# Patient Record
Sex: FEMALE | Race: WHITE | NOT HISPANIC OR LATINO | Employment: OTHER | URBAN - METROPOLITAN AREA
[De-identification: names, ages, dates, MRNs, and addresses within clinical notes are randomized per-mention and may not be internally consistent; named-entity substitution may affect disease eponyms.]

---

## 2021-01-01 ENCOUNTER — HOSPITAL ENCOUNTER (OUTPATIENT)
Dept: CARDIOLOGY | Facility: HOSPITAL | Age: 71
Discharge: HOME | End: 2021-10-26
Payer: COMMERCIAL

## 2021-01-01 ENCOUNTER — OFFICE VISIT (OUTPATIENT)
Dept: CARDIOLOGY | Facility: CLINIC | Age: 71
End: 2021-01-01
Payer: COMMERCIAL

## 2021-01-01 ENCOUNTER — LAB REQUISITION (OUTPATIENT)
Dept: LAB | Facility: CLINIC | Age: 71
End: 2021-01-01

## 2021-01-01 ENCOUNTER — HOSPITAL ENCOUNTER (OUTPATIENT)
Dept: CARDIAC REHAB | Facility: HOSPITAL | Age: 71
End: 2021-10-18
Attending: INTERNAL MEDICINE
Payer: COMMERCIAL

## 2021-01-01 ENCOUNTER — TELEPHONE (OUTPATIENT)
Dept: CARDIOLOGY | Facility: CLINIC | Age: 71
End: 2021-01-01
Payer: COMMERCIAL

## 2021-01-01 ENCOUNTER — HOSPITAL ENCOUNTER (OUTPATIENT)
Dept: RADIOLOGY | Facility: HOSPITAL | Age: 71
Discharge: HOME | End: 2021-12-03
Attending: INTERNAL MEDICINE | Admitting: INTERNAL MEDICINE
Payer: COMMERCIAL

## 2021-01-01 ENCOUNTER — LAB (OUTPATIENT)
Dept: CARDIOLOGY | Facility: CLINIC | Age: 71
End: 2021-01-01
Payer: COMMERCIAL

## 2021-01-01 ENCOUNTER — ALLIED HEALTH/NURSE VISIT (OUTPATIENT)
Dept: CARDIOLOGY | Facility: CLINIC | Age: 71
End: 2021-01-01

## 2021-01-01 ENCOUNTER — OFFICE VISIT (OUTPATIENT)
Dept: CARDIOLOGY | Facility: CLINIC | Age: 71
End: 2021-01-01

## 2021-01-01 VITALS
HEIGHT: 61 IN | WEIGHT: 166.6 LBS | RESPIRATION RATE: 20 BRPM | DIASTOLIC BLOOD PRESSURE: 82 MMHG | HEART RATE: 80 BPM | SYSTOLIC BLOOD PRESSURE: 152 MMHG | BODY MASS INDEX: 31.45 KG/M2

## 2021-01-01 VITALS
HEART RATE: 84 BPM | RESPIRATION RATE: 16 BRPM | SYSTOLIC BLOOD PRESSURE: 120 MMHG | BODY MASS INDEX: 30.02 KG/M2 | DIASTOLIC BLOOD PRESSURE: 70 MMHG | HEIGHT: 61 IN | WEIGHT: 159 LBS

## 2021-01-01 VITALS
RESPIRATION RATE: 16 BRPM | DIASTOLIC BLOOD PRESSURE: 84 MMHG | WEIGHT: 164 LBS | SYSTOLIC BLOOD PRESSURE: 126 MMHG | HEART RATE: 82 BPM | HEIGHT: 61 IN | BODY MASS INDEX: 30.96 KG/M2

## 2021-01-01 DIAGNOSIS — J90 PLEURAL EFFUSION ON RIGHT: ICD-10-CM

## 2021-01-01 DIAGNOSIS — Z95.2 S/P TAVR (TRANSCATHETER AORTIC VALVE REPLACEMENT): ICD-10-CM

## 2021-01-01 DIAGNOSIS — I50.32 CHRONIC HEART FAILURE WITH PRESERVED EJECTION FRACTION (H): Primary | ICD-10-CM

## 2021-01-01 DIAGNOSIS — I50.32 CHRONIC DIASTOLIC HEART FAILURE (H): Primary | ICD-10-CM

## 2021-01-01 DIAGNOSIS — R01.1 HEART MURMUR, SYSTOLIC: ICD-10-CM

## 2021-01-01 DIAGNOSIS — I35.0 SEVERE AORTIC STENOSIS: ICD-10-CM

## 2021-01-01 DIAGNOSIS — Z95.2 S/P TAVR (TRANSCATHETER AORTIC VALVE REPLACEMENT): Primary | ICD-10-CM

## 2021-01-01 DIAGNOSIS — Z09 ENCOUNTER FOR FOLLOW-UP EXAMINATION AFTER COMPLETED TREATMENT FOR CONDITIONS OTHER THAN MALIGNANT NEOPLASM: ICD-10-CM

## 2021-01-01 DIAGNOSIS — I10 HYPERTENSION, UNSPECIFIED TYPE: Primary | ICD-10-CM

## 2021-01-01 LAB
ANION GAP SERPL CALCULATED.3IONS-SCNC: 10 MMOL/L (ref 5–18)
ANION GAP SERPL CALCULATED.3IONS-SCNC: 10 MMOL/L (ref 5–18)
ANION GAP SERPL CALCULATED.3IONS-SCNC: 8 MMOL/L (ref 5–18)
ATRIAL RATE - MUSE: 82 BPM
BUN SERPL-MCNC: 12 MG/DL (ref 8–28)
BUN SERPL-MCNC: 12 MG/DL (ref 8–28)
BUN SERPL-MCNC: 9 MG/DL (ref 8–28)
CALCIUM SERPL-MCNC: 8.6 MG/DL (ref 8.5–10.5)
CALCIUM SERPL-MCNC: 8.7 MG/DL (ref 8.5–10.5)
CALCIUM SERPL-MCNC: 9.3 MG/DL (ref 8.5–10.5)
CHLORIDE BLD-SCNC: 102 MMOL/L (ref 98–107)
CHLORIDE BLD-SCNC: 103 MMOL/L (ref 98–107)
CHLORIDE BLD-SCNC: 104 MMOL/L (ref 98–107)
CO2 SERPL-SCNC: 28 MMOL/L (ref 22–31)
CO2 SERPL-SCNC: 29 MMOL/L (ref 22–31)
CO2 SERPL-SCNC: 30 MMOL/L (ref 22–31)
CREAT SERPL-MCNC: 1 MG/DL (ref 0.6–1.1)
CREAT SERPL-MCNC: 1.01 MG/DL (ref 0.6–1.1)
CREAT SERPL-MCNC: 1.1 MG/DL (ref 0.6–1.1)
DIASTOLIC BLOOD PRESSURE - MUSE: NORMAL MMHG
ERYTHROCYTE [DISTWIDTH] IN BLOOD BY AUTOMATED COUNT: 14.1 % (ref 10–15)
GFR SERPL CREATININE-BSD FRML MDRD: 50 ML/MIN/1.73M2
GFR SERPL CREATININE-BSD FRML MDRD: 55 ML/MIN/1.73M2
GFR SERPL CREATININE-BSD FRML MDRD: 56 ML/MIN/1.73M2
GLUCOSE BLD-MCNC: 101 MG/DL (ref 70–125)
GLUCOSE BLD-MCNC: 112 MG/DL (ref 70–125)
GLUCOSE BLD-MCNC: 97 MG/DL (ref 70–125)
HCT VFR BLD AUTO: 32.2 % (ref 35–47)
HGB BLD-MCNC: 10.5 G/DL (ref 11.7–15.7)
INTERPRETATION ECG - MUSE: NORMAL
LVEF ECHO: NORMAL
MCH RBC QN AUTO: 33.3 PG (ref 26.5–33)
MCHC RBC AUTO-ENTMCNC: 32.6 G/DL (ref 31.5–36.5)
MCV RBC AUTO: 102 FL (ref 78–100)
P AXIS - MUSE: 51 DEGREES
PLATELET # BLD AUTO: 103 10E3/UL (ref 150–450)
POTASSIUM BLD-SCNC: 3.6 MMOL/L (ref 3.5–5)
POTASSIUM BLD-SCNC: 4.1 MMOL/L (ref 3.5–5)
POTASSIUM BLD-SCNC: 4.5 MMOL/L (ref 3.5–5)
PR INTERVAL - MUSE: 152 MS
QRS DURATION - MUSE: 84 MS
QT - MUSE: 398 MS
QTC - MUSE: 464 MS
R AXIS - MUSE: 35 DEGREES
RBC # BLD AUTO: 3.15 10E6/UL (ref 3.8–5.2)
SODIUM SERPL-SCNC: 139 MMOL/L (ref 136–145)
SODIUM SERPL-SCNC: 141 MMOL/L (ref 136–145)
SODIUM SERPL-SCNC: 144 MMOL/L (ref 136–145)
SYSTOLIC BLOOD PRESSURE - MUSE: NORMAL MMHG
T AXIS - MUSE: -6 DEGREES
VENTRICULAR RATE- MUSE: 82 BPM
WBC # BLD AUTO: 4.3 10E3/UL (ref 4–11)

## 2021-01-01 PROCEDURE — 93306 TTE W/DOPPLER COMPLETE: CPT

## 2021-01-01 PROCEDURE — 93797 PHYS/QHP OP CAR RHAB WO ECG: CPT | Mod: XU

## 2021-01-01 PROCEDURE — 36415 COLL VENOUS BLD VENIPUNCTURE: CPT

## 2021-01-01 PROCEDURE — 71046 X-RAY EXAM CHEST 2 VIEWS: CPT

## 2021-01-01 PROCEDURE — 99214 OFFICE O/P EST MOD 30 MIN: CPT | Performed by: INTERNAL MEDICINE

## 2021-01-01 PROCEDURE — 93000 ELECTROCARDIOGRAM COMPLETE: CPT | Performed by: INTERNAL MEDICINE

## 2021-01-01 PROCEDURE — 99214 OFFICE O/P EST MOD 30 MIN: CPT | Performed by: NURSE PRACTITIONER

## 2021-01-01 PROCEDURE — 93306 TTE W/DOPPLER COMPLETE: CPT | Mod: 26 | Performed by: INTERNAL MEDICINE

## 2021-01-01 PROCEDURE — 80048 BASIC METABOLIC PNL TOTAL CA: CPT | Performed by: FAMILY MEDICINE

## 2021-01-01 PROCEDURE — 80048 BASIC METABOLIC PNL TOTAL CA: CPT | Performed by: NURSE PRACTITIONER

## 2021-01-01 PROCEDURE — 93798 PHYS/QHP OP CAR RHAB W/ECG: CPT

## 2021-01-01 PROCEDURE — 85027 COMPLETE CBC AUTOMATED: CPT

## 2021-01-01 PROCEDURE — 80048 BASIC METABOLIC PNL TOTAL CA: CPT

## 2021-01-01 PROCEDURE — 36415 COLL VENOUS BLD VENIPUNCTURE: CPT | Performed by: NURSE PRACTITIONER

## 2021-01-01 RX ORDER — HYDROXYZINE HYDROCHLORIDE 25 MG/1
25 TABLET, FILM COATED ORAL PRN
COMMUNITY
Start: 2021-01-01 | End: 2022-01-01

## 2021-01-01 RX ORDER — BUPROPION HYDROCHLORIDE 150 MG/1
1 TABLET ORAL EVERY MORNING
COMMUNITY
Start: 2021-09-13 | End: 2021-01-01

## 2021-01-01 RX ORDER — TORSEMIDE 20 MG/1
20 TABLET ORAL DAILY
Qty: 30 TABLET | Refills: 12 | Status: SHIPPED | OUTPATIENT
Start: 2021-01-01 | End: 2021-01-01

## 2021-01-01 RX ORDER — TORSEMIDE 20 MG/1
20 TABLET ORAL DAILY
Qty: 90 TABLET | Refills: 1 | Status: SHIPPED | OUTPATIENT
Start: 2021-01-01 | End: 2022-01-01

## 2021-01-01 RX ORDER — LOSARTAN POTASSIUM 25 MG/1
25 TABLET ORAL DAILY
Qty: 90 TABLET | Refills: 3 | Status: ON HOLD | OUTPATIENT
Start: 2021-01-01 | End: 2022-01-01

## 2021-01-01 ASSESSMENT — MIFFLIN-ST. JEOR
SCORE: 1198.07
SCORE: 1186.28
SCORE: 1163.6

## 2021-01-04 ENCOUNTER — HOME CARE/HOSPICE - HEALTHEAST (OUTPATIENT)
Dept: HOME HEALTH SERVICES | Facility: HOME HEALTH | Age: 71
End: 2021-01-04

## 2021-01-11 ENCOUNTER — HOME CARE/HOSPICE - HEALTHEAST (OUTPATIENT)
Dept: HOME HEALTH SERVICES | Facility: HOME HEALTH | Age: 71
End: 2021-01-11

## 2021-01-13 ENCOUNTER — HOME CARE/HOSPICE - HEALTHEAST (OUTPATIENT)
Dept: HOME HEALTH SERVICES | Facility: HOME HEALTH | Age: 71
End: 2021-01-13

## 2021-01-15 ENCOUNTER — HOME CARE/HOSPICE - HEALTHEAST (OUTPATIENT)
Dept: HOME HEALTH SERVICES | Facility: HOME HEALTH | Age: 71
End: 2021-01-15

## 2021-01-19 ENCOUNTER — HOME CARE/HOSPICE - HEALTHEAST (OUTPATIENT)
Dept: HOME HEALTH SERVICES | Facility: HOME HEALTH | Age: 71
End: 2021-01-19

## 2021-01-20 ENCOUNTER — HOME CARE/HOSPICE - HEALTHEAST (OUTPATIENT)
Dept: HOME HEALTH SERVICES | Facility: HOME HEALTH | Age: 71
End: 2021-01-20

## 2021-01-26 ENCOUNTER — RECORDS - HEALTHEAST (OUTPATIENT)
Dept: CARDIOLOGY | Facility: HOSPITAL | Age: 71
End: 2021-01-26

## 2021-01-26 ENCOUNTER — RECORDS - HEALTHEAST (OUTPATIENT)
Dept: ADMINISTRATIVE | Facility: OTHER | Age: 71
End: 2021-01-26

## 2021-01-26 DIAGNOSIS — R01.1 HEART MURMUR, SYSTOLIC: ICD-10-CM

## 2021-05-19 ENCOUNTER — RECORDS - HEALTHEAST (OUTPATIENT)
Dept: LAB | Facility: CLINIC | Age: 71
End: 2021-05-19

## 2021-05-19 LAB
CHOLEST SERPL-MCNC: 150 MG/DL
FASTING STATUS PATIENT QL REPORTED: NORMAL
HDLC SERPL-MCNC: 52 MG/DL
LDLC SERPL CALC-MCNC: 81 MG/DL
TRIGL SERPL-MCNC: 87 MG/DL
TSH SERPL DL<=0.005 MIU/L-ACNC: 1.68 UIU/ML (ref 0.3–5)

## 2021-05-24 ENCOUNTER — RECORDS - HEALTHEAST (OUTPATIENT)
Dept: ADMINISTRATIVE | Facility: CLINIC | Age: 71
End: 2021-05-24

## 2021-05-25 ENCOUNTER — RECORDS - HEALTHEAST (OUTPATIENT)
Dept: ADMINISTRATIVE | Facility: CLINIC | Age: 71
End: 2021-05-25

## 2021-05-26 ENCOUNTER — RECORDS - HEALTHEAST (OUTPATIENT)
Dept: ADMINISTRATIVE | Facility: CLINIC | Age: 71
End: 2021-05-26

## 2021-05-27 ENCOUNTER — RECORDS - HEALTHEAST (OUTPATIENT)
Dept: ADMINISTRATIVE | Facility: CLINIC | Age: 71
End: 2021-05-27

## 2021-05-27 VITALS
DIASTOLIC BLOOD PRESSURE: 70 MMHG | TEMPERATURE: 98.1 F | SYSTOLIC BLOOD PRESSURE: 122 MMHG | OXYGEN SATURATION: 94 % | HEART RATE: 81 BPM

## 2021-06-01 ENCOUNTER — RECORDS - HEALTHEAST (OUTPATIENT)
Dept: ADMINISTRATIVE | Facility: OTHER | Age: 71
End: 2021-06-01

## 2021-06-02 ENCOUNTER — AMBULATORY - HEALTHEAST (OUTPATIENT)
Dept: CARDIOLOGY | Facility: CLINIC | Age: 71
End: 2021-06-02

## 2021-06-16 PROBLEM — M25.469 KNEE EFFUSION: Status: ACTIVE | Noted: 2021-01-02

## 2021-06-25 NOTE — PROGRESS NOTES
"  ----- Message -----------------------------------------------------------------  From: Aaliyah Miranda  Sent: 6/1/2021   5:09 PM CDT  To: Garfield Leigh RN, [facility] [place]  Subject: RE: AS                                           Called and spoke to patient.  She is moving this month and would like to call me back after she gets settled.      ----- Message ------------------------------------------------------------------  From: Garfield Leigh RN  Sent: 6/1/2021   8:37 AM CDT  [facility] [place]  Subject: AS                                                 Schedule in  for AS. Echo report from [facility] will be scanned in media. (MG 46, KP 07.)    Thanks    ----- Message ------------------------------------------------------------------  From: Francine Travis  Sent: 5/28/2021  11:20 AM CDT  To: [facility] [place]  Subject: Valve consult                                    Hi Team,     We got a faxed referral from Dr. oNra Mccarthy with [facility]. States that \"the pt's echo shows normal LVEF but severe aortic stenosis. This may be contributing to Katherine's low mood and fatigue. Absolutely needs to see cardiology to discuss tx options\"    Should this pt establish with gen card or will she be a good fit for Valve Clinic?  "

## 2021-07-30 DIAGNOSIS — I35.0 AORTIC STENOSIS: Primary | ICD-10-CM

## 2021-08-02 ENCOUNTER — LAB (OUTPATIENT)
Dept: CARDIOLOGY | Facility: CLINIC | Age: 71
End: 2021-08-02
Payer: COMMERCIAL

## 2021-08-02 ENCOUNTER — ALLIED HEALTH/NURSE VISIT (OUTPATIENT)
Dept: CARDIOLOGY | Facility: CLINIC | Age: 71
End: 2021-08-02
Payer: COMMERCIAL

## 2021-08-02 ENCOUNTER — OFFICE VISIT (OUTPATIENT)
Dept: CARDIOLOGY | Facility: CLINIC | Age: 71
End: 2021-08-02
Payer: COMMERCIAL

## 2021-08-02 VITALS
RESPIRATION RATE: 16 BRPM | SYSTOLIC BLOOD PRESSURE: 124 MMHG | DIASTOLIC BLOOD PRESSURE: 82 MMHG | HEART RATE: 92 BPM | WEIGHT: 163.4 LBS | BODY MASS INDEX: 30.87 KG/M2

## 2021-08-02 DIAGNOSIS — I35.0 NONRHEUMATIC AORTIC VALVE STENOSIS: Primary | ICD-10-CM

## 2021-08-02 DIAGNOSIS — I35.0 SEVERE AORTIC STENOSIS: ICD-10-CM

## 2021-08-02 DIAGNOSIS — I35.0 AORTIC STENOSIS: Primary | ICD-10-CM

## 2021-08-02 DIAGNOSIS — I35.0 AORTIC STENOSIS: ICD-10-CM

## 2021-08-02 LAB
ALBUMIN SERPL-MCNC: 3.8 G/DL (ref 3.5–5)
ALP SERPL-CCNC: 99 U/L (ref 45–120)
ALT SERPL W P-5'-P-CCNC: 15 U/L (ref 0–45)
ANION GAP SERPL CALCULATED.3IONS-SCNC: 7 MMOL/L (ref 5–18)
AST SERPL W P-5'-P-CCNC: 34 U/L (ref 0–40)
ATRIAL RATE - MUSE: 83 BPM
BILIRUB SERPL-MCNC: 0.5 MG/DL (ref 0–1)
BUN SERPL-MCNC: 8 MG/DL (ref 8–28)
CALCIUM SERPL-MCNC: 9.2 MG/DL (ref 8.5–10.5)
CHLORIDE BLD-SCNC: 107 MMOL/L (ref 98–107)
CO2 SERPL-SCNC: 27 MMOL/L (ref 22–31)
CREAT SERPL-MCNC: 0.9 MG/DL (ref 0.6–1.1)
DIASTOLIC BLOOD PRESSURE - MUSE: NORMAL MMHG
ERYTHROCYTE [DISTWIDTH] IN BLOOD BY AUTOMATED COUNT: 14.9 % (ref 10–15)
GFR SERPL CREATININE-BSD FRML MDRD: 64 ML/MIN/1.73M2
GLUCOSE BLD-MCNC: 94 MG/DL (ref 70–125)
HCT VFR BLD AUTO: 34.6 % (ref 35–47)
HGB BLD-MCNC: 11.5 G/DL (ref 11.7–15.7)
INTERPRETATION ECG - MUSE: NORMAL
MCH RBC QN AUTO: 33.8 PG (ref 26.5–33)
MCHC RBC AUTO-ENTMCNC: 33.2 G/DL (ref 31.5–36.5)
MCV RBC AUTO: 102 FL (ref 78–100)
P AXIS - MUSE: 73 DEGREES
PLATELET # BLD AUTO: 149 10E3/UL (ref 150–450)
POTASSIUM BLD-SCNC: 3.8 MMOL/L (ref 3.5–5)
PR INTERVAL - MUSE: 142 MS
PROT SERPL-MCNC: 7.4 G/DL (ref 6–8)
QRS DURATION - MUSE: 86 MS
QT - MUSE: 370 MS
QTC - MUSE: 434 MS
R AXIS - MUSE: 69 DEGREES
RBC # BLD AUTO: 3.4 10E6/UL (ref 3.8–5.2)
SODIUM SERPL-SCNC: 141 MMOL/L (ref 136–145)
SYSTOLIC BLOOD PRESSURE - MUSE: NORMAL MMHG
T AXIS - MUSE: -84 DEGREES
VENTRICULAR RATE- MUSE: 83 BPM
WBC # BLD AUTO: 4.1 10E3/UL (ref 4–11)

## 2021-08-02 PROCEDURE — 99205 OFFICE O/P NEW HI 60 MIN: CPT | Performed by: INTERNAL MEDICINE

## 2021-08-02 PROCEDURE — 85027 COMPLETE CBC AUTOMATED: CPT

## 2021-08-02 PROCEDURE — 99207 PR NO CHARGE NURSE ONLY: CPT

## 2021-08-02 PROCEDURE — 93000 ELECTROCARDIOGRAM COMPLETE: CPT | Performed by: INTERNAL MEDICINE

## 2021-08-02 PROCEDURE — 36415 COLL VENOUS BLD VENIPUNCTURE: CPT

## 2021-08-02 PROCEDURE — 80053 COMPREHEN METABOLIC PANEL: CPT

## 2021-08-02 RX ORDER — FUROSEMIDE 20 MG
20 TABLET ORAL 2 TIMES DAILY PRN
Qty: 6 TABLET | Refills: 0 | Status: SHIPPED | OUTPATIENT
Start: 2021-08-02 | End: 2021-08-02

## 2021-08-02 RX ORDER — FUROSEMIDE 20 MG
20 TABLET ORAL DAILY
Qty: 90 TABLET | Refills: 3 | Status: SHIPPED | OUTPATIENT
Start: 2021-08-02 | End: 2021-08-02

## 2021-08-02 RX ORDER — FUROSEMIDE 20 MG
TABLET ORAL
Qty: 90 TABLET | Refills: 3 | Status: SHIPPED | OUTPATIENT
Start: 2021-08-02 | End: 2021-01-01

## 2021-08-02 NOTE — PROGRESS NOTES
"Patient in valve clinic today to discuss her severe aortic stenosis.     Valve Clinic Nursing Note: Aortic Stenosis    Referring provider: PCP    Patient history is significant for severe aortic stenosis, MAC, anxiety, h/o recent tobacco.     Symptoms include BLE swelling (patient stopped her lasix 2-3 weeks ago due to it being \"20 years old\"), fatigue, dizziness    Echo information: ()  EF: 65-70%  M P. Thomas: 4.2 KP: 0.7    CT scan needs to be done.     Tentative Plan: patient is going to try to get herself prepared for getting a procedure done- she would like to get her tests done at the end of August.   We will call her to get all of her tests scheduled.    TAVR Frailty nursing assessment:    Serum albumin (date completed 2021): 3.8  5 meter walk (date completed 2021): 9.50, 10.85, 10.08  De Leon index (date completed 2021): 6/6  Total frailty score: 0/3    KCCQ12 (date completed 2021)    Preliminary STS score: 2%      Alexa Vigil, RN, BSN  Valve Clinic Coordinator  [facility]  [phone]  21 3:12 PM    "

## 2021-08-02 NOTE — H&P (VIEW-ONLY)
HEART CARE ENCOUNTER CONSULTATON NOTE      [facility]  [phone]      Assessment/Recommendations   Assessment/Plan:    Severe symptomatic aortic valve stenosis: Given the findings of her echocardiogram as well as her declining functional capacity,Ms Fofana will benefit from aortic valve replacement.  The options of surgical as well as transcatheter aortic valve replacement were reviewed, given her age and comorbidities, she would be a good candidate for TAVR.    She will be scheduled for a coronary angiogram and a CTA to help plan the procedure.  Of note given her prior AAA repair, it is likely that she will need alternative access for valve delivery.    In the interim, she has been asked to avoid any exertion, and knows to call if there is a change in condition or worsening symptoms.    HFpEF: Ms Fofana describes increasing lower extremity edema, state that she ran out of her Lasix a few days ago.  She was provided a prescription for 20 mg twice daily for the next 3 days followed by 20 mg daily.    She did not want to schedule her procedures at this time, stating that she had to rearrange her calendar, and will therefore be called in the next week for her CTA and angiogram appointments.    She was also asked to follow-up with her primary care provider given some complaints of increasing thirst and weight loss, and her recent glucose of 155, concerning for underlying diabetes mellitus.       History of Present Illness/Subjective    HPI: Maren Fofana is a 71 year old female with a history of hypertension and dyslipidemia, as well as prior AAA endograft repair and L2-L3 laminectomy.    She had an echocardiogram recently based on a worsening murmur and increased fatigue which showed severe aortic valve stenosis, with a mean gradient of 46 mmHg, aortic valve area of 0.7 cm , in the setting of normal left ventricular systolic function with ejection fraction of 60%.    Ms Fofana reports a decline  in her functional capacity with some shortness of breath. She denies chest pain, dizziness or lightheadedness.             Physical Examination  Review of Systems   Vitals: /82 (BP Location: Right arm, Patient Position: Sitting, Cuff Size: Adult Regular)   Pulse 92   Resp 16   Wt 74.1 kg (163 lb 6.4 oz)   BMI 30.87 kg/m    BMI= Body mass index is 30.87 kg/m .  Wt Readings from Last 3 Encounters:   21 74.1 kg (163 lb 6.4 oz)       General Appearance:   no distress, normal body habitus, upright.   ENT/Mouth: membranes moist, no nasal discharge or bleeding gums.  Normal head shape, no evidence of injury or laceration.     EYES:  no scleral icterus, normal conjunctivae   Neck: no evidence of thyromegaly.  Supple   Chest/Lungs:   No audible wheezing equal chest wall expansion. Non labored breathing.  No cough.   Cardiovascular:   No evidence of elevated jugular venous pressure.  No evidence of pitting edema bilaterally    Abdomen:  no evidence of abdominal distention. No observe juandice.     Extremities: no cyanosis or clubbing noted.    Skin: no xanthelasma, normal skin color. No evidence of facial lacerations.      Neurologic: Normal arm motion bilateral, no tremors.  No evidence of focal defect.       Psychiatric: alert and oriented x3, calm        Please refer above for cardiac ROS details.        Medical History  Surgical History Family History Social History   Past Medical History:   Diagnosis Date     Aortic stenosis      Fibromyalgia      GERD (gastroesophageal reflux disease)      Hypothyroid      Migraine      Peripheral vascular disease (H)      Reactive airway disease      Past Surgical History:   Procedure Laterality Date      SECTION Bilateral      HYSTERECTOMY       POSTERIOR LAMINECTOMY / DECOMPRESSION LUMBAR SPINE      L2-L3     SPINAL FUSION       TONSILLECTOMY & ADENOIDECTOMY       TOTAL KNEE ARTHROPLASTY Bilateral      Family History   Problem Relation Age of Onset      Pancreatic Cancer Mother      Pancreatic Cancer Father         Social History     Socioeconomic History     Marital status: Legally      Spouse name: Not on file     Number of children: Not on file     Years of education: Not on file     Highest education level: Not on file   Occupational History     Not on file   Tobacco Use     Smoking status: Former Smoker     Packs/day: 0.50     Quit date: 2021     Years since quittin.3     Smokeless tobacco: Never Used   Substance and Sexual Activity     Alcohol use: Yes     Drug use: Never     Sexual activity: Not on file   Other Topics Concern     Not on file   Social History Narrative     Not on file     Social Determinants of Health     Financial Resource Strain:      Difficulty of Paying Living Expenses:    Food Insecurity:      Worried About Running Out of Food in the Last Year:      Ran Out of Food in the Last Year:    Transportation Needs:      Lack of Transportation (Medical):      Lack of Transportation (Non-Medical):    Physical Activity:      Days of Exercise per Week:      Minutes of Exercise per Session:    Stress:      Feeling of Stress :    Social Connections:      Frequency of Communication with Friends and Family:      Frequency of Social Gatherings with Friends and Family:      Attends Zoroastrian Services:      Active Member of Clubs or Organizations:      Attends Club or Organization Meetings:      Marital Status:    Intimate Partner Violence:      Fear of Current or Ex-Partner:      Emotionally Abused:      Physically Abused:      Sexually Abused:            Medications  Allergies   Current Outpatient Medications   Medication Sig Dispense Refill     acetaminophen (TYLENOL) 500 MG tablet [ACETAMINOPHEN (TYLENOL) 500 MG TABLET] Take 2 tablets (1,000 mg total) by mouth 3 (three) times a day.  0     aspirin-acetaminophen-caffeine (EXCEDRIN MIGRAINE) 250-250-65 mg per tablet [ASPIRIN-ACETAMINOPHEN-CAFFEINE (EXCEDRIN MIGRAINE) 250-250-65 MG PER  TABLET] Take 2 tablets by mouth every 6 (six) hours as needed (migraines).  0     atorvastatin (LIPITOR) 80 MG tablet [ATORVASTATIN (LIPITOR) 80 MG TABLET] Take 80 mg by mouth daily.       EPINEPHrine (EPIPEN/ADRENACLICK/AUVI-Q) 0.3 mg/0.3 mL injection 0.3 mg as needed        levothyroxine (SYNTHROID, LEVOTHROID) 88 MCG tablet [LEVOTHYROXINE (SYNTHROID, LEVOTHROID) 88 MCG TABLET] Take 88 mcg by mouth daily.       traZODone (DESYREL) 50 MG tablet [TRAZODONE (DESYREL) 50 MG TABLET] Take 150 mg by mouth at bedtime.       venlafaxine (EFFEXOR-XR) 150 MG 24 hr capsule [VENLAFAXINE (EFFEXOR-XR) 150 MG 24 HR CAPSULE] Take 300 mg by mouth daily.       baclofen (LIORESAL) 10 MG tablet [BACLOFEN (LIORESAL) 10 MG TABLET] Take 10 mg by mouth 3 (three) times a day as needed (spasm). (Patient not taking: Reported on 8/2/2021)       docusate sodium (COLACE) 100 MG capsule [DOCUSATE SODIUM (COLACE) 100 MG CAPSULE] Take 1 capsule (100 mg total) by mouth 2 (two) times a day as needed for constipation. (Patient not taking: Reported on 8/2/2021)  0     furosemide (LASIX) 20 MG tablet [FUROSEMIDE (LASIX) 20 MG TABLET] Take 20 mg by mouth daily as needed. (Patient not taking: Reported on 8/2/2021)       ibuprofen (ADVIL,MOTRIN) 200 MG tablet [IBUPROFEN (ADVIL,MOTRIN) 200 MG TABLET] Take 3 tablets (600 mg total) by mouth every 6 (six) hours as needed for pain. Take with food or snack (Patient not taking: Reported on 8/2/2021)       oxybutynin (DITROPAN) 5 MG tablet [OXYBUTYNIN (DITROPAN) 5 MG TABLET] Take 15 mg by mouth at bedtime. (Patient not taking: Reported on 8/2/2021)       oxyCODONE (ROXICODONE) 5 MG immediate release tablet [OXYCODONE (ROXICODONE) 5 MG IMMEDIATE RELEASE TABLET] Take 0.5 tablets (2.5 mg total) by mouth every 6 (six) hours as needed for pain. (Patient not taking: Reported on 8/2/2021) 6 tablet 0       Allergies   Allergen Reactions     Bee Venom Anaphylaxis     Dilaudid [Hydromorphone] Other (See Comments)      Altered mental status     Latex      hives          Lab Results    Chemistry/lipid CBC Cardiac Enzymes/BNP/TSH/INR   Recent Labs   Lab Test 05/19/21  1332   CHOL 150   HDL 52   LDL 81   TRIG 87     Recent Labs   Lab Test 05/19/21  1332   LDL 81     Recent Labs   Lab Test 01/02/21  0912      POTASSIUM 3.6   CHLORIDE 105   CO2 25      BUN 10   CR 0.83   GFRESTIMATED >60   YENI 8.7     Recent Labs   Lab Test 01/02/21 0912   CR 0.83     No results for input(s): A1C in the last 98683 hours.       Recent Labs   Lab Test 01/02/21 0912   WBC 4.7   HGB 11.8*   HCT 34.7*   MCV 98   *     Recent Labs   Lab Test 01/02/21 0912   HGB 11.8*    No results for input(s): TROPONINI in the last 18318 hours.  No results for input(s): BNP, NTBNPI, NTBNP in the last 94001 hours.  Recent Labs   Lab Test 05/19/21  1332   TSH 1.68     Recent Labs   Lab Test 01/02/21 0912   INR 1.10        Sara Randall MD

## 2021-08-02 NOTE — LETTER
8/2/2021    Bindu Webster MD  [facility] [address] [facility] [address]    RE: Maren Fofana       Dear Colleague,    I had the pleasure of seeing Maren Fofana in the [facility].      HEART CARE ENCOUNTER CONSULTATON NOTE      [facility]  [phone]      Assessment/Recommendations   Assessment/Plan:    Severe symptomatic aortic valve stenosis: Given the findings of her echocardiogram as well as her declining functional capacity,Ms Fofana will benefit from aortic valve replacement.  The options of surgical as well as transcatheter aortic valve replacement were reviewed, given her age and comorbidities, she would be a good candidate for TAVR.    She will be scheduled for a coronary angiogram and a CTA to help plan the procedure.  Of note given her prior AAA repair, it is likely that she will need alternative access for valve delivery.    In the interim, she has been asked to avoid any exertion, and knows to call if there is a change in condition or worsening symptoms.    HFpEF: Ms Fofana describes increasing lower extremity edema, state that she ran out of her Lasix a few days ago.  She was provided a prescription for 20 mg twice daily for the next 3 days followed by 20 mg daily.    She did not want to schedule her procedures at this time, stating that she had to rearrange her calendar, and will therefore be called in the next week for her CTA and angiogram appointments.    She was also asked to follow-up with her primary care provider given some complaints of increasing thirst and weight loss, and her recent glucose of 155, concerning for underlying diabetes mellitus.       History of Present Illness/Subjective    HPI: Maren Fofana is a 71 year old female with a history of hypertension and dyslipidemia, as well as prior AAA endograft repair and L2-L3 laminectomy.    She had an echocardiogram recently based on a  worsening murmur and increased fatigue which showed severe aortic valve stenosis, with a mean gradient of 46 mmHg, aortic valve area of 0.7 cm , in the setting of normal left ventricular systolic function with ejection fraction of 60%.    Ms Fofana reports a decline in her functional capacity with some shortness of breath. She denies chest pain, dizziness or lightheadedness.             Physical Examination  Review of Systems   Vitals: /82 (BP Location: Right arm, Patient Position: Sitting, Cuff Size: Adult Regular)   Pulse 92   Resp 16   Wt 74.1 kg (163 lb 6.4 oz)   BMI 30.87 kg/m    BMI= Body mass index is 30.87 kg/m .  Wt Readings from Last 3 Encounters:   08/02/21 74.1 kg (163 lb 6.4 oz)       General Appearance:   no distress, normal body habitus, upright.   ENT/Mouth: membranes moist, no nasal discharge or bleeding gums.  Normal head shape, no evidence of injury or laceration.     EYES:  no scleral icterus, normal conjunctivae   Neck: no evidence of thyromegaly.  Supple   Chest/Lungs:   No audible wheezing equal chest wall expansion. Non labored breathing.  No cough.   Cardiovascular:   No evidence of elevated jugular venous pressure.  No evidence of pitting edema bilaterally    Abdomen:  no evidence of abdominal distention. No observe juandice.     Extremities: no cyanosis or clubbing noted.    Skin: no xanthelasma, normal skin color. No evidence of facial lacerations.      Neurologic: Normal arm motion bilateral, no tremors.  No evidence of focal defect.       Psychiatric: alert and oriented x3, calm        Please refer above for cardiac ROS details.        Medical History  Surgical History Family History Social History   Past Medical History:   Diagnosis Date     Aortic stenosis      Fibromyalgia      GERD (gastroesophageal reflux disease)      Hypothyroid      Migraine      Peripheral vascular disease (H)      Reactive airway disease      Past Surgical History:   Procedure Laterality Date       SECTION Bilateral      HYSTERECTOMY       POSTERIOR LAMINECTOMY / DECOMPRESSION LUMBAR SPINE      L2-L3     SPINAL FUSION       TONSILLECTOMY & ADENOIDECTOMY       TOTAL KNEE ARTHROPLASTY Bilateral      Family History   Problem Relation Age of Onset     Pancreatic Cancer Mother      Pancreatic Cancer Father         Social History     Socioeconomic History     Marital status: Legally      Spouse name: Not on file     Number of children: Not on file     Years of education: Not on file     Highest education level: Not on file   Occupational History     Not on file   Tobacco Use     Smoking status: Former Smoker     Packs/day: 0.50     Quit date: 2021     Years since quittin.3     Smokeless tobacco: Never Used   Substance and Sexual Activity     Alcohol use: Yes     Drug use: Never     Sexual activity: Not on file   Other Topics Concern     Not on file   Social History Narrative     Not on file     Social Determinants of Health     Financial Resource Strain:      Difficulty of Paying Living Expenses:    Food Insecurity:      Worried About Running Out of Food in the Last Year:      Ran Out of Food in the Last Year:    Transportation Needs:      Lack of Transportation (Medical):      Lack of Transportation (Non-Medical):    Physical Activity:      Days of Exercise per Week:      Minutes of Exercise per Session:    Stress:      Feeling of Stress :    Social Connections:      Frequency of Communication with Friends and Family:      Frequency of Social Gatherings with Friends and Family:      Attends Jewish Services:      Active Member of Clubs or Organizations:      Attends Club or Organization Meetings:      Marital Status:    Intimate Partner Violence:      Fear of Current or Ex-Partner:      Emotionally Abused:      Physically Abused:      Sexually Abused:            Medications  Allergies   Current Outpatient Medications   Medication Sig Dispense Refill     acetaminophen (TYLENOL) 500 MG  tablet [ACETAMINOPHEN (TYLENOL) 500 MG TABLET] Take 2 tablets (1,000 mg total) by mouth 3 (three) times a day.  0     aspirin-acetaminophen-caffeine (EXCEDRIN MIGRAINE) 250-250-65 mg per tablet [ASPIRIN-ACETAMINOPHEN-CAFFEINE (EXCEDRIN MIGRAINE) 250-250-65 MG PER TABLET] Take 2 tablets by mouth every 6 (six) hours as needed (migraines).  0     atorvastatin (LIPITOR) 80 MG tablet [ATORVASTATIN (LIPITOR) 80 MG TABLET] Take 80 mg by mouth daily.       EPINEPHrine (EPIPEN/ADRENACLICK/AUVI-Q) 0.3 mg/0.3 mL injection 0.3 mg as needed        levothyroxine (SYNTHROID, LEVOTHROID) 88 MCG tablet [LEVOTHYROXINE (SYNTHROID, LEVOTHROID) 88 MCG TABLET] Take 88 mcg by mouth daily.       traZODone (DESYREL) 50 MG tablet [TRAZODONE (DESYREL) 50 MG TABLET] Take 150 mg by mouth at bedtime.       venlafaxine (EFFEXOR-XR) 150 MG 24 hr capsule [VENLAFAXINE (EFFEXOR-XR) 150 MG 24 HR CAPSULE] Take 300 mg by mouth daily.       baclofen (LIORESAL) 10 MG tablet [BACLOFEN (LIORESAL) 10 MG TABLET] Take 10 mg by mouth 3 (three) times a day as needed (spasm). (Patient not taking: Reported on 8/2/2021)       docusate sodium (COLACE) 100 MG capsule [DOCUSATE SODIUM (COLACE) 100 MG CAPSULE] Take 1 capsule (100 mg total) by mouth 2 (two) times a day as needed for constipation. (Patient not taking: Reported on 8/2/2021)  0     furosemide (LASIX) 20 MG tablet [FUROSEMIDE (LASIX) 20 MG TABLET] Take 20 mg by mouth daily as needed. (Patient not taking: Reported on 8/2/2021)       ibuprofen (ADVIL,MOTRIN) 200 MG tablet [IBUPROFEN (ADVIL,MOTRIN) 200 MG TABLET] Take 3 tablets (600 mg total) by mouth every 6 (six) hours as needed for pain. Take with food or snack (Patient not taking: Reported on 8/2/2021)       oxybutynin (DITROPAN) 5 MG tablet [OXYBUTYNIN (DITROPAN) 5 MG TABLET] Take 15 mg by mouth at bedtime. (Patient not taking: Reported on 8/2/2021)       oxyCODONE (ROXICODONE) 5 MG immediate release tablet [OXYCODONE (ROXICODONE) 5 MG IMMEDIATE RELEASE  TABLET] Take 0.5 tablets (2.5 mg total) by mouth every 6 (six) hours as needed for pain. (Patient not taking: Reported on 8/2/2021) 6 tablet 0       Allergies   Allergen Reactions     Bee Venom Anaphylaxis     Dilaudid [Hydromorphone] Other (See Comments)     Altered mental status     Latex      hives          Lab Results    Chemistry/lipid CBC Cardiac Enzymes/BNP/TSH/INR   Recent Labs   Lab Test 05/19/21  1332   CHOL 150   HDL 52   LDL 81   TRIG 87     Recent Labs   Lab Test 05/19/21  1332   LDL 81     Recent Labs   Lab Test 01/02/21  0912      POTASSIUM 3.6   CHLORIDE 105   CO2 25      BUN 10   CR 0.83   GFRESTIMATED >60   YENI 8.7     Recent Labs   Lab Test 01/02/21 0912   CR 0.83     No results for input(s): A1C in the last 96284 hours.       Recent Labs   Lab Test 01/02/21 0912   WBC 4.7   HGB 11.8*   HCT 34.7*   MCV 98   *     Recent Labs   Lab Test 01/02/21 0912   HGB 11.8*    No results for input(s): TROPONINI in the last 69829 hours.  No results for input(s): BNP, NTBNPI, NTBNP in the last 14152 hours.  Recent Labs   Lab Test 05/19/21  1332   TSH 1.68     Recent Labs   Lab Test 01/02/21 0912   INR 1.10        Sara Randall MD                                          Thank you for allowing me to participate in the care of your patient.      Sincerely,     Sara Randall MD     [facility]  cc:   No referring provider defined for this encounter.

## 2021-08-02 NOTE — Clinical Note
Would you mind calling patient to schedule her pre-TAVR testing, CT, angiogram and surgeon consult. Patient would like these scheduled for end of August. Thanks!  Alexa

## 2021-08-02 NOTE — PROGRESS NOTES
HEART CARE ENCOUNTER CONSULTATON NOTE      [facility]  [phone]      Assessment/Recommendations   Assessment/Plan:    Severe symptomatic aortic valve stenosis: Given the findings of her echocardiogram as well as her declining functional capacity,Ms Fofana will benefit from aortic valve replacement.  The options of surgical as well as transcatheter aortic valve replacement were reviewed, given her age and comorbidities, she would be a good candidate for TAVR.    She will be scheduled for a coronary angiogram and a CTA to help plan the procedure.  Of note given her prior AAA repair, it is likely that she will need alternative access for valve delivery.    In the interim, she has been asked to avoid any exertion, and knows to call if there is a change in condition or worsening symptoms.    HFpEF: Ms Fofana describes increasing lower extremity edema, state that she ran out of her Lasix a few days ago.  She was provided a prescription for 20 mg twice daily for the next 3 days followed by 20 mg daily.    She did not want to schedule her procedures at this time, stating that she had to rearrange her calendar, and will therefore be called in the next week for her CTA and angiogram appointments.    She was also asked to follow-up with her primary care provider given some complaints of increasing thirst and weight loss, and her recent glucose of 155, concerning for underlying diabetes mellitus.       History of Present Illness/Subjective    HPI: Maren Fofana is a 71 year old female with a history of hypertension and dyslipidemia, as well as prior AAA endograft repair and L2-L3 laminectomy.    She had an echocardiogram recently based on a worsening murmur and increased fatigue which showed severe aortic valve stenosis, with a mean gradient of 46 mmHg, aortic valve area of 0.7 cm , in the setting of normal left ventricular systolic function with ejection fraction of 60%.    Ms Fofana reports a decline  in her functional capacity with some shortness of breath. She denies chest pain, dizziness or lightheadedness.             Physical Examination  Review of Systems   Vitals: /82 (BP Location: Right arm, Patient Position: Sitting, Cuff Size: Adult Regular)   Pulse 92   Resp 16   Wt 74.1 kg (163 lb 6.4 oz)   BMI 30.87 kg/m    BMI= Body mass index is 30.87 kg/m .  Wt Readings from Last 3 Encounters:   21 74.1 kg (163 lb 6.4 oz)       General Appearance:   no distress, normal body habitus, upright.   ENT/Mouth: membranes moist, no nasal discharge or bleeding gums.  Normal head shape, no evidence of injury or laceration.     EYES:  no scleral icterus, normal conjunctivae   Neck: no evidence of thyromegaly.  Supple   Chest/Lungs:   No audible wheezing equal chest wall expansion. Non labored breathing.  No cough.   Cardiovascular:   No evidence of elevated jugular venous pressure.  No evidence of pitting edema bilaterally    Abdomen:  no evidence of abdominal distention. No observe juandice.     Extremities: no cyanosis or clubbing noted.    Skin: no xanthelasma, normal skin color. No evidence of facial lacerations.      Neurologic: Normal arm motion bilateral, no tremors.  No evidence of focal defect.       Psychiatric: alert and oriented x3, calm        Please refer above for cardiac ROS details.        Medical History  Surgical History Family History Social History   Past Medical History:   Diagnosis Date     Aortic stenosis      Fibromyalgia      GERD (gastroesophageal reflux disease)      Hypothyroid      Migraine      Peripheral vascular disease (H)      Reactive airway disease      Past Surgical History:   Procedure Laterality Date      SECTION Bilateral      HYSTERECTOMY       POSTERIOR LAMINECTOMY / DECOMPRESSION LUMBAR SPINE      L2-L3     SPINAL FUSION       TONSILLECTOMY & ADENOIDECTOMY       TOTAL KNEE ARTHROPLASTY Bilateral      Family History   Problem Relation Age of Onset      Pancreatic Cancer Mother      Pancreatic Cancer Father         Social History     Socioeconomic History     Marital status: Legally      Spouse name: Not on file     Number of children: Not on file     Years of education: Not on file     Highest education level: Not on file   Occupational History     Not on file   Tobacco Use     Smoking status: Former Smoker     Packs/day: 0.50     Quit date: 2021     Years since quittin.3     Smokeless tobacco: Never Used   Substance and Sexual Activity     Alcohol use: Yes     Drug use: Never     Sexual activity: Not on file   Other Topics Concern     Not on file   Social History Narrative     Not on file     Social Determinants of Health     Financial Resource Strain:      Difficulty of Paying Living Expenses:    Food Insecurity:      Worried About Running Out of Food in the Last Year:      Ran Out of Food in the Last Year:    Transportation Needs:      Lack of Transportation (Medical):      Lack of Transportation (Non-Medical):    Physical Activity:      Days of Exercise per Week:      Minutes of Exercise per Session:    Stress:      Feeling of Stress :    Social Connections:      Frequency of Communication with Friends and Family:      Frequency of Social Gatherings with Friends and Family:      Attends Worship Services:      Active Member of Clubs or Organizations:      Attends Club or Organization Meetings:      Marital Status:    Intimate Partner Violence:      Fear of Current or Ex-Partner:      Emotionally Abused:      Physically Abused:      Sexually Abused:            Medications  Allergies   Current Outpatient Medications   Medication Sig Dispense Refill     acetaminophen (TYLENOL) 500 MG tablet [ACETAMINOPHEN (TYLENOL) 500 MG TABLET] Take 2 tablets (1,000 mg total) by mouth 3 (three) times a day.  0     aspirin-acetaminophen-caffeine (EXCEDRIN MIGRAINE) 250-250-65 mg per tablet [ASPIRIN-ACETAMINOPHEN-CAFFEINE (EXCEDRIN MIGRAINE) 250-250-65 MG PER  TABLET] Take 2 tablets by mouth every 6 (six) hours as needed (migraines).  0     atorvastatin (LIPITOR) 80 MG tablet [ATORVASTATIN (LIPITOR) 80 MG TABLET] Take 80 mg by mouth daily.       EPINEPHrine (EPIPEN/ADRENACLICK/AUVI-Q) 0.3 mg/0.3 mL injection 0.3 mg as needed        levothyroxine (SYNTHROID, LEVOTHROID) 88 MCG tablet [LEVOTHYROXINE (SYNTHROID, LEVOTHROID) 88 MCG TABLET] Take 88 mcg by mouth daily.       traZODone (DESYREL) 50 MG tablet [TRAZODONE (DESYREL) 50 MG TABLET] Take 150 mg by mouth at bedtime.       venlafaxine (EFFEXOR-XR) 150 MG 24 hr capsule [VENLAFAXINE (EFFEXOR-XR) 150 MG 24 HR CAPSULE] Take 300 mg by mouth daily.       baclofen (LIORESAL) 10 MG tablet [BACLOFEN (LIORESAL) 10 MG TABLET] Take 10 mg by mouth 3 (three) times a day as needed (spasm). (Patient not taking: Reported on 8/2/2021)       docusate sodium (COLACE) 100 MG capsule [DOCUSATE SODIUM (COLACE) 100 MG CAPSULE] Take 1 capsule (100 mg total) by mouth 2 (two) times a day as needed for constipation. (Patient not taking: Reported on 8/2/2021)  0     furosemide (LASIX) 20 MG tablet [FUROSEMIDE (LASIX) 20 MG TABLET] Take 20 mg by mouth daily as needed. (Patient not taking: Reported on 8/2/2021)       ibuprofen (ADVIL,MOTRIN) 200 MG tablet [IBUPROFEN (ADVIL,MOTRIN) 200 MG TABLET] Take 3 tablets (600 mg total) by mouth every 6 (six) hours as needed for pain. Take with food or snack (Patient not taking: Reported on 8/2/2021)       oxybutynin (DITROPAN) 5 MG tablet [OXYBUTYNIN (DITROPAN) 5 MG TABLET] Take 15 mg by mouth at bedtime. (Patient not taking: Reported on 8/2/2021)       oxyCODONE (ROXICODONE) 5 MG immediate release tablet [OXYCODONE (ROXICODONE) 5 MG IMMEDIATE RELEASE TABLET] Take 0.5 tablets (2.5 mg total) by mouth every 6 (six) hours as needed for pain. (Patient not taking: Reported on 8/2/2021) 6 tablet 0       Allergies   Allergen Reactions     Bee Venom Anaphylaxis     Dilaudid [Hydromorphone] Other (See Comments)      Altered mental status     Latex      hives          Lab Results    Chemistry/lipid CBC Cardiac Enzymes/BNP/TSH/INR   Recent Labs   Lab Test 05/19/21  1332   CHOL 150   HDL 52   LDL 81   TRIG 87     Recent Labs   Lab Test 05/19/21  1332   LDL 81     Recent Labs   Lab Test 01/02/21  0912      POTASSIUM 3.6   CHLORIDE 105   CO2 25      BUN 10   CR 0.83   GFRESTIMATED >60   YENI 8.7     Recent Labs   Lab Test 01/02/21 0912   CR 0.83     No results for input(s): A1C in the last 77807 hours.       Recent Labs   Lab Test 01/02/21 0912   WBC 4.7   HGB 11.8*   HCT 34.7*   MCV 98   *     Recent Labs   Lab Test 01/02/21 0912   HGB 11.8*    No results for input(s): TROPONINI in the last 34248 hours.  No results for input(s): BNP, NTBNPI, NTBNP in the last 56304 hours.  Recent Labs   Lab Test 05/19/21  1332   TSH 1.68     Recent Labs   Lab Test 01/02/21 0912   INR 1.10        Sara Randall MD

## 2021-08-03 PROBLEM — I35.0 SEVERE AORTIC STENOSIS: Status: ACTIVE | Noted: 2021-08-03

## 2021-08-09 ENCOUNTER — DOCUMENTATION ONLY (OUTPATIENT)
Dept: CARDIOLOGY | Facility: CLINIC | Age: 71
End: 2021-08-09

## 2021-08-09 NOTE — PROGRESS NOTES
Aaliyah Miranda Angela M, RN  Patient wants me to call back at the end of August to schedule in the future.     Thanks,   Aaliyah           Previous Messages       ----- Message -----   From: Alexa Granda RN   Sent: 8/2/2021   3:33 PM CDT   To: Aaliyah Miranda     Would you mind calling patient to schedule her pre-TAVR testing, CT, angiogram and surgeon consult. Patient would like these scheduled for end of August. Thanks!   Alexa

## 2021-08-17 DIAGNOSIS — Z11.59 ENCOUNTER FOR SCREENING FOR OTHER VIRAL DISEASES: ICD-10-CM

## 2021-08-21 ENCOUNTER — HOSPITAL ENCOUNTER (OUTPATIENT)
Dept: CT IMAGING | Facility: CLINIC | Age: 71
Discharge: HOME | End: 2021-08-21
Attending: INTERNAL MEDICINE | Admitting: INTERNAL MEDICINE
Payer: COMMERCIAL

## 2021-08-21 DIAGNOSIS — I35.0 SEVERE AORTIC STENOSIS: ICD-10-CM

## 2021-08-21 PROCEDURE — 71275 CT ANGIOGRAPHY CHEST: CPT

## 2021-08-21 PROCEDURE — 250N000011 HC RX IP 250 OP 636: Performed by: INTERNAL MEDICINE

## 2021-08-21 RX ORDER — IOPAMIDOL 755 MG/ML
100 INJECTION, SOLUTION INTRAVASCULAR ONCE
Status: COMPLETED | OUTPATIENT
Start: 2021-08-21 | End: 2021-08-21

## 2021-08-21 RX ADMIN — IOPAMIDOL 100 ML: 755 INJECTION, SOLUTION INTRAVENOUS at 10:31

## 2021-08-25 ENCOUNTER — PREP FOR PROCEDURE (OUTPATIENT)
Dept: CARDIOLOGY | Facility: CLINIC | Age: 71
End: 2021-08-25

## 2021-08-25 ENCOUNTER — TELEPHONE (OUTPATIENT)
Dept: CARDIOLOGY | Facility: CLINIC | Age: 71
End: 2021-08-25

## 2021-08-25 DIAGNOSIS — I35.0 SEVERE AORTIC STENOSIS: Primary | ICD-10-CM

## 2021-08-25 PROBLEM — I51.89 OTHER ILL-DEFINED HEART DISEASES: Status: ACTIVE | Noted: 2021-08-03

## 2021-08-25 RX ORDER — LIDOCAINE 40 MG/G
CREAM TOPICAL
Status: CANCELLED | OUTPATIENT
Start: 2021-08-25

## 2021-08-25 RX ORDER — DIAZEPAM 5 MG
5 TABLET ORAL
Status: CANCELLED | OUTPATIENT
Start: 2021-08-30

## 2021-08-25 RX ORDER — FENTANYL CITRATE 50 UG/ML
25 INJECTION, SOLUTION INTRAMUSCULAR; INTRAVENOUS
Status: DISCONTINUED | OUTPATIENT
Start: 2021-08-25 | End: 2021-08-25 | Stop reason: HOSPADM

## 2021-08-25 RX ORDER — SODIUM CHLORIDE 9 MG/ML
INJECTION, SOLUTION INTRAVENOUS CONTINUOUS
Status: CANCELLED | OUTPATIENT
Start: 2021-08-25

## 2021-08-25 RX ORDER — ASPIRIN 81 MG/1
243 TABLET, CHEWABLE ORAL ONCE
Status: CANCELLED | OUTPATIENT
Start: 2021-08-30

## 2021-08-25 RX ORDER — ASPIRIN 325 MG
325 TABLET ORAL ONCE
Status: CANCELLED | OUTPATIENT
Start: 2021-08-30 | End: 2021-08-25

## 2021-08-25 NOTE — TELEPHONE ENCOUNTER
[facility] RN Pre-Procedure Education Note    Reason for angiogram: pre-TAVR, severe aortic stenosis      Procedure: coronary angiogram with possible intervention With Dr. Randall    Date of Procedure: 8/30  Arrival time: 6:30 am    Location: [facility]                Diagnosis: severe aortic stenosis   Cardiologist Ordering Procedure: Prakash   Primary Cardiologist: n/a  PCP: Bindu Webster    H&P completed by: Dr. Randall   Previous Cath Report: no  Bypass grafts: No  Labs within 7 days: no  Renal Issues: No  Diabetic: No    COVID TEST:   Date:8/27  Location: [facility]        Does patient have contrast/IV dye allergy: no      Patient Education  Explained indications/risks for diagnostic evaluation, including one or more of the following:  left heart catheterization, right heart catheterization, coronary angiogram and left ventriculogram  Explained indications/risks for therapeutic interventions, including one or more of the following: PTCA, artherectomy and stent.  These risks are in addition to baseline risks associated with a Diagnostic Evaluation.  Patient state understanding of procedure and risks and agrees to proceed    Additional education comment: Pt was instructed on procedure letter and written education material. This information was reviewed with the patient. No further questions at this time.    Pre-procedure instructions  Patient instructed to be NPO after midnight.  Patient instructed to arrange for transportation home following procedure  No driving for 24 hours post procedure  Depending on the results of the test, provider may decide to keep patient overnight in the hospital for further evaluation.  Reviewed lifting restrictions    Pre-procedure medication instructions  medication instructions: instructed to hold lasix the AM of procedure.      Current Outpatient Medications   Medication Sig Dispense Refill     acetaminophen (TYLENOL) 500 MG tablet [ACETAMINOPHEN  (TYLENOL) 500 MG TABLET] Take 2 tablets (1,000 mg total) by mouth 3 (three) times a day.  0     aspirin-acetaminophen-caffeine (EXCEDRIN MIGRAINE) 250-250-65 mg per tablet [ASPIRIN-ACETAMINOPHEN-CAFFEINE (EXCEDRIN MIGRAINE) 250-250-65 MG PER TABLET] Take 2 tablets by mouth every 6 (six) hours as needed (migraines).  0     atorvastatin (LIPITOR) 80 MG tablet [ATORVASTATIN (LIPITOR) 80 MG TABLET] Take 80 mg by mouth daily.       baclofen (LIORESAL) 10 MG tablet [BACLOFEN (LIORESAL) 10 MG TABLET] Take 10 mg by mouth 3 (three) times a day as needed (spasm). (Patient not taking: Reported on 8/2/2021)       docusate sodium (COLACE) 100 MG capsule [DOCUSATE SODIUM (COLACE) 100 MG CAPSULE] Take 1 capsule (100 mg total) by mouth 2 (two) times a day as needed for constipation. (Patient not taking: Reported on 8/2/2021)  0     EPINEPHrine (EPIPEN/ADRENACLICK/AUVI-Q) 0.3 mg/0.3 mL injection 0.3 mg as needed        furosemide (LASIX) 20 MG tablet Take 20 mg tablet two times a day for 3 days, then resume 20 mg daily 90 tablet 3     furosemide (LASIX) 20 MG tablet [FUROSEMIDE (LASIX) 20 MG TABLET] Take 20 mg by mouth daily as needed. (Patient not taking: Reported on 8/2/2021)       ibuprofen (ADVIL,MOTRIN) 200 MG tablet [IBUPROFEN (ADVIL,MOTRIN) 200 MG TABLET] Take 3 tablets (600 mg total) by mouth every 6 (six) hours as needed for pain. Take with food or snack (Patient not taking: Reported on 8/2/2021)       levothyroxine (SYNTHROID, LEVOTHROID) 88 MCG tablet [LEVOTHYROXINE (SYNTHROID, LEVOTHROID) 88 MCG TABLET] Take 88 mcg by mouth daily.       oxybutynin (DITROPAN) 5 MG tablet [OXYBUTYNIN (DITROPAN) 5 MG TABLET] Take 15 mg by mouth at bedtime. (Patient not taking: Reported on 8/2/2021)       oxyCODONE (ROXICODONE) 5 MG immediate release tablet [OXYCODONE (ROXICODONE) 5 MG IMMEDIATE RELEASE TABLET] Take 0.5 tablets (2.5 mg total) by mouth every 6 (six) hours as needed for pain. (Patient not taking: Reported on 8/2/2021) 6 tablet  0     traZODone (DESYREL) 50 MG tablet [TRAZODONE (DESYREL) 50 MG TABLET] Take 150 mg by mouth at bedtime.       venlafaxine (EFFEXOR-XR) 150 MG 24 hr capsule [VENLAFAXINE (EFFEXOR-XR) 150 MG 24 HR CAPSULE] Take 300 mg by mouth daily.         Allergies   Allergen Reactions     Bee Venom Anaphylaxis     Dilaudid [Hydromorphone] Other (See Comments)     Altered mental status     Latex      hives         Alexa Vigil, RN, BSN  Valve Clinic Coordinator  [facility]  [phone]  08/25/21 9:35 AM

## 2021-08-27 ENCOUNTER — LAB (OUTPATIENT)
Dept: LAB | Facility: CLINIC | Age: 71
End: 2021-08-27
Payer: COMMERCIAL

## 2021-08-27 DIAGNOSIS — Z11.59 ENCOUNTER FOR SCREENING FOR OTHER VIRAL DISEASES: ICD-10-CM

## 2021-08-27 LAB — SARS-COV-2 RNA RESP QL NAA+PROBE: NEGATIVE

## 2021-08-27 PROCEDURE — U0003 INFECTIOUS AGENT DETECTION BY NUCLEIC ACID (DNA OR RNA); SEVERE ACUTE RESPIRATORY SYNDROME CORONAVIRUS 2 (SARS-COV-2) (CORONAVIRUS DISEASE [COVID-19]), AMPLIFIED PROBE TECHNIQUE, MAKING USE OF HIGH THROUGHPUT TECHNOLOGIES AS DESCRIBED BY CMS-2020-01-R: HCPCS

## 2021-08-27 PROCEDURE — U0005 INFEC AGEN DETEC AMPLI PROBE: HCPCS

## 2021-08-30 ENCOUNTER — HOSPITAL ENCOUNTER (OUTPATIENT)
Facility: HOSPITAL | Age: 71
Discharge: HOME | End: 2021-08-30
Attending: INTERNAL MEDICINE | Admitting: INTERNAL MEDICINE
Payer: COMMERCIAL

## 2021-08-30 VITALS
SYSTOLIC BLOOD PRESSURE: 149 MMHG | OXYGEN SATURATION: 96 % | TEMPERATURE: 98.4 F | BODY MASS INDEX: 31.15 KG/M2 | HEIGHT: 61 IN | DIASTOLIC BLOOD PRESSURE: 92 MMHG | RESPIRATION RATE: 13 BRPM | HEART RATE: 71 BPM | WEIGHT: 165 LBS

## 2021-08-30 DIAGNOSIS — I51.89 OTHER ILL-DEFINED HEART DISEASES: ICD-10-CM

## 2021-08-30 DIAGNOSIS — I35.0 SEVERE AORTIC STENOSIS: ICD-10-CM

## 2021-08-30 LAB
ABO/RH(D): NORMAL
ANION GAP SERPL CALCULATED.3IONS-SCNC: 7 MMOL/L (ref 5–18)
ANTIBODY SCREEN: NEGATIVE
ATRIAL RATE - MUSE: 74 BPM
BUN SERPL-MCNC: 9 MG/DL (ref 8–28)
CALCIUM SERPL-MCNC: 8.7 MG/DL (ref 8.5–10.5)
CHLORIDE BLD-SCNC: 105 MMOL/L (ref 98–107)
CO2 SERPL-SCNC: 30 MMOL/L (ref 22–31)
CREAT SERPL-MCNC: 0.98 MG/DL (ref 0.6–1.1)
DIASTOLIC BLOOD PRESSURE - MUSE: NORMAL MMHG
ERYTHROCYTE [DISTWIDTH] IN BLOOD BY AUTOMATED COUNT: 14.2 % (ref 10–15)
GFR SERPL CREATININE-BSD FRML MDRD: 57 ML/MIN/1.73M2
GLUCOSE BLD-MCNC: 98 MG/DL (ref 70–125)
HCT VFR BLD AUTO: 31.9 % (ref 35–47)
HGB BLD-MCNC: 10.5 G/DL (ref 11.7–15.7)
INTERPRETATION ECG - MUSE: NORMAL
MCH RBC QN AUTO: 33.4 PG (ref 26.5–33)
MCHC RBC AUTO-ENTMCNC: 32.9 G/DL (ref 31.5–36.5)
MCV RBC AUTO: 102 FL (ref 78–100)
P AXIS - MUSE: 73 DEGREES
PLATELET # BLD AUTO: 115 10E3/UL (ref 150–450)
POTASSIUM BLD-SCNC: 3.5 MMOL/L (ref 3.5–5)
PR INTERVAL - MUSE: 152 MS
QRS DURATION - MUSE: 90 MS
QT - MUSE: 418 MS
QTC - MUSE: 463 MS
R AXIS - MUSE: 53 DEGREES
RBC # BLD AUTO: 3.14 10E6/UL (ref 3.8–5.2)
SODIUM SERPL-SCNC: 142 MMOL/L (ref 136–145)
SPECIMEN EXPIRATION DATE: NORMAL
SYSTOLIC BLOOD PRESSURE - MUSE: NORMAL MMHG
T AXIS - MUSE: 241 DEGREES
VENTRICULAR RATE- MUSE: 74 BPM
WBC # BLD AUTO: 3.8 10E3/UL (ref 4–11)

## 2021-08-30 PROCEDURE — 86901 BLOOD TYPING SEROLOGIC RH(D): CPT | Performed by: INTERNAL MEDICINE

## 2021-08-30 PROCEDURE — 93454 CORONARY ARTERY ANGIO S&I: CPT | Performed by: INTERNAL MEDICINE

## 2021-08-30 PROCEDURE — 85027 COMPLETE CBC AUTOMATED: CPT | Performed by: INTERNAL MEDICINE

## 2021-08-30 PROCEDURE — 250N000011 HC RX IP 250 OP 636: Performed by: INTERNAL MEDICINE

## 2021-08-30 PROCEDURE — 250N000009 HC RX 250: Performed by: INTERNAL MEDICINE

## 2021-08-30 PROCEDURE — C1887 CATHETER, GUIDING: HCPCS | Performed by: INTERNAL MEDICINE

## 2021-08-30 PROCEDURE — 999N000054 HC STATISTIC EKG NON-CHARGEABLE

## 2021-08-30 PROCEDURE — 250N000013 HC RX MED GY IP 250 OP 250 PS 637: Performed by: INTERNAL MEDICINE

## 2021-08-30 PROCEDURE — 93005 ELECTROCARDIOGRAM TRACING: CPT

## 2021-08-30 PROCEDURE — C1894 INTRO/SHEATH, NON-LASER: HCPCS | Performed by: INTERNAL MEDICINE

## 2021-08-30 PROCEDURE — 93454 CORONARY ARTERY ANGIO S&I: CPT | Mod: 26 | Performed by: INTERNAL MEDICINE

## 2021-08-30 PROCEDURE — 999N000099 HC STATISTIC MODERATE SEDATION < 10 MIN: Performed by: INTERNAL MEDICINE

## 2021-08-30 PROCEDURE — 272N000001 HC OR GENERAL SUPPLY STERILE: Performed by: INTERNAL MEDICINE

## 2021-08-30 PROCEDURE — 93010 ELECTROCARDIOGRAM REPORT: CPT | Performed by: INTERNAL MEDICINE

## 2021-08-30 PROCEDURE — 80048 BASIC METABOLIC PNL TOTAL CA: CPT | Performed by: INTERNAL MEDICINE

## 2021-08-30 PROCEDURE — 258N000003 HC RX IP 258 OP 636: Performed by: INTERNAL MEDICINE

## 2021-08-30 PROCEDURE — 255N000002 HC RX 255 OP 636: Performed by: INTERNAL MEDICINE

## 2021-08-30 PROCEDURE — 36415 COLL VENOUS BLD VENIPUNCTURE: CPT | Performed by: INTERNAL MEDICINE

## 2021-08-30 PROCEDURE — 99152 MOD SED SAME PHYS/QHP 5/>YRS: CPT | Performed by: INTERNAL MEDICINE

## 2021-08-30 RX ORDER — PHENOL 1.4 %
30 AEROSOL, SPRAY (ML) MUCOUS MEMBRANE AT BEDTIME
COMMUNITY

## 2021-08-30 RX ORDER — FENTANYL CITRATE 50 UG/ML
25 INJECTION, SOLUTION INTRAMUSCULAR; INTRAVENOUS
Status: DISCONTINUED | OUTPATIENT
Start: 2021-08-30 | End: 2021-08-30 | Stop reason: HOSPADM

## 2021-08-30 RX ORDER — DIAZEPAM 5 MG
5 TABLET ORAL
Status: COMPLETED | OUTPATIENT
Start: 2021-08-30 | End: 2021-08-30

## 2021-08-30 RX ORDER — LIDOCAINE 40 MG/G
CREAM TOPICAL
Status: DISCONTINUED | OUTPATIENT
Start: 2021-08-30 | End: 2021-08-30 | Stop reason: HOSPADM

## 2021-08-30 RX ORDER — NALOXONE HYDROCHLORIDE 0.4 MG/ML
0.4 INJECTION, SOLUTION INTRAMUSCULAR; INTRAVENOUS; SUBCUTANEOUS
Status: DISCONTINUED | OUTPATIENT
Start: 2021-08-30 | End: 2021-08-30 | Stop reason: HOSPADM

## 2021-08-30 RX ORDER — NALOXONE HYDROCHLORIDE 0.4 MG/ML
0.2 INJECTION, SOLUTION INTRAMUSCULAR; INTRAVENOUS; SUBCUTANEOUS
Status: DISCONTINUED | OUTPATIENT
Start: 2021-08-30 | End: 2021-08-30 | Stop reason: HOSPADM

## 2021-08-30 RX ORDER — SODIUM CHLORIDE 9 MG/ML
INJECTION, SOLUTION INTRAVENOUS CONTINUOUS
Status: DISCONTINUED | OUTPATIENT
Start: 2021-08-30 | End: 2021-08-30 | Stop reason: HOSPADM

## 2021-08-30 RX ORDER — ACETAMINOPHEN 325 MG/1
650 TABLET ORAL EVERY 4 HOURS PRN
Status: DISCONTINUED | OUTPATIENT
Start: 2021-08-30 | End: 2021-08-30 | Stop reason: HOSPADM

## 2021-08-30 RX ORDER — FENTANYL CITRATE 50 UG/ML
INJECTION, SOLUTION INTRAMUSCULAR; INTRAVENOUS
Status: DISCONTINUED | OUTPATIENT
Start: 2021-08-30 | End: 2021-08-30 | Stop reason: HOSPADM

## 2021-08-30 RX ORDER — FLUTICASONE PROPIONATE 50 MCG
1 SPRAY, SUSPENSION (ML) NASAL 2 TIMES DAILY PRN
COMMUNITY
End: 2022-01-01

## 2021-08-30 RX ORDER — IODIXANOL 320 MG/ML
INJECTION, SOLUTION INTRAVASCULAR
Status: DISCONTINUED | OUTPATIENT
Start: 2021-08-30 | End: 2021-08-30 | Stop reason: HOSPADM

## 2021-08-30 RX ORDER — ASPIRIN 81 MG/1
243 TABLET, CHEWABLE ORAL ONCE
Status: COMPLETED | OUTPATIENT
Start: 2021-08-30 | End: 2021-08-30

## 2021-08-30 RX ORDER — ATROPINE SULFATE 0.1 MG/ML
0.5 INJECTION INTRAVENOUS
Status: DISCONTINUED | OUTPATIENT
Start: 2021-08-30 | End: 2021-08-30 | Stop reason: HOSPADM

## 2021-08-30 RX ORDER — ASPIRIN 325 MG
325 TABLET ORAL ONCE
Status: COMPLETED | OUTPATIENT
Start: 2021-08-30 | End: 2021-08-30

## 2021-08-30 RX ORDER — FLUMAZENIL 0.1 MG/ML
0.2 INJECTION, SOLUTION INTRAVENOUS
Status: DISCONTINUED | OUTPATIENT
Start: 2021-08-30 | End: 2021-08-30 | Stop reason: HOSPADM

## 2021-08-30 RX ADMIN — ASPIRIN 325 MG: 325 TABLET, FILM COATED ORAL at 08:18

## 2021-08-30 RX ADMIN — SODIUM CHLORIDE: 9 INJECTION, SOLUTION INTRAVENOUS at 08:19

## 2021-08-30 RX ADMIN — DIAZEPAM 5 MG: 5 TABLET ORAL at 08:18

## 2021-08-30 ASSESSMENT — MIFFLIN-ST. JEOR: SCORE: 1190.82

## 2021-08-30 NOTE — PRE-PROCEDURE
GENERAL PRE-PROCEDURE:   Procedure:  Coronary angiogram with possible PCI  Date/Time:  8/30/2021 7:12 AM    Written consent obtained?: Yes    Risks and benefits: Risks, benefits and alternatives were discussed    Consent given by:  Patient  Patient states understanding of procedure being performed: Yes    Patient's understanding of procedure matches consent: Yes    Procedure consent matches procedure scheduled: Yes    Expected level of sedation:  Moderate  Appropriately NPO:  Yes  ASA Class:  4 (Severe symptomatic aortic stenosis, HTN, dyslipidemia, hx of AAA endograft repair, hx of lumbar laminectomy)  Mallampati  :  Grade 1- soft palate, uvula, tonsillar pillars, and posterior pharyngeal wall visible  Lungs:  Lungs clear with good breath sounds bilaterally  Heart:  Systolic murmur  History & Physical reviewed:  History and physical reviewed and no updates needed  Statement of review:  I have reviewed the lab findings, diagnostic data, medications, and the plan for sedation

## 2021-08-30 NOTE — DISCHARGE INSTRUCTIONS
Interventional Cardiology  Coronary Angiogram/Angioplasty/Stent/Atherectomy Discharge  Instructions -   Radial (wrist) Approach     The instructions below are to help you understand how to take care of yourself. There is also information about when to call the doctor or emergency services.    **Do not stop your aspirin or platelet inhibitor unless directed by your Cardiologist.  These medications help to prevent platelets in your blood from sticking together and forming a clot.   Examples of these medications are: Ticagrelor (Brilinta), Clopidogrel (Plavix), Prasugrel (Effient)    For 24 hours after procedure:    Do not subject hand/arm to any forceful movements for 24 hours, such as supporting weight when rising from a chair or bed.    Do not drive a car for 24 hours.    The dressing on the puncture site may be removed after 24 hours and left open to air. If minor oozing, you may apply a Band-Aid and remove after 12 hours.     You may shower on the day after your procedure. Do not take a tub bath or wash dishes (no soaking wrist) with the puncture site in water for 3 days after the procedure.    For 48 hours following the procedure:    Do not operate a lawnmower, motorcycle, chainsaw or all-terrain vehicle.    Do not lift anything heavier than 5-10 pounds with affected arm for 5 days.    Avoid excessive bending (flexion/extension) wrist movement.    Do not engage in vigorous exercise (i.e. tennis, golf) using the affected arm for 5 days after discharge.    You may return to work in 72 hours if no complications and no heavy lifting.    If bleeding should occur following discharge:    Sit down and apply firm pressure with your thumb against the puncture site and fingers against back of wrist for 10 minutes.    If the bleeding stops, continue to rest, keeping your wrist still for 2 hours. Notify your doctor as soon as possible.    If bleeding does not stop after 10 minutes or if there is a large amount of bleeding or  spurting, call 911 immediately. Do not drive yourself to the hospital.           Contact the Heart Clinic at [phone] if you develop:    Fever over 100.4, that lasts more than one day    Redness, heat, or pus at the puncture site    Change in color or temperature of the hand or arm    Expect mild tingling of hand and tenderness at the puncture site for up to 3 days. You may take Tylenol or a pain medicine recommended by your doctor.                       Our Cardiac Rehab staff may visit briefly with you while your in the hospital.   If they miss you, someone will contact you after you are home.  You are encouraged to enroll in an Outpatient Cardiac Rehab Program     No elective dental work for 6 weeks after having a stent    If you are on metformin, ActoPlus Met , Glucophage , Glucovance , Avandamet , Fortamet , Glumetza , Janumet , Riomet , PrandiMet, OR Metaglip , resume this medication only after your kidney function test is done and you are told to do so by your primary care provider.      Your Procedural Physician was: {CV Physicians:[id number]}  the phone number is: [phone]    [facility]:  [phone]  If you are calling after hours, please listen to the entire voicemail, a live  will answer at the end of the message

## 2021-09-03 ENCOUNTER — OFFICE VISIT (OUTPATIENT)
Dept: CARDIOLOGY | Facility: CLINIC | Age: 71
End: 2021-09-03
Payer: COMMERCIAL

## 2021-09-03 VITALS
SYSTOLIC BLOOD PRESSURE: 136 MMHG | DIASTOLIC BLOOD PRESSURE: 78 MMHG | WEIGHT: 159 LBS | RESPIRATION RATE: 16 BRPM | HEIGHT: 60 IN | HEART RATE: 84 BPM | BODY MASS INDEX: 31.22 KG/M2

## 2021-09-03 DIAGNOSIS — I35.0 SEVERE AORTIC STENOSIS: Primary | ICD-10-CM

## 2021-09-03 PROCEDURE — 99205 OFFICE O/P NEW HI 60 MIN: CPT | Performed by: SURGERY

## 2021-09-03 ASSESSMENT — MIFFLIN-ST. JEOR: SCORE: 1147.72

## 2021-09-03 NOTE — LETTER
9/3/2021    Nora Mcacrthy MD  [facility] [address] [place] [address]    RE: Maren Fofana       Dear Colleague,    I had the pleasure of seeing Maren Fofana in the [facility].    Cardiac and Thoracic Surgery Consultation      Maren Fofana MRN# [id number]   YOB: 1950 Age: 71 year old     Reason for consult: I was asked by Dr. Randall to evaluate this patient for aortic stenosis.           Assessment and Plan:   Ms. Fofana is a 71 year-old woman with severe aortic stenosis. I recommend transcatheter aortic valve replacement given her frailty and comorbidities. She has high STS risk due to her frailty. I discussed the technical details of the procedure, as well as the risks and benefits. I described the technical aspects of transcatheter aortic valve replacement to her, including the possibility of alternative access most likely through the left subclavian approach.The patient understands the risks which include but are not limited to bleeding, infection, stroke, coronary obstruction, aortic dissection, aortic root rupture, and end-organ injury. We discussed the treatment of such complications. The patient understands that she could choose to have open surgery and cardiopulmonary bypass in the rare event that she would suffer a complication requiring surgery, and she also understands that she could decide in advance that she does not want to have a sternotomy and cardiopulmonary bypass and instead would have comfort care if she had such a complication. She has decided that is she were to have a complication requiring emergency sternotomy and cardiopulmonary bypass, she would want that. She trusts her family to make decisions for her if she is not going to have a meaningful recovery. She understands that the risk of any of this is quite low.    Surgery will be scheduled in the coming weeks.             Chief Complaint:   Ms.  Ct is a 71 year-old woman with severe dizziness and lightheadedness due to severe aortic stenosis.    History is obtained from the patient         History of Present Illness:   This patient is a 71 year old female who presents with significant lightheadedness and dizziness. She had a fall a few months ago and had a knee fracture that has healed with nonoperative management. She also has dyspnea with any exertion and she has lower extremity edema. ON echocardiography, she has severe aortic stenosis and preserved left ventricular function.    Of note, she has a history of an abdominal aortic aneurysm and underwent endovascular repair. She also has had a lumbar laminectomy. On coronary angiography, she does not have any obstructive CAD.                 Past Medical History:     Past Medical History:   Diagnosis Date     Aortic stenosis      Collapsed lung      Fibromyalgia      GERD (gastroesophageal reflux disease)      Hypothyroid      Migraine      Peripheral vascular disease (H)      Reactive airway disease      Vertigo              Past Surgical History:     Past Surgical History:   Procedure Laterality Date      SECTION Bilateral      CV CORONARY ANGIOGRAM N/A 2021    Procedure: Coronary Angiogram;  Surgeon: Sara Randall MD;  Location: [facility] CV     HYSTERECTOMY       POSTERIOR LAMINECTOMY / DECOMPRESSION LUMBAR SPINE      L2-L3     SPINAL FUSION       TONSILLECTOMY & ADENOIDECTOMY       TOTAL KNEE ARTHROPLASTY Bilateral                Social History:     Social History     Tobacco Use     Smoking status: Former Smoker     Packs/day: 0.50     Quit date: 2021     Years since quittin.4     Smokeless tobacco: Never Used   Substance Use Topics     Alcohol use: Yes             Family History:     Family History   Problem Relation Age of Onset     Pancreatic Cancer Mother      Pancreatic Cancer Father              Immunizations:     Immunization History   Administered Date(s)  Administered     COVID-19,PF,Moderna 01/28/2021, 02/25/2021             Allergies:     Allergies   Allergen Reactions     Bee Venom Anaphylaxis     Dilaudid [Hydromorphone] Other (See Comments)     Altered mental status     Latex      hives             Medications:     Current Outpatient Medications Ordered in Epic   Medication     aspirin-acetaminophen-caffeine (EXCEDRIN MIGRAINE) 250-250-65 mg per tablet     atorvastatin (LIPITOR) 80 MG tablet     EPINEPHrine (EPIPEN/ADRENACLICK/AUVI-Q) 0.3 mg/0.3 mL injection     fluticasone (FLONASE) 50 MCG/ACT nasal spray     furosemide (LASIX) 20 MG tablet     levothyroxine (SYNTHROID, LEVOTHROID) 88 MCG tablet     Melatonin 10 MG TABS tablet     traZODone (DESYREL) 50 MG tablet     venlafaxine (EFFEXOR-XR) 150 MG 24 hr capsule     No current Epic-ordered facility-administered medications on file.             Review of Systems:     The 10 point Review of Systems is negative other than noted in the HPI            Physical Exam:   Vitals were reviewed      BP: 136/78 Pulse: 84   Resp: 16        Constitutional:   awake, alert, cooperative, no apparent distress, and appears stated age     Eyes:   Lids and lashes normal, pupils equal, round and reactive to light, extra ocular muscles intact, sclera clear, conjunctiva normal     ENT:   normocepalic, without obvious abnormality     Neck:   supple, symmetrical, trachea midline     Lungs:   no increased work of breathing, good air exchange and no retractions     Cardiovascular:   regular rate and rhythm     Abdomen:   non-distended     Musculoskeletal:   no lower extremity pitting edema present  there is no redness, warmth, or swelling of the joints  full range of motion noted  motor strength is 5 out of 5 all extremities bilaterally     Neurologic:   Mental Status Exam:  Level of Alertness:   awake  Orientation:   person, place, time  Cranial Nerves:  cranial nerves II-XII are grossly intact  Motor Exam:  moves all extremities well  and symmetrically     Neuropsychiatric:   General: normal, calm and normal eye contact  Level of consciousness: alert / normal  Affect: normal     Skin:   no bruising or bleeding, normal skin color, texture, turgor and no redness, warmth, or swelling          Data:   All laboratory data reviewed  All cardiac studies reviewed by me.  All imaging studies reviewed by me.    CARDIAC CATHETERIZATION:  Left main mild disease  LAD with mild disease  Left circumflex has mild disease  RCA is dominant with mild atherosclerosis    TRANSTHORACIC ECHOCARDIOGRAPHY:  severe aortic valve stenosis  mean gradient of 46 mmHg  aortic valve area of 0.7 cm   normal left ventricular systolic function with LV ejection fraction of 60%.    EKG:  Sinus rhythm  with  Premature atrial complexes   Nonspecific T wave abnormality   Abnormal ECG   When compared with ECG of 02-AUG-2021 12:29,   No significant change was found    CTA TAVR PROTOCOL:  Aortic calcifications of the aortic annulus and root  Not too much ascending aortic calcification  Aortic calcification of the arch  Abdominal aortic aneurysm status post EVAAR  Left subclavian with some non obstructive calcification at the takeoff and another area of calcification with some narrowing a few cm beyond that where it curves toward the subclavian          Thank you for allowing me to participate in the care of your patient.      Sincerely,     Manuel Rollins MD     [facility]  cc:   No referring provider defined for this encounter.

## 2021-09-03 NOTE — PROGRESS NOTES
Cardiac and Thoracic Surgery Consultation      Maren Fofana MRN# [id number]   YOB: 1950 Age: 71 year old     Reason for consult: I was asked by Dr. Randall to evaluate this patient for aortic stenosis.           Assessment and Plan:   Ms. Fofana is a 71 year-old woman with severe aortic stenosis. I recommend transcatheter aortic valve replacement given her frailty and comorbidities. She has high STS risk due to her frailty. I discussed the technical details of the procedure, as well as the risks and benefits. I described the technical aspects of transcatheter aortic valve replacement to her, including the possibility of alternative access most likely through the left subclavian approach.The patient understands the risks which include but are not limited to bleeding, infection, stroke, coronary obstruction, aortic dissection, aortic root rupture, and end-organ injury. We discussed the treatment of such complications. The patient understands that she could choose to have open surgery and cardiopulmonary bypass in the rare event that she would suffer a complication requiring surgery, and she also understands that she could decide in advance that she does not want to have a sternotomy and cardiopulmonary bypass and instead would have comfort care if she had such a complication. She has decided that is she were to have a complication requiring emergency sternotomy and cardiopulmonary bypass, she would want that. She trusts her family to make decisions for her if she is not going to have a meaningful recovery. She understands that the risk of any of this is quite low.    Surgery will be scheduled in the coming weeks.             Chief Complaint:   Ms. Fofana is a 71 year-old woman with severe dizziness and lightheadedness due to severe aortic stenosis.    History is obtained from the patient         History of Present Illness:   This patient is a 71 year old female who presents with significant  lightheadedness and dizziness. She had a fall a few months ago and had a knee fracture that has healed with nonoperative management. She also has dyspnea with any exertion and she has lower extremity edema. ON echocardiography, she has severe aortic stenosis and preserved left ventricular function.    Of note, she has a history of an abdominal aortic aneurysm and underwent endovascular repair. She also has had a lumbar laminectomy. On coronary angiography, she does not have any obstructive CAD.                 Past Medical History:     Past Medical History:   Diagnosis Date     Aortic stenosis      Collapsed lung      Fibromyalgia      GERD (gastroesophageal reflux disease)      Hypothyroid      Migraine      Peripheral vascular disease (H)      Reactive airway disease      Vertigo              Past Surgical History:     Past Surgical History:   Procedure Laterality Date      SECTION Bilateral      CV CORONARY ANGIOGRAM N/A 2021    Procedure: Coronary Angiogram;  Surgeon: Sara Randall MD;  Location: [facility] CV     HYSTERECTOMY       POSTERIOR LAMINECTOMY / DECOMPRESSION LUMBAR SPINE      L2-L3     SPINAL FUSION       TONSILLECTOMY & ADENOIDECTOMY       TOTAL KNEE ARTHROPLASTY Bilateral                Social History:     Social History     Tobacco Use     Smoking status: Former Smoker     Packs/day: 0.50     Quit date: 2021     Years since quittin.4     Smokeless tobacco: Never Used   Substance Use Topics     Alcohol use: Yes             Family History:     Family History   Problem Relation Age of Onset     Pancreatic Cancer Mother      Pancreatic Cancer Father              Immunizations:     Immunization History   Administered Date(s) Administered     COVID-19,PF,Moderna 2021, 2021             Allergies:     Allergies   Allergen Reactions     Bee Venom Anaphylaxis     Dilaudid [Hydromorphone] Other (See Comments)     Altered mental status     Latex      hives              Medications:     Current Outpatient Medications Ordered in Epic   Medication     aspirin-acetaminophen-caffeine (EXCEDRIN MIGRAINE) 250-250-65 mg per tablet     atorvastatin (LIPITOR) 80 MG tablet     EPINEPHrine (EPIPEN/ADRENACLICK/AUVI-Q) 0.3 mg/0.3 mL injection     fluticasone (FLONASE) 50 MCG/ACT nasal spray     furosemide (LASIX) 20 MG tablet     levothyroxine (SYNTHROID, LEVOTHROID) 88 MCG tablet     Melatonin 10 MG TABS tablet     traZODone (DESYREL) 50 MG tablet     venlafaxine (EFFEXOR-XR) 150 MG 24 hr capsule     No current Epic-ordered facility-administered medications on file.             Review of Systems:     The 10 point Review of Systems is negative other than noted in the HPI            Physical Exam:   Vitals were reviewed      BP: 136/78 Pulse: 84   Resp: 16        Constitutional:   awake, alert, cooperative, no apparent distress, and appears stated age     Eyes:   Lids and lashes normal, pupils equal, round and reactive to light, extra ocular muscles intact, sclera clear, conjunctiva normal     ENT:   normocepalic, without obvious abnormality     Neck:   supple, symmetrical, trachea midline     Lungs:   no increased work of breathing, good air exchange and no retractions     Cardiovascular:   regular rate and rhythm     Abdomen:   non-distended     Musculoskeletal:   no lower extremity pitting edema present  there is no redness, warmth, or swelling of the joints  full range of motion noted  motor strength is 5 out of 5 all extremities bilaterally     Neurologic:   Mental Status Exam:  Level of Alertness:   awake  Orientation:   person, place, time  Cranial Nerves:  cranial nerves II-XII are grossly intact  Motor Exam:  moves all extremities well and symmetrically     Neuropsychiatric:   General: normal, calm and normal eye contact  Level of consciousness: alert / normal  Affect: normal     Skin:   no bruising or bleeding, normal skin color, texture, turgor and no redness, warmth, or swelling           Data:   All laboratory data reviewed  All cardiac studies reviewed by me.  All imaging studies reviewed by me.    CARDIAC CATHETERIZATION:  Left main mild disease  LAD with mild disease  Left circumflex has mild disease  RCA is dominant with mild atherosclerosis    TRANSTHORACIC ECHOCARDIOGRAPHY:  severe aortic valve stenosis  mean gradient of 46 mmHg  aortic valve area of 0.7 cm   normal left ventricular systolic function with LV ejection fraction of 60%.    EKG:  Sinus rhythm  with  Premature atrial complexes   Nonspecific T wave abnormality   Abnormal ECG   When compared with ECG of 02-AUG-2021 12:29,   No significant change was found    CTA TAVR PROTOCOL:  Aortic calcifications of the aortic annulus and root  Not too much ascending aortic calcification  Aortic calcification of the arch  Abdominal aortic aneurysm status post EVAAR  Left subclavian with some non obstructive calcification at the takeoff and another area of calcification with some narrowing a few cm beyond that where it curves toward the subclavian

## 2021-09-15 ENCOUNTER — TELEPHONE (OUTPATIENT)
Dept: CARDIOLOGY | Facility: CLINIC | Age: 71
End: 2021-09-15

## 2021-09-15 DIAGNOSIS — Z11.59 ENCOUNTER FOR SCREENING FOR OTHER VIRAL DISEASES: ICD-10-CM

## 2021-09-15 DIAGNOSIS — I35.0 SEVERE AORTIC STENOSIS: Primary | ICD-10-CM

## 2021-09-15 DIAGNOSIS — I51.89 OTHER ILL-DEFINED HEART DISEASES: ICD-10-CM

## 2021-09-15 NOTE — TELEPHONE ENCOUNTER
----- Message from Aaliyah Miranda sent at 9/8/2021  1:09 PM CDT -----  Patient agreed to 09/28 TAVR.  Let me know when you place orders.    Scheduled for preop on 09/23.  COVID in [facility] 09/25.    Thanks!  ----- Message -----  From: Garfield Leigh RN  Sent: 9/3/2021   4:59 PM CDT  To: Alexa Lam RN      Saw Ria today. He says she needs TAVR sooner than later/ C/O dizziness and likely alternative access due to h/o AAA endograft     Thanks,

## 2021-09-19 ENCOUNTER — HEALTH MAINTENANCE LETTER (OUTPATIENT)
Age: 71
End: 2021-09-19

## 2021-09-22 ENCOUNTER — PREP FOR PROCEDURE (OUTPATIENT)
Dept: CARDIOLOGY | Facility: CLINIC | Age: 71
End: 2021-09-22

## 2021-09-22 ENCOUNTER — ANESTHESIA EVENT (OUTPATIENT)
Dept: CARDIOLOGY | Facility: CLINIC | Age: 71
DRG: 266 | End: 2021-09-22
Payer: COMMERCIAL

## 2021-09-22 DIAGNOSIS — I35.0 SEVERE AORTIC STENOSIS: Primary | ICD-10-CM

## 2021-09-22 RX ORDER — SODIUM CHLORIDE 9 MG/ML
INJECTION, SOLUTION INTRAVENOUS CONTINUOUS
Status: CANCELLED | OUTPATIENT
Start: 2021-09-22

## 2021-09-22 RX ORDER — CEFAZOLIN SODIUM 2 G/100ML
2 INJECTION, SOLUTION INTRAVENOUS
Status: CANCELLED | OUTPATIENT
Start: 2021-09-22

## 2021-09-22 RX ORDER — LIDOCAINE 40 MG/G
CREAM TOPICAL
Status: CANCELLED | OUTPATIENT
Start: 2021-09-22

## 2021-09-22 RX ORDER — ASPIRIN 325 MG
325 TABLET ORAL DAILY
Status: CANCELLED | OUTPATIENT
Start: 2021-09-22

## 2021-09-23 ENCOUNTER — ALLIED HEALTH/NURSE VISIT (OUTPATIENT)
Dept: CARDIOLOGY | Facility: CLINIC | Age: 71
End: 2021-09-23
Payer: COMMERCIAL

## 2021-09-23 ENCOUNTER — LAB (OUTPATIENT)
Dept: CARDIOLOGY | Facility: CLINIC | Age: 71
End: 2021-09-23
Payer: COMMERCIAL

## 2021-09-23 ENCOUNTER — OFFICE VISIT (OUTPATIENT)
Dept: CARDIOLOGY | Facility: CLINIC | Age: 71
End: 2021-09-23
Payer: COMMERCIAL

## 2021-09-23 VITALS
WEIGHT: 162 LBS | SYSTOLIC BLOOD PRESSURE: 116 MMHG | BODY MASS INDEX: 31.8 KG/M2 | HEART RATE: 80 BPM | DIASTOLIC BLOOD PRESSURE: 78 MMHG | HEIGHT: 60 IN | RESPIRATION RATE: 16 BRPM

## 2021-09-23 DIAGNOSIS — I35.0 AORTIC STENOSIS: Primary | ICD-10-CM

## 2021-09-23 DIAGNOSIS — I51.89 OTHER ILL-DEFINED HEART DISEASES: ICD-10-CM

## 2021-09-23 DIAGNOSIS — M25.00 HEMARTHROSIS: ICD-10-CM

## 2021-09-23 DIAGNOSIS — Z96.653 STATUS POST TOTAL BILATERAL KNEE REPLACEMENT: ICD-10-CM

## 2021-09-23 DIAGNOSIS — I35.0 SEVERE AORTIC STENOSIS: ICD-10-CM

## 2021-09-23 DIAGNOSIS — I35.0 SEVERE AORTIC STENOSIS: Primary | ICD-10-CM

## 2021-09-23 DIAGNOSIS — I71.40 ABDOMINAL AORTIC ANEURYSM (AAA) WITHOUT RUPTURE (H): ICD-10-CM

## 2021-09-23 LAB
ABO/RH(D): NORMAL
ALBUMIN SERPL-MCNC: 3.8 G/DL (ref 3.5–5)
ALP SERPL-CCNC: 94 U/L (ref 45–120)
ALT SERPL W P-5'-P-CCNC: 9 U/L (ref 0–45)
ANION GAP SERPL CALCULATED.3IONS-SCNC: 8 MMOL/L (ref 5–18)
ANTIBODY SCREEN: NEGATIVE
AST SERPL W P-5'-P-CCNC: 27 U/L (ref 0–40)
ATRIAL RATE - MUSE: 80 BPM
BASOPHILS # BLD AUTO: 0 10E3/UL (ref 0–0.2)
BASOPHILS NFR BLD AUTO: 1 %
BILIRUB SERPL-MCNC: 0.6 MG/DL (ref 0–1)
BNP SERPL-MCNC: 228 PG/ML (ref 0–130)
BUN SERPL-MCNC: 8 MG/DL (ref 8–28)
CALCIUM SERPL-MCNC: 9.1 MG/DL (ref 8.5–10.5)
CHLORIDE BLD-SCNC: 105 MMOL/L (ref 98–107)
CO2 SERPL-SCNC: 29 MMOL/L (ref 22–31)
CREAT SERPL-MCNC: 0.99 MG/DL (ref 0.6–1.1)
DIASTOLIC BLOOD PRESSURE - MUSE: NORMAL MMHG
EOSINOPHIL # BLD AUTO: 0.1 10E3/UL (ref 0–0.7)
EOSINOPHIL NFR BLD AUTO: 2 %
ERYTHROCYTE [DISTWIDTH] IN BLOOD BY AUTOMATED COUNT: 14.2 % (ref 10–15)
GFR SERPL CREATININE-BSD FRML MDRD: 57 ML/MIN/1.73M2
GLUCOSE BLD-MCNC: 104 MG/DL (ref 70–125)
HCT VFR BLD AUTO: 35.4 % (ref 35–47)
HGB BLD-MCNC: 11.7 G/DL (ref 11.7–15.7)
IMM GRANULOCYTES # BLD: 0 10E3/UL
IMM GRANULOCYTES NFR BLD: 0 %
INR PPP: 1.21 (ref 0.9–1.15)
INTERPRETATION ECG - MUSE: NORMAL
LYMPHOCYTES # BLD AUTO: 1.3 10E3/UL (ref 0.8–5.3)
LYMPHOCYTES NFR BLD AUTO: 34 %
MAGNESIUM SERPL-MCNC: 1.8 MG/DL (ref 1.8–2.6)
MCH RBC QN AUTO: 33.5 PG (ref 26.5–33)
MCHC RBC AUTO-ENTMCNC: 33.1 G/DL (ref 31.5–36.5)
MCV RBC AUTO: 101 FL (ref 78–100)
MONOCYTES # BLD AUTO: 0.3 10E3/UL (ref 0–1.3)
MONOCYTES NFR BLD AUTO: 6 %
NEUTROPHILS # BLD AUTO: 2.2 10E3/UL (ref 1.6–8.3)
NEUTROPHILS NFR BLD AUTO: 57 %
NRBC # BLD AUTO: 0 10E3/UL
NRBC BLD AUTO-RTO: 0 /100
P AXIS - MUSE: 51 DEGREES
PLATELET # BLD AUTO: 120 10E3/UL (ref 150–450)
POTASSIUM BLD-SCNC: 3.1 MMOL/L (ref 3.5–5)
PR INTERVAL - MUSE: 150 MS
PROT SERPL-MCNC: 7.3 G/DL (ref 6–8)
QRS DURATION - MUSE: 90 MS
QT - MUSE: 362 MS
QTC - MUSE: 417 MS
R AXIS - MUSE: 49 DEGREES
RBC # BLD AUTO: 3.49 10E6/UL (ref 3.8–5.2)
SODIUM SERPL-SCNC: 142 MMOL/L (ref 136–145)
SPECIMEN EXPIRATION DATE: NORMAL
SYSTOLIC BLOOD PRESSURE - MUSE: NORMAL MMHG
T AXIS - MUSE: 230 DEGREES
VENTRICULAR RATE- MUSE: 80 BPM
WBC # BLD AUTO: 3.9 10E3/UL (ref 4–11)

## 2021-09-23 PROCEDURE — 86901 BLOOD TYPING SEROLOGIC RH(D): CPT | Performed by: INTERNAL MEDICINE

## 2021-09-23 PROCEDURE — 99207 PR NO CHARGE NURSE ONLY: CPT

## 2021-09-23 PROCEDURE — 86850 RBC ANTIBODY SCREEN: CPT | Performed by: INTERNAL MEDICINE

## 2021-09-23 PROCEDURE — 85610 PROTHROMBIN TIME: CPT | Performed by: INTERNAL MEDICINE

## 2021-09-23 PROCEDURE — 86900 BLOOD TYPING SEROLOGIC ABO: CPT | Performed by: INTERNAL MEDICINE

## 2021-09-23 PROCEDURE — 80053 COMPREHEN METABOLIC PANEL: CPT | Performed by: INTERNAL MEDICINE

## 2021-09-23 PROCEDURE — 99215 OFFICE O/P EST HI 40 MIN: CPT | Performed by: INTERNAL MEDICINE

## 2021-09-23 PROCEDURE — 83735 ASSAY OF MAGNESIUM: CPT | Performed by: INTERNAL MEDICINE

## 2021-09-23 PROCEDURE — 36415 COLL VENOUS BLD VENIPUNCTURE: CPT | Performed by: INTERNAL MEDICINE

## 2021-09-23 PROCEDURE — 85025 COMPLETE CBC W/AUTO DIFF WBC: CPT | Performed by: INTERNAL MEDICINE

## 2021-09-23 PROCEDURE — 93000 ELECTROCARDIOGRAM COMPLETE: CPT | Performed by: INTERNAL MEDICINE

## 2021-09-23 PROCEDURE — 83880 ASSAY OF NATRIURETIC PEPTIDE: CPT | Performed by: INTERNAL MEDICINE

## 2021-09-23 RX ORDER — POTASSIUM CHLORIDE 750 MG/1
10 TABLET, EXTENDED RELEASE ORAL DAILY
Qty: 5 TABLET | Refills: 0 | Status: ON HOLD | OUTPATIENT
Start: 2021-09-23 | End: 2021-09-29

## 2021-09-23 ASSESSMENT — MIFFLIN-ST. JEOR: SCORE: 1161.33

## 2021-09-23 NOTE — NURSING NOTE
Reason for today s visit: Pre-op TAVR RN Visit    Patient scheduled for Transcatheter Aortic Valve Replacement (TAVR) on Tues 9/28/21  arrival time: 0545AM for 1st case  at [facility] with Dr. Randall and Dr. Kulkarni    Referring provider: PCP  PCP: Nora Mccarthy    CT surgery consult completed:  Dr. Rollins (date 9/3/21)      Tests completed today:  Pre-procedure labs drawn: CMP, CBC, INR, BNP, Mag, Type and Screen  EKG      STS score: 2%      Serum albumin (date completed 9/23/21): 3.8  De [place] index (date completed 9/23/21): 6/6      Total Frailty Score: 0      Patient instructed on medications:   Hold lasix AM of procedure, but ok to take other medications with a sip of water  Loading dose of Plavix: n/a  Loading dose of ASA: 325 mg upon arrrival      Patient instructed on skin prep:  Patient sent home with hibiclens and instructed for skin prep be done evening before procedure.        Surgery and anesthesia consents  Rachael AMAYA reviewed risks of TAVR and signed consents  Consents scanned into media  Anesthesia consents to be completed am of surgery      Pre and post procedure education was also reviewed with the patient and daughter. No further questions and ready to proceed with surgery as planned.    Instructed to come to the main entrance of [facility] at 0545 AM on Tues 9/28    All questions were answered to patient and Daughter by Rachael AMAYA and Garfield Leigh RN    Daughter present at the time of appointment.        Garfield Leigh RN  [facility]  Phone: [phone]  Fax: [phone]  09/23/21  1:59 PM

## 2021-09-23 NOTE — PATIENT INSTRUCTIONS
Maren Fofana,    It was a pleasure to see you today in the clinic regarding your upcoming TAVR.     My recommendations after this visit include:     - report to the [facility] on Tuesday, Sept 28 at the instructed time      If you have questions or concerns, please call using the numbers below:    Valve Clinic Pool  [phone]    After Hours/Scheduling  [phone]    Otherwise you can dial the nurse directly at:    Garfield Leigh RN  Valve clinic coordinator  [phone]

## 2021-09-23 NOTE — PROGRESS NOTES
HEART CARE ENCOUNTER NOTE       [facility]  [phone]    Assessment/Recommendations   Problem List Items Addressed This Visit        Circulatory    Severe aortic stenosis - Primary    AAA (abdominal aortic aneurysm) (H)       Musculoskeletal and Integumentary    Hemarthrosis       Other    Status post total knee replacement          1.  Aortic Stenosis: This has become severe and is now causing dyspnea on exertion, shortness of breath at rest, dizziness, orthopnea and lower extremity edema.  She has seen a decline in her functional status.  She has been evaluated by a multidisciplinary team and has been deemed a good candidate for transcatheter aortic valve replacement.  She has now undergone all the required workup for TAVR and wishes to proceed.   We discussed the procedure in detail,  including post-procedure care, restrictions and follow up.   She understands that there is a 4-5% risk of bleeding, infection, stroke, heart block requiring permanent pacemaker, cardiac perforation, aortic root rupture, dissection and death.  Patient also understands that if something unexpected happens, the procedure may need to be stopped or changed to help her.     All questions were answered   Consents were signed and witnessed by me.  As per Dr. Rollins's note dated September 3, patient is a candidate for urgent sternotomy and cardiopulmonary bypass in the rare event that aortic dissection or aortic rupture occur.  She has never had trouble with anesthesia in the past.    Labs today reveal Hgb 11.7, low WBC with value of 3.9, electrolytes show potassium low at 3.1 and magnesium 1.8.  Creat is 0.99    The plan will be for general anesthesia with EMILY monitoring.  We will plan on using the sentinel embolic protection device.  We will use the Bates Ryan valve, via the left subclavian approach.  There is a possibility of doing a peripheral nerve block, at which time we would do her under MAC instead of  GA, but this will be officially decided on the morning of procedure based on surgeon/team preference.  Maren Fofana will report on the morning of September 28 for the procedure and all instructions regarding times and medications were given by TAVR coordinator RN.     2.  AAA -status post endovascular repair    3.  Chronic back pain with disc disease and spondylolisthesis -status post lumbar laminectomy in the past    4.  Tobacco abuse -patient stopped smoking approximately 5 to 6 months ago    5.  Moderate right pleural effusion -as seen on TAVR (transcatheter aortic valve replacement) CT.  Per surgery recommendation, patient probably would benefit from drainage of this fluid by thoracentesis after TAVR, either inpatient or outpatient.    6.  Acute on chronic diastolic heart failure, NYHA class III -evidenced by 3 pillow orthopnea, lower extremity edema and now shortness of breath at rest which has occurred within the last 2 weeks.  .  She is currently taking Lasix 20 mg daily, but this could be adjusted if needed.  Also patient has moderate right pleural effusion with tiny to small left pleural effusion.       History of Present Illness/Subjective    Maren Fofana is a 71 year old female who comes in today for history and physical prior to TAVR (transcatheter aortic valve replacement).  Her daughter accompanies her to the visit today.    Katherine has history of aortic stenosis that has been followed peripherally, anxiety, tobacco abuse, AAA with endovascular repair, chronic back pain, osteoarthritis, lumbar laminectomy in the past and bilateral knee replacements in the past.  Patient was evaluated by PCP who noticed that her aortic stenosis murmur had gotten significantly worse and ordered echocardiogram.  Echocardiogram showed progression of her stenosis to severe.  Patient has reported increased amounts of dyspnea on exertion and lower extremity edema.  She is now short of breath at rest and says her  dizziness has been worse in the last 2 weeks.  She has 3 pillow orthopnea.  She denies any chest pain or PND.    Katherine also denies palpitations, pre-syncope, syncope, weight loss, changes in appetite, nausea or vomiting.     Medical, surgical, family, social history, and medications were reviewed and updated as necessary.    ECG (personally reviewed): 9/23/2021 shows normal sinus rhythm    ECHOCARDIOGRAM done at [facility] on May 25:  Severe aortic stenosis with reduced leaflet mobility  Aortic valve area 0.7 cm   Mean gradient 46 mmHg  Peak gradient 71 mmHg  EF 70% with no wall motion abnormalities    CATH done on August 30:  Coronary angiography showed:     No significant coronary artery disease, with mild atherosclerosis.     Details:     Left main mild disease  LAD with mild disease  Left circumflex has mild disease  RCA is dominant with mild atherosclerosis     Calcified aortic valve noted     Physical Examination Review of Systems   Vitals: /78 (BP Location: Left arm, Patient Position: Sitting, Cuff Size: Adult Regular)   Pulse 80   Resp 16   Ht 1.524 m (5')   Wt 73.5 kg (162 lb)   BMI 31.64 kg/m    BMI= Body mass index is 31.64 kg/m .  Wt Readings from Last 3 Encounters:   09/23/21 73.5 kg (162 lb)   09/03/21 72.1 kg (159 lb)   08/30/21 74.8 kg (165 lb)       General Appearance:   Alert, cooperative and in no acute distress   ENT/Mouth: membranes moist, no oral lesions or bleeding gums.      EYES:  no scleral icterus, normal conjunctivae   Neck: no carotid bruits.  Thyroid not visualized   Chest/Lungs:   lungs are clear on left, but with no breath sounds mid-chest down on right side.  No rales or wheezing   Cardiovascular:   Regular. Normal first and second heart sounds with 5/6 systolic murmur.  No rubs or gallops; the carotid, radial and posterior tibial pulses are intact, 1+ edema bilaterally    Abdomen:  Soft and nontender. Bowel sounds are present in all quadrants   Extremities: no cyanosis or  clubbing   Skin: no xanthelasma, warm.    Neurologic: normal gait, normal  bilateral, no tremors   Psychiatric: Normal mood and affect       Please refer above for cardiac ROS details.      Medical History  Surgical History Family History Social History   Past Medical History:   Diagnosis Date     Aortic stenosis      Collapsed lung      Fibromyalgia      GERD (gastroesophageal reflux disease)      Hypothyroid      Migraine      Peripheral vascular disease (H)      Reactive airway disease      Vertigo      Past Surgical History:   Procedure Laterality Date      SECTION Bilateral      CV CORONARY ANGIOGRAM N/A 2021    Procedure: Coronary Angiogram;  Surgeon: Sara Randall MD;  Location: [facility] CV     HYSTERECTOMY       POSTERIOR LAMINECTOMY / DECOMPRESSION LUMBAR SPINE      L2-L3     SPINAL FUSION       TONSILLECTOMY & ADENOIDECTOMY       TOTAL KNEE ARTHROPLASTY Bilateral      Family History   Problem Relation Age of Onset     Pancreatic Cancer Mother      Pancreatic Cancer Father     Social History     Socioeconomic History     Marital status: Legally      Spouse name: Not on file     Number of children: Not on file     Years of education: Not on file     Highest education level: Not on file   Occupational History     Not on file   Tobacco Use     Smoking status: Former Smoker     Packs/day: 0.50     Quit date: 2021     Years since quittin.4     Smokeless tobacco: Never Used   Substance and Sexual Activity     Alcohol use: Yes     Drug use: Never     Sexual activity: Not on file   Other Topics Concern     Parent/sibling w/ CABG, MI or angioplasty before 65F 55M? Not Asked   Social History Narrative     Not on file     Social Determinants of Health     Financial Resource Strain:      Difficulty of Paying Living Expenses:    Food Insecurity:      Worried About Running Out of Food in the Last Year:      Ran Out of Food in the Last Year:    Transportation Needs:      Lack  of Transportation (Medical):      Lack of Transportation (Non-Medical):    Physical Activity:      Days of Exercise per Week:      Minutes of Exercise per Session:    Stress:      Feeling of Stress :    Social Connections:      Frequency of Communication with Friends and Family:      Frequency of Social Gatherings with Friends and Family:      Attends [employer] Services:      Active Member of Clubs or Organizations:      Attends Club or Organization Meetings:      Marital Status:    Intimate Partner Violence:      Fear of Current or Ex-Partner:      Emotionally Abused:      Physically Abused:      Sexually Abused:           Medications  Allergies   Current Outpatient Medications   Medication Sig Dispense Refill     aspirin-acetaminophen-caffeine (EXCEDRIN MIGRAINE) 250-250-65 mg per tablet [ASPIRIN-ACETAMINOPHEN-CAFFEINE (EXCEDRIN MIGRAINE) 250-250-65 MG PER TABLET] Take 2 tablets by mouth every 6 (six) hours as needed (migraines). (Patient taking differently: Take 2 tablets by mouth every 4 hours as needed )  0     atorvastatin (LIPITOR) 80 MG tablet [ATORVASTATIN (LIPITOR) 80 MG TABLET] Take 80 mg by mouth daily.       EPINEPHrine (EPIPEN/ADRENACLICK/AUVI-Q) 0.3 mg/0.3 mL injection 0.3 mg as needed        fluticasone (FLONASE) 50 MCG/ACT nasal spray Spray 1 spray into both nostrils 2 times daily as needed for rhinitis or allergies       furosemide (LASIX) 20 MG tablet Take 20 mg tablet two times a day for 3 days, then resume 20 mg daily (Patient taking differently: Take 20 mg by mouth daily Take 20 mg by mouth daily) 90 tablet 3     levothyroxine (SYNTHROID, LEVOTHROID) 88 MCG tablet [LEVOTHYROXINE (SYNTHROID, LEVOTHROID) 88 MCG TABLET] Take 88 mcg by mouth daily.       Melatonin 10 MG TABS tablet Take 10 mg by mouth nightly as needed for sleep       traZODone (DESYREL) 50 MG tablet Take 100 mg by mouth At Bedtime        venlafaxine (EFFEXOR-XR) 150 MG 24 hr capsule [VENLAFAXINE (EFFEXOR-XR) 150 MG 24 HR CAPSULE]  Take 300 mg by mouth daily.      Allergies   Allergen Reactions     Bee Venom Anaphylaxis     Dilaudid [Hydromorphone] Other (See Comments)     Altered mental status     Latex      hives         Lab Results    Chemistry/lipid CBC Cardiac Enzymes/BNP/TSH/INR   Recent Labs   Lab Test 05/19/21  1332   CHOL 150   HDL 52   LDL 81   TRIG 87     Recent Labs   Lab Test 05/19/21  1332   LDL 81     Recent Labs   Lab Test 09/23/21  1251      POTASSIUM 3.1*   CHLORIDE 105   CO2 29      BUN 8   CR 0.99   GFRESTIMATED 57*   YENI 9.1     Recent Labs   Lab Test 09/23/21  1251 08/30/21  0711 08/02/21  1214   CR 0.99 0.98 0.90     No results for input(s): A1C in the last 57592 hours. Recent Labs   Lab Test 09/23/21  1251   WBC 3.9*   HGB 11.7   HCT 35.4   *   *     Recent Labs   Lab Test 09/23/21  1251 08/30/21  0711 08/02/21  1214   HGB 11.7 10.5* 11.5*    No results for input(s): TROPONINI in the last 19510 hours.  No results for input(s): BNP, NTBNPI, NTBNP in the last 33807 hours.  Recent Labs   Lab Test 05/19/21  1332   TSH 1.68     Recent Labs   Lab Test 09/23/21  1251 01/02/21  0912   INR 1.21* 1.10          35 minutes spent on the date of encounter doing education, consent signing, chart prep/review, review of outside records, review of test results, patient visit, documentation and discussion with family.      This note has been dictated using voice recognition software. Any grammatical or context distortions are unintentional and inherent to the software.

## 2021-09-23 NOTE — H&P (VIEW-ONLY)
HEART CARE ENCOUNTER NOTE       [facility]  [phone]    Assessment/Recommendations   Problem List Items Addressed This Visit        Circulatory    Severe aortic stenosis - Primary    AAA (abdominal aortic aneurysm) (H)       Musculoskeletal and Integumentary    Hemarthrosis       Other    Status post total knee replacement          1.  Aortic Stenosis: This has become severe and is now causing dyspnea on exertion, shortness of breath at rest, dizziness, orthopnea and lower extremity edema.  She has seen a decline in her functional status.  She has been evaluated by a multidisciplinary team and has been deemed a good candidate for transcatheter aortic valve replacement.  She has now undergone all the required workup for TAVR and wishes to proceed.   We discussed the procedure in detail,  including post-procedure care, restrictions and follow up.   She understands that there is a 4-5% risk of bleeding, infection, stroke, heart block requiring permanent pacemaker, cardiac perforation, aortic root rupture, dissection and death.  Patient also understands that if something unexpected happens, the procedure may need to be stopped or changed to help her.     All questions were answered   Consents were signed and witnessed by me.  As per Dr. Rollins's note dated September 3, patient is a candidate for urgent sternotomy and cardiopulmonary bypass in the rare event that aortic dissection or aortic rupture occur.  She has never had trouble with anesthesia in the past.    Labs today reveal Hgb 11.7, low WBC with value of 3.9, electrolytes show potassium low at 3.1 and magnesium 1.8.  Creat is 0.99    The plan will be for general anesthesia with EMILY monitoring.  We will plan on using the sentinel embolic protection device.  We will use the Bates Ryan valve, via the left subclavian approach.  There is a possibility of doing a peripheral nerve block, at which time we would do her under MAC instead of  GA, but this will be officially decided on the morning of procedure based on surgeon/team preference.  Maren Fofana will report on the morning of September 28 for the procedure and all instructions regarding times and medications were given by TAVR coordinator RN.     2.  AAA -status post endovascular repair    3.  Chronic back pain with disc disease and spondylolisthesis -status post lumbar laminectomy in the past    4.  Tobacco abuse -patient stopped smoking approximately 5 to 6 months ago    5.  Moderate right pleural effusion -as seen on TAVR (transcatheter aortic valve replacement) CT.  Per surgery recommendation, patient probably would benefit from drainage of this fluid by thoracentesis after TAVR, either inpatient or outpatient.    6.  Acute on chronic diastolic heart failure, NYHA class III -evidenced by 3 pillow orthopnea, lower extremity edema and now shortness of breath at rest which has occurred within the last 2 weeks.  .  She is currently taking Lasix 20 mg daily, but this could be adjusted if needed.  Also patient has moderate right pleural effusion with tiny to small left pleural effusion.       History of Present Illness/Subjective    Maren Fofana is a 71 year old female who comes in today for history and physical prior to TAVR (transcatheter aortic valve replacement).  Her daughter accompanies her to the visit today.    Katherine has history of aortic stenosis that has been followed peripherally, anxiety, tobacco abuse, AAA with endovascular repair, chronic back pain, osteoarthritis, lumbar laminectomy in the past and bilateral knee replacements in the past.  Patient was evaluated by PCP who noticed that her aortic stenosis murmur had gotten significantly worse and ordered echocardiogram.  Echocardiogram showed progression of her stenosis to severe.  Patient has reported increased amounts of dyspnea on exertion and lower extremity edema.  She is now short of breath at rest and says her  dizziness has been worse in the last 2 weeks.  She has 3 pillow orthopnea.  She denies any chest pain or PND.    Katherine also denies palpitations, pre-syncope, syncope, weight loss, changes in appetite, nausea or vomiting.     Medical, surgical, family, social history, and medications were reviewed and updated as necessary.    ECG (personally reviewed): 9/23/2021 shows normal sinus rhythm    ECHOCARDIOGRAM done at [facility] on May 25:  Severe aortic stenosis with reduced leaflet mobility  Aortic valve area 0.7 cm   Mean gradient 46 mmHg  Peak gradient 71 mmHg  EF 70% with no wall motion abnormalities    CATH done on August 30:  Coronary angiography showed:     No significant coronary artery disease, with mild atherosclerosis.     Details:     Left main mild disease  LAD with mild disease  Left circumflex has mild disease  RCA is dominant with mild atherosclerosis     Calcified aortic valve noted     Physical Examination Review of Systems   Vitals: /78 (BP Location: Left arm, Patient Position: Sitting, Cuff Size: Adult Regular)   Pulse 80   Resp 16   Ht 1.524 m (5')   Wt 73.5 kg (162 lb)   BMI 31.64 kg/m    BMI= Body mass index is 31.64 kg/m .  Wt Readings from Last 3 Encounters:   09/23/21 73.5 kg (162 lb)   09/03/21 72.1 kg (159 lb)   08/30/21 74.8 kg (165 lb)       General Appearance:   Alert, cooperative and in no acute distress   ENT/Mouth: membranes moist, no oral lesions or bleeding gums.      EYES:  no scleral icterus, normal conjunctivae   Neck: no carotid bruits.  Thyroid not visualized   Chest/Lungs:   lungs are clear on left, but with no breath sounds mid-chest down on right side.  No rales or wheezing   Cardiovascular:   Regular. Normal first and second heart sounds with 5/6 systolic murmur.  No rubs or gallops; the carotid, radial and posterior tibial pulses are intact, 1+ edema bilaterally    Abdomen:  Soft and nontender. Bowel sounds are present in all quadrants   Extremities: no cyanosis or  clubbing   Skin: no xanthelasma, warm.    Neurologic: normal gait, normal  bilateral, no tremors   Psychiatric: Normal mood and affect       Please refer above for cardiac ROS details.      Medical History  Surgical History Family History Social History   Past Medical History:   Diagnosis Date     Aortic stenosis      Collapsed lung      Fibromyalgia      GERD (gastroesophageal reflux disease)      Hypothyroid      Migraine      Peripheral vascular disease (H)      Reactive airway disease      Vertigo      Past Surgical History:   Procedure Laterality Date      SECTION Bilateral      CV CORONARY ANGIOGRAM N/A 2021    Procedure: Coronary Angiogram;  Surgeon: Sara Randall MD;  Location: [facility] CV     HYSTERECTOMY       POSTERIOR LAMINECTOMY / DECOMPRESSION LUMBAR SPINE      L2-L3     SPINAL FUSION       TONSILLECTOMY & ADENOIDECTOMY       TOTAL KNEE ARTHROPLASTY Bilateral      Family History   Problem Relation Age of Onset     Pancreatic Cancer Mother      Pancreatic Cancer Father     Social History     Socioeconomic History     Marital status: Legally      Spouse name: Not on file     Number of children: Not on file     Years of education: Not on file     Highest education level: Not on file   Occupational History     Not on file   Tobacco Use     Smoking status: Former Smoker     Packs/day: 0.50     Quit date: 2021     Years since quittin.4     Smokeless tobacco: Never Used   Substance and Sexual Activity     Alcohol use: Yes     Drug use: Never     Sexual activity: Not on file   Other Topics Concern     Parent/sibling w/ CABG, MI or angioplasty before 65F 55M? Not Asked   Social History Narrative     Not on file     Social Determinants of Health     Financial Resource Strain:      Difficulty of Paying Living Expenses:    Food Insecurity:      Worried About Running Out of Food in the Last Year:      Ran Out of Food in the Last Year:    Transportation Needs:      Lack  of Transportation (Medical):      Lack of Transportation (Non-Medical):    Physical Activity:      Days of Exercise per Week:      Minutes of Exercise per Session:    Stress:      Feeling of Stress :    Social Connections:      Frequency of Communication with Friends and Family:      Frequency of Social Gatherings with Friends and Family:      Attends [employer] Services:      Active Member of Clubs or Organizations:      Attends Club or Organization Meetings:      Marital Status:    Intimate Partner Violence:      Fear of Current or Ex-Partner:      Emotionally Abused:      Physically Abused:      Sexually Abused:           Medications  Allergies   Current Outpatient Medications   Medication Sig Dispense Refill     aspirin-acetaminophen-caffeine (EXCEDRIN MIGRAINE) 250-250-65 mg per tablet [ASPIRIN-ACETAMINOPHEN-CAFFEINE (EXCEDRIN MIGRAINE) 250-250-65 MG PER TABLET] Take 2 tablets by mouth every 6 (six) hours as needed (migraines). (Patient taking differently: Take 2 tablets by mouth every 4 hours as needed )  0     atorvastatin (LIPITOR) 80 MG tablet [ATORVASTATIN (LIPITOR) 80 MG TABLET] Take 80 mg by mouth daily.       EPINEPHrine (EPIPEN/ADRENACLICK/AUVI-Q) 0.3 mg/0.3 mL injection 0.3 mg as needed        fluticasone (FLONASE) 50 MCG/ACT nasal spray Spray 1 spray into both nostrils 2 times daily as needed for rhinitis or allergies       furosemide (LASIX) 20 MG tablet Take 20 mg tablet two times a day for 3 days, then resume 20 mg daily (Patient taking differently: Take 20 mg by mouth daily Take 20 mg by mouth daily) 90 tablet 3     levothyroxine (SYNTHROID, LEVOTHROID) 88 MCG tablet [LEVOTHYROXINE (SYNTHROID, LEVOTHROID) 88 MCG TABLET] Take 88 mcg by mouth daily.       Melatonin 10 MG TABS tablet Take 10 mg by mouth nightly as needed for sleep       traZODone (DESYREL) 50 MG tablet Take 100 mg by mouth At Bedtime        venlafaxine (EFFEXOR-XR) 150 MG 24 hr capsule [VENLAFAXINE (EFFEXOR-XR) 150 MG 24 HR CAPSULE]  Take 300 mg by mouth daily.      Allergies   Allergen Reactions     Bee Venom Anaphylaxis     Dilaudid [Hydromorphone] Other (See Comments)     Altered mental status     Latex      hives         Lab Results    Chemistry/lipid CBC Cardiac Enzymes/BNP/TSH/INR   Recent Labs   Lab Test 05/19/21  1332   CHOL 150   HDL 52   LDL 81   TRIG 87     Recent Labs   Lab Test 05/19/21  1332   LDL 81     Recent Labs   Lab Test 09/23/21  1251      POTASSIUM 3.1*   CHLORIDE 105   CO2 29      BUN 8   CR 0.99   GFRESTIMATED 57*   YENI 9.1     Recent Labs   Lab Test 09/23/21  1251 08/30/21  0711 08/02/21  1214   CR 0.99 0.98 0.90     No results for input(s): A1C in the last 25315 hours. Recent Labs   Lab Test 09/23/21  1251   WBC 3.9*   HGB 11.7   HCT 35.4   *   *     Recent Labs   Lab Test 09/23/21  1251 08/30/21  0711 08/02/21  1214   HGB 11.7 10.5* 11.5*    No results for input(s): TROPONINI in the last 69119 hours.  No results for input(s): BNP, NTBNPI, NTBNP in the last 52723 hours.  Recent Labs   Lab Test 05/19/21  1332   TSH 1.68     Recent Labs   Lab Test 09/23/21  1251 01/02/21  0912   INR 1.21* 1.10          35 minutes spent on the date of encounter doing education, consent signing, chart prep/review, review of outside records, review of test results, patient visit, documentation and discussion with family.      This note has been dictated using voice recognition software. Any grammatical or context distortions are unintentional and inherent to the software.

## 2021-09-23 NOTE — LETTER
9/23/2021    Nora Mccarthy MD  [facility] [address] [place] [address]    RE: Maren Fofana       Dear Colleague,    I had the pleasure of seeing Maren Fofana in the [facility].    HEART CARE ENCOUNTER NOTE       [facility]  [phone]    Assessment/Recommendations   Problem List Items Addressed This Visit        Circulatory    Severe aortic stenosis - Primary    AAA (abdominal aortic aneurysm) (H)       Musculoskeletal and Integumentary    Hemarthrosis       Other    Status post total knee replacement          1.  Aortic Stenosis: This has become severe and is now causing dyspnea on exertion, shortness of breath at rest, dizziness, orthopnea and lower extremity edema.  She has seen a decline in her functional status.  She has been evaluated by a multidisciplinary team and has been deemed a good candidate for transcatheter aortic valve replacement.  She has now undergone all the required workup for TAVR and wishes to proceed.   We discussed the procedure in detail,  including post-procedure care, restrictions and follow up.   She understands that there is a 4-5% risk of bleeding, infection, stroke, heart block requiring permanent pacemaker, cardiac perforation, aortic root rupture, dissection and death.  Patient also understands that if something unexpected happens, the procedure may need to be stopped or changed to help her.     All questions were answered   Consents were signed and witnessed by me.  As per Dr. Rollins's note dated September 3, patient is a candidate for urgent sternotomy and cardiopulmonary bypass in the rare event that aortic dissection or aortic rupture occur.  She has never had trouble with anesthesia in the past.    Labs today reveal Hgb 11.7, low WBC with value of 3.9, electrolytes show potassium low at 3.1 and magnesium 1.8.  Creat is 0.99    The plan will be for general anesthesia with  EMILY monitoring.  We will plan on using the sentinel embolic protection device.  We will use the Bates Ryan valve, via the left subclavian approach.  There is a possibility of doing a peripheral nerve block, at which time we would do her under MAC instead of GA, but this will be officially decided on the morning of procedure based on surgeon/team preference.  Maren Fofana will report on the morning of September 28 for the procedure and all instructions regarding times and medications were given by TAVR coordinator RN.     2.  AAA -status post endovascular repair    3.  Chronic back pain with disc disease and spondylolisthesis -status post lumbar laminectomy in the past    4.  Tobacco abuse -patient stopped smoking approximately 5 to 6 months ago    5.  Moderate right pleural effusion -as seen on TAVR (transcatheter aortic valve replacement) CT.  Per surgery recommendation, patient probably would benefit from drainage of this fluid by thoracentesis after TAVR, either inpatient or outpatient.    6.  Acute on chronic diastolic heart failure, NYHA class III -evidenced by 3 pillow orthopnea, lower extremity edema and now shortness of breath at rest which has occurred within the last 2 weeks.  .  She is currently taking Lasix 20 mg daily, but this could be adjusted if needed.  Also patient has moderate right pleural effusion with tiny to small left pleural effusion.       History of Present Illness/Subjective    Maren Fofana is a 71 year old female who comes in today for history and physical prior to TAVR (transcatheter aortic valve replacement).  Her daughter accompanies her to the visit today.    Katherine has history of aortic stenosis that has been followed peripherally, anxiety, tobacco abuse, AAA with endovascular repair, chronic back pain, osteoarthritis, lumbar laminectomy in the past and bilateral knee replacements in the past.  Patient was evaluated by PCP who noticed that her aortic stenosis murmur  had gotten significantly worse and ordered echocardiogram.  Echocardiogram showed progression of her stenosis to severe.  Patient has reported increased amounts of dyspnea on exertion and lower extremity edema.  She is now short of breath at rest and says her dizziness has been worse in the last 2 weeks.  She has 3 pillow orthopnea.  She denies any chest pain or PND.    Katherine also denies palpitations, pre-syncope, syncope, weight loss, changes in appetite, nausea or vomiting.     Medical, surgical, family, social history, and medications were reviewed and updated as necessary.    ECG (personally reviewed): 9/23/2021 shows normal sinus rhythm    ECHOCARDIOGRAM done at [facility] on May 25:  Severe aortic stenosis with reduced leaflet mobility  Aortic valve area 0.7 cm   Mean gradient 46 mmHg  Peak gradient 71 mmHg  EF 70% with no wall motion abnormalities    CATH done on August 30:  Coronary angiography showed:     No significant coronary artery disease, with mild atherosclerosis.     Details:     Left main mild disease  LAD with mild disease  Left circumflex has mild disease  RCA is dominant with mild atherosclerosis     Calcified aortic valve noted     Physical Examination Review of Systems   Vitals: /78 (BP Location: Left arm, Patient Position: Sitting, Cuff Size: Adult Regular)   Pulse 80   Resp 16   Ht 1.524 m (5')   Wt 73.5 kg (162 lb)   BMI 31.64 kg/m    BMI= Body mass index is 31.64 kg/m .  Wt Readings from Last 3 Encounters:   09/23/21 73.5 kg (162 lb)   09/03/21 72.1 kg (159 lb)   08/30/21 74.8 kg (165 lb)       General Appearance:   Alert, cooperative and in no acute distress   ENT/Mouth: membranes moist, no oral lesions or bleeding gums.      EYES:  no scleral icterus, normal conjunctivae   Neck: no carotid bruits.  Thyroid not visualized   Chest/Lungs:   lungs are clear on left, but with no breath sounds mid-chest down on right side.  No rales or wheezing   Cardiovascular:   Regular. Normal first  and second heart sounds with 5/6 systolic murmur.  No rubs or gallops; the carotid, radial and posterior tibial pulses are intact, 1+ edema bilaterally    Abdomen:  Soft and nontender. Bowel sounds are present in all quadrants   Extremities: no cyanosis or clubbing   Skin: no xanthelasma, warm.    Neurologic: normal gait, normal  bilateral, no tremors   Psychiatric: Normal mood and affect       Please refer above for cardiac ROS details.      Medical History  Surgical History Family History Social History   Past Medical History:   Diagnosis Date     Aortic stenosis      Collapsed lung      Fibromyalgia      GERD (gastroesophageal reflux disease)      Hypothyroid      Migraine      Peripheral vascular disease (H)      Reactive airway disease      Vertigo      Past Surgical History:   Procedure Laterality Date      SECTION Bilateral      CV CORONARY ANGIOGRAM N/A 2021    Procedure: Coronary Angiogram;  Surgeon: Sara Randall MD;  Location: [facility] LAB CV     HYSTERECTOMY       POSTERIOR LAMINECTOMY / DECOMPRESSION LUMBAR SPINE      L2-L3     SPINAL FUSION       TONSILLECTOMY & ADENOIDECTOMY       TOTAL KNEE ARTHROPLASTY Bilateral      Family History   Problem Relation Age of Onset     Pancreatic Cancer Mother      Pancreatic Cancer Father     Social History     Socioeconomic History     Marital status: Legally      Spouse name: Not on file     Number of children: Not on file     Years of education: Not on file     Highest education level: Not on file   Occupational History     Not on file   Tobacco Use     Smoking status: Former Smoker     Packs/day: 0.50     Quit date: 2021     Years since quittin.4     Smokeless tobacco: Never Used   Substance and Sexual Activity     Alcohol use: Yes     Drug use: Never     Sexual activity: Not on file   Other Topics Concern     Parent/sibling w/ CABG, MI or angioplasty before 65F 55M? Not Asked   Social History Narrative     Not on file      Social Determinants of Health     Financial Resource Strain:      Difficulty of Paying Living Expenses:    Food Insecurity:      Worried About Running Out of Food in the Last Year:      Ran Out of Food in the Last Year:    Transportation Needs:      Lack of Transportation (Medical):      Lack of Transportation (Non-Medical):    Physical Activity:      Days of Exercise per Week:      Minutes of Exercise per Session:    Stress:      Feeling of Stress :    Social Connections:      Frequency of Communication with Friends and Family:      Frequency of Social Gatherings with Friends and Family:      Attends [employer] Services:      Active Member of Clubs or Organizations:      Attends Club or Organization Meetings:      Marital Status:    Intimate Partner Violence:      Fear of Current or Ex-Partner:      Emotionally Abused:      Physically Abused:      Sexually Abused:           Medications  Allergies   Current Outpatient Medications   Medication Sig Dispense Refill     aspirin-acetaminophen-caffeine (EXCEDRIN MIGRAINE) 250-250-65 mg per tablet [ASPIRIN-ACETAMINOPHEN-CAFFEINE (EXCEDRIN MIGRAINE) 250-250-65 MG PER TABLET] Take 2 tablets by mouth every 6 (six) hours as needed (migraines). (Patient taking differently: Take 2 tablets by mouth every 4 hours as needed )  0     atorvastatin (LIPITOR) 80 MG tablet [ATORVASTATIN (LIPITOR) 80 MG TABLET] Take 80 mg by mouth daily.       EPINEPHrine (EPIPEN/ADRENACLICK/AUVI-Q) 0.3 mg/0.3 mL injection 0.3 mg as needed        fluticasone (FLONASE) 50 MCG/ACT nasal spray Spray 1 spray into both nostrils 2 times daily as needed for rhinitis or allergies       furosemide (LASIX) 20 MG tablet Take 20 mg tablet two times a day for 3 days, then resume 20 mg daily (Patient taking differently: Take 20 mg by mouth daily Take 20 mg by mouth daily) 90 tablet 3     levothyroxine (SYNTHROID, LEVOTHROID) 88 MCG tablet [LEVOTHYROXINE (SYNTHROID, LEVOTHROID) 88 MCG TABLET] Take 88 mcg by mouth  daily.       Melatonin 10 MG TABS tablet Take 10 mg by mouth nightly as needed for sleep       traZODone (DESYREL) 50 MG tablet Take 100 mg by mouth At Bedtime        venlafaxine (EFFEXOR-XR) 150 MG 24 hr capsule [VENLAFAXINE (EFFEXOR-XR) 150 MG 24 HR CAPSULE] Take 300 mg by mouth daily.      Allergies   Allergen Reactions     Bee Venom Anaphylaxis     Dilaudid [Hydromorphone] Other (See Comments)     Altered mental status     Latex      hives         Lab Results    Chemistry/lipid CBC Cardiac Enzymes/BNP/TSH/INR   Recent Labs   Lab Test 05/19/21  1332   CHOL 150   HDL 52   LDL 81   TRIG 87     Recent Labs   Lab Test 05/19/21  1332   LDL 81     Recent Labs   Lab Test 09/23/21  1251      POTASSIUM 3.1*   CHLORIDE 105   CO2 29      BUN 8   CR 0.99   GFRESTIMATED 57*   YENI 9.1     Recent Labs   Lab Test 09/23/21  1251 08/30/21  0711 08/02/21  1214   CR 0.99 0.98 0.90     No results for input(s): A1C in the last 81806 hours. Recent Labs   Lab Test 09/23/21  1251   WBC 3.9*   HGB 11.7   HCT 35.4   *   *     Recent Labs   Lab Test 09/23/21  1251 08/30/21  0711 08/02/21  1214   HGB 11.7 10.5* 11.5*    No results for input(s): TROPONINI in the last 99255 hours.  No results for input(s): BNP, NTBNPI, NTBNP in the last 43760 hours.  Recent Labs   Lab Test 05/19/21  1332   TSH 1.68     Recent Labs   Lab Test 09/23/21  1251 01/02/21  0912   INR 1.21* 1.10          35 minutes spent on the date of encounter doing education, consent signing, chart prep/review, review of outside records, review of test results, patient visit, documentation and discussion with family.      This note has been dictated using voice recognition software. Any grammatical or context distortions are unintentional and inherent to the software.                     Thank you for allowing me to participate in the care of your patient.      Sincerely,     CHARLA Delgadillo [facility]  [facility]  cc:   No referring provider defined for this encounter.

## 2021-09-25 ENCOUNTER — LAB (OUTPATIENT)
Dept: FAMILY MEDICINE | Facility: CLINIC | Age: 71
End: 2021-09-25
Payer: COMMERCIAL

## 2021-09-25 DIAGNOSIS — Z11.59 ENCOUNTER FOR SCREENING FOR OTHER VIRAL DISEASES: ICD-10-CM

## 2021-09-25 PROCEDURE — U0005 INFEC AGEN DETEC AMPLI PROBE: HCPCS

## 2021-09-25 PROCEDURE — U0003 INFECTIOUS AGENT DETECTION BY NUCLEIC ACID (DNA OR RNA); SEVERE ACUTE RESPIRATORY SYNDROME CORONAVIRUS 2 (SARS-COV-2) (CORONAVIRUS DISEASE [COVID-19]), AMPLIFIED PROBE TECHNIQUE, MAKING USE OF HIGH THROUGHPUT TECHNOLOGIES AS DESCRIBED BY CMS-2020-01-R: HCPCS

## 2021-09-26 LAB — SARS-COV-2 RNA RESP QL NAA+PROBE: NEGATIVE

## 2021-09-27 PROCEDURE — 99223 1ST HOSP IP/OBS HIGH 75: CPT | Mod: AI | Performed by: NURSE PRACTITIONER

## 2021-09-27 NOTE — H&P
[facility]      CSI History and Physicial  Maren Fofana MRN: [id number]  1950  Date of Admission:(Not on file)  Primary care provider: Nora Mccarthy      Assessment and Plan:     Katherine is a pleasant 71 y.o female with history of aortic stenosis, HTN, HLD, diastolic heart failure, AAA with endovascular repair, and L2-L3 laminectomy with chronic back pain. She is here today for planned transcatheter aortic valve replacement.      #Severe aortic stenosis, now s/p Transcatheter Aortic Valve Replacement with 23 mm ES. Prior to procedure, pt noted to have a mean gradient 46 mmHG, KP 0.7 Cm^2- Sheath sites soft without hematoma. Left subclavian access site CDI. No arrhythmias. Neuro's intact.    - Bedrest per protocol x 4 hours   - Neuro checks, per protocol  - Echocardiogram tomorrow  - Monitor groin/subclavian site   - EKG in morning   - ASA for anti-platelet therapy   - Cardiac rehab/PT/OT  - Diurese as needed/able  - Daily weights  - Strict intake/output  - Continuous telemetry monitoring      # AAA s/p endovascular repair   # HTN  # HLD   Blood pressure well-controlled. No PTA anti-hypertensives.   - Continue PTA atorvastatin 80 mg daily     # Acute on chronic diastolic heart failure, NYHA class III  # Moderate R pleural effusion   Patient reports 3-pillow orthopnea, SHAD, and increased SOB. Moderate R pleural effusion noted on CT TAVR. Per surgery recommendation, patient probably would benefit from drainage of this fluid by thoracentesis after TAVR, either inpatient or outpatient.  - Plan to re-evaluate once off bedrest, consider thoracentesis prior to discharge   - Continue PTA lasix 20 mg daily     # Hypokalemia  K 3.3. on admission. No clinical significance.   - Daily BMP  - Continue PTA daily K replacement    # Chronic back pain  # L2-L3 laminectomy   - Tylenol prn     FEN: Cardiac diet  Disposition: Home in 1-2 days with cardiac rehab pending stable post-op period  Code Status: FULL  CODE    Macie NAMRATA Baer  [facility] Interventional Cardiology Team  [phone]           Chief Complaint:   Progressive dyspnea on exertion          History of Present Illness:   Katherine is a pleasant 71 y.o female with history of aortic stenosis, HTN, HLD, AAA with endograft repair and L2-L3 laminectomy. She is here today for planned transcatheter aortic valve replacement.     Patient had been followed peripherally for her aortic stenosis. She was evaluated by PCP who noticed that her aortic stenosis murmur had gotten significantly worse and ordered echocardiogram.  Echocardiogram on 21 showed progression of her stenosis to severe characterized by KP 0.7 cm2 with MG 46 mmHg. LVEF 70%.     Patient has reported increased amounts of dyspnea on exertion and lower extremity edema.  She is now short of breath at rest and says her dizziness has been worse in the last 2 weeks.  She has 3 pillow orthopnea.  She denies any chest pain or PND.    She is now s/p TAVR with 23 mm ES via left subclavian approach. Patient was seen in PACU. No acute complications. Hemodynamically stable. No conduction abnormalities. Access sites CDI. R groin sheaths to pulled on floor.            Review of Systems:    10 point review of systems negative except for stated above in HPI.          Past Medical History:   Medical History reviewed.   Past Medical History:   Diagnosis Date     Aortic stenosis      Collapsed lung      Fibromyalgia      GERD (gastroesophageal reflux disease)      Hypothyroid      Migraine      Peripheral vascular disease (H)      Reactive airway disease      Vertigo              Past Surgical History:   Surgical History reviewed.   Past Surgical History:   Procedure Laterality Date      SECTION Bilateral      CV CORONARY ANGIOGRAM N/A 2021    Procedure: Coronary Angiogram;  Surgeon: Sara Randall MD;  [facility]: [facility] CV     HYSTERECTOMY       POSTERIOR LAMINECTOMY / DECOMPRESSION LUMBAR SPINE       L2-L3     SPINAL FUSION       TONSILLECTOMY & ADENOIDECTOMY       TOTAL KNEE ARTHROPLASTY Bilateral              Social History:   Social History reviewed.  Social History     Tobacco Use     Smoking status: Former Smoker     Packs/day: 0.50     Quit date: 2021     Years since quittin.4     Smokeless tobacco: Never Used   Substance Use Topics     Alcohol use: Yes             Family History:   Family History reviewed.   Family History   Problem Relation Age of Onset     Pancreatic Cancer Mother      Pancreatic Cancer Father              Allergies:     Allergies   Allergen Reactions     Bee Venom Anaphylaxis     Dilaudid [Hydromorphone] Other (See Comments)     Altered mental status     Latex      hives             Medications:   Medications Reviewed.   No current facility-administered medications for this encounter.     Current Outpatient Medications   Medication Sig     aspirin-acetaminophen-caffeine (EXCEDRIN MIGRAINE) 250-250-65 mg per tablet [ASPIRIN-ACETAMINOPHEN-CAFFEINE (EXCEDRIN MIGRAINE) 250-250-65 MG PER TABLET] Take 2 tablets by mouth every 6 (six) hours as needed (migraines). (Patient taking differently: Take 2 tablets by mouth every 4 hours as needed )     atorvastatin (LIPITOR) 80 MG tablet [ATORVASTATIN (LIPITOR) 80 MG TABLET] Take 80 mg by mouth daily.     EPINEPHrine (EPIPEN/ADRENACLICK/AUVI-Q) 0.3 mg/0.3 mL injection 0.3 mg as needed      fluticasone (FLONASE) 50 MCG/ACT nasal spray Spray 1 spray into both nostrils 2 times daily as needed for rhinitis or allergies     furosemide (LASIX) 20 MG tablet Take 20 mg tablet two times a day for 3 days, then resume 20 mg daily (Patient taking differently: Take 20 mg by mouth daily Take 20 mg by mouth daily)     levothyroxine (SYNTHROID, LEVOTHROID) 88 MCG tablet [LEVOTHYROXINE (SYNTHROID, LEVOTHROID) 88 MCG TABLET] Take 88 mcg by mouth daily.     Melatonin 10 MG TABS tablet Take 10 mg by mouth nightly as needed for sleep     potassium chloride  "ER (KLOR-CON M) 10 MEQ CR tablet Take 1 tablet (10 mEq) by mouth daily     traZODone (DESYREL) 50 MG tablet Take 100 mg by mouth At Bedtime      venlafaxine (EFFEXOR-XR) 150 MG 24 hr capsule [VENLAFAXINE (EFFEXOR-XR) 150 MG 24 HR CAPSULE] Take 300 mg by mouth daily.             Physical Exam:   Vitals were reviewed.  Blood pressure 107/68, pulse 66, temperature 97.5  F (36.4  C), temperature source Oral, resp. rate 15, height 1.549 m (5' 1\"), weight 74.3 kg (163 lb 12.8 oz), SpO2 94 %.    General: AAOx3, NAD  Skin: Not jaundiced, no rash, no ecchymoses; incision site CDI, soft, non-tender, no signs of hematoma. No bruit  HEENT: MMM, PERRLA, EOM intact  CV: RRR, normal S1S2, no murmur, clicks, rubs  Resp: Diminished at bases bilaterally, no wheezes, rhonchi  Abd: Soft, non-tender, BS+, no masses appreciated  Extremities: warm and well perfused, palpable pulses, 1+ SHAD  Neuro: No lateralizing symptoms or focal neurologic deficits        Labs:   Routine Labs:  No results found for: TROPI, TROPONIN, TROPR, TROPN  CMP  Recent Labs   Lab 09/28/21  1147 09/28/21  1134 09/28/21  0643 09/28/21  0607 09/23/21  1251     --   --   --  142   POTASSIUM 3.4  --  3.3*  --  3.1*   CHLORIDE 111*  --   --   --  105   CO2 26  --   --   --  29   ANIONGAP 4  --   --   --  8   * 135*  --  104* 104   BUN 8  --   --   --  8   CR 0.92  --   --   --  0.99   GFRESTIMATED 62  --   --   --  57*   YENI 7.7*  --   --   --  9.1   MAG 1.6  --   --   --  1.8   PROTTOTAL 6.1*  --   --   --  7.3   ALBUMIN 2.8*  --   --   --  3.8   BILITOTAL 0.3  --   --   --  0.6   ALKPHOS 72  --   --   --  94   AST 22  --   --   --  27   ALT 15  --   --   --  9     CBC  Recent Labs   Lab 09/28/21  1148 09/23/21  1251   WBC 4.5 3.9*   RBC 2.80* 3.49*   HGB 9.4* 11.7   HCT 28.5* 35.4   * 101*   MCH 33.6* 33.5*   MCHC 33.0 33.1   RDW 14.4 14.2   PLT 81* 120*     INR  Recent Labs   Lab 09/23/21  1251   INR 1.21*           Diagnostics:    EKG 12Lead: " 9/28/21 post-procedure

## 2021-09-28 ENCOUNTER — HOSPITAL ENCOUNTER (OUTPATIENT)
Dept: CARDIOLOGY | Facility: CLINIC | Age: 71
Discharge: HOME | End: 2021-09-28
Attending: INTERNAL MEDICINE | Admitting: INTERNAL MEDICINE
Payer: COMMERCIAL

## 2021-09-28 ENCOUNTER — ANESTHESIA (OUTPATIENT)
Dept: CARDIOLOGY | Facility: CLINIC | Age: 71
DRG: 266 | End: 2021-09-28
Payer: COMMERCIAL

## 2021-09-28 ENCOUNTER — HOSPITAL ENCOUNTER (INPATIENT)
Facility: CLINIC | Age: 71
LOS: 1 days | Discharge: HOME | DRG: 266 | End: 2021-09-29
Attending: INTERNAL MEDICINE | Admitting: INTERNAL MEDICINE
Payer: COMMERCIAL

## 2021-09-28 DIAGNOSIS — Z95.2 S/P TAVR (TRANSCATHETER AORTIC VALVE REPLACEMENT): Primary | ICD-10-CM

## 2021-09-28 DIAGNOSIS — E87.6 HYPOKALEMIA: ICD-10-CM

## 2021-09-28 DIAGNOSIS — I35.0 SEVERE AORTIC STENOSIS: ICD-10-CM

## 2021-09-28 DIAGNOSIS — I51.89 OTHER ILL-DEFINED HEART DISEASES: ICD-10-CM

## 2021-09-28 DIAGNOSIS — I35.0 AORTIC STENOSIS, SEVERE: ICD-10-CM

## 2021-09-28 DIAGNOSIS — I35.0 SEVERE AORTIC STENOSIS: Primary | ICD-10-CM

## 2021-09-28 LAB
ACT BLD: 114 SECONDS (ref 74–150)
ACT BLD: 231 SECONDS (ref 74–150)
ACT BLD: 255 SECONDS (ref 74–150)
ALBUMIN SERPL-MCNC: 2.8 G/DL (ref 3.4–5)
ALP SERPL-CCNC: 72 U/L (ref 40–150)
ALT SERPL W P-5'-P-CCNC: 15 U/L (ref 0–50)
ANION GAP SERPL CALCULATED.3IONS-SCNC: 4 MMOL/L (ref 3–14)
AST SERPL W P-5'-P-CCNC: 22 U/L (ref 0–45)
BILIRUB SERPL-MCNC: 0.3 MG/DL (ref 0.2–1.3)
BLD PROD TYP BPU: NORMAL
BLD PROD TYP BPU: NORMAL
BLOOD COMPONENT TYPE: NORMAL
BLOOD COMPONENT TYPE: NORMAL
BUN SERPL-MCNC: 8 MG/DL (ref 7–30)
CALCIUM SERPL-MCNC: 7.7 MG/DL (ref 8.5–10.1)
CHLORIDE BLD-SCNC: 111 MMOL/L (ref 94–109)
CO2 SERPL-SCNC: 26 MMOL/L (ref 20–32)
CODING SYSTEM: NORMAL
CODING SYSTEM: NORMAL
CREAT SERPL-MCNC: 0.92 MG/DL (ref 0.52–1.04)
CROSSMATCH: NORMAL
CROSSMATCH: NORMAL
ERYTHROCYTE [DISTWIDTH] IN BLOOD BY AUTOMATED COUNT: 14.4 % (ref 10–15)
GFR SERPL CREATININE-BSD FRML MDRD: 62 ML/MIN/1.73M2
GLUCOSE BLD-MCNC: 142 MG/DL (ref 70–99)
GLUCOSE BLDC GLUCOMTR-MCNC: 104 MG/DL (ref 70–99)
GLUCOSE BLDC GLUCOMTR-MCNC: 135 MG/DL (ref 70–99)
HCT VFR BLD AUTO: 28.5 % (ref 35–47)
HGB BLD-MCNC: 9.4 G/DL (ref 11.7–15.7)
ISSUE DATE AND TIME: NORMAL
ISSUE DATE AND TIME: NORMAL
LVEF ECHO: NORMAL
MAGNESIUM SERPL-MCNC: 1.6 MG/DL (ref 1.6–2.3)
MCH RBC QN AUTO: 33.6 PG (ref 26.5–33)
MCHC RBC AUTO-ENTMCNC: 33 G/DL (ref 31.5–36.5)
MCV RBC AUTO: 102 FL (ref 78–100)
PHOSPHATE SERPL-MCNC: 3.9 MG/DL (ref 2.5–4.5)
PLATELET # BLD AUTO: 81 10E3/UL (ref 150–450)
POTASSIUM BLD-SCNC: 3.3 MMOL/L (ref 3.4–5.3)
POTASSIUM BLD-SCNC: 3.4 MMOL/L (ref 3.4–5.3)
PROT SERPL-MCNC: 6.1 G/DL (ref 6.8–8.8)
RBC # BLD AUTO: 2.8 10E6/UL (ref 3.8–5.2)
SODIUM SERPL-SCNC: 141 MMOL/L (ref 133–144)
UNIT ABO/RH: NORMAL
UNIT ABO/RH: NORMAL
UNIT NUMBER: NORMAL
UNIT NUMBER: NORMAL
UNIT STATUS: NORMAL
UNIT STATUS: NORMAL
UNIT TYPE ISBT: 7300
UNIT TYPE ISBT: 7300
WBC # BLD AUTO: 4.5 10E3/UL (ref 4–11)

## 2021-09-28 PROCEDURE — 258N000003 HC RX IP 258 OP 636: Performed by: NURSE ANESTHETIST, CERTIFIED REGISTERED

## 2021-09-28 PROCEDURE — 370N000017 HC ANESTHESIA TECHNICAL FEE, PER MIN: Performed by: INTERNAL MEDICINE

## 2021-09-28 PROCEDURE — 02RF38Z REPLACEMENT OF AORTIC VALVE WITH ZOOPLASTIC TISSUE, PERCUTANEOUS APPROACH: ICD-10-PCS | Performed by: INTERNAL MEDICINE

## 2021-09-28 PROCEDURE — 86923 COMPATIBILITY TEST ELECTRIC: CPT

## 2021-09-28 PROCEDURE — 93355 ECHO TRANSESOPHAGEAL (TEE): CPT | Performed by: INTERNAL MEDICINE

## 2021-09-28 PROCEDURE — 250N000009 HC RX 250: Performed by: INTERNAL MEDICINE

## 2021-09-28 PROCEDURE — 250N000011 HC RX IP 250 OP 636: Performed by: INTERNAL MEDICINE

## 2021-09-28 PROCEDURE — 0W993ZZ DRAINAGE OF RIGHT PLEURAL CAVITY, PERCUTANEOUS APPROACH: ICD-10-PCS | Performed by: THORACIC SURGERY (CARDIOTHORACIC VASCULAR SURGERY)

## 2021-09-28 PROCEDURE — 250N000011 HC RX IP 250 OP 636: Performed by: NURSE PRACTITIONER

## 2021-09-28 PROCEDURE — 80053 COMPREHEN METABOLIC PANEL: CPT | Performed by: INTERNAL MEDICINE

## 2021-09-28 PROCEDURE — 250N000013 HC RX MED GY IP 250 OP 250 PS 637: Performed by: INTERNAL MEDICINE

## 2021-09-28 PROCEDURE — 250N000009 HC RX 250: Performed by: NURSE ANESTHETIST, CERTIFIED REGISTERED

## 2021-09-28 PROCEDURE — 83735 ASSAY OF MAGNESIUM: CPT | Performed by: INTERNAL MEDICINE

## 2021-09-28 PROCEDURE — 33363 REPLACE AORTIC VALVE OPEN: CPT | Mod: 62 | Performed by: INTERNAL MEDICINE

## 2021-09-28 PROCEDURE — 258N000003 HC RX IP 258 OP 636: Performed by: INTERNAL MEDICINE

## 2021-09-28 PROCEDURE — C1733 CATH, EP, OTHR THAN COOL-TIP: HCPCS | Performed by: INTERNAL MEDICINE

## 2021-09-28 PROCEDURE — 36415 COLL VENOUS BLD VENIPUNCTURE: CPT | Performed by: INTERNAL MEDICINE

## 2021-09-28 PROCEDURE — 272N000001 HC OR GENERAL SUPPLY STERILE: Performed by: INTERNAL MEDICINE

## 2021-09-28 PROCEDURE — 410N000003 HC PER-PERFUSION 1ST 30 MIN: Performed by: INTERNAL MEDICINE

## 2021-09-28 PROCEDURE — 85027 COMPLETE CBC AUTOMATED: CPT | Performed by: INTERNAL MEDICINE

## 2021-09-28 PROCEDURE — 250N000011 HC RX IP 250 OP 636: Performed by: STUDENT IN AN ORGANIZED HEALTH CARE EDUCATION/TRAINING PROGRAM

## 2021-09-28 PROCEDURE — 84132 ASSAY OF SERUM POTASSIUM: CPT | Performed by: INTERNAL MEDICINE

## 2021-09-28 PROCEDURE — 93010 ELECTROCARDIOGRAM REPORT: CPT | Mod: 59 | Performed by: INTERNAL MEDICINE

## 2021-09-28 PROCEDURE — 255N000002 HC RX 255 OP 636: Performed by: INTERNAL MEDICINE

## 2021-09-28 PROCEDURE — 214N000001 HC R&B CCU UMMC

## 2021-09-28 PROCEDURE — X2A5312 CEREBRAL EMBOLIC FILTRATION, DUAL FILTER IN INNOMINATE ARTERY AND LEFT COMMON CAROTID ARTERY, PERCUTANEOUS APPROACH, NEW TECHNOLOGY GROUP 2: ICD-10-PCS | Performed by: INTERNAL MEDICINE

## 2021-09-28 PROCEDURE — 93005 ELECTROCARDIOGRAM TRACING: CPT

## 2021-09-28 PROCEDURE — 93321 DOPPLER ECHO F-UP/LMTD STD: CPT

## 2021-09-28 PROCEDURE — 85347 COAGULATION TIME ACTIVATED: CPT

## 2021-09-28 PROCEDURE — 250N000011 HC RX IP 250 OP 636: Performed by: NURSE ANESTHETIST, CERTIFIED REGISTERED

## 2021-09-28 PROCEDURE — C1894 INTRO/SHEATH, NON-LASER: HCPCS | Performed by: INTERNAL MEDICINE

## 2021-09-28 PROCEDURE — 272N000002 HC OR SUPPLY OTHER OPNP: Performed by: INTERNAL MEDICINE

## 2021-09-28 PROCEDURE — 84100 ASSAY OF PHOSPHORUS: CPT | Performed by: INTERNAL MEDICINE

## 2021-09-28 PROCEDURE — 360N000079 HC SURGERY LEVEL 6, PER MIN: Performed by: INTERNAL MEDICINE

## 2021-09-28 PROCEDURE — C1769 GUIDE WIRE: HCPCS | Performed by: INTERNAL MEDICINE

## 2021-09-28 DEVICE — VALVE AORTIC SAPEIN 3 UTLRA TAVR 23MM 9750TFX23A: Type: IMPLANTABLE DEVICE | Site: GROIN | Status: FUNCTIONAL

## 2021-09-28 RX ORDER — CEFAZOLIN SODIUM 2 G/100ML
2 INJECTION, SOLUTION INTRAVENOUS
Status: COMPLETED | OUTPATIENT
Start: 2021-09-28 | End: 2021-09-28

## 2021-09-28 RX ORDER — IODIXANOL 320 MG/ML
INJECTION, SOLUTION INTRAVASCULAR
Status: DISCONTINUED | OUTPATIENT
Start: 2021-09-28 | End: 2021-09-28 | Stop reason: HOSPADM

## 2021-09-28 RX ORDER — LIDOCAINE 40 MG/G
CREAM TOPICAL
Status: DISCONTINUED | OUTPATIENT
Start: 2021-09-28 | End: 2021-09-28 | Stop reason: HOSPADM

## 2021-09-28 RX ORDER — FLUMAZENIL 0.1 MG/ML
0.2 INJECTION, SOLUTION INTRAVENOUS
Status: ACTIVE | OUTPATIENT
Start: 2021-09-28 | End: 2021-09-28

## 2021-09-28 RX ORDER — POTASSIUM CHLORIDE 750 MG/1
20 TABLET, EXTENDED RELEASE ORAL DAILY
Status: DISCONTINUED | OUTPATIENT
Start: 2021-09-29 | End: 2021-09-29 | Stop reason: HOSPADM

## 2021-09-28 RX ORDER — ONDANSETRON 2 MG/ML
4 INJECTION INTRAMUSCULAR; INTRAVENOUS EVERY 30 MIN PRN
Status: DISCONTINUED | OUTPATIENT
Start: 2021-09-28 | End: 2021-09-28 | Stop reason: HOSPADM

## 2021-09-28 RX ORDER — NALOXONE HYDROCHLORIDE 0.4 MG/ML
0.4 INJECTION, SOLUTION INTRAMUSCULAR; INTRAVENOUS; SUBCUTANEOUS
Status: DISCONTINUED | OUTPATIENT
Start: 2021-09-28 | End: 2021-09-29 | Stop reason: HOSPADM

## 2021-09-28 RX ORDER — HYDRALAZINE HYDROCHLORIDE 20 MG/ML
10 INJECTION INTRAMUSCULAR; INTRAVENOUS
Status: DISCONTINUED | OUTPATIENT
Start: 2021-09-28 | End: 2021-09-29 | Stop reason: HOSPADM

## 2021-09-28 RX ORDER — NOREPINEPHRINE BITARTRATE 0.06 MG/ML
.01-.6 INJECTION, SOLUTION INTRAVENOUS CONTINUOUS
Status: DISCONTINUED | OUTPATIENT
Start: 2021-09-28 | End: 2021-09-28

## 2021-09-28 RX ORDER — FUROSEMIDE 20 MG
20 TABLET ORAL DAILY
Status: DISCONTINUED | OUTPATIENT
Start: 2021-09-29 | End: 2021-09-29 | Stop reason: HOSPADM

## 2021-09-28 RX ORDER — FENTANYL CITRATE 50 UG/ML
25 INJECTION, SOLUTION INTRAMUSCULAR; INTRAVENOUS
Status: COMPLETED | OUTPATIENT
Start: 2021-09-28 | End: 2021-09-28

## 2021-09-28 RX ORDER — EPHEDRINE SULFATE 50 MG/ML
INJECTION, SOLUTION INTRAMUSCULAR; INTRAVENOUS; SUBCUTANEOUS PRN
Status: DISCONTINUED | OUTPATIENT
Start: 2021-09-28 | End: 2021-09-28

## 2021-09-28 RX ORDER — POTASSIUM CHLORIDE 7.45 MG/ML
10 INJECTION INTRAVENOUS
Status: DISCONTINUED | OUTPATIENT
Start: 2021-09-28 | End: 2021-09-29 | Stop reason: HOSPADM

## 2021-09-28 RX ORDER — BUPIVACAINE HYDROCHLORIDE 2.5 MG/ML
INJECTION, SOLUTION EPIDURAL; INFILTRATION; INTRACAUDAL PRN
Status: DISCONTINUED | OUTPATIENT
Start: 2021-09-28 | End: 2021-09-28

## 2021-09-28 RX ORDER — NALOXONE HYDROCHLORIDE 0.4 MG/ML
0.2 INJECTION, SOLUTION INTRAMUSCULAR; INTRAVENOUS; SUBCUTANEOUS
Status: ACTIVE | OUTPATIENT
Start: 2021-09-28 | End: 2021-09-28

## 2021-09-28 RX ORDER — NALOXONE HYDROCHLORIDE 0.4 MG/ML
0.2 INJECTION, SOLUTION INTRAMUSCULAR; INTRAVENOUS; SUBCUTANEOUS
Status: DISCONTINUED | OUTPATIENT
Start: 2021-09-28 | End: 2021-09-29 | Stop reason: HOSPADM

## 2021-09-28 RX ORDER — ACETAMINOPHEN 325 MG/1
650 TABLET ORAL EVERY 4 HOURS PRN
Status: DISCONTINUED | OUTPATIENT
Start: 2021-09-28 | End: 2021-09-29 | Stop reason: HOSPADM

## 2021-09-28 RX ORDER — VENLAFAXINE HYDROCHLORIDE 75 MG/1
300 CAPSULE, EXTENDED RELEASE ORAL DAILY
Status: DISCONTINUED | OUTPATIENT
Start: 2021-09-29 | End: 2021-09-29 | Stop reason: HOSPADM

## 2021-09-28 RX ORDER — LEVOTHYROXINE SODIUM 88 UG/1
88 TABLET ORAL DAILY
Status: DISCONTINUED | OUTPATIENT
Start: 2021-09-29 | End: 2021-09-29 | Stop reason: HOSPADM

## 2021-09-28 RX ORDER — DEXMEDETOMIDINE HYDROCHLORIDE 4 UG/ML
0.2-1.2 INJECTION, SOLUTION INTRAVENOUS CONTINUOUS
Status: DISCONTINUED | OUTPATIENT
Start: 2021-09-28 | End: 2021-09-28 | Stop reason: HOSPADM

## 2021-09-28 RX ORDER — NITROGLYCERIN 10 MG/100ML
INJECTION INTRAVENOUS PRN
Status: DISCONTINUED | OUTPATIENT
Start: 2021-09-28 | End: 2021-09-28

## 2021-09-28 RX ORDER — FENTANYL CITRATE 50 UG/ML
25 INJECTION, SOLUTION INTRAMUSCULAR; INTRAVENOUS EVERY 5 MIN PRN
Status: DISCONTINUED | OUTPATIENT
Start: 2021-09-28 | End: 2021-09-28 | Stop reason: HOSPADM

## 2021-09-28 RX ORDER — POTASSIUM CHLORIDE 750 MG/1
20-40 TABLET, EXTENDED RELEASE ORAL
Status: DISCONTINUED | OUTPATIENT
Start: 2021-09-28 | End: 2021-09-29 | Stop reason: HOSPADM

## 2021-09-28 RX ORDER — FLUTICASONE PROPIONATE 50 MCG
1 SPRAY, SUSPENSION (ML) NASAL 2 TIMES DAILY PRN
Status: DISCONTINUED | OUTPATIENT
Start: 2021-09-28 | End: 2021-09-29 | Stop reason: HOSPADM

## 2021-09-28 RX ORDER — SODIUM CHLORIDE, SODIUM LACTATE, POTASSIUM CHLORIDE, CALCIUM CHLORIDE 600; 310; 30; 20 MG/100ML; MG/100ML; MG/100ML; MG/100ML
INJECTION, SOLUTION INTRAVENOUS CONTINUOUS PRN
Status: DISCONTINUED | OUTPATIENT
Start: 2021-09-28 | End: 2021-09-28

## 2021-09-28 RX ORDER — SODIUM CHLORIDE, SODIUM LACTATE, POTASSIUM CHLORIDE, CALCIUM CHLORIDE 600; 310; 30; 20 MG/100ML; MG/100ML; MG/100ML; MG/100ML
INJECTION, SOLUTION INTRAVENOUS CONTINUOUS
Status: DISCONTINUED | OUTPATIENT
Start: 2021-09-28 | End: 2021-09-28 | Stop reason: HOSPADM

## 2021-09-28 RX ORDER — NALOXONE HYDROCHLORIDE 0.4 MG/ML
0.4 INJECTION, SOLUTION INTRAMUSCULAR; INTRAVENOUS; SUBCUTANEOUS
Status: ACTIVE | OUTPATIENT
Start: 2021-09-28 | End: 2021-09-28

## 2021-09-28 RX ORDER — POTASSIUM CHLORIDE 750 MG/1
40 TABLET, EXTENDED RELEASE ORAL ONCE
Status: COMPLETED | OUTPATIENT
Start: 2021-09-28 | End: 2021-09-28

## 2021-09-28 RX ORDER — NITROGLYCERIN 0.4 MG/1
0.4 TABLET SUBLINGUAL EVERY 5 MIN PRN
Status: DISCONTINUED | OUTPATIENT
Start: 2021-09-28 | End: 2021-09-29 | Stop reason: HOSPADM

## 2021-09-28 RX ORDER — TRAZODONE HYDROCHLORIDE 100 MG/1
100 TABLET ORAL AT BEDTIME
Status: DISCONTINUED | OUTPATIENT
Start: 2021-09-28 | End: 2021-09-29 | Stop reason: HOSPADM

## 2021-09-28 RX ORDER — SODIUM CHLORIDE 9 MG/ML
INJECTION, SOLUTION INTRAVENOUS CONTINUOUS
Status: ACTIVE | OUTPATIENT
Start: 2021-09-28 | End: 2021-09-28

## 2021-09-28 RX ORDER — ONDANSETRON 2 MG/ML
4 INJECTION INTRAMUSCULAR; INTRAVENOUS EVERY 6 HOURS PRN
Status: DISCONTINUED | OUTPATIENT
Start: 2021-09-28 | End: 2021-09-29 | Stop reason: HOSPADM

## 2021-09-28 RX ORDER — FENTANYL CITRATE 50 UG/ML
INJECTION, SOLUTION INTRAMUSCULAR; INTRAVENOUS PRN
Status: DISCONTINUED | OUTPATIENT
Start: 2021-09-28 | End: 2021-09-28

## 2021-09-28 RX ORDER — FENTANYL CITRATE 50 UG/ML
25-50 INJECTION, SOLUTION INTRAMUSCULAR; INTRAVENOUS
Status: DISCONTINUED | OUTPATIENT
Start: 2021-09-28 | End: 2021-09-28 | Stop reason: HOSPADM

## 2021-09-28 RX ORDER — PROTAMINE SULFATE 10 MG/ML
INJECTION, SOLUTION INTRAVENOUS PRN
Status: DISCONTINUED | OUTPATIENT
Start: 2021-09-28 | End: 2021-09-28

## 2021-09-28 RX ORDER — METOPROLOL TARTRATE 1 MG/ML
1-2 INJECTION, SOLUTION INTRAVENOUS EVERY 5 MIN PRN
Status: DISCONTINUED | OUTPATIENT
Start: 2021-09-28 | End: 2021-09-28 | Stop reason: HOSPADM

## 2021-09-28 RX ORDER — ASPIRIN 81 MG/1
81 TABLET ORAL DAILY
Status: DISCONTINUED | OUTPATIENT
Start: 2021-09-29 | End: 2021-09-29 | Stop reason: HOSPADM

## 2021-09-28 RX ORDER — FLUMAZENIL 0.1 MG/ML
0.2 INJECTION, SOLUTION INTRAVENOUS
Status: DISCONTINUED | OUTPATIENT
Start: 2021-09-28 | End: 2021-09-28 | Stop reason: HOSPADM

## 2021-09-28 RX ORDER — HEPARIN SODIUM 1000 [USP'U]/ML
INJECTION, SOLUTION INTRAVENOUS; SUBCUTANEOUS PRN
Status: DISCONTINUED | OUTPATIENT
Start: 2021-09-28 | End: 2021-09-28

## 2021-09-28 RX ORDER — ONDANSETRON 4 MG/1
4 TABLET, ORALLY DISINTEGRATING ORAL EVERY 30 MIN PRN
Status: DISCONTINUED | OUTPATIENT
Start: 2021-09-28 | End: 2021-09-28 | Stop reason: HOSPADM

## 2021-09-28 RX ORDER — CEFAZOLIN SODIUM 1 G/3ML
1 INJECTION, POWDER, FOR SOLUTION INTRAMUSCULAR; INTRAVENOUS EVERY 8 HOURS
Status: DISPENSED | OUTPATIENT
Start: 2021-09-28 | End: 2021-09-29

## 2021-09-28 RX ORDER — SODIUM CHLORIDE 9 MG/ML
INJECTION, SOLUTION INTRAVENOUS CONTINUOUS PRN
Status: DISCONTINUED | OUTPATIENT
Start: 2021-09-28 | End: 2021-09-28

## 2021-09-28 RX ORDER — MAGNESIUM SULFATE HEPTAHYDRATE 40 MG/ML
4 INJECTION, SOLUTION INTRAVENOUS EVERY 4 HOURS PRN
Status: DISCONTINUED | OUTPATIENT
Start: 2021-09-28 | End: 2021-09-29 | Stop reason: HOSPADM

## 2021-09-28 RX ORDER — POTASSIUM CHLORIDE 1.5 G/1.58G
20-40 POWDER, FOR SOLUTION ORAL
Status: DISCONTINUED | OUTPATIENT
Start: 2021-09-28 | End: 2021-09-29 | Stop reason: HOSPADM

## 2021-09-28 RX ORDER — POTASSIUM CHLORIDE 29.8 MG/ML
20 INJECTION INTRAVENOUS
Status: DISCONTINUED | OUTPATIENT
Start: 2021-09-28 | End: 2021-09-29 | Stop reason: HOSPADM

## 2021-09-28 RX ORDER — SODIUM CHLORIDE 9 MG/ML
INJECTION, SOLUTION INTRAVENOUS CONTINUOUS
Status: DISCONTINUED | OUTPATIENT
Start: 2021-09-28 | End: 2021-09-28

## 2021-09-28 RX ORDER — ATROPINE SULFATE 0.1 MG/ML
0.5 INJECTION INTRAVENOUS
Status: DISPENSED | OUTPATIENT
Start: 2021-09-28 | End: 2021-09-28

## 2021-09-28 RX ORDER — ASPIRIN 325 MG
325 TABLET ORAL DAILY
Status: DISCONTINUED | OUTPATIENT
Start: 2021-09-28 | End: 2021-09-28 | Stop reason: HOSPADM

## 2021-09-28 RX ORDER — ONDANSETRON 4 MG/1
4 TABLET, ORALLY DISINTEGRATING ORAL EVERY 6 HOURS PRN
Status: DISCONTINUED | OUTPATIENT
Start: 2021-09-28 | End: 2021-09-29 | Stop reason: HOSPADM

## 2021-09-28 RX ORDER — PHENYLEPHRINE HCL IN 0.9% NACL 50MG/250ML
.1-1 PLASTIC BAG, INJECTION (ML) INTRAVENOUS CONTINUOUS
Status: DISCONTINUED | OUTPATIENT
Start: 2021-09-28 | End: 2021-09-28 | Stop reason: HOSPADM

## 2021-09-28 RX ORDER — ATORVASTATIN CALCIUM 80 MG/1
80 TABLET, FILM COATED ORAL DAILY
Status: DISCONTINUED | OUTPATIENT
Start: 2021-09-29 | End: 2021-09-29 | Stop reason: HOSPADM

## 2021-09-28 RX ADMIN — NOREPINEPHRINE BITARTRATE 6.4 MCG: 1 INJECTION, SOLUTION, CONCENTRATE INTRAVENOUS at 09:55

## 2021-09-28 RX ADMIN — PROTAMINE SULFATE 50 MG: 10 INJECTION, SOLUTION INTRAVENOUS at 10:34

## 2021-09-28 RX ADMIN — FENTANYL CITRATE 25 MCG: 50 INJECTION, SOLUTION INTRAMUSCULAR; INTRAVENOUS at 16:22

## 2021-09-28 RX ADMIN — PHENYLEPHRINE HYDROCHLORIDE 200 MCG: 10 INJECTION INTRAVENOUS at 09:54

## 2021-09-28 RX ADMIN — POTASSIUM CHLORIDE 40 MEQ: 750 TABLET, EXTENDED RELEASE ORAL at 14:14

## 2021-09-28 RX ADMIN — NOREPINEPHRINE BITARTRATE 12.8 MCG: 1 INJECTION, SOLUTION, CONCENTRATE INTRAVENOUS at 10:47

## 2021-09-28 RX ADMIN — CEFAZOLIN 1 G: 1 INJECTION, POWDER, FOR SOLUTION INTRAMUSCULAR; INTRAVENOUS at 16:33

## 2021-09-28 RX ADMIN — NOREPINEPHRINE BITARTRATE 6.4 MCG: 1 INJECTION, SOLUTION, CONCENTRATE INTRAVENOUS at 10:03

## 2021-09-28 RX ADMIN — Medication 5 MG: at 09:49

## 2021-09-28 RX ADMIN — SODIUM CHLORIDE: 9 INJECTION, SOLUTION INTRAVENOUS at 08:21

## 2021-09-28 RX ADMIN — PHENYLEPHRINE HYDROCHLORIDE 200 MCG: 10 INJECTION INTRAVENOUS at 10:45

## 2021-09-28 RX ADMIN — DEXMEDETOMIDINE HYDROCHLORIDE 8 MCG: 100 INJECTION, SOLUTION INTRAVENOUS at 09:35

## 2021-09-28 RX ADMIN — ASPIRIN 325 MG ORAL TABLET 325 MG: 325 PILL ORAL at 06:40

## 2021-09-28 RX ADMIN — EPINEPHRINE 5 MCG: 1 INJECTION PARENTERAL at 10:07

## 2021-09-28 RX ADMIN — DEXMEDETOMIDINE 0.6 MCG/KG/HR: 100 INJECTION, SOLUTION, CONCENTRATE INTRAVENOUS at 08:27

## 2021-09-28 RX ADMIN — Medication 3000 UNITS: at 09:57

## 2021-09-28 RX ADMIN — FENTANYL CITRATE 50 MCG: 50 INJECTION, SOLUTION INTRAMUSCULAR; INTRAVENOUS at 07:42

## 2021-09-28 RX ADMIN — SODIUM CHLORIDE: 9 INJECTION, SOLUTION INTRAVENOUS at 06:41

## 2021-09-28 RX ADMIN — FENTANYL CITRATE 25 MCG: 50 INJECTION, SOLUTION INTRAMUSCULAR; INTRAVENOUS at 15:56

## 2021-09-28 RX ADMIN — FENTANYL CITRATE 50 MCG: 50 INJECTION, SOLUTION INTRAMUSCULAR; INTRAVENOUS at 10:23

## 2021-09-28 RX ADMIN — NOREPINEPHRINE BITARTRATE 6.4 MCG: 1 INJECTION, SOLUTION, CONCENTRATE INTRAVENOUS at 11:10

## 2021-09-28 RX ADMIN — Medication 10 MG: at 10:05

## 2021-09-28 RX ADMIN — PHENYLEPHRINE HYDROCHLORIDE 100 MCG: 10 INJECTION INTRAVENOUS at 09:29

## 2021-09-28 RX ADMIN — PHENYLEPHRINE HYDROCHLORIDE 100 MCG: 10 INJECTION INTRAVENOUS at 09:16

## 2021-09-28 RX ADMIN — BUPIVACAINE HYDROCHLORIDE 55 ML: 2.5 INJECTION, SOLUTION EPIDURAL; INFILTRATION; INTRACAUDAL at 07:45

## 2021-09-28 RX ADMIN — TRAZODONE HYDROCHLORIDE 100 MG: 100 TABLET ORAL at 22:04

## 2021-09-28 RX ADMIN — Medication 2 G: at 08:30

## 2021-09-28 RX ADMIN — FENTANYL CITRATE 50 MCG: 50 INJECTION, SOLUTION INTRAMUSCULAR; INTRAVENOUS at 07:38

## 2021-09-28 RX ADMIN — FENTANYL CITRATE 25 MCG: 50 INJECTION, SOLUTION INTRAMUSCULAR; INTRAVENOUS at 09:05

## 2021-09-28 RX ADMIN — SODIUM CHLORIDE, POTASSIUM CHLORIDE, SODIUM LACTATE AND CALCIUM CHLORIDE: 600; 310; 30; 20 INJECTION, SOLUTION INTRAVENOUS at 08:08

## 2021-09-28 RX ADMIN — PHENYLEPHRINE HYDROCHLORIDE 200 MCG: 10 INJECTION INTRAVENOUS at 09:47

## 2021-09-28 RX ADMIN — ACETAMINOPHEN 650 MG: 325 TABLET, FILM COATED ORAL at 19:40

## 2021-09-28 RX ADMIN — NOREPINEPHRINE BITARTRATE 6.4 MCG: 1 INJECTION, SOLUTION, CONCENTRATE INTRAVENOUS at 11:15

## 2021-09-28 RX ADMIN — FENTANYL CITRATE 25 MCG: 50 INJECTION, SOLUTION INTRAMUSCULAR; INTRAVENOUS at 08:35

## 2021-09-28 RX ADMIN — NOREPINEPHRINE BITARTRATE 6.4 MCG: 1 INJECTION, SOLUTION, CONCENTRATE INTRAVENOUS at 10:15

## 2021-09-28 RX ADMIN — PHENYLEPHRINE HYDROCHLORIDE 100 MCG: 10 INJECTION INTRAVENOUS at 09:35

## 2021-09-28 RX ADMIN — DEXMEDETOMIDINE HYDROCHLORIDE 8 MCG: 100 INJECTION, SOLUTION INTRAVENOUS at 09:06

## 2021-09-28 RX ADMIN — NITROGLYCERIN 50 MCG: 10 INJECTION INTRAVENOUS at 10:21

## 2021-09-28 RX ADMIN — CEFAZOLIN 1 G: 1 INJECTION, POWDER, FOR SOLUTION INTRAMUSCULAR; INTRAVENOUS at 23:27

## 2021-09-28 RX ADMIN — Medication 5 MG: at 10:45

## 2021-09-28 RX ADMIN — MIDAZOLAM 0.5 MG: 1 INJECTION INTRAMUSCULAR; INTRAVENOUS at 15:50

## 2021-09-28 RX ADMIN — NOREPINEPHRINE BITARTRATE 6.4 MCG: 1 INJECTION, SOLUTION, CONCENTRATE INTRAVENOUS at 10:06

## 2021-09-28 RX ADMIN — Medication 5 MG: at 09:54

## 2021-09-28 RX ADMIN — EPINEPHRINE 5 MCG: 1 INJECTION PARENTERAL at 10:16

## 2021-09-28 RX ADMIN — DEXMEDETOMIDINE HYDROCHLORIDE 8 MCG: 100 INJECTION, SOLUTION INTRAVENOUS at 09:15

## 2021-09-28 RX ADMIN — Medication 8000 UNITS: at 09:44

## 2021-09-28 RX ADMIN — DEXMEDETOMIDINE HYDROCHLORIDE 20 MCG: 100 INJECTION, SOLUTION INTRAVENOUS at 08:49

## 2021-09-28 ASSESSMENT — MIFFLIN-ST. JEOR: SCORE: 1185.38

## 2021-09-28 ASSESSMENT — LIFESTYLE VARIABLES: TOBACCO_USE: 1

## 2021-09-28 ASSESSMENT — ACTIVITIES OF DAILY LIVING (ADL)
ADLS_ACUITY_SCORE: 6
ADLS_ACUITY_SCORE: 12

## 2021-09-28 NOTE — Clinical Note
Prepped per 's instructions. Valve secured onto the delivery system balloon and correct orientation confirmed (blue to blue)  Bates ISABELLA 2 Ultra Model 9750 TFX, expiration date 2023-03 10,  [id number]

## 2021-09-28 NOTE — ANESTHESIA CARE TRANSFER NOTE
Patient: Maren Fofana    Procedure(s):  CV TRANSCATHETER AORTIC VALVE REPLACEMENT  LEFT SUBCLAVIAN ACCESS FOR  TRANSCATHETER AORTIC VALVE REPLACEMENT.  CARDIOPULMONARY BYPASS STANDBY    Diagnosis: valvular disease  Diagnosis Additional Information: No value filed.    Anesthesia Type:   MAC     Note:    Oropharynx: oropharynx clear of all foreign objects and spontaneously breathing  Level of Consciousness: awake  Oxygen Supplementation: nasal cannula  Level of Supplemental Oxygen (L/min / FiO2): 2  Independent Airway: airway patency satisfactory and stable  Dentition: dentition unchanged  Vital Signs Stable: post-procedure vital signs reviewed and stable  Report to RN Given: handoff report given  Patient transferred to: PACU  Comments: Awake denies any discomfort.     Handoff Report: Identifed the Patient, Identified the Reponsible Provider, Reviewed the pertinent medical history, Discussed the surgical course, Reviewed Intra-OP anesthesia mangement and issues during anesthesia, Set expectations for post-procedure period and Allowed opportunity for questions and acknowledgement of understanding      Vitals:  Vitals Value Taken Time   BP 91/69 09/28/21 1136   Temp     Pulse 61 09/28/21 1137   Resp     SpO2 92 % 09/28/21 1137   Vitals shown include unvalidated device data.    Electronically Signed By: DENA Minor CRNA  September 28, 2021  11:38 AM

## 2021-09-28 NOTE — ANESTHESIA PROCEDURE NOTES
Pectoralis Procedure Note    Pre-Procedure   Staff -        Anesthesiologist:  Beka Aparicio MD       Resident/Fellow: Hailee Watson MD       Performed By: resident       Location: pre-op       Procedure Start/Stop Times: 9/28/2021 7:34 AM and 9/28/2021 7:54 AM       Pre-Anesthestic Checklist: patient identified, IV checked, site marked, risks and benefits discussed, informed consent, monitors and equipment checked, pre-op evaluation, at physician/surgeon's request and post-op pain management  Timeout:       Correct Patient: Yes        Correct Procedure: Yes        Correct Site: Yes        Correct Position: Yes        Correct Laterality: Yes        Site Marked: Yes  Procedure Documentation  Procedure: Pectoralis       Diagnosis: POST OPERATIVE PAIN       Laterality: left       Patient Position: sitting       Patient Prep/Sterile Barriers: sterile gloves, mask       Skin prep: Chloraprep       Needle Type: short bevel       Needle Gauge: 21.        Needle Length (millimeters): 110        Ultrasound guided       1. Ultrasound was used to identify targeted nerve, plexus, vascular marker, or fascial plane and place a needle adjacent to it in real-time.       2. Ultrasound was used to visualize the spread of anesthetic in close proximity to the above referenced structure.    Assessment/Narrative         The placement was negative for: blood aspirated, painful injection and site bleeding       Paresthesias: No.     Bolus given via needle..        Secured via.        Insertion/Infusion Method: Single Shot       Complications: none       Injection made incrementally with aspirations every 5 mL.

## 2021-09-28 NOTE — ANESTHESIA PREPROCEDURE EVALUATION
Anesthesia Pre-Procedure Evaluation    Patient: Maren Fofana   MRN: [id number] : 1950        Preoperative Diagnosis: valvular disease   Procedure : Procedure(s):  CV TRANSCATHETER AORTIC VALVE REPLACEMENT  OR TRANSCATHETER AORTIC VALVE REPLACEMENT, OPEN FEMORAL ARTERY APPROACH     Past Medical History:   Diagnosis Date     Aortic stenosis      Collapsed lung      Fibromyalgia      GERD (gastroesophageal reflux disease)      Hypothyroid      Migraine      Peripheral vascular disease (H)      Reactive airway disease      Vertigo       Past Surgical History:   Procedure Laterality Date      SECTION Bilateral      CV CORONARY ANGIOGRAM N/A 2021    Procedure: Coronary Angiogram;  Surgeon: Sara Randall MD;  Location: [facility] CATH LAB CV     HYSTERECTOMY       POSTERIOR LAMINECTOMY / DECOMPRESSION LUMBAR SPINE      L2-L3     SPINAL FUSION       TONSILLECTOMY & ADENOIDECTOMY       TOTAL KNEE ARTHROPLASTY Bilateral       Allergies   Allergen Reactions     Bee Venom Anaphylaxis     Dilaudid [Hydromorphone] Other (See Comments)     Altered mental status     Latex      hives      Social History     Tobacco Use     Smoking status: Former Smoker     Packs/day: 0.50     Quit date: 2021     Years since quittin.4     Smokeless tobacco: Never Used   Substance Use Topics     Alcohol use: Yes      Wt Readings from Last 1 Encounters:   21 74.3 kg (163 lb 12.8 oz)        Anesthesia Evaluation   Pt has had prior anesthetic. Type: General.    No history of anesthetic complications       ROS/MED HX  ENT/Pulmonary: Comment: Reactive airway disease    (+) tobacco use, Past use,     Neurologic:  - neg neurologic ROS     Cardiovascular: Comment: AAA s/p repair    (+) Dyslipidemia hypertension-----valvular problems/murmurs type: AS gradient 46, KP 0.7.     METS/Exercise Tolerance:     Hematologic:       Musculoskeletal: Comment: Lumbar disc disease s/p laminectomy      GI/Hepatic:        Renal/Genitourinary:       Endo:     (+) thyroid problem, hypothyroidism,     Psychiatric/Substance Use:       Infectious Disease: Comment: COVID negative 9/26      Malignancy:       Other:            Physical Exam    Airway        Mallampati: III   TM distance: < 3 FB   Neck ROM: limited   Mouth opening: > 3 cm    Respiratory Devices and Support         Dental       (+) upper dentures and lower dentures      Cardiovascular          Rhythm and rate: regular and normal     Pulmonary           breath sounds clear to auscultation           OUTSIDE LABS:  CBC:   Lab Results   Component Value Date    WBC 3.9 (L) 09/23/2021    WBC 3.8 (L) 08/30/2021    HGB 11.7 09/23/2021    HGB 10.5 (L) 08/30/2021    HCT 35.4 09/23/2021    HCT 31.9 (L) 08/30/2021     (L) 09/23/2021     (L) 08/30/2021     BMP:   Lab Results   Component Value Date     09/23/2021     08/30/2021    POTASSIUM 3.3 (L) 09/28/2021    POTASSIUM 3.1 (L) 09/23/2021    CHLORIDE 105 09/23/2021    CHLORIDE 105 08/30/2021    CO2 29 09/23/2021    CO2 30 08/30/2021    BUN 8 09/23/2021    BUN 9 08/30/2021    CR 0.99 09/23/2021    CR 0.98 08/30/2021     (H) 09/28/2021     09/23/2021     COAGS:   Lab Results   Component Value Date    PTT 28 01/02/2021    INR 1.21 (H) 09/23/2021     POC: No results found for: BGM, HCG, HCGS  HEPATIC:   Lab Results   Component Value Date    ALBUMIN 3.8 09/23/2021    PROTTOTAL 7.3 09/23/2021    ALT 9 09/23/2021    AST 27 09/23/2021    ALKPHOS 94 09/23/2021    BILITOTAL 0.6 09/23/2021     OTHER:   Lab Results   Component Value Date    YENI 9.1 09/23/2021    MAG 1.8 09/23/2021    TSH 1.68 05/19/2021       Anesthesia Plan    ASA Status:  3   NPO Status:  NPO Appropriate    Anesthesia Type: MAC.     - Reason for MAC: straight local not clinically adequate   Induction: Intravenous.   Maintenance: TIVA.   Techniques and Equipment:     - Lines/Monitors: 2nd IV     Consents    Anesthesia Plan(s) and associated  risks, benefits, and realistic alternatives discussed. Questions answered and patient/representative(s) expressed understanding.     - Discussed with:  Patient         Postoperative Care    Pain management: Peripheral nerve block (Single Shot).   PONV prophylaxis: Ondansetron (or other 5HT-3)     Comments:                Antonia Senior MD

## 2021-09-28 NOTE — DISCHARGE INSTRUCTIONS
Patient Instructions - Going Home after TAVR:    Going Home: It s normal to feel a little anxious after leaving the hospital and when fewer people are nearby. People do much better when they feel as though they have support- if you live alone we suggest you arrange to have someone stay with you for a day or two to help you recover.     Medications:  Take all of your medications as directed in your discharge summary. Do not stop taking these medications without speaking with your cardiologist. If you have any questions about any of your medications, speak to your pharmacist or provider.     Follow up Appointments    You will follow up with Rachael Conrad PA-C on October 11 at 3:30 pm.    You will have a repeat echocardiogram on October 26 at 10 am.    You will have a follow up with Dr. Kulkarni on October 27 at 3:50 pm.  If you need to reschedule any of these appointments, please call:  [phone]      See your regular cardiologist in 6 months.  Your regular heart doctor will continue to be your heart specialist.     See your family doctor in 2 weeks.  Make an appointment once you get home.    You will receive a temporary  heart valve  wallet card. We recommend that you keep it in your wallet or purse at all times. You will be receiving a permanent wallet card from the  within 6 months.     Before an MRI (magnetic resonance imaging) procedure, always notify the doctor (or medical technician) that you have an implanted heart valve.     Site Care: Shower every day- let the water run over your incision or access site, but do not rub the area. No tub baths, pools or hot-tubs for the 1st week you are at home. Do not put ointments, powders, or lotions on your access site and/or incision.  It is normal to feel a small lump the size of a marble in the groin access site.  That is the closure device used to close the vein.  If you are experiencing discomfort, redness or increasing  swelling, please call us.    Dental Work: Avoid major dental work for the next 6 months if possible. You will need antibiotics before dental work or surgery. This would decrease the risk of infection.     Driving:  You should not drive for the first 2 weeks after your procedure. The first time you drive, you must have someone with you. You may ride in a car.     Activity: People recover at different rates depending on their general health and the type of heart valve procedure. Most people take about 4-10 weeks to feel fully recovered. Daily activity and exercise are an important part of your recovery.  Do not lift, push or pull anything > 10 lb. for the first 2 weeks.        CALL THE VALVE CLINIC IF YOU HAVE ANY QUESTIONS OR CONCERNS:  [phone]  For the first 2 weeks after TAVR     Do Not:     Lift, push, or pull more than ten pounds (including pets, laundry, groceries, etc)    Garden, including lawn mowing and raking    Excessively bear down or strain when having a bowel movement     Ride a bike   Do:    Weigh yourself every day in the morning after using the bathroom.  If you notice weight gain of more than 3 pounds in 1 day or more than 5 pounds in 1 week, call the cardiology clinic.     Get up and get dressed every day. Do not stay in bed.  Set a daily routine.     Walk as much as you can. You may walk up and down stairs. When you are tired, rest.     Cough and deep breathe. Use your incentive spirometer 5-10 times each hour when you are awake.     We strongly recommend you participate in a cardiac rehabilitation program. This type of program will help you learn about your heart health, prevent more heart problems, participate in safe and heart-healthy activities, and learn how to return to your activities of daily living and hobbies.     Let the cardiology clinic know if you need to leave town before your first follow-up visit.     When to call the Cardiology Clinic to speak with a nurse?     Swelling of  the legs, ankles, or feet    Increasing shortness of breath    Change in the color or temperature of your lower legs and feet    Abdominal pain or unrelieved nausea and/or vomiting    Redness and warmth around your access site and/or incision that does not go away    Yellow or green drainage from your access site and/or incision     Weight gain of 3 pounds in one day or 5 pounds in one week    Develop any numbness, tingling, or limited movement of your arms or legs.     Chest pain that does not go away    New confusion or you cannot think clearly    Fever and chills         [facility]:  [phone]  If you are calling after hours, please listen to the entire voicemail, a live  will answer at the end of the message

## 2021-09-28 NOTE — ANESTHESIA PROCEDURE NOTES
Interscalene Procedure Note    Pre-Procedure   Staff -        Anesthesiologist:  Beka Aparicio MD       Resident/Fellow: Hailee Watson MD       Performed By: resident       Location: pre-op       Procedure Start/Stop Times: 9/28/2021 7:35 AM and 9/28/2021 7:54 AM       Pre-Anesthestic Checklist: patient identified, IV checked, site marked, risks and benefits discussed, informed consent, monitors and equipment checked, pre-op evaluation, at physician/surgeon's request and post-op pain management  Timeout:       Correct Patient: Yes        Correct Procedure: Yes        Correct Site: Yes        Correct Position: Yes        Correct Laterality: Yes        Site Marked: Yes  Procedure Documentation  Procedure: Interscalene       Diagnosis: POST OPERATIVE PAIN       Laterality: left       Patient Position: sitting       Patient Prep/Sterile Barriers: sterile gloves, mask       Skin prep: Chloraprep       Needle Type: short bevel       Needle Gauge: 22.        Needle Length (millimeters): 50        Ultrasound guided       1. Ultrasound was used to identify targeted nerve, plexus, vascular marker, or fascial plane and place a needle adjacent to it in real-time.       2. Ultrasound was used to visualize the spread of anesthetic in close proximity to the above referenced structure.    Assessment/Narrative         The placement was negative for: blood aspirated, painful injection and site bleeding       Paresthesias: No.     Bolus given via needle..        Secured via.        Insertion/Infusion Method: Single Shot       Complications: none       Injection made incrementally with aspirations every 5 mL.

## 2021-09-28 NOTE — PROVIDER NOTIFICATION
Cardiac team to bedside, aware of potassium=3.4 and magnesium 1.6, to replace when up on the floor. Plan for echo when up on the floor, no need to delay going up to floor to do in PACU.    RT at bedside setting up pratibha-track per anesthesiology.

## 2021-09-28 NOTE — PROGRESS NOTES
Transfer  Transferred from: [facility]  Via:bed  Reason for transfer:Pt appropriate for 6C- improved patient condition  Family: Aware of transfer  Belongings: Sent with pt  Chart: Sent with pt  Medications: Med's from bin sent with pt  Report called from:  Bindu BECERRIL SR, JENNIFER'S on 4 L NC with headache.  Sheaths in and pulled c/b re bleed and additional pressure held and now WNL.  Right radial band off without complications.  Otherwise, pt resting well and off bedrest around 900.  Continue to monitor and with POC.

## 2021-09-28 NOTE — ANESTHESIA PROCEDURE NOTES
Other (superficial cervical plexus block) Procedure Note    Pre-Procedure   Staff -        Anesthesiologist:  Beka Aparicio MD       Resident/Fellow: Hailee Watson MD       Performed By: resident       Location: pre-op       Procedure Start/Stop Times: 9/28/2021 7:34 AM and 9/28/2021 7:54 AM       Pre-Anesthestic Checklist: patient identified, IV checked, site marked, risks and benefits discussed, informed consent, monitors and equipment checked, pre-op evaluation, at physician/surgeon's request and post-op pain management  Timeout:       Correct Patient: Yes        Correct Procedure: Yes        Correct Site: Yes        Correct Position: Yes        Correct Laterality: Yes        Site Marked: Yes  Procedure Documentation  Procedure: Other (superficial cervical plexus block)       Diagnosis: POST OPERATIVE PAIN       Laterality: left       Patient Position: sitting       Patient Prep/Sterile Barriers: sterile gloves, mask       Skin prep: Chloraprep       Needle Type: short bevel       Needle Gauge: 22.        Needle Length (millimeters): 50        Ultrasound guided       1. Ultrasound was used to identify targeted nerve, plexus, vascular marker, or fascial plane and place a needle adjacent to it in real-time.       2. Ultrasound was used to visualize the spread of anesthetic in close proximity to the above referenced structure.    Assessment/Narrative         The placement was negative for: blood aspirated, painful injection and site bleeding       Paresthesias: No.     Bolus given via needle..        Secured via.        Insertion/Infusion Method: Single Shot       Complications: none       Injection made incrementally with aspirations every 5 mL.

## 2021-09-28 NOTE — OR NURSING
Left Brachial Plexus block, superficial cervical plexus and pectoralis plane block performed without complications.  VSS.  100 mcg Fentanyl given for sedation. Pt tolerated well.  Will continue to monitor.

## 2021-09-28 NOTE — ANESTHESIA POSTPROCEDURE EVALUATION
Patient: Maren Fofana    Procedure(s):  CV TRANSCATHETER AORTIC VALVE REPLACEMENT  LEFT SUBCLAVIAN ACCESS FOR  TRANSCATHETER AORTIC VALVE REPLACEMENT.  CARDIOPULMONARY BYPASS STANDBY    Diagnosis:valvular disease  Diagnosis Additional Information: No value filed.    Anesthesia Type:  MAC    Note:  Disposition: Admission   Postop Pain Control: Uneventful            Sign Out: Well controlled pain   PONV: No   Neuro/Psych: Uneventful            Sign Out: Acceptable/Baseline neuro status   Airway/Respiratory: Uneventful            Sign Out: Acceptable/Baseline resp. status   CV/Hemodynamics: Uneventful            Sign Out: Acceptable CV status; No obvious hypovolemia; No obvious fluid overload   Other NRE: NONE   DID A NON-ROUTINE EVENT OCCUR? No           Last vitals:  Vitals Value Taken Time   /68 09/28/21 1315   Temp 36.7  C (98.1  F) 09/28/21 1300   Pulse 68 09/28/21 1317   Resp 15 09/28/21 1315   SpO2 99 % 09/28/21 1324   Vitals shown include unvalidated device data.    Electronically Signed By: Antonia Senior MD  September 28, 2021  1:25 PM

## 2021-09-28 NOTE — OP NOTE
OPERATIVE DATE: 9/28/2021    PRE-OPERATIVE DIAGNOSIS:  1) Severe, symptomatic aortic stenosis  2) Gastroesophageal reflux disease  3) Peripheral vascular disease  4) Infrarenal abdominal aortic aneurysm status post EVAR  5) Hypothyroid    POST-OPERATIVE DIAGNOSIS:  1) Severe, symptomatic aortic stenosis  2) Gastroesophageal reflux disease  3) Peripheral vascular disease  4) Infrarenal abdominal aortic aneurysm status post EVAR  5) Hypothyroid    PROCEDURE:  1) Temporary pacemaker insertion  2) Iliofemoral artery angiography  3) Ascending aorta angiography  4) Left heart catheterization  5) Successful left transaxillary transcatheter aortic valve replacement with 23 mm Bates Ryan 3 valve  6) [place] cerebral protection insertion and removal    SURGEON: Jorge L Baeza MD    ASSISTANT: Eliezer Mcintyre PA-C    CARDIOLOGIST: Geovani Kulkarni MD and Sara Randall    ANESTHESIA: Monitored anesthesia care with local analgesia    ESTIMATED BLOOD LOSS: 50 mL    INDICATIONS:  Ms. Maren Fofana is a 71 year old year old female with severe, symptomatic aortic valve stenosis. We were asked to evaluate for alternative access transcatheter aortic valve replacement. Risks and benefits of the operation were explained to the patient and their family including, but not limited to, bleeding, infection, stroke and even death. They understood these risks and agreed to proceed electively.    OPERATIVE REPORT:  The patient was transferred to the operating room and positioned supine on the OR table. Monitored anesthesia care was begun. The patients chest, abdomen and bilateral lower extremities were clipped, prepped and draped in sterile fashion. A pre-procedure time-out was performed confirming the correct patient, correct site and correct procedure.    A left 3 cm infraclavicular incision was made after infiltration of the dermis with 5 mL of lidocaine. The left axillary artery was isolated and a pursestring suture 5-0  inserted.    Intravenous heparin was administered. Arterial access of the left femoral arterie and left common femoral vein was performed by interventional cardiology. The right radial artery was also accessed with a sheath. A [place] cerebral protection device was inserted by cardiology.    A temporary transvenous pacemaker was placed by the cardiology team.    The left axillary artery was accessed with an 18 ga needle and a 9 Fr sheath inserted to gain access to the root. A pigtail catheter was advanced to the aortic root and angiogram performed to confirm optimal implant angle. The pigtail was placed in the non-coronary cusp.    The LV was accessed using an AL-1 catheter over a straight wire. The pigtail catheter was advanced into the LV. The pigtail catheter was used to advance a [place] Safari wire into the LV and the pigtail catheter was removed. Over the Safari wire the 14 Fr E-Sheath was inserted directly into the left axillary artery    The 23 mm S3 bioprosthetic valve was positioned across the native aortic valve and deployed using rapid pacing.    Transthoracic echocardiography showed no paravalvular insufficiency.    The deployment system and wires were removed. The left axillary artery was repaired directly with 5-0 Prolene. A subclavian angiogram showed a moderate stenosis without flow limitation. Doppler signal were triphasic proximal and distal to the repair. The wound was closed in layers with absorbable sutures.     The patient was extubated and then transferred to the recovery area in stable condition.    All needle, sponge and instrument counts were correct at the end of the case.    Jorge L Baeza MD  Cardiothoracic Surgery  [phone]

## 2021-09-28 NOTE — BRIEF OP NOTE
[facility]    Brief Operative Note    Pre-operative diagnosis: valvular disease  Post-operative diagnosis Same as pre-operative diagnosis    Procedure: Procedure(s):  CV TRANSCATHETER AORTIC VALVE REPLACEMENT  LEFT SUBCLAVIAN ACCESS FOR  TRANSCATHETER AORTIC VALVE REPLACEMENT.  CARDIOPULMONARY BYPASS STANDBY  Surgeon: Surgeon(s) and Role:  Panel 1:     * Geovani Kulkarni MD - Primary     * Sara Randall MD - Assisting     * Mg Church MD - Assisting  Panel 2:     * Jorge L Baeza MD - Primary  Anesthesia: General   Estimated blood loss: * No values recorded between 9/28/2021  8:20 AM and 9/28/2021 11:20 AM *  Drains: None  Specimens: * No specimens in log *  Findings:   None.  Complications: None.  Implants:   Implant Name Type Inv. Item Serial No.  Lot No. LRB No. Used Action   VALVE AORTIC SAPEIN 3 UTLRA TAVR 23MM [id number] - [id number] Valve VALVE AORTIC SAPEIN 3 UTLRA TAVR 23MM [id number] [employer]  N/A 1 Implanted       Subclavian TAVR, left subclavian artery access via cutdown

## 2021-09-28 NOTE — PROVIDER NOTIFICATION
Cardiology coordinator informed of low blood pressures on arrival (MAP's in the 60's, systolic of 70). Patient has received 1,000 mL of Lactated Ringers. EKG normal, labs pending. BP now stable 92/55 MAP 69. Coordinator to update team.

## 2021-09-29 ENCOUNTER — APPOINTMENT (OUTPATIENT)
Dept: OCCUPATIONAL THERAPY | Facility: CLINIC | Age: 71
DRG: 266 | End: 2021-09-29
Attending: INTERNAL MEDICINE
Payer: COMMERCIAL

## 2021-09-29 ENCOUNTER — APPOINTMENT (OUTPATIENT)
Dept: GENERAL RADIOLOGY | Facility: CLINIC | Age: 71
DRG: 266 | End: 2021-09-29
Attending: INTERNAL MEDICINE
Payer: COMMERCIAL

## 2021-09-29 ENCOUNTER — APPOINTMENT (OUTPATIENT)
Dept: CARDIOLOGY | Facility: CLINIC | Age: 71
DRG: 266 | End: 2021-09-29
Attending: INTERNAL MEDICINE
Payer: COMMERCIAL

## 2021-09-29 VITALS
DIASTOLIC BLOOD PRESSURE: 72 MMHG | RESPIRATION RATE: 18 BRPM | HEART RATE: 91 BPM | TEMPERATURE: 98.4 F | WEIGHT: 163.8 LBS | BODY MASS INDEX: 30.93 KG/M2 | OXYGEN SATURATION: 95 % | HEIGHT: 61 IN | SYSTOLIC BLOOD PRESSURE: 135 MMHG

## 2021-09-29 DIAGNOSIS — Z95.2 S/P TAVR (TRANSCATHETER AORTIC VALVE REPLACEMENT): Primary | ICD-10-CM

## 2021-09-29 LAB
ANION GAP SERPL CALCULATED.3IONS-SCNC: 5 MMOL/L (ref 3–14)
ATRIAL RATE - MUSE: 61 BPM
ATRIAL RATE - MUSE: 83 BPM
BUN SERPL-MCNC: 8 MG/DL (ref 7–30)
CALCIUM SERPL-MCNC: 8.3 MG/DL (ref 8.5–10.1)
CHLORIDE BLD-SCNC: 109 MMOL/L (ref 94–109)
CO2 SERPL-SCNC: 27 MMOL/L (ref 20–32)
CREAT SERPL-MCNC: 1.01 MG/DL (ref 0.52–1.04)
DIASTOLIC BLOOD PRESSURE - MUSE: NORMAL MMHG
DIASTOLIC BLOOD PRESSURE - MUSE: NORMAL MMHG
ERYTHROCYTE [DISTWIDTH] IN BLOOD BY AUTOMATED COUNT: 14.6 % (ref 10–15)
GFR SERPL CREATININE-BSD FRML MDRD: 55 ML/MIN/1.73M2
GLUCOSE BLD-MCNC: 128 MG/DL (ref 70–99)
HCT VFR BLD AUTO: 33.9 % (ref 35–47)
HGB BLD-MCNC: 11 G/DL (ref 11.7–15.7)
INR PPP: 1.24 (ref 0.85–1.15)
INTERPRETATION ECG - MUSE: NORMAL
INTERPRETATION ECG - MUSE: NORMAL
LDH SERPL L TO P-CCNC: 287 U/L (ref 81–234)
LVEF ECHO: NORMAL
MAGNESIUM SERPL-MCNC: 1.8 MG/DL (ref 1.6–2.3)
MCH RBC QN AUTO: 33.8 PG (ref 26.5–33)
MCHC RBC AUTO-ENTMCNC: 32.4 G/DL (ref 31.5–36.5)
MCV RBC AUTO: 104 FL (ref 78–100)
P AXIS - MUSE: 62 DEGREES
P AXIS - MUSE: 76 DEGREES
PHOSPHATE SERPL-MCNC: 3.1 MG/DL (ref 2.5–4.5)
PLATELET # BLD AUTO: 86 10E3/UL (ref 150–450)
POTASSIUM BLD-SCNC: 4.5 MMOL/L (ref 3.4–5.3)
PR INTERVAL - MUSE: 146 MS
PR INTERVAL - MUSE: 148 MS
PROT SERPL-MCNC: 7.2 G/DL (ref 6.8–8.8)
QRS DURATION - MUSE: 90 MS
QRS DURATION - MUSE: 90 MS
QT - MUSE: 372 MS
QT - MUSE: 490 MS
QTC - MUSE: 437 MS
QTC - MUSE: 493 MS
R AXIS - MUSE: 54 DEGREES
R AXIS - MUSE: 68 DEGREES
RBC # BLD AUTO: 3.25 10E6/UL (ref 3.8–5.2)
SODIUM SERPL-SCNC: 141 MMOL/L (ref 133–144)
SYSTOLIC BLOOD PRESSURE - MUSE: NORMAL MMHG
SYSTOLIC BLOOD PRESSURE - MUSE: NORMAL MMHG
T AXIS - MUSE: 206 DEGREES
T AXIS - MUSE: 235 DEGREES
VENTRICULAR RATE- MUSE: 61 BPM
VENTRICULAR RATE- MUSE: 83 BPM
WBC # BLD AUTO: 5.5 10E3/UL (ref 4–11)

## 2021-09-29 PROCEDURE — 84100 ASSAY OF PHOSPHORUS: CPT | Performed by: INTERNAL MEDICINE

## 2021-09-29 PROCEDURE — 93306 TTE W/DOPPLER COMPLETE: CPT

## 2021-09-29 PROCEDURE — 36415 COLL VENOUS BLD VENIPUNCTURE: CPT | Performed by: INTERNAL MEDICINE

## 2021-09-29 PROCEDURE — 93005 ELECTROCARDIOGRAM TRACING: CPT

## 2021-09-29 PROCEDURE — 97535 SELF CARE MNGMENT TRAINING: CPT | Mod: GO

## 2021-09-29 PROCEDURE — 250N000013 HC RX MED GY IP 250 OP 250 PS 637: Performed by: INTERNAL MEDICINE

## 2021-09-29 PROCEDURE — 71045 X-RAY EXAM CHEST 1 VIEW: CPT

## 2021-09-29 PROCEDURE — 85610 PROTHROMBIN TIME: CPT | Performed by: NURSE PRACTITIONER

## 2021-09-29 PROCEDURE — 36415 COLL VENOUS BLD VENIPUNCTURE: CPT | Performed by: NURSE PRACTITIONER

## 2021-09-29 PROCEDURE — 90662 IIV NO PRSV INCREASED AG IM: CPT | Performed by: NURSE PRACTITIONER

## 2021-09-29 PROCEDURE — 93010 ELECTROCARDIOGRAM REPORT: CPT | Performed by: INTERNAL MEDICINE

## 2021-09-29 PROCEDURE — 250N000013 HC RX MED GY IP 250 OP 250 PS 637: Performed by: NURSE PRACTITIONER

## 2021-09-29 PROCEDURE — 84155 ASSAY OF PROTEIN SERUM: CPT | Performed by: NURSE PRACTITIONER

## 2021-09-29 PROCEDURE — 97165 OT EVAL LOW COMPLEX 30 MIN: CPT | Mod: GO

## 2021-09-29 PROCEDURE — G0008 ADMIN INFLUENZA VIRUS VAC: HCPCS | Performed by: NURSE PRACTITIONER

## 2021-09-29 PROCEDURE — 83615 LACTATE (LD) (LDH) ENZYME: CPT | Performed by: NURSE PRACTITIONER

## 2021-09-29 PROCEDURE — 80048 BASIC METABOLIC PNL TOTAL CA: CPT | Performed by: INTERNAL MEDICINE

## 2021-09-29 PROCEDURE — 99239 HOSP IP/OBS DSCHRG MGMT >30: CPT | Mod: 25 | Performed by: NURSE PRACTITIONER

## 2021-09-29 PROCEDURE — 71045 X-RAY EXAM CHEST 1 VIEW: CPT | Mod: 26 | Performed by: STUDENT IN AN ORGANIZED HEALTH CARE EDUCATION/TRAINING PROGRAM

## 2021-09-29 PROCEDURE — 93306 TTE W/DOPPLER COMPLETE: CPT | Mod: 26 | Performed by: INTERNAL MEDICINE

## 2021-09-29 PROCEDURE — 83735 ASSAY OF MAGNESIUM: CPT | Performed by: INTERNAL MEDICINE

## 2021-09-29 PROCEDURE — 250N000011 HC RX IP 250 OP 636: Performed by: NURSE PRACTITIONER

## 2021-09-29 PROCEDURE — 250N000011 HC RX IP 250 OP 636: Performed by: INTERNAL MEDICINE

## 2021-09-29 PROCEDURE — 85027 COMPLETE CBC AUTOMATED: CPT | Performed by: INTERNAL MEDICINE

## 2021-09-29 PROCEDURE — 97530 THERAPEUTIC ACTIVITIES: CPT | Mod: GO

## 2021-09-29 RX ORDER — HYDROXYZINE HYDROCHLORIDE 50 MG/1
50 TABLET, FILM COATED ORAL EVERY 6 HOURS PRN
Status: DISCONTINUED | OUTPATIENT
Start: 2021-09-29 | End: 2021-09-29 | Stop reason: HOSPADM

## 2021-09-29 RX ORDER — POTASSIUM CHLORIDE 1500 MG/1
20 TABLET, EXTENDED RELEASE ORAL DAILY
Qty: 90 TABLET | Refills: 3 | Status: ON HOLD | OUTPATIENT
Start: 2021-09-30 | End: 2022-01-01

## 2021-09-29 RX ORDER — HYDROXYZINE HYDROCHLORIDE 25 MG/1
25 TABLET, FILM COATED ORAL EVERY 6 HOURS PRN
Status: DISCONTINUED | OUTPATIENT
Start: 2021-09-29 | End: 2021-09-29 | Stop reason: HOSPADM

## 2021-09-29 RX ADMIN — ACETAMINOPHEN 650 MG: 325 TABLET, FILM COATED ORAL at 08:25

## 2021-09-29 RX ADMIN — HYDRALAZINE HYDROCHLORIDE 10 MG: 20 INJECTION INTRAMUSCULAR; INTRAVENOUS at 08:48

## 2021-09-29 RX ADMIN — HYDROXYZINE HYDROCHLORIDE 25 MG: 25 TABLET, FILM COATED ORAL at 11:54

## 2021-09-29 RX ADMIN — HYDRALAZINE HYDROCHLORIDE 10 MG: 20 INJECTION INTRAMUSCULAR; INTRAVENOUS at 09:30

## 2021-09-29 RX ADMIN — ACETAMINOPHEN 650 MG: 325 TABLET, FILM COATED ORAL at 02:35

## 2021-09-29 RX ADMIN — INFLUENZA A VIRUS A/VICTORIA/2570/2019 IVR-215 (H1N1) ANTIGEN (FORMALDEHYDE INACTIVATED), INFLUENZA A VIRUS A/TASMANIA/503/2020 IVR-221 (H3N2) ANTIGEN (FORMALDEHYDE INACTIVATED), INFLUENZA B VIRUS B/PHUKET/3073/2013 ANTIGEN (FORMALDEHYDE INACTIVATED), AND INFLUENZA B VIRUS B/WASHINGTON/02/2019 ANTIGEN (FORMALDEHYDE INACTIVATED) 0.7 ML: 60; 60; 60; 60 INJECTION, SUSPENSION INTRAMUSCULAR at 14:52

## 2021-09-29 RX ADMIN — VENLAFAXINE HYDROCHLORIDE 300 MG: 75 CAPSULE, EXTENDED RELEASE ORAL at 08:25

## 2021-09-29 RX ADMIN — POTASSIUM CHLORIDE 20 MEQ: 750 TABLET, EXTENDED RELEASE ORAL at 08:25

## 2021-09-29 RX ADMIN — Medication 2.5 MG: at 13:29

## 2021-09-29 RX ADMIN — LEVOTHYROXINE SODIUM 88 MCG: 88 TABLET ORAL at 08:25

## 2021-09-29 RX ADMIN — ATORVASTATIN CALCIUM 80 MG: 80 TABLET, FILM COATED ORAL at 08:25

## 2021-09-29 RX ADMIN — FUROSEMIDE 20 MG: 20 TABLET ORAL at 08:25

## 2021-09-29 RX ADMIN — Medication 2.5 MG: at 09:29

## 2021-09-29 RX ADMIN — ASPIRIN 81 MG: 81 TABLET ORAL at 08:25

## 2021-09-29 ASSESSMENT — ACTIVITIES OF DAILY LIVING (ADL)
ADLS_ACUITY_SCORE: 10
ADLS_ACUITY_SCORE: 10
ADLS_ACUITY_SCORE: 6
ADLS_ACUITY_SCORE: 14
PREVIOUS_RESPONSIBILITIES: MEAL PREP;HOUSEKEEPING;LAUNDRY;SHOPPING;MEDICATION MANAGEMENT;FINANCES;DRIVING
ADLS_ACUITY_SCORE: 10

## 2021-09-29 NOTE — PLAN OF CARE
Occupational Therapy Discharge Summary    Reason for therapy discharge:    Discharged to home.    Progress towards therapy goal(s). See goals on Care Plan in [facility] electronic health record for goal details.  Goals partially met.  Barriers to achieving goals:   discharge from facility.    Therapy recommendation(s):    Continued therapy is recommended.  Rationale/Recommendations:  Pt would benefit from further therapy to progress functional independence with all mobility and ADLs/IADLs and to improve functional strength and endurance for optimal cardiac health. Pt refused all therapy recommendations, REC assist for heavier IADLs at home.

## 2021-09-29 NOTE — PHARMACY-ADMISSION MEDICATION HISTORY
Admission Medication History Completed by Pharmacy    See [facility] Admission Navigator for allergy information, preferred outpatient pharmacy, prior to admission medications and immunization status.     Medication History Sources:     Patient, dispense report, daughter Caitie [phone]    Changes made to PTA medication list (reason):    Added: None    Deleted: potassium chloride 10 mEq daily (short 5-day prescription filled on 9/23, pt confirmed completing this)    Changed:   o Melatonin 10 mg qhs PRN > 30 mg qhs (per pt report, daughter was unsure if pt was actually doing this and said she'd speak with her mother about it)  o Trazodone 100 mg qhs > 150 mg (confirmed via dispense report)    Additional Information:    Pt manages her own meds but daughter maintains a med list. Pt was able to confirm med names and dosages when they were stated for her.    Hydromorphone allergy was clarified, confirmed that pt has tolerated oxycodone in the past, per Caitie.    Prior to Admission medications    Medication Sig Last Dose Taking? Auth Provider   aspirin-acetaminophen-caffeine (EXCEDRIN MIGRAINE) 250-250-65 mg per tablet [ASPIRIN-ACETAMINOPHEN-CAFFEINE (EXCEDRIN MIGRAINE) 250-250-65 MG PER TABLET] Take 2 tablets by mouth every 6 (six) hours as needed (migraines).  Patient taking differently: Take 2 tablets by mouth every 4 hours as needed  Past Month at Unknown time Yes Zina German MD   atorvastatin (LIPITOR) 80 MG tablet [ATORVASTATIN (LIPITOR) 80 MG TABLET] Take 80 mg by mouth daily. 9/27/2021 at Unknown time Yes Provider, Historical   EPINEPHrine (EPIPEN/ADRENACLICK/AUVI-Q) 0.3 mg/0.3 mL injection 0.3 mg as needed  More than a month at Unknown time Yes Provider, Historical   fluticasone (FLONASE) 50 MCG/ACT nasal spray Spray 1 spray into both nostrils 2 times daily as needed for rhinitis or allergies 9/27/2021 at Unknown time Yes Reported, Patient   furosemide (LASIX) 20 MG tablet Take 20 mg tablet two times a day for 3  days, then resume 20 mg daily  Patient taking differently: Take 20 mg by mouth daily Take 20 mg by mouth daily 9/27/2021 at Unknown time Yes Sara Randall MD   levothyroxine (SYNTHROID, LEVOTHROID) 88 MCG tablet [LEVOTHYROXINE (SYNTHROID, LEVOTHROID) 88 MCG TABLET] Take 88 mcg by mouth daily. 9/27/2021 at Unknown time Yes Provider, Historical   Melatonin 10 MG TABS tablet Take 30 mg by mouth At Bedtime  Past Week at Unknown time Yes Reported, Patient   traZODone (DESYREL) 150 MG tablet Take 150 mg by mouth At Bedtime  9/27/2021 at Unknown time Yes Provider, Historical   venlafaxine (EFFEXOR-XR) 150 MG 24 hr capsule [VENLAFAXINE (EFFEXOR-XR) 150 MG 24 HR CAPSULE] Take 300 mg by mouth daily. 9/27/2021 at Unknown time Yes Provider, Historical       Date completed: 09/29/21    Medication history completed by:   Ad Lewis, DutchD, BCPS

## 2021-09-29 NOTE — CONSULTS
Care Management Follow Up    Length of Stay (days): 1    Expected Discharge Date: 09/29/2021     Concerns to be Addressed: discharge planning      Patient plan of care discussed at interdisciplinary rounds: Yes    Anticipated Discharge Disposition: home      Anticipated Discharge Services:  TBD  Anticipated Discharge DME:  none    Additional Information:  Pt is a 71-year-old female admitted to [facility] 9/28/21 following planned TAVR. CM/SW auto consulted for discharge planning. OT consulted but no discharge recommendations documented at this time. Pt to discharge home today. No CM/SW needs identified at this time. CM/SW to continue to follow for potential needs.    ONI Hathaway, LICSW  6C   [facility]  Pager [phone]  Phone [phone]

## 2021-09-29 NOTE — PLAN OF CARE
Pt admitted 9/28 for scheduled TAVR procedure. PMH includes aortic stenosis, HTN, HLD, diastolic HF, AAA s/p endovascular repair, and L2-L3 laminectomy with chronic back pain.     Neuro: A&O x 4. Pleasant affect. Calls appropriately. Q2H neuros performed per orders were WDL with no deficits noted. Pt complaining of systemic pruritis that she would like medic  Cardiac: SR 70's to 80's. 's to 130's. Denies dizziness, chest pain, or palpitations. R radial and groin sites WDL with CMS intact and normal pulses.  Respiratory: Sating well on RA. LS clear.   GI/: Purewick in place with good UOP. Last BM 9/28. Denies nausea.  Diet/Appetite: Regular  Skin: L chest incision dressing CDI.   LDA: BL PIV SL.   Activity: AO1  Pain: Pain in left shoulder near incision controlled by PRN Tylenol. Pt concerned that with activity at home that the Tylenol will not be sufficient and would like something stronger.      Plan: Continue to monitor and alert team with any changes or concerns.

## 2021-09-29 NOTE — PLAN OF CARE
Time: 0700 - 1500    Reason for admit: Aortic stenosis, TAVR.   Vitals: HTN this AM of 154/91 and 160/103; PRN hydralazine given x2 with improvement to 120/64. Other VSS on RA, afebrile. Pt requested 1 L O2 via NC for comfort.   Activity: SBA/A1.   Neuro: A&Ox4, able to make needs known. Strength and sensation equal bilaterally. PERRLA. Neuros intact.   Mood/Behavior: Verbalizes anxiety. Cooperative, accepting. Atarax given x1 for anxiety.   Cardiac: HTN this AM.   Respiratory: ENCISO, wheezing on auscultation.   Diet: Regular diet.   GI/: Voiding WDL, using Purewick for comfort. No BM this shift. Denies nausea.  Skin: R groin, L chest, and R radial puncture sites from TAVR, dressings CDI, no e/o hematoma or bleeding noted.   Pain: Pt reported pain in L shoulder and at L chest incision site; Tylenol and oxycodone given PRN with pt reporting partial relief.     New this shift: Pt had echo at bedside. Diangostic/therapeutic thoracentesis attempted at bedside, but procedural docs are recommending it be done in IR; MD paged. Plan is for pt to have thora done as outpatient. Pt discharged home at 1530, PIVs removed, AVS reviewed including post-TAVR education, belongings gathered and sent with pt.

## 2021-09-29 NOTE — PROGRESS NOTES
09/29/21 1400   Quick Adds   Type of Visit Initial Occupational Therapy Evaluation   Living Environment   People in home alone   Current Living Arrangements apartment   Home Accessibility no concerns   Transportation Anticipated family or friend will provide;car, drives self   Living Environment Comments Pt lives alone in a IND senior living apartment, no access concerns or stairs, all needs met on the main level of the home.   Self-Care   Usual Activity Tolerance moderate   Current Activity Tolerance moderate   Regular Exercise No   Equipment Currently Used at Home cane, straight;walker, rolling;grab bar, tub/shower   Activity/Exercise/Self-Care Comment Does not exercise regularly, has a cane and a 4ww ar home for ambulation, daughter also has a 2ww she can bring over to pt.    Instrumental Activities of Daily Living (IADL)   Previous Responsibilities meal prep;housekeeping;laundry;shopping;medication management;finances;driving   IADL Comments Pt was IND at baseline with all IADLs, was needing increased assist the last two weeks with IADLs 2/2 dizziness from decreased cardiac function.    Disability/Function   Hearing Difficulty or Deaf yes   Patient's preferred means of communication verbal   Describe hearing loss bilateral hearing loss   Use of hearing assistive devices bilateral hearing aids   Wear Glasses or Blind yes   Vision Management glasses   Concentrating, Remembering or Making Decisions Difficulty no   Difficulty Communicating no   Difficulty Eating/Swallowing no   Walking or Climbing Stairs Difficulty yes   Walking or Climbing Stairs ambulation difficulty, requires equipment   Mobility Management Pt uses a cane or a 4ww at baseline for ambulation at baseline.    Dressing/Bathing Difficulty yes   Dressing/Bathing bathing difficulty, requires equipment   Dressing/Bathing Management grab bar used in shower at home    Toileting issues no   Doing Errands Independently Difficulty (such as shopping) yes    Errands Management family has been helping recently with errands    Fall history within last six months no   Change in Functional Status Since Onset of Current Illness/Injury yes   General Information   Onset of Illness/Injury or Date of Surgery 09/28/21   Referring Physician Rachael Conrad PA-C   Patient/Family Therapy Goal Statement (OT) return home    Additional Occupational Profile Info/Pertinent History of Current Problem Per chart: Katherine is a pleasant 71 y.o female with history of aortic stenosis, HTN, HLD, AAA with endograft repair and L2-L3 laminectomy. She is here today for planned transcatheter aortic valve replacement.    Existing Precautions/Restrictions cardiac;fall   Left Upper Extremity (Weight-bearing Status) full weight-bearing (FWB)   Right Upper Extremity (Weight-bearing Status) full weight-bearing (FWB)   Left Lower Extremity (Weight-bearing Status) full weight-bearing (FWB)   Right Lower Extremity (Weight-bearing Status) full weight-bearing (FWB)   General Observations and Info Activity: ambulate    Cognitive Status Examination   Orientation Status orientation to person, place and time   Affect/Mental Status (Cognitive) anxious   Follows Commands WNL   Cognitive Status Comments Pt seemed mildly forgetful during eval and anxious with activity, able to follow all commands without difficulty.    Visual Perception   Visual Impairment/Limitations WNL   Sensory   Sensory Quick Adds No deficits were identified   Pain Assessment   Patient Currently in Pain Yes, see Vital Sign flowsheet   Integumentary/Edema   Integumentary/Edema no deficits were identifed   Posture   Posture forward head position;protracted shoulders   Range of Motion Comprehensive   General Range of Motion no range of motion deficits identified   Strength Comprehensive (MMT)   Comment, General Manual Muscle Testing (MMT) Assessment BUE strength WFL    Coordination   Upper Extremity Coordination No deficits were identified   Gross  Motor Coordination No deficits identified    Fine Motor Coordination Not tested    Bed Mobility   Comment (Bed Mobility) SBA    Transfers   Transfer Comments SBA    Balance   Balance Comments Midly unsteady on feet, needed fww for balance during ambulation.    Clinical Impression   Criteria for Skilled Therapeutic Interventions Met (OT) yes;meets criteria;skilled treatment is necessary   OT Diagnosis Decreased functional endurance and ADL-I   OT Problem List-Impairments impacting ADL problems related to;activity tolerance impaired;balance;mobility;pain;strength;post-surgical precautions   Assessment of Occupational Performance 3-5 Performance Deficits   Identified Performance Deficits ambulation, home management, community mobility   Planned Therapy Interventions (OT) ADL retraining;IADL retraining;strengthening;progressive activity/exercise;home program guidelines;bed mobility training   Clinical Decision Making Complexity (OT) low complexity   Therapy Frequency (OT) Daily   Predicted Duration of Therapy 10/6/2021   Risk & Benefits of therapy have been explained evaluation/treatment results reviewed;care plan/treatment goals reviewed;risks/benefits reviewed;current/potential barriers reviewed;participants voiced agreement with care plan;participants included;patient;daughter   OT Discharge Planning    OT Discharge Recommendation (DC Rec) Home with assist;home with home care occupational therapy;home with outpatient occupational therapy   OT Rationale for DC Rec Pt would benefit from HH PT/OT and OP CR to progress functional strength and IND with all mobility, ADLs/IADLs, and functional endurance. Pt is declining all therapy recommendations and states she has friends that live in her building who can help her. REC assist for heavier IADLs once returning home.    Total Evaluation Time (Minutes)   Total Evaluation Time (Minutes) 5

## 2021-09-29 NOTE — DISCHARGE SUMMARY
[address] [address]  p: [phone]    Discharge Summary: Cardiology Service    Maren Fofana MRN# [id number]   YOB: 1950 Age: 71 year old       Admission Date: 9/28/2021  Discharge Date: 09/29/21    Discharge Diagnoses:  1. Severe aortic stenosis   2. AAA s/p endovascular repair   3. HTN  4. HLD  5. Acute on chronic diastolic heart failure, NYHA class III  6. Moderate R pleural effusion  7. Hypokalemia  8. Chronic back pain  9. L2-L3 laminectomy    Brief HPI:  Katherine is a pleasant 71 y.o female with history of aortic stenosis, HTN, HLD, AAA with endograft repair and L2-L3 laminectomy. She is here today for planned transcatheter aortic valve replacement.      Patient had been followed peripherally for her aortic stenosis. She was evaluated by PCP who noticed that her aortic stenosis murmur had gotten significantly worse and ordered echocardiogram.  Echocardiogram on 5/25/21 showed progression of her stenosis to severe characterized by KP 0.7 cm2 with MG 46 mmHg. LVEF 70%.      Patient has reported increased amounts of dyspnea on exertion and lower extremity edema.  She is now short of breath at rest and says her dizziness has been worse in the last 2 weeks.  She has 3 pillow orthopnea.  She denies any chest pain or PND.     Hospital Course by Diagnosis  #Severe aortic stenosis, now s/p Transcatheter Aortic Valve Replacement with 23 mm ES. Prior to procedure, pt noted to have a mean gradient 46 mmHG, KP 0.7 Cm^2- Sheath sites soft without hematoma. Left subclavian access site CDI. No arrhythmias. Neuro's intact. Left subclavian access site CDI, no bleeding or bruising. Post procedure echo shows MG 17 mmHg in the setting of hyperdynamic LV function, no PVL, and trivial pericardial effusion.      - ASA for anti-platelet therapy   - Outpatient cardiac rehab  - Lifelong antibiotics prior to any dental procedure   - Repeat echo in 1  month   - Follow-up with Structural clinic at [facility] in 1 week and 1 month          # AAA s/p endovascular repair   # HTN  # HLD   Blood pressure well-controlled. No PTA anti-hypertensives.   - Continue PTA atorvastatin 80 mg daily      # Acute on chronic diastolic heart failure, NYHA class III  # Moderate R pleural effusion   Moderate R pleural effusion noted on CT TAVR. Per surgery recommendation, patient probably would benefit from drainage of this fluid by thoracentesis post-TAVR. Patient reports improvement in breathing post-TAVR, stable on RA.   - Continue PTA lasix 20 mg daily   - Plan for thoracentesis as outpatient, will arrange at 1-week structural follow-up     # Hypokalemia, resolved   K 3.3. on admission. No clinical significance.   - Repeat BMP in 1 week   - Increase potassium chloride to 20 mEq daily [PTA 10 mEq]     # Chronic back pain  # L2-L3 laminectomy   - Continue PTA tylenol prn     Pertinent Procedures:  1. Transcatheter aortic valve replacement     Consults:  None     Medication Changes:  See below     Discharge medications:   Current Discharge Medication List      START taking these medications    Details   aspirin (ASA) 81 MG EC tablet Take 1 tablet (81 mg) by mouth daily  Qty: 90 tablet, Refills: 0    Associated Diagnoses: Aortic stenosis, severe         CONTINUE these medications which have CHANGED    Details   potassium chloride ER (KLOR-CON M) 20 MEQ CR tablet Take 1 tablet (20 mEq) by mouth daily  Qty: 90 tablet, Refills: 3    Associated Diagnoses: Hypokalemia         CONTINUE these medications which have NOT CHANGED    Details   aspirin-acetaminophen-caffeine (EXCEDRIN MIGRAINE) 250-250-65 mg per tablet [ASPIRIN-ACETAMINOPHEN-CAFFEINE (EXCEDRIN MIGRAINE) 250-250-65 MG PER TABLET] Take 2 tablets by mouth every 6 (six) hours as needed (migraines).  Refills: 0    Associated Diagnoses: Other migraine without status migrainosus, not intractable      atorvastatin (LIPITOR) 80 MG tablet  [ATORVASTATIN (LIPITOR) 80 MG TABLET] Take 80 mg by mouth daily.      EPINEPHrine (EPIPEN/ADRENACLICK/AUVI-Q) 0.3 mg/0.3 mL injection 0.3 mg as needed     Comments: Please dispense product with lowest cost to patient      fluticasone (FLONASE) 50 MCG/ACT nasal spray Spray 1 spray into both nostrils 2 times daily as needed for rhinitis or allergies      furosemide (LASIX) 20 MG tablet Take 20 mg tablet two times a day for 3 days, then resume 20 mg daily  Qty: 90 tablet, Refills: 3    Associated Diagnoses: Aortic stenosis      levothyroxine (SYNTHROID, LEVOTHROID) 88 MCG tablet [LEVOTHYROXINE (SYNTHROID, LEVOTHROID) 88 MCG TABLET] Take 88 mcg by mouth daily.      Melatonin 10 MG TABS tablet Take 30 mg by mouth At Bedtime       traZODone (DESYREL) 150 MG tablet Take 150 mg by mouth At Bedtime       venlafaxine (EFFEXOR-XR) 150 MG 24 hr capsule [VENLAFAXINE (EFFEXOR-XR) 150 MG 24 HR CAPSULE] Take 300 mg by mouth daily.             Follow-up:  - Structural clinic at [facility] in 1 week and 1 month  - PCP in 7-10 days     Labs or imaging requiring follow-up after discharge:  - CBC/BMP in 1 week   - Echo in 1 month     Code status:  Full     Condition on discharge  Temp:  [96.1  F (35.6  C)-98.9  F (37.2  C)] 98.4  F (36.9  C)  Pulse:  [58-96] 96  Resp:  [14-18] 18  BP: ()/() 120/64  MAP:  [65 mmHg-74 mmHg] 72 mmHg  Arterial Line BP: ()/(51-57) 97/56  SpO2:  [92 %-98 %] 95 %  General: Alert, interactive, NAD  Eyes: sclera anicteric, EOMI  Neck: JVP 0, carotid 2+ bilaterally  Cardiovascular: regular rate and rhythm, normal S1 and S2, no murmurs, gallops, or rubs  Resp: clear to auscultation bilaterally, no rales, wheezes, or rhonchi  GI: Soft, nontender, nondistended. +BS.  No HSM or masses, no rebound or guarding.  Extremities: no edema, no cyanosis or clubbing, dorsalis pedis and posterior tibialis pulses 2+ bilaterally  Skin: Warm and dry, no jaundice or rash. Left subclavian site intact, no bleeding  or bruising   Neuro: CN 2-12 intact, moves all extremities equally  Psych: Alert & oriented x 3    Imaging with results:  Echocardiogram: 9/29/21 (post-procedure)  Interpretation Summary  Left ventricular size is normal and relative wall thickness is increased  consistent with concentric remodeling. The visual ejection fraction is >70%.  LV appears hyperkinetic leading to LVOT gradient of 50mmHg.  There is a 23 mm Bates Ryan 3 valve in the aortic position. No  perivalvular leak. Mean aortic gradient ia 17.5mmHg.  The right ventricle is normal size and systolic function is normal.  Estimated pulmonary artery systolic pressure is 32 mmHg plus 8mmHg of right  atrial pressure.  Trivial pericardial effusion is present.    TAVR: 9/28/21  Conclusions:  1. Severe symptomatic aortic stenosis status post successful transcatheter aortic valve replacement with a 23mm Ryan 3 valve, delivered via the L subclavian approach.     2.  Trace aortic insufficiency post deployment with a mean gradient of 5 mmHg.     Postprocedure patient status:  Patient planned to be transferred to the PACU/recovery for ongoing management.  Temporary pacemaker removed and need to be re-assessed in the next 24 hours based on clinical need.    Patient Care Team:  Nora Mccarthy MD as PCP - General (Family Medicine)  Rachael Conrad PA-C as Assigned Heart and Vascular Provider    Macie Bear DNP, APRN, CNP  [facility]  [phone]

## 2021-09-29 NOTE — PLAN OF CARE
"D-S/p TAVR for severe symptomatic aortic stenosis. POD 0. See flow sheets for vs and assessments.  I-R groin sheaths discontinued by JS and manual pressure held until 1640. Off bedrest at 2230. Pt instructed to call for assistance to get OOB.  A-No post procedure complications noted at this time. Pt verbalizes, \"I already feel better\".  P-ECHO and EKG in am.  "

## 2021-09-29 NOTE — PROGRESS NOTES
Pt given Discharge paperwork and instructions. IV taken out. Pt discharge prescriptions sent to pharmacy and picked up per family. Pt taken to lobby via wheelchair and staff.

## 2021-09-30 ENCOUNTER — PATIENT OUTREACH (OUTPATIENT)
Dept: CARE COORDINATION | Facility: CLINIC | Age: 71
End: 2021-09-30

## 2021-09-30 DIAGNOSIS — Z71.89 OTHER SPECIFIED COUNSELING: ICD-10-CM

## 2021-09-30 NOTE — PROGRESS NOTES
Clinic Care Coordination Contact  [facility]/Voicemail       Clinical Data: Care Coordinator Outreach  Outreach attempted x 1.  Left message on patient's voicemail with call back information and requested return call.  Plan: Care Coordinator will try to reach patient again in 1-2 business days.    PRISCA Marx  [phone]  [facility]

## 2021-10-01 NOTE — PROGRESS NOTES
Clinic Care Coordination Contact  [facility]/Voicemail       Clinical Data: Care Coordinator Outreach  Outreach attempted x 2.  Unable to leave message on patient's voicemail with call back information.    Plan: Care Coordinator will do no further outreaches at this time.    PRISCA Marx  [phone]  [facility]

## 2021-10-11 NOTE — LETTER
10/11/2021    Nora Mccarthy MD  [facility] [address] [place] [address]    RE: Maren Fofana       Dear Colleague,    I had the pleasure of seeing Maren Fofana in the [facility].    HEART CARE ENCOUNTER NOTE       [facility]  [phone]    Assessment/Recommendations   Diagnoses and all orders for this visit:    Severe aortic stenosis - S/P TAVR on September 28 via the left subclavian approach and using a #23 ISABELLA valve.  Incision site healing well.  Ok to resume activities except for lifting more than 10-15lb above shoulder height.  Her breathing has improved slightly, but still having LE edema.  Continue ASA indefinitely.  She is scheduled for cardiac rehab on October 18 and is a bit hesitant to go, but I encouraged going to first session and seeing what it's all about.  She has repeat echo scheduled for October 26 and will see Dr. Kulkarni on October 27 with full labs and EKG.      She should see Ju Phillips or Jillian for her 6 month visit and then will see me or Dr. Kulkarni in another year.     Hypertension, unspecified type - Patient has not historically had this diagnosis, nor has she needed BP medications, however after hospital did need prn Hydralazine and now BP too high.  Will have her start losartan 25 mg daily and follow BP trend.  Can make adjustments when she sees Dr. Kulkarni at end of month.      Pleural effusion on right - moderate by pre-TAVR CTA.  Patient has had mild improvement in breathing after TAVR, but still has SOB with activity.  Breathing still requires more effort and she does have occasional wheezes heard by daughter.     AAA -status post endovascular repair     Chronic back pain with disc disease and spondylolisthesis -status post lumbar laminectomy in the past     Tobacco abuse -patient stopped smoking approximately 5 to 6 months ago     Acute on chronic diastolic heart  "failure, NYHA class III - improved somewhat after new valve, however patient has worsening LE edema and still has moderate right pleural effusion, which by exam seems worse - diminished breath sounds heard past mid-point of back. Increase lasix to 20 mg BID, continue potassium, start losartan.     Thank you for the opportunity to participate in the care of Maren Fofana. It's been a pleasure working with her.  Please do not hesitate to call with any questions or concerns.       History of Present Illness/Subjective    I had the opportunity to see Maren Fofana at the [facility] for her 1 week follow up visit after TAVR.  Her daughter accompanies her to the visit today.         Maren Fofana denies exertional chest discomfort, palpitations, shortness of breath at rest, PND, orthopnea, lightheadedness, dizziness, pre-syncope or syncope.  Maren Fofana also denies any recent weight loss, changes in appetite, nausea or vomiting.   ____________________________________________________________  Pre-discharge echo from September 29:  Interpretation Summary  Left ventricular size is normal and relative wall thickness is increased  consistent with concentric remodeling. The visual ejection fraction is >70%.  LV appears hyperkinetic leading to LVOT gradient of 50mmHg.  There is a 23 mm Bates Ryan 3 valve in the aortic position. No  perivalvular leak. Mean aortic gradient ia 17.5mmHg.  The right ventricle is normal size and systolic function is normal.  Estimated pulmonary artery systolic pressure is 32 mmHg plus 8mmHg of right  atrial pressure.  Trivial pericardial effusion is present.  IVC moderately     Physical Examination Review of Systems   Vitals: BP (!) 152/82 (BP Location: Left arm, Patient Position: Sitting, Cuff Size: Adult Regular)   Pulse 80   Resp 20   Ht 1.549 m (5' 1\")   Wt 75.6 kg (166 lb 9.6 oz)   BMI 31.48 kg/m    BMI= Body mass index is 31.48 kg/m .  Wt Readings from Last 3 " Encounters:   10/11/21 75.6 kg (166 lb 9.6 oz)   21 74.3 kg (163 lb 12.8 oz)   21 73.5 kg (162 lb)       General Appearance:   Alert, cooperative and in no acute distress   ENT/Mouth: membranes moist, no oral lesions or bleeding gums.      EYES:  no scleral icterus, normal conjunctivae   Neck: Supple without lymphadenopathy.  Thyroid not visualized   Chest/Lungs:   lungs are clear on left, but there are no breath sounds on right from base to nipple line  to auscultation, no rales or wheezing   Cardiovascular:   Regular. Normal first and second heart sounds with no murmurs, rubs, or gallops; the carotid, radial and posterior tibial pulses are intact, 2+ edema bilaterally    Abdomen:  Soft and nontender. Bowel sounds are present in all quadrants   Extremities: no cyanosis or clubbing.  Incision well healed   Skin: no xanthelasma, warm.    Neurologic: normal gait, normal  bilateral, no tremors   Psychiatric: Normal mood and affect       Please refer above for cardiac ROS details.      Medical History  Surgical History Family History Social History   Past Medical History:   Diagnosis Date     Aortic stenosis      Collapsed lung      Fibromyalgia      GERD (gastroesophageal reflux disease)      Hypothyroid      Migraine      Peripheral vascular disease (H)      Reactive airway disease      Vertigo      Past Surgical History:   Procedure Laterality Date      SECTION Bilateral      CV CORONARY ANGIOGRAM N/A 2021    Procedure: Coronary Angiogram;  Surgeon: Sara Randall MD;  Location: [facility] CATH LAB CV     CV TRANSCATHETER AORTIC VALVE REPLACEMENT N/A 2021    Procedure: CV TRANSCATHETER AORTIC VALVE REPLACEMENT;  Surgeon: Geovani Kulkarni MD;  Location: [facility] CARDIAC CATH LAB     HEART CATH FEMORAL CANNULIZATION WITH OPEN STANDBY REPAIR AORTIC VALVE N/A 2021    Procedure: LEFT SUBCLAVIAN ACCESS FOR  TRANSCATHETER AORTIC VALVE REPLACEMENT.  CARDIOPULMONARY BYPASS STANDBY;   Surgeon: Jorge L Baeza MD;  Location:  HEART CARDIAC CATH LAB     HYSTERECTOMY       POSTERIOR LAMINECTOMY / DECOMPRESSION LUMBAR SPINE      L2-L3     SPINAL FUSION       TONSILLECTOMY & ADENOIDECTOMY       TOTAL KNEE ARTHROPLASTY Bilateral      Family History   Problem Relation Age of Onset     Pancreatic Cancer Mother      Pancreatic Cancer Father     Social History     Socioeconomic History     Marital status: Legally      Spouse name: Not on file     Number of children: Not on file     Years of education: Not on file     Highest education level: Not on file   Occupational History     Not on file   Tobacco Use     Smoking status: Former Smoker     Packs/day: 0.50     Quit date: 2021     Years since quittin.5     Smokeless tobacco: Never Used   Substance and Sexual Activity     Alcohol use: Yes     Drug use: Never     Sexual activity: Not on file   Other Topics Concern     Parent/sibling w/ CABG, MI or angioplasty before 65F 55M? Not Asked   Social History Narrative     Not on file     Social Determinants of Health     Financial Resource Strain:      Difficulty of Paying Living Expenses:    Food Insecurity:      Worried About Running Out of Food in the Last Year:      Ran Out of Food in the Last Year:    Transportation Needs:      Lack of Transportation (Medical):      Lack of Transportation (Non-Medical):    Physical Activity:      Days of Exercise per Week:      Minutes of Exercise per Session:    Stress:      Feeling of Stress :    Social Connections:      Frequency of Communication with Friends and Family:      Frequency of Social Gatherings with Friends and Family:      Attends Rastafarian Services:      Active Member of Clubs or Organizations:      Attends Club or Organization Meetings:      Marital Status:    Intimate Partner Violence:      Fear of Current or Ex-Partner:      Emotionally Abused:      Physically Abused:      Sexually Abused:           Medications  Allergies   Current  Outpatient Medications   Medication Sig Dispense Refill     acetaminophen-codeine (TYLENOL #3) 300-30 MG tablet TAKE 1 TABLET BY MOUTH AS NEEDED AT BEDTIME FOR SLEEP       aspirin (ASA) 81 MG EC tablet Take 1 tablet (81 mg) by mouth daily 90 tablet 0     atorvastatin (LIPITOR) 80 MG tablet [ATORVASTATIN (LIPITOR) 80 MG TABLET] Take 80 mg by mouth daily.       EPINEPHrine (EPIPEN/ADRENACLICK/AUVI-Q) 0.3 mg/0.3 mL injection 0.3 mg as needed        fluticasone (FLONASE) 50 MCG/ACT nasal spray Spray 1 spray into both nostrils 2 times daily as needed for rhinitis or allergies       furosemide (LASIX) 20 MG tablet Take 20 mg tablet two times a day for 3 days, then resume 20 mg daily (Patient taking differently: Take 20 mg by mouth daily Take 20 mg by mouth daily) 90 tablet 3     hydrOXYzine (ATARAX) 25 MG tablet Take 25 mg by mouth as needed       levothyroxine (SYNTHROID, LEVOTHROID) 88 MCG tablet [LEVOTHYROXINE (SYNTHROID, LEVOTHROID) 88 MCG TABLET] Take 88 mcg by mouth daily.       losartan (COZAAR) 25 MG tablet Take 1 tablet (25 mg) by mouth daily 90 tablet 3     Melatonin 10 MG TABS tablet Take 30 mg by mouth At Bedtime        potassium chloride ER (KLOR-CON M) 20 MEQ CR tablet Take 1 tablet (20 mEq) by mouth daily 90 tablet 3     traZODone (DESYREL) 150 MG tablet Take 150 mg by mouth At Bedtime        venlafaxine (EFFEXOR-XR) 150 MG 24 hr capsule [VENLAFAXINE (EFFEXOR-XR) 150 MG 24 HR CAPSULE] Take 300 mg by mouth daily.      Allergies   Allergen Reactions     Bee Venom Anaphylaxis     Other Drug Allergy (See Comments) Hives     Ice Packs   Any kind of cold      Dilaudid [Hydromorphone] Other (See Comments)     Altered mental status, confusion, itching. Pt tolerates oxycodone.     Latex      hives         Lab Results    Chemistry/lipid CBC Cardiac Enzymes/BNP/TSH/INR   Recent Labs   Lab Test 05/19/21  1332   CHOL 150   HDL 52   LDL 81   TRIG 87     Recent Labs   Lab Test 05/19/21  1332   LDL 81     Recent Labs   Lab  Test 10/06/21  1635      POTASSIUM 4.5   CHLORIDE 102   CO2 29   GLC 97   BUN 12   CR 1.00   GFRESTIMATED 56*   YENI 9.3     Recent Labs   Lab Test 10/06/21  1635 09/29/21  0640 09/28/21  1147   CR 1.00 1.01 0.92     No results for input(s): A1C in the last 02931 hours. Recent Labs   Lab Test 09/29/21  0640   WBC 5.5   HGB 11.0*   HCT 33.9*   *   PLT 86*     Recent Labs   Lab Test 09/29/21  0640 09/28/21  1148 09/23/21  1251   HGB 11.0* 9.4* 11.7    No results for input(s): TROPONINI in the last 49498 hours.  Recent Labs   Lab Test 09/23/21  1251   *     Recent Labs   Lab Test 05/19/21  1332   TSH 1.68     Recent Labs   Lab Test 09/29/21  1044 09/23/21  1251 01/02/21  0912   INR 1.24* 1.21* 1.10        35 minutes spent on the date of encounter doing chart review, review of test results, interpretation with above tests, patient visit, documentation and discussion with family.      This note has been dictated using voice recognition software. Any grammatical or context distortions are unintentional and inherent to the software.                    Thank you for allowing me to participate in the care of your patient.      Sincerely,     Rachael Conrad PA-C     [facility]  cc:   No referring provider defined for this encounter.

## 2021-10-11 NOTE — PATIENT INSTRUCTIONS
Maren Fofana,    It was a pleasure to see you today in the clinic regarding your recent TAVR.     My recommendations after this visit include:     - increase lasix to 20 mg twice daily  - continue potassium pills for now  - start losartan 25 mg daily    - I will let you know once I talk to the doctors and place order for the thoracentesis (drainage of the right lung)    You should followup with Dr Kulkarni on Oct 27 - we will check EKG and labs at that visit.       If you have questions or concerns, please call using the numbers below:    Valve Clinic Pool  [phone]    After Hours/Scheduling  [phone]    Otherwise you can dial the nurse directly at:    Garfield Leigh RN  Valve clinic coordinator  [phone]    Alexa Granda RN  Valve clinic coordinator  [phone]

## 2021-10-13 NOTE — PROGRESS NOTES
HEART CARE ENCOUNTER NOTE       [facility]  [phone]    Assessment/Recommendations   Diagnoses and all orders for this visit:    Severe aortic stenosis - S/P TAVR on September 28 via the left subclavian approach and using a #23 ISABELLA valve.  Incision site healing well.  Ok to resume activities except for lifting more than 10-15lb above shoulder height.  Her breathing has improved slightly, but still having LE edema.  Continue ASA indefinitely.  She is scheduled for cardiac rehab on October 18 and is a bit hesitant to go, but I encouraged going to first session and seeing what it's all about.  She has repeat echo scheduled for October 26 and will see Dr. Kulkarni on October 27 with full labs and EKG.      She should see Ju Phillips or Jillian for her 6 month visit and then will see me or Dr. Kulkarni in another year.     Hypertension, unspecified type - Patient has not historically had this diagnosis, nor has she needed BP medications, however after hospital did need prn Hydralazine and now BP too high.  Will have her start losartan 25 mg daily and follow BP trend.  Can make adjustments when she sees Dr. Kulkarni at end of month.      Pleural effusion on right - moderate by pre-TAVR CTA.  Patient has had mild improvement in breathing after TAVR, but still has SOB with activity.  Breathing still requires more effort and she does have occasional wheezes heard by daughter.     AAA -status post endovascular repair     Chronic back pain with disc disease and spondylolisthesis -status post lumbar laminectomy in the past     Tobacco abuse -patient stopped smoking approximately 5 to 6 months ago     Acute on chronic diastolic heart failure, NYHA class III - improved somewhat after new valve, however patient has worsening LE edema and still has moderate right pleural effusion, which by exam seems worse - diminished breath sounds heard past mid-point of back. Increase lasix to 20 mg BID,  "continue potassium, start losartan.     Thank you for the opportunity to participate in the care of Maren Fofana. It's been a pleasure working with her.  Please do not hesitate to call with any questions or concerns.       History of Present Illness/Subjective    I had the opportunity to see Maren Fofana at the [facility] for her 1 week follow up visit after TAVR.  Her daughter accompanies her to the visit today.         Maren Fofana denies exertional chest discomfort, palpitations, shortness of breath at rest, PND, orthopnea, lightheadedness, dizziness, pre-syncope or syncope.  Maren Fofana also denies any recent weight loss, changes in appetite, nausea or vomiting.   ____________________________________________________________  Pre-discharge echo from September 29:  Interpretation Summary  Left ventricular size is normal and relative wall thickness is increased  consistent with concentric remodeling. The visual ejection fraction is >70%.  LV appears hyperkinetic leading to LVOT gradient of 50mmHg.  There is a 23 mm Bates Ryan 3 valve in the aortic position. No  perivalvular leak. Mean aortic gradient ia 17.5mmHg.  The right ventricle is normal size and systolic function is normal.  Estimated pulmonary artery systolic pressure is 32 mmHg plus 8mmHg of right  atrial pressure.  Trivial pericardial effusion is present.  IVC moderately     Physical Examination Review of Systems   Vitals: BP (!) 152/82 (BP Location: Left arm, Patient Position: Sitting, Cuff Size: Adult Regular)   Pulse 80   Resp 20   Ht 1.549 m (5' 1\")   Wt 75.6 kg (166 lb 9.6 oz)   BMI 31.48 kg/m    BMI= Body mass index is 31.48 kg/m .  Wt Readings from Last 3 Encounters:   10/11/21 75.6 kg (166 lb 9.6 oz)   09/28/21 74.3 kg (163 lb 12.8 oz)   09/23/21 73.5 kg (162 lb)       General Appearance:   Alert, cooperative and in no acute distress   ENT/Mouth: membranes moist, no oral lesions or bleeding gums.      EYES:  no " scleral icterus, normal conjunctivae   Neck: Supple without lymphadenopathy.  Thyroid not visualized   Chest/Lungs:   lungs are clear on left, but there are no breath sounds on right from base to nipple line  to auscultation, no rales or wheezing   Cardiovascular:   Regular. Normal first and second heart sounds with no murmurs, rubs, or gallops; the carotid, radial and posterior tibial pulses are intact, 2+ edema bilaterally    Abdomen:  Soft and nontender. Bowel sounds are present in all quadrants   Extremities: no cyanosis or clubbing.  Incision well healed   Skin: no xanthelasma, warm.    Neurologic: normal gait, normal  bilateral, no tremors   Psychiatric: Normal mood and affect       Please refer above for cardiac ROS details.      Medical History  Surgical History Family History Social History   Past Medical History:   Diagnosis Date     Aortic stenosis      Collapsed lung      Fibromyalgia      GERD (gastroesophageal reflux disease)      Hypothyroid      Migraine      Peripheral vascular disease (H)      Reactive airway disease      Vertigo      Past Surgical History:   Procedure Laterality Date      SECTION Bilateral      CV CORONARY ANGIOGRAM N/A 2021    Procedure: Coronary Angiogram;  Surgeon: Sara Randall MD;  Location: [facility] CATH LAB CV     CV TRANSCATHETER AORTIC VALVE REPLACEMENT N/A 2021    Procedure: CV TRANSCATHETER AORTIC VALVE REPLACEMENT;  Surgeon: Geovani Kulkarni MD;  Location: [facility] CARDIAC CATH LAB     HEART CATH FEMORAL CANNULIZATION WITH OPEN STANDBY REPAIR AORTIC VALVE N/A 2021    Procedure: LEFT SUBCLAVIAN ACCESS FOR  TRANSCATHETER AORTIC VALVE REPLACEMENT.  CARDIOPULMONARY BYPASS STANDBY;  Surgeon: Jorge L Baeza MD;  Location: [facility] CARDIAC CATH LAB     HYSTERECTOMY       POSTERIOR LAMINECTOMY / DECOMPRESSION LUMBAR SPINE      L2-L3     SPINAL FUSION       TONSILLECTOMY & ADENOIDECTOMY       TOTAL KNEE ARTHROPLASTY Bilateral      Family History    Problem Relation Age of Onset     Pancreatic Cancer Mother      Pancreatic Cancer Father     Social History     Socioeconomic History     Marital status: Legally      Spouse name: Not on file     Number of children: Not on file     Years of education: Not on file     Highest education level: Not on file   Occupational History     Not on file   Tobacco Use     Smoking status: Former Smoker     Packs/day: 0.50     Quit date: 2021     Years since quittin.5     Smokeless tobacco: Never Used   Substance and Sexual Activity     Alcohol use: Yes     Drug use: Never     Sexual activity: Not on file   Other Topics Concern     Parent/sibling w/ CABG, MI or angioplasty before 65F 55M? Not Asked   Social History Narrative     Not on file     Social Determinants of Health     Financial Resource Strain:      Difficulty of Paying Living Expenses:    Food Insecurity:      Worried About Running Out of Food in the Last Year:      Ran Out of Food in the Last Year:    Transportation Needs:      Lack of Transportation (Medical):      Lack of Transportation (Non-Medical):    Physical Activity:      Days of Exercise per Week:      Minutes of Exercise per Session:    Stress:      Feeling of Stress :    Social Connections:      Frequency of Communication with Friends and Family:      Frequency of Social Gatherings with Friends and Family:      Attends Jew Services:      Active Member of Clubs or Organizations:      Attends Club or Organization Meetings:      Marital Status:    Intimate Partner Violence:      Fear of Current or Ex-Partner:      Emotionally Abused:      Physically Abused:      Sexually Abused:           Medications  Allergies   Current Outpatient Medications   Medication Sig Dispense Refill     acetaminophen-codeine (TYLENOL #3) 300-30 MG tablet TAKE 1 TABLET BY MOUTH AS NEEDED AT BEDTIME FOR SLEEP       aspirin (ASA) 81 MG EC tablet Take 1 tablet (81 mg) by mouth daily 90 tablet 0     atorvastatin  (LIPITOR) 80 MG tablet [ATORVASTATIN (LIPITOR) 80 MG TABLET] Take 80 mg by mouth daily.       EPINEPHrine (EPIPEN/ADRENACLICK/AUVI-Q) 0.3 mg/0.3 mL injection 0.3 mg as needed        fluticasone (FLONASE) 50 MCG/ACT nasal spray Spray 1 spray into both nostrils 2 times daily as needed for rhinitis or allergies       furosemide (LASIX) 20 MG tablet Take 20 mg tablet two times a day for 3 days, then resume 20 mg daily (Patient taking differently: Take 20 mg by mouth daily Take 20 mg by mouth daily) 90 tablet 3     hydrOXYzine (ATARAX) 25 MG tablet Take 25 mg by mouth as needed       levothyroxine (SYNTHROID, LEVOTHROID) 88 MCG tablet [LEVOTHYROXINE (SYNTHROID, LEVOTHROID) 88 MCG TABLET] Take 88 mcg by mouth daily.       losartan (COZAAR) 25 MG tablet Take 1 tablet (25 mg) by mouth daily 90 tablet 3     Melatonin 10 MG TABS tablet Take 30 mg by mouth At Bedtime        potassium chloride ER (KLOR-CON M) 20 MEQ CR tablet Take 1 tablet (20 mEq) by mouth daily 90 tablet 3     traZODone (DESYREL) 150 MG tablet Take 150 mg by mouth At Bedtime        venlafaxine (EFFEXOR-XR) 150 MG 24 hr capsule [VENLAFAXINE (EFFEXOR-XR) 150 MG 24 HR CAPSULE] Take 300 mg by mouth daily.      Allergies   Allergen Reactions     Bee Venom Anaphylaxis     Other Drug Allergy (See Comments) Hives     Ice Packs   Any kind of cold      Dilaudid [Hydromorphone] Other (See Comments)     Altered mental status, confusion, itching. Pt tolerates oxycodone.     Latex      hives         Lab Results    Chemistry/lipid CBC Cardiac Enzymes/BNP/TSH/INR   Recent Labs   Lab Test 05/19/21  1332   CHOL 150   HDL 52   LDL 81   TRIG 87     Recent Labs   Lab Test 05/19/21  1332   LDL 81     Recent Labs   Lab Test 10/06/21  1635      POTASSIUM 4.5   CHLORIDE 102   CO2 29   GLC 97   BUN 12   CR 1.00   GFRESTIMATED 56*   YENI 9.3     Recent Labs   Lab Test 10/06/21  1635 09/29/21  0640 09/28/21  1147   CR 1.00 1.01 0.92     No results for input(s): A1C in the last  94563 hours. Recent Labs   Lab Test 09/29/21  0640   WBC 5.5   HGB 11.0*   HCT 33.9*   *   PLT 86*     Recent Labs   Lab Test 09/29/21  0640 09/28/21  1148 09/23/21  1251   HGB 11.0* 9.4* 11.7    No results for input(s): TROPONINI in the last 47748 hours.  Recent Labs   Lab Test 09/23/21  1251   *     Recent Labs   Lab Test 05/19/21  1332   TSH 1.68     Recent Labs   Lab Test 09/29/21  1044 09/23/21  1251 01/02/21  0912   INR 1.24* 1.21* 1.10        35 minutes spent on the date of encounter doing chart review, review of test results, interpretation with above tests, patient visit, documentation and discussion with family.      This note has been dictated using voice recognition software. Any grammatical or context distortions are unintentional and inherent to the software.

## 2021-10-27 NOTE — PROGRESS NOTES
HEART CARE ENCOUNTER CONSULTATON NOTE      [facility]  [phone]      Assessment/Recommendations   Assessment/Plan: 71F w/ aortic stenosis s/p TAVR 09/21, HTN, AAA s.p repair, history of smoking, HFPEF here for f/u post-TAVR.    # Aortic stenosis - s/p TAVR w/ a # 23S3 mean gradient 16, no AI  - continue ASA, losartan, furosemide-> torsemide    # HFPEF - still volume up on exam  - not much improvement w/ furosemide dose of 20mg BID - will switch to torsemide 20mg daily for raymond reliable absorption.Re-iterated need for Na compliance  - check BMP in 1 week  - continue losartan    # Dizziness/lightheadedness - doesn't appear to be due to cardiac causes  - will try Epley maneuvers, if not improved in 1 mos, may need to see a Neurologist    F/u in HF clinic in 2 weeks  F/u in 1 mos w/ me         History of Present Illness/Subjective    HPI: Maren Fofana is a 71 year old female w/ aoritic stenosis s/p TAVR 09/21, HTN, AAA s.p repair, history of smoking, HFPEF here for f/u post-TAVR.    She denies CP, SOB has improved but dizziness is still there. She does have pedal edema, which did not change much with increasing furosemide. No SOB at rest, no ENCISO/orthopnea/PND, no syncope/N/V/D/F/C.    Recent Echocardiogram Results:  Normal right ventricle size and systolic function.  The left atrium is severely dilated.  Left ventricular function is normal.The ejection fraction is 60-65%.  There is moderate to mod-severe (2-3+) tricuspid regurgitation.  Right ventricle systolic pressure estimate normal  There is a ISABELLA 3 bioprosthetic valve present in aortic valve position.  The mean AoV pressure gradient is 16mmHg.  Compared to prior study, there is no significant change.    Recent Coronary Angiogram Results:  No significant coronary artery disease, with mild atherosclerosis.     Details:     Left main mild disease  LAD with mild disease  Left circumflex has mild disease  RCA is dominant with mild  "atherosclerosis     Calcified aortic valve noted       Physical Examination  Review of Systems   Vitals: /84 (BP Location: Left arm, Patient Position: Sitting, Cuff Size: Adult Regular)   Pulse 82   Resp 16   Ht 1.549 m (5' 1\")   Wt 74.4 kg (164 lb)   BMI 30.99 kg/m    BMI= There is no height or weight on file to calculate BMI.  Wt Readings from Last 3 Encounters:   10/11/21 75.6 kg (166 lb 9.6 oz)   21 74.3 kg (163 lb 12.8 oz)   21 73.5 kg (162 lb)       General Appearance:   no distress, normal body habitus   ENT/Mouth: membranes moist, no oral lesions or bleeding gums.      EYES:  no scleral icterus, normal conjunctivae   Neck: no carotid bruits or thyromegaly   Chest/Lungs:   lungs are clear to auscultation, no rales or wheezing, no sternal scar, equal chest wall expansion    Cardiovascular:   Regular. Normal first and second heart sounds with 1/6 systolic murmur, rubs, or gallops; the carotid, radial and posterior tibial pulses are intact, Jugular venous pressure 8 at 90 degrees, + edema bilaterally L>R   Abdomen:  no organomegaly, masses, bruits, or tenderness; bowel sounds are present   Extremities: no cyanosis or clubbing   Skin: no xanthelasma, warm.    Neurologic: normal  bilateral, no tremors     Psychiatric: alert and oriented x3, calm        Please refer above for cardiac ROS details.        Medical History  Surgical History Family History Social History   Past Medical History:   Diagnosis Date     Aortic stenosis      Collapsed lung      Fibromyalgia      GERD (gastroesophageal reflux disease)      Hypothyroid      Migraine      Peripheral vascular disease (H)      Reactive airway disease      Vertigo      Past Surgical History:   Procedure Laterality Date      SECTION Bilateral      CV CORONARY ANGIOGRAM N/A 2021    Procedure: Coronary Angiogram;  Surgeon: Sara Randall MD;  Location: [facility] CATH LAB CV     CV TRANSCATHETER AORTIC VALVE REPLACEMENT N/A " 2021    Procedure: CV TRANSCATHETER AORTIC VALVE REPLACEMENT;  Surgeon: Geovani Kulkarni MD;  Location:  HEART CARDIAC CATH LAB     HEART CATH FEMORAL CANNULIZATION WITH OPEN STANDBY REPAIR AORTIC VALVE N/A 2021    Procedure: LEFT SUBCLAVIAN ACCESS FOR  TRANSCATHETER AORTIC VALVE REPLACEMENT.  CARDIOPULMONARY BYPASS STANDBY;  Surgeon: Jorge L Baeza MD;  Location: [facility] CARDIAC CATH LAB     HYSTERECTOMY       POSTERIOR LAMINECTOMY / DECOMPRESSION LUMBAR SPINE      L2-L3     SPINAL FUSION       TONSILLECTOMY & ADENOIDECTOMY       TOTAL KNEE ARTHROPLASTY Bilateral      Family History   Problem Relation Age of Onset     Pancreatic Cancer Mother      Pancreatic Cancer Father         Social History     Socioeconomic History     Marital status: Legally      Spouse name: Not on file     Number of children: Not on file     Years of education: Not on file     Highest education level: Not on file   Occupational History     Not on file   Tobacco Use     Smoking status: Former Smoker     Packs/day: 0.50     Quit date: 2021     Years since quittin.5     Smokeless tobacco: Never Used   Substance and Sexual Activity     Alcohol use: Yes     Drug use: Never     Sexual activity: Not on file   Other Topics Concern     Parent/sibling w/ CABG, MI or angioplasty before 65F 55M? Not Asked   Social History Narrative     Not on file     Social Determinants of Health     Financial Resource Strain:      Difficulty of Paying Living Expenses:    Food Insecurity:      Worried About Running Out of Food in the Last Year:      Ran Out of Food in the Last Year:    Transportation Needs:      Lack of Transportation (Medical):      Lack of Transportation (Non-Medical):    Physical Activity:      Days of Exercise per Week:      Minutes of Exercise per Session:    Stress:      Feeling of Stress :    Social Connections:      Frequency of Communication with Friends and Family:      Frequency of Social Gatherings with  Friends and Family:      Attends Jehovah's witness Services:      Active Member of Clubs or Organizations:      Attends Club or Organization Meetings:      Marital Status:    Intimate Partner Violence:      Fear of Current or Ex-Partner:      Emotionally Abused:      Physically Abused:      Sexually Abused:            Medications  Allergies   Current Outpatient Medications   Medication Sig Dispense Refill     acetaminophen-codeine (TYLENOL #3) 300-30 MG tablet TAKE 1 TABLET BY MOUTH AS NEEDED AT BEDTIME FOR SLEEP       aspirin (ASA) 81 MG EC tablet Take 1 tablet (81 mg) by mouth daily 90 tablet 0     atorvastatin (LIPITOR) 80 MG tablet [ATORVASTATIN (LIPITOR) 80 MG TABLET] Take 80 mg by mouth daily.       EPINEPHrine (EPIPEN/ADRENACLICK/AUVI-Q) 0.3 mg/0.3 mL injection 0.3 mg as needed        fluticasone (FLONASE) 50 MCG/ACT nasal spray Spray 1 spray into both nostrils 2 times daily as needed for rhinitis or allergies       furosemide (LASIX) 20 MG tablet Take 20 mg tablet two times a day for 3 days, then resume 20 mg daily (Patient taking differently: Take 20 mg by mouth daily Take 20 mg by mouth daily) 90 tablet 3     hydrOXYzine (ATARAX) 25 MG tablet Take 25 mg by mouth as needed       levothyroxine (SYNTHROID, LEVOTHROID) 88 MCG tablet [LEVOTHYROXINE (SYNTHROID, LEVOTHROID) 88 MCG TABLET] Take 88 mcg by mouth daily.       losartan (COZAAR) 25 MG tablet Take 1 tablet (25 mg) by mouth daily 90 tablet 3     Melatonin 10 MG TABS tablet Take 30 mg by mouth At Bedtime        potassium chloride ER (KLOR-CON M) 20 MEQ CR tablet Take 1 tablet (20 mEq) by mouth daily 90 tablet 3     traZODone (DESYREL) 150 MG tablet Take 150 mg by mouth At Bedtime        venlafaxine (EFFEXOR-XR) 150 MG 24 hr capsule [VENLAFAXINE (EFFEXOR-XR) 150 MG 24 HR CAPSULE] Take 300 mg by mouth daily.         Allergies   Allergen Reactions     Bee Venom Anaphylaxis     Other Drug Allergy (See Comments) Hives     Ice Packs   Any kind of cold      Dilaudid  [Hydromorphone] Other (See Comments)     Altered mental status, confusion, itching. Pt tolerates oxycodone.     Latex      hives          Lab Results    Chemistry/lipid CBC Cardiac Enzymes/BNP/TSH/INR   Recent Labs   Lab Test 05/19/21  1332   CHOL 150   HDL 52   LDL 81   TRIG 87     Recent Labs   Lab Test 05/19/21  1332   LDL 81     Recent Labs   Lab Test 10/06/21  1635      POTASSIUM 4.5   CHLORIDE 102   CO2 29   GLC 97   BUN 12   CR 1.00   GFRESTIMATED 56*   YENI 9.3     Recent Labs   Lab Test 10/06/21  1635 09/29/21  0640 09/28/21  1147   CR 1.00 1.01 0.92     No results for input(s): A1C in the last 03425 hours.       Recent Labs   Lab Test 09/29/21  0640   WBC 5.5   HGB 11.0*   HCT 33.9*   *   PLT 86*     Recent Labs   Lab Test 09/29/21  0640 09/28/21  1148 09/23/21  1251   HGB 11.0* 9.4* 11.7    No results for input(s): TROPONINI in the last 05812 hours.  Recent Labs   Lab Test 09/23/21  1251   *     Recent Labs   Lab Test 05/19/21  1332   TSH 1.68     Recent Labs   Lab Test 09/29/21  1044 09/23/21  1251 01/02/21  0912   INR 1.24* 1.21* 1.10        Geovani Kulkarni MD

## 2021-10-27 NOTE — PATIENT INSTRUCTIONS
It is a pleasure to see you both today!    As we have discussed, lets do some fine-tuning, as the major issues (valve) areuch improved post-TAVR    To that end:    1 . Let's switch furosemide to torsemide and check labs in 1 week    2. Please, watch Na - 2g/day max    3. Weigh yourself daily. If up by 2lbs in a day or 5 lbs in 2 days, let us know    4. Follow up in HF clinic in 2 weeks, w/ me in 1 mos w/ a CXR to see if you even need a thoracentesis at that pont    5. Try Epley maneuvers and let me know whether that helps

## 2021-10-27 NOTE — LETTER
10/27/2021    Nora Mccarthy MD  [facility] [address] [place] [address]    RE: Maren Fofana       Dear Colleague,    I had the pleasure of seeing Maren Fofana in the [facility].      HEART CARE ENCOUNTER CONSULTATON NOTE      [facility]  [phone]      Assessment/Recommendations   Assessment/Plan: 71F w/ aortic stenosis s/p TAVR 09/21, HTN, AAA s.p repair, history of smoking, HFPEF here for f/u post-TAVR.    # Aortic stenosis - s/p TAVR w/ a # 23S3 mean gradient 16, no AI  - continue ASA, losartan, furosemide-> torsemide    # HFPEF - still volume up on exam  - not much improvement w/ furosemide dose of 20mg BID - will switch to torsemide 20mg daily for raymond reliable absorption.Re-iterated need for Na compliance  - check BMP in 1 week  - continue losartan    # Dizziness/lightheadedness - doesn't appear to be due to cardiac causes  - will try Epley maneuvers, if not improved in 1 mos, may need to see a Neurologist    F/u in HF clinic in 2 weeks  F/u in 1 mos w/ me         History of Present Illness/Subjective    HPI: Maren Fofana is a 71 year old female w/ aoritic stenosis s/p TAVR 09/21, HTN, AAA s.p repair, history of smoking, HFPEF here for f/u post-TAVR.    She denies CP, SOB has improved but dizziness is still there. She does have pedal edema, which did not change much with increasing furosemide. No SOB at rest, no ENCISO/orthopnea/PND, no syncope/N/V/D/F/C.    Recent Echocardiogram Results:  Normal right ventricle size and systolic function.  The left atrium is severely dilated.  Left ventricular function is normal.The ejection fraction is 60-65%.  There is moderate to mod-severe (2-3+) tricuspid regurgitation.  Right ventricle systolic pressure estimate normal  There is a ISABELLA 3 bioprosthetic valve present in aortic valve position.  The mean AoV pressure gradient is 16mmHg.  Compared to prior study, there  "is no significant change.    Recent Coronary Angiogram Results:  No significant coronary artery disease, with mild atherosclerosis.     Details:     Left main mild disease  LAD with mild disease  Left circumflex has mild disease  RCA is dominant with mild atherosclerosis     Calcified aortic valve noted       Physical Examination  Review of Systems   Vitals: /84 (BP Location: Left arm, Patient Position: Sitting, Cuff Size: Adult Regular)   Pulse 82   Resp 16   Ht 1.549 m (5' 1\")   Wt 74.4 kg (164 lb)   BMI 30.99 kg/m    BMI= There is no height or weight on file to calculate BMI.  Wt Readings from Last 3 Encounters:   10/11/21 75.6 kg (166 lb 9.6 oz)   09/28/21 74.3 kg (163 lb 12.8 oz)   09/23/21 73.5 kg (162 lb)       General Appearance:   no distress, normal body habitus   ENT/Mouth: membranes moist, no oral lesions or bleeding gums.      EYES:  no scleral icterus, normal conjunctivae   Neck: no carotid bruits or thyromegaly   Chest/Lungs:   lungs are clear to auscultation, no rales or wheezing, no sternal scar, equal chest wall expansion    Cardiovascular:   Regular. Normal first and second heart sounds with 1/6 systolic murmur, rubs, or gallops; the carotid, radial and posterior tibial pulses are intact, Jugular venous pressure 8 at 90 degrees, + edema bilaterally L>R   Abdomen:  no organomegaly, masses, bruits, or tenderness; bowel sounds are present   Extremities: no cyanosis or clubbing   Skin: no xanthelasma, warm.    Neurologic: normal  bilateral, no tremors     Psychiatric: alert and oriented x3, calm        Please refer above for cardiac ROS details.        Medical History  Surgical History Family History Social History   Past Medical History:   Diagnosis Date     Aortic stenosis      Collapsed lung      Fibromyalgia      GERD (gastroesophageal reflux disease)      Hypothyroid      Migraine      Peripheral vascular disease (H)      Reactive airway disease      Vertigo      Past Surgical " History:   Procedure Laterality Date      SECTION Bilateral      CV CORONARY ANGIOGRAM N/A 2021    Procedure: Coronary Angiogram;  Surgeon: Sara Randall MD;  Location: [facility] CATH LAB CV     CV TRANSCATHETER AORTIC VALVE REPLACEMENT N/A 2021    Procedure: CV TRANSCATHETER AORTIC VALVE REPLACEMENT;  Surgeon: Geovani Kulkarni MD;  Location: [facility] CARDIAC CATH LAB     HEART CATH FEMORAL CANNULIZATION WITH OPEN STANDBY REPAIR AORTIC VALVE N/A 2021    Procedure: LEFT SUBCLAVIAN ACCESS FOR  TRANSCATHETER AORTIC VALVE REPLACEMENT.  CARDIOPULMONARY BYPASS STANDBY;  Surgeon: Jorge L Baeza MD;  Location: [facility] CARDIAC CATH LAB     HYSTERECTOMY       POSTERIOR LAMINECTOMY / DECOMPRESSION LUMBAR SPINE      L2-L3     SPINAL FUSION       TONSILLECTOMY & ADENOIDECTOMY       TOTAL KNEE ARTHROPLASTY Bilateral      Family History   Problem Relation Age of Onset     Pancreatic Cancer Mother      Pancreatic Cancer Father         Social History     Socioeconomic History     Marital status: Legally      Spouse name: Not on file     Number of children: Not on file     Years of education: Not on file     Highest education level: Not on file   Occupational History     Not on file   Tobacco Use     Smoking status: Former Smoker     Packs/day: 0.50     Quit date: 2021     Years since quittin.5     Smokeless tobacco: Never Used   Substance and Sexual Activity     Alcohol use: Yes     Drug use: Never     Sexual activity: Not on file   Other Topics Concern     Parent/sibling w/ CABG, MI or angioplasty before 65F 55M? Not Asked   Social History Narrative     Not on file     Social Determinants of Health     Financial Resource Strain:      Difficulty of Paying Living Expenses:    Food Insecurity:      Worried About Running Out of Food in the Last Year:      Ran Out of Food in the Last Year:    Transportation Needs:      Lack of Transportation (Medical):      Lack of Transportation  (Non-Medical):    Physical Activity:      Days of Exercise per Week:      Minutes of Exercise per Session:    Stress:      Feeling of Stress :    Social Connections:      Frequency of Communication with Friends and Family:      Frequency of Social Gatherings with Friends and Family:      Attends Gnosticism Services:      Active Member of Clubs or Organizations:      Attends Club or Organization Meetings:      Marital Status:    Intimate Partner Violence:      Fear of Current or Ex-Partner:      Emotionally Abused:      Physically Abused:      Sexually Abused:            Medications  Allergies   Current Outpatient Medications   Medication Sig Dispense Refill     acetaminophen-codeine (TYLENOL #3) 300-30 MG tablet TAKE 1 TABLET BY MOUTH AS NEEDED AT BEDTIME FOR SLEEP       aspirin (ASA) 81 MG EC tablet Take 1 tablet (81 mg) by mouth daily 90 tablet 0     atorvastatin (LIPITOR) 80 MG tablet [ATORVASTATIN (LIPITOR) 80 MG TABLET] Take 80 mg by mouth daily.       EPINEPHrine (EPIPEN/ADRENACLICK/AUVI-Q) 0.3 mg/0.3 mL injection 0.3 mg as needed        fluticasone (FLONASE) 50 MCG/ACT nasal spray Spray 1 spray into both nostrils 2 times daily as needed for rhinitis or allergies       furosemide (LASIX) 20 MG tablet Take 20 mg tablet two times a day for 3 days, then resume 20 mg daily (Patient taking differently: Take 20 mg by mouth daily Take 20 mg by mouth daily) 90 tablet 3     hydrOXYzine (ATARAX) 25 MG tablet Take 25 mg by mouth as needed       levothyroxine (SYNTHROID, LEVOTHROID) 88 MCG tablet [LEVOTHYROXINE (SYNTHROID, LEVOTHROID) 88 MCG TABLET] Take 88 mcg by mouth daily.       losartan (COZAAR) 25 MG tablet Take 1 tablet (25 mg) by mouth daily 90 tablet 3     Melatonin 10 MG TABS tablet Take 30 mg by mouth At Bedtime        potassium chloride ER (KLOR-CON M) 20 MEQ CR tablet Take 1 tablet (20 mEq) by mouth daily 90 tablet 3     traZODone (DESYREL) 150 MG tablet Take 150 mg by mouth At Bedtime        venlafaxine  (EFFEXOR-XR) 150 MG 24 hr capsule [VENLAFAXINE (EFFEXOR-XR) 150 MG 24 HR CAPSULE] Take 300 mg by mouth daily.         Allergies   Allergen Reactions     Bee Venom Anaphylaxis     Other Drug Allergy (See Comments) Hives     Ice Packs   Any kind of cold      Dilaudid [Hydromorphone] Other (See Comments)     Altered mental status, confusion, itching. Pt tolerates oxycodone.     Latex      hives          Lab Results    Chemistry/lipid CBC Cardiac Enzymes/BNP/TSH/INR   Recent Labs   Lab Test 05/19/21  1332   CHOL 150   HDL 52   LDL 81   TRIG 87     Recent Labs   Lab Test 05/19/21  1332   LDL 81     Recent Labs   Lab Test 10/06/21  1635      POTASSIUM 4.5   CHLORIDE 102   CO2 29   GLC 97   BUN 12   CR 1.00   GFRESTIMATED 56*   YENI 9.3     Recent Labs   Lab Test 10/06/21  1635 09/29/21  0640 09/28/21  1147   CR 1.00 1.01 0.92     No results for input(s): A1C in the last 57435 hours.       Recent Labs   Lab Test 09/29/21  0640   WBC 5.5   HGB 11.0*   HCT 33.9*   *   PLT 86*     Recent Labs   Lab Test 09/29/21  0640 09/28/21  1148 09/23/21  1251   HGB 11.0* 9.4* 11.7    No results for input(s): TROPONINI in the last 12333 hours.  Recent Labs   Lab Test 09/23/21  1251   *     Recent Labs   Lab Test 05/19/21  1332   TSH 1.68     Recent Labs   Lab Test 09/29/21  1044 09/23/21  1251 01/02/21  0912   INR 1.24* 1.21* 1.10        Geovani Kulkarni MD                                          Thank you for allowing me to participate in the care of your patient.      Sincerely,     Geovani Kulkarni MD     [facility]  cc:   No referring provider defined for this encounter.

## 2021-11-11 NOTE — LETTER
11/11/2021    Nora Mccarthy MD  [facility] [address] [place] [address]    RE: Maren Fofana       Dear Colleague,    I had the pleasure of seeing Maren Fofana in the [facility].  HEART CARE PROGRESS NOTE      [facility]  [phone]      Assessment/Recommendations   Assessment:    1.  Heart failure with preserved ejection fraction: Lower extremity edema has improved with changing to torsemide.  She denies any shortness of breath and lung sounds are clear.  Weight has decreased 7 pounds. We reviewed heart failure diagnosis, medications, treatment plan, low sodium diet, weight monitoring, and symptom monitoring.  She met with a heart failure nurse clinician to further discuss.    2.  Hypertension: Blood pressure controlled at 120/70.  Continue losartan.    Plan:  1.  Continue current medications   2. BMP pending  3.  Low-sodium diet and daily weights    Maren Fofana will follow up with Dr. Kulkarni on December 10.       History of Present Illness/Subjective    Ms. Maren Fofana is a 71 year old female seen at [facility] today for follow-up.  She has a history of aortic stenosis with TAVR 2021, hypertension, AAA repair, and heart failure with preserved ejection fraction.  Echocardiogram on October 26, 2021 showed ejection fraction of 60 to 65% and moderate to severe tricuspid regurgitation.    She was seen by Dr. Kulkarni 2 weeks ago and Lasix changed to torsemide due to continued lower extremity edema.  Edema has improved significantly.  Left lower leg chronically more swollen than right lower leg.  She denies lightheadedness, shortness of breath, dyspnea on exertion, orthopnea, chest pain and lower extremity edema.      Her clinic weight has decreased 7 pounds since starting torsemide.  She is working on following a low-sodium diet.      ECHO 10/26/2021:  Interpretation Summary    "  Normal right ventricle size and systolic function.  The left atrium is severely dilated.  Left ventricular function is normal.The ejection fraction is 60-65%.  There is moderate to mod-severe (2-3+) tricuspid regurgitation.  Right ventricle systolic pressure estimate normal  There is a ISABELLA 3 bioprosthetic valve present in aortic valve position.  The mean AoV pressure gradient is 16mmHg.  Compared to prior study, there is no significant change.        Physical Examination Review of Systems   Vitals: /70 (BP Location: Right arm, Patient Position: Sitting, Cuff Size: Adult Regular)   Pulse 84   Resp 16   Ht 1.549 m (5' 1\")   Wt 72.1 kg (159 lb)   BMI 30.04 kg/m    BMI= Body mass index is 30.04 kg/m .  Wt Readings from Last 3 Encounters:   21 72.1 kg (159 lb)   10/27/21 74.4 kg (164 lb)   10/11/21 75.6 kg (166 lb 9.6 oz)       General Appearance:     Alert, cooperative and in no acute distress.   ENT/Mouth: membranes moist, no oral lesions or bleeding gums.      EYES:  no scleral icterus, normal conjunctivae   Chest/Lungs:   lungs are clear to auscultation, no rales or wheezing, respirations unlabored   Cardiovascular:   Regular. Normal first and second heart sounds, 1+ edema bilateral lower extremities, left greater than right   Abdomen:  Soft, nontender, nondistended, bowel sounds present   Extremities: no cyanosis or clubbing   Skin: warm, dry.    Neurologic: mood and affect are appropriate, alert and oriented x3         Please refer above for cardiac ROS details.      Medical History  Surgical History Family History Social History   Past Medical History:   Diagnosis Date     Aortic stenosis      Collapsed lung      Fibromyalgia      GERD (gastroesophageal reflux disease)      Hypothyroid      Migraine      Peripheral vascular disease (H)      Reactive airway disease      Vertigo      Past Surgical History:   Procedure Laterality Date      SECTION Bilateral      CV CORONARY ANGIOGRAM N/A " 2021    Procedure: Coronary Angiogram;  Surgeon: Sara Randall MD;  Location: [facility] CATH LAB CV     CV TRANSCATHETER AORTIC VALVE REPLACEMENT N/A 2021    Procedure: CV TRANSCATHETER AORTIC VALVE REPLACEMENT;  Surgeon: Geovani Kulkarni MD;  Location: [facility] CARDIAC CATH LAB     HEART CATH FEMORAL CANNULIZATION WITH OPEN STANDBY REPAIR AORTIC VALVE N/A 2021    Procedure: LEFT SUBCLAVIAN ACCESS FOR  TRANSCATHETER AORTIC VALVE REPLACEMENT.  CARDIOPULMONARY BYPASS STANDBY;  Surgeon: Jorge L Baeza MD;  Location: [facility] CARDIAC CATH LAB     HYSTERECTOMY       POSTERIOR LAMINECTOMY / DECOMPRESSION LUMBAR SPINE      L2-L3     SPINAL FUSION       TONSILLECTOMY & ADENOIDECTOMY       TOTAL KNEE ARTHROPLASTY Bilateral      Family History   Problem Relation Age of Onset     Pancreatic Cancer Mother      Pancreatic Cancer Father     Social History     Socioeconomic History     Marital status: Legally      Spouse name: Not on file     Number of children: Not on file     Years of education: Not on file     Highest education level: Not on file   Occupational History     Not on file   Tobacco Use     Smoking status: Former Smoker     Packs/day: 0.50     Quit date: 2021     Years since quittin.6     Smokeless tobacco: Never Used   Substance and Sexual Activity     Alcohol use: Yes     Drug use: Never     Sexual activity: Not on file   Other Topics Concern     Parent/sibling w/ CABG, MI or angioplasty before 65F 55M? Not Asked   Social History Narrative     Not on file     Social Determinants of Health     Financial Resource Strain: Not on file   Food Insecurity: Not on file   Transportation Needs: Not on file   Physical Activity: Not on file   Stress: Not on file   Social Connections: Not on file   Intimate Partner Violence: Not on file   Housing Stability: Not on file          Medications  Allergies   Current Outpatient Medications   Medication Sig Dispense Refill     aspirin (ASA) 81 MG  EC tablet Take 1 tablet (81 mg) by mouth daily 90 tablet 0     atorvastatin (LIPITOR) 80 MG tablet [ATORVASTATIN (LIPITOR) 80 MG TABLET] Take 80 mg by mouth daily.       EPINEPHrine (EPIPEN/ADRENACLICK/AUVI-Q) 0.3 mg/0.3 mL injection 0.3 mg as needed        fluticasone (FLONASE) 50 MCG/ACT nasal spray Spray 1 spray into both nostrils 2 times daily as needed for rhinitis or allergies       hydrOXYzine (ATARAX) 25 MG tablet Take 25 mg by mouth as needed       levothyroxine (SYNTHROID, LEVOTHROID) 88 MCG tablet [LEVOTHYROXINE (SYNTHROID, LEVOTHROID) 88 MCG TABLET] Take 88 mcg by mouth daily.       losartan (COZAAR) 25 MG tablet Take 1 tablet (25 mg) by mouth daily 90 tablet 3     Melatonin 10 MG TABS tablet Take 30 mg by mouth At Bedtime        potassium chloride ER (KLOR-CON M) 20 MEQ CR tablet Take 1 tablet (20 mEq) by mouth daily 90 tablet 3     torsemide (DEMADEX) 20 MG tablet Take 1 tablet (20 mg) by mouth daily 30 tablet 12     traZODone (DESYREL) 150 MG tablet Take 150 mg by mouth At Bedtime        venlafaxine (EFFEXOR-XR) 150 MG 24 hr capsule [VENLAFAXINE (EFFEXOR-XR) 150 MG 24 HR CAPSULE] Take 300 mg by mouth daily.      Allergies   Allergen Reactions     Bee Venom Anaphylaxis     Other Drug Allergy (See Comments) Hives     Ice Packs   Any kind of cold      Dilaudid [Hydromorphone] Other (See Comments)     Altered mental status, confusion, itching. Pt tolerates oxycodone.     Latex      hives         Lab Results    Chemistry/lipid CBC Cardiac Enzymes/BNP/TSH/INR   Recent Labs   Lab Test 05/19/21  1332   CHOL 150   HDL 52   LDL 81   TRIG 87     Recent Labs   Lab Test 05/19/21  1332   LDL 81     Recent Labs   Lab Test 10/27/21  1659      POTASSIUM 4.1   CHLORIDE 103   CO2 28      BUN 12   CR 1.01   GFRESTIMATED 55*   YENI 8.7     Recent Labs   Lab Test 10/27/21  1659 10/06/21  1635 09/29/21  0640   CR 1.01 1.00 1.01     No results for input(s): A1C in the last 00526 hours. Recent Labs   Lab Test  10/27/21  1659   WBC 4.3   HGB 10.5*   HCT 32.2*   *   *     Recent Labs   Lab Test 10/27/21  1659 09/29/21  0640 09/28/21  1148   HGB 10.5* 11.0* 9.4*    No results for input(s): TROPONINI in the last 23714 hours.  Recent Labs   Lab Test 09/23/21  1251   *     Recent Labs   Lab Test 05/19/21  1332   TSH 1.68     Recent Labs   Lab Test 09/29/21  1044 09/23/21  1251 01/02/21  0912   INR 1.24* 1.21* 1.10

## 2021-11-11 NOTE — PATIENT INSTRUCTIONS
Maren Fofana,    It was a pleasure to see you today at the [facility].     My recommendations after this visit include:  - No changes to your medications.    - Your labs will be on MyChart.   - Start weighing yourself daily.   - Limit salt in your diet.   - If you have any questions or concerns, please call [phone] to talk with my nurse.    Rosaura Rangel, CNP

## 2021-11-11 NOTE — PROGRESS NOTES
Patient seen in clinic with her daughter today for HF education  Reviewed HF Binder that includes the  HF Sx Awareness & and Weight log booklet highlighting :  __X_patient s type of heart failure _X__Na management in diet  __X_importance of daily wts  _X__Fluid Guidelines, if applicable  __X_medication review and importance of compliance   Katherine was offered education on both fluid and sodium restriction today. She stated that she would drink 3 -4 times her restricted fluid amounts daily if allowed. [employer] arms meals are offered today and she completed application which was faxed. Her daughter does her grocery shopping and will continue to supplement with fresh fruits/items that she prefers for low sodium snacks. Discussed the need to read product labels thoroughly.     Instructed patient in signs and sx of heart failure, reiterated when to call clinic - reviewed HF hotline # [phone] and after hours call # [phone].  Majority of time was spent reviewing: discussing sodium content of items.  Patient verbalized understanding of HF discussion.  Plan for f/u with continued HF education reviewed.  No formal f/u scheduled with nurse clinician for continued education - will continue to reinforce HF management education.    Brenda Edmonds RN BSN, CHFN

## 2021-11-11 NOTE — PROGRESS NOTES
HEART CARE PROGRESS NOTE      [facility]  [phone]      Assessment/Recommendations   Assessment:    1.  Heart failure with preserved ejection fraction: Lower extremity edema has improved with changing to torsemide.  She denies any shortness of breath and lung sounds are clear.  Weight has decreased 7 pounds. We reviewed heart failure diagnosis, medications, treatment plan, low sodium diet, weight monitoring, and symptom monitoring.  She met with a heart failure nurse clinician to further discuss.    2.  Hypertension: Blood pressure controlled at 120/70.  Continue losartan.    Plan:  1.  Continue current medications   2. BMP pending  3.  Low-sodium diet and daily weights    Maren Fofana will follow up with Dr. Kulkarni on December 10.       History of Present Illness/Subjective    Ms. Maren Fofana is a 71 year old female seen at [facility] today for follow-up.  She has a history of aortic stenosis with TAVR 2021, hypertension, AAA repair, and heart failure with preserved ejection fraction.  Echocardiogram on October 26, 2021 showed ejection fraction of 60 to 65% and moderate to severe tricuspid regurgitation.    She was seen by Dr. Kulkarni 2 weeks ago and Lasix changed to torsemide due to continued lower extremity edema.  Edema has improved significantly.  Left lower leg chronically more swollen than right lower leg.  She denies lightheadedness, shortness of breath, dyspnea on exertion, orthopnea, chest pain and lower extremity edema.      Her clinic weight has decreased 7 pounds since starting torsemide.  She is working on following a low-sodium diet.      ECHO 10/26/2021:  Interpretation Summary     Normal right ventricle size and systolic function.  The left atrium is severely dilated.  Left ventricular function is normal.The ejection fraction is 60-65%.  There is moderate to mod-severe (2-3+) tricuspid regurgitation.  Right ventricle systolic pressure  "estimate normal  There is a ISABELLA 3 bioprosthetic valve present in aortic valve position.  The mean AoV pressure gradient is 16mmHg.  Compared to prior study, there is no significant change.        Physical Examination Review of Systems   Vitals: /70 (BP Location: Right arm, Patient Position: Sitting, Cuff Size: Adult Regular)   Pulse 84   Resp 16   Ht 1.549 m (5' 1\")   Wt 72.1 kg (159 lb)   BMI 30.04 kg/m    BMI= Body mass index is 30.04 kg/m .  Wt Readings from Last 3 Encounters:   21 72.1 kg (159 lb)   10/27/21 74.4 kg (164 lb)   10/11/21 75.6 kg (166 lb 9.6 oz)       General Appearance:     Alert, cooperative and in no acute distress.   ENT/Mouth: membranes moist, no oral lesions or bleeding gums.      EYES:  no scleral icterus, normal conjunctivae   Chest/Lungs:   lungs are clear to auscultation, no rales or wheezing, respirations unlabored   Cardiovascular:   Regular. Normal first and second heart sounds, 1+ edema bilateral lower extremities, left greater than right   Abdomen:  Soft, nontender, nondistended, bowel sounds present   Extremities: no cyanosis or clubbing   Skin: warm, dry.    Neurologic: mood and affect are appropriate, alert and oriented x3         Please refer above for cardiac ROS details.      Medical History  Surgical History Family History Social History   Past Medical History:   Diagnosis Date     Aortic stenosis      Collapsed lung      Fibromyalgia      GERD (gastroesophageal reflux disease)      Hypothyroid      Migraine      Peripheral vascular disease (H)      Reactive airway disease      Vertigo      Past Surgical History:   Procedure Laterality Date      SECTION Bilateral      CV CORONARY ANGIOGRAM N/A 2021    Procedure: Coronary Angiogram;  Surgeon: Sara Randall MD;  Location: [facility] CATH LAB CV     CV TRANSCATHETER AORTIC VALVE REPLACEMENT N/A 2021    Procedure: CV TRANSCATHETER AORTIC VALVE REPLACEMENT;  Surgeon: Geovani Kulkarni MD;  " Location:  HEART CARDIAC CATH LAB     HEART CATH FEMORAL CANNULIZATION WITH OPEN STANDBY REPAIR AORTIC VALVE N/A 2021    Procedure: LEFT SUBCLAVIAN ACCESS FOR  TRANSCATHETER AORTIC VALVE REPLACEMENT.  CARDIOPULMONARY BYPASS STANDBY;  Surgeon: Jorge L Baeza MD;  Location:  HEART CARDIAC CATH LAB     HYSTERECTOMY       POSTERIOR LAMINECTOMY / DECOMPRESSION LUMBAR SPINE      L2-L3     SPINAL FUSION       TONSILLECTOMY & ADENOIDECTOMY       TOTAL KNEE ARTHROPLASTY Bilateral      Family History   Problem Relation Age of Onset     Pancreatic Cancer Mother      Pancreatic Cancer Father     Social History     Socioeconomic History     Marital status: Legally      Spouse name: Not on file     Number of children: Not on file     Years of education: Not on file     Highest education level: Not on file   Occupational History     Not on file   Tobacco Use     Smoking status: Former Smoker     Packs/day: 0.50     Quit date: 2021     Years since quittin.6     Smokeless tobacco: Never Used   Substance and Sexual Activity     Alcohol use: Yes     Drug use: Never     Sexual activity: Not on file   Other Topics Concern     Parent/sibling w/ CABG, MI or angioplasty before 65F 55M? Not Asked   Social History Narrative     Not on file     Social Determinants of Health     Financial Resource Strain: Not on file   Food Insecurity: Not on file   Transportation Needs: Not on file   Physical Activity: Not on file   Stress: Not on file   Social Connections: Not on file   Intimate Partner Violence: Not on file   Housing Stability: Not on file          Medications  Allergies   Current Outpatient Medications   Medication Sig Dispense Refill     aspirin (ASA) 81 MG EC tablet Take 1 tablet (81 mg) by mouth daily 90 tablet 0     atorvastatin (LIPITOR) 80 MG tablet [ATORVASTATIN (LIPITOR) 80 MG TABLET] Take 80 mg by mouth daily.       EPINEPHrine (EPIPEN/ADRENACLICK/AUVI-Q) 0.3 mg/0.3 mL injection 0.3 mg as needed         fluticasone (FLONASE) 50 MCG/ACT nasal spray Spray 1 spray into both nostrils 2 times daily as needed for rhinitis or allergies       hydrOXYzine (ATARAX) 25 MG tablet Take 25 mg by mouth as needed       levothyroxine (SYNTHROID, LEVOTHROID) 88 MCG tablet [LEVOTHYROXINE (SYNTHROID, LEVOTHROID) 88 MCG TABLET] Take 88 mcg by mouth daily.       losartan (COZAAR) 25 MG tablet Take 1 tablet (25 mg) by mouth daily 90 tablet 3     Melatonin 10 MG TABS tablet Take 30 mg by mouth At Bedtime        potassium chloride ER (KLOR-CON M) 20 MEQ CR tablet Take 1 tablet (20 mEq) by mouth daily 90 tablet 3     torsemide (DEMADEX) 20 MG tablet Take 1 tablet (20 mg) by mouth daily 30 tablet 12     traZODone (DESYREL) 150 MG tablet Take 150 mg by mouth At Bedtime        venlafaxine (EFFEXOR-XR) 150 MG 24 hr capsule [VENLAFAXINE (EFFEXOR-XR) 150 MG 24 HR CAPSULE] Take 300 mg by mouth daily.      Allergies   Allergen Reactions     Bee Venom Anaphylaxis     Other Drug Allergy (See Comments) Hives     Ice Packs   Any kind of cold      Dilaudid [Hydromorphone] Other (See Comments)     Altered mental status, confusion, itching. Pt tolerates oxycodone.     Latex      hives         Lab Results    Chemistry/lipid CBC Cardiac Enzymes/BNP/TSH/INR   Recent Labs   Lab Test 05/19/21  1332   CHOL 150   HDL 52   LDL 81   TRIG 87     Recent Labs   Lab Test 05/19/21  1332   LDL 81     Recent Labs   Lab Test 10/27/21  1659      POTASSIUM 4.1   CHLORIDE 103   CO2 28      BUN 12   CR 1.01   GFRESTIMATED 55*   YENI 8.7     Recent Labs   Lab Test 10/27/21  1659 10/06/21  1635 09/29/21  0640   CR 1.01 1.00 1.01     No results for input(s): A1C in the last 30679 hours. Recent Labs   Lab Test 10/27/21  1659   WBC 4.3   HGB 10.5*   HCT 32.2*   *   *     Recent Labs   Lab Test 10/27/21  1659 09/29/21  0640 09/28/21  1148   HGB 10.5* 11.0* 9.4*    No results for input(s): TROPONINI in the last 33326 hours.  Recent Labs   Lab Test  09/23/21  1251   *     Recent Labs   Lab Test 05/19/21  1332   TSH 1.68     Recent Labs   Lab Test 09/29/21  1044 09/23/21  1251 01/02/21  0912   INR 1.24* 1.21* 1.10

## 2022-01-01 ENCOUNTER — APPOINTMENT (OUTPATIENT)
Dept: PHYSICAL THERAPY | Facility: HOSPITAL | Age: 72
DRG: 314 | End: 2022-01-01
Attending: INTERNAL MEDICINE
Payer: COMMERCIAL

## 2022-01-01 ENCOUNTER — APPOINTMENT (OUTPATIENT)
Dept: PHYSICAL THERAPY | Facility: HOSPITAL | Age: 72
DRG: 314 | End: 2022-01-01
Payer: COMMERCIAL

## 2022-01-01 ENCOUNTER — APPOINTMENT (OUTPATIENT)
Dept: RADIOLOGY | Facility: HOSPITAL | Age: 72
DRG: 314 | End: 2022-01-01
Attending: INTERNAL MEDICINE
Payer: COMMERCIAL

## 2022-01-01 ENCOUNTER — APPOINTMENT (OUTPATIENT)
Dept: CT IMAGING | Facility: HOSPITAL | Age: 72
DRG: 314 | End: 2022-01-01
Attending: EMERGENCY MEDICINE
Payer: COMMERCIAL

## 2022-01-01 ENCOUNTER — APPOINTMENT (OUTPATIENT)
Dept: PET IMAGING | Facility: HOSPITAL | Age: 72
DRG: 314 | End: 2022-01-01
Attending: INTERNAL MEDICINE
Payer: COMMERCIAL

## 2022-01-01 ENCOUNTER — APPOINTMENT (OUTPATIENT)
Dept: CARDIOLOGY | Facility: HOSPITAL | Age: 72
DRG: 314 | End: 2022-01-01
Attending: INTERNAL MEDICINE
Payer: COMMERCIAL

## 2022-01-01 ENCOUNTER — APPOINTMENT (OUTPATIENT)
Dept: RADIOLOGY | Facility: HOSPITAL | Age: 72
DRG: 314 | End: 2022-01-01
Attending: EMERGENCY MEDICINE
Payer: COMMERCIAL

## 2022-01-01 ENCOUNTER — DOCUMENTATION ONLY (OUTPATIENT)
Dept: OTHER | Facility: CLINIC | Age: 72
End: 2022-01-01

## 2022-01-01 ENCOUNTER — HOSPITAL ENCOUNTER (EMERGENCY)
Facility: HOSPITAL | Age: 72
Discharge: HOME | End: 2022-08-22
Admitting: EMERGENCY MEDICINE
Payer: COMMERCIAL

## 2022-01-01 ENCOUNTER — APPOINTMENT (OUTPATIENT)
Dept: SPEECH THERAPY | Facility: HOSPITAL | Age: 72
DRG: 314 | End: 2022-01-01
Attending: INTERNAL MEDICINE
Payer: COMMERCIAL

## 2022-01-01 ENCOUNTER — OFFICE VISIT (OUTPATIENT)
Dept: CARDIOLOGY | Facility: CLINIC | Age: 72
End: 2022-01-01
Payer: COMMERCIAL

## 2022-01-01 ENCOUNTER — APPOINTMENT (OUTPATIENT)
Dept: MRI IMAGING | Facility: HOSPITAL | Age: 72
DRG: 314 | End: 2022-01-01
Attending: INTERNAL MEDICINE
Payer: COMMERCIAL

## 2022-01-01 ENCOUNTER — ANESTHESIA (OUTPATIENT)
Dept: CARDIOLOGY | Facility: HOSPITAL | Age: 72
DRG: 314 | End: 2022-01-01
Payer: COMMERCIAL

## 2022-01-01 ENCOUNTER — ANESTHESIA EVENT (OUTPATIENT)
Dept: CARDIOLOGY | Facility: HOSPITAL | Age: 72
DRG: 314 | End: 2022-01-01
Payer: COMMERCIAL

## 2022-01-01 ENCOUNTER — APPOINTMENT (OUTPATIENT)
Dept: ULTRASOUND IMAGING | Facility: HOSPITAL | Age: 72
DRG: 314 | End: 2022-01-01
Attending: EMERGENCY MEDICINE
Payer: COMMERCIAL

## 2022-01-01 ENCOUNTER — HOSPITAL ENCOUNTER (INPATIENT)
Facility: HOSPITAL | Age: 72
LOS: 17 days | Discharge: HOSPICE | DRG: 314 | End: 2022-09-25
Attending: EMERGENCY MEDICINE | Admitting: INTERNAL MEDICINE
Payer: COMMERCIAL

## 2022-01-01 VITALS
RESPIRATION RATE: 16 BRPM | SYSTOLIC BLOOD PRESSURE: 124 MMHG | DIASTOLIC BLOOD PRESSURE: 80 MMHG | WEIGHT: 154 LBS | BODY MASS INDEX: 29.1 KG/M2 | HEART RATE: 80 BPM

## 2022-01-01 VITALS
BODY MASS INDEX: 29.3 KG/M2 | TEMPERATURE: 98.1 F | DIASTOLIC BLOOD PRESSURE: 58 MMHG | RESPIRATION RATE: 18 BRPM | SYSTOLIC BLOOD PRESSURE: 102 MMHG | WEIGHT: 155.2 LBS | HEART RATE: 68 BPM | OXYGEN SATURATION: 98 % | HEIGHT: 61 IN

## 2022-01-01 VITALS
HEART RATE: 98 BPM | BODY MASS INDEX: 29.45 KG/M2 | DIASTOLIC BLOOD PRESSURE: 62 MMHG | RESPIRATION RATE: 20 BRPM | SYSTOLIC BLOOD PRESSURE: 140 MMHG | WEIGHT: 156 LBS | TEMPERATURE: 98.2 F | OXYGEN SATURATION: 97 % | HEIGHT: 61 IN

## 2022-01-01 DIAGNOSIS — Z51.5 HOSPICE CARE PATIENT: Primary | ICD-10-CM

## 2022-01-01 DIAGNOSIS — R65.20 SEVERE SEPSIS (H): ICD-10-CM

## 2022-01-01 DIAGNOSIS — N19 RENAL FAILURE, UNSPECIFIED CHRONICITY: ICD-10-CM

## 2022-01-01 DIAGNOSIS — R18.8 OTHER ASCITES: ICD-10-CM

## 2022-01-01 DIAGNOSIS — R60.1 ANASARCA: ICD-10-CM

## 2022-01-01 DIAGNOSIS — Z95.2 S/P TAVR (TRANSCATHETER AORTIC VALVE REPLACEMENT): Primary | ICD-10-CM

## 2022-01-01 DIAGNOSIS — J94.8 HYDROPNEUMOTHORAX: ICD-10-CM

## 2022-01-01 DIAGNOSIS — Z95.2 S/P TAVR (TRANSCATHETER AORTIC VALVE REPLACEMENT): ICD-10-CM

## 2022-01-01 DIAGNOSIS — J90 PLEURAL EFFUSION: ICD-10-CM

## 2022-01-01 DIAGNOSIS — H66.90 OTITIS MEDIA: ICD-10-CM

## 2022-01-01 DIAGNOSIS — A41.9 SEVERE SEPSIS (H): ICD-10-CM

## 2022-01-01 LAB
ALBUMIN MFR UR ELPH: 24.4 MG/DL
ALBUMIN SERPL BCG-MCNC: 2.2 G/DL (ref 3.5–5.2)
ALBUMIN SERPL-MCNC: 1.9 G/DL (ref 3.5–5)
ALBUMIN SERPL-MCNC: 2 G/DL (ref 3.5–5)
ALBUMIN SERPL-MCNC: 2.6 G/DL (ref 3.5–5)
ALBUMIN SERPL-MCNC: 2.8 G/DL (ref 3.5–5)
ALBUMIN SERPL-MCNC: 2.9 G/DL (ref 3.5–5)
ALBUMIN SERPL-MCNC: 3 G/DL (ref 3.5–5)
ALBUMIN SERPL-MCNC: 3.2 G/DL (ref 3.5–5)
ALBUMIN SERPL-MCNC: 3.8 G/DL (ref 3.5–5)
ALBUMIN UR-MCNC: 200 MG/DL
ALBUMIN UR-MCNC: NEGATIVE MG/DL
ALP SERPL-CCNC: 113 U/L (ref 45–120)
ALP SERPL-CCNC: 129 U/L (ref 45–120)
ALP SERPL-CCNC: 131 U/L (ref 45–120)
ALP SERPL-CCNC: 140 U/L (ref 35–104)
ALP SERPL-CCNC: 151 U/L (ref 45–120)
ALP SERPL-CCNC: 61 U/L (ref 45–120)
ALP SERPL-CCNC: 68 U/L (ref 45–120)
ALP SERPL-CCNC: 69 U/L (ref 45–120)
ALP SERPL-CCNC: 90 U/L (ref 45–120)
ALT SERPL W P-5'-P-CCNC: 11 U/L (ref 0–45)
ALT SERPL W P-5'-P-CCNC: 12 U/L (ref 0–45)
ALT SERPL W P-5'-P-CCNC: 12 U/L (ref 10–35)
ALT SERPL W P-5'-P-CCNC: 14 U/L (ref 0–45)
ALT SERPL W P-5'-P-CCNC: 15 U/L (ref 0–45)
ALT SERPL W P-5'-P-CCNC: 16 U/L (ref 0–45)
ALT SERPL W P-5'-P-CCNC: 19 U/L (ref 0–45)
ALT SERPL W P-5'-P-CCNC: 9 U/L (ref 0–45)
ALT SERPL W P-5'-P-CCNC: <9 U/L (ref 0–45)
AMMONIA PLAS-SCNC: 14 UMOL/L (ref 11–35)
AMMONIA PLAS-SCNC: 32 UMOL/L (ref 11–35)
AMORPH CRY #/AREA URNS HPF: ABNORMAL /HPF
ANION GAP SERPL CALCULATED.3IONS-SCNC: 10 MMOL/L (ref 7–15)
ANION GAP SERPL CALCULATED.3IONS-SCNC: 10 MMOL/L (ref 7–15)
ANION GAP SERPL CALCULATED.3IONS-SCNC: 13 MMOL/L (ref 5–18)
ANION GAP SERPL CALCULATED.3IONS-SCNC: 6 MMOL/L (ref 7–15)
ANION GAP SERPL CALCULATED.3IONS-SCNC: 7 MMOL/L (ref 5–18)
ANION GAP SERPL CALCULATED.3IONS-SCNC: 8 MMOL/L (ref 5–18)
ANION GAP SERPL CALCULATED.3IONS-SCNC: 9 MMOL/L (ref 5–18)
ANION GAP SERPL CALCULATED.3IONS-SCNC: 9 MMOL/L (ref 5–18)
ANION GAP SERPL CALCULATED.3IONS-SCNC: 9 MMOL/L (ref 7–15)
APPEARANCE UR: ABNORMAL
APPEARANCE UR: CLEAR
APTT PPP: 35 SECONDS (ref 22–38)
AST SERPL W P-5'-P-CCNC: 21 U/L (ref 0–40)
AST SERPL W P-5'-P-CCNC: 24 U/L (ref 0–40)
AST SERPL W P-5'-P-CCNC: 26 U/L (ref 0–40)
AST SERPL W P-5'-P-CCNC: 39 U/L (ref 10–35)
AST SERPL W P-5'-P-CCNC: 50 U/L (ref 0–40)
AST SERPL W P-5'-P-CCNC: 53 U/L (ref 0–40)
AST SERPL W P-5'-P-CCNC: 53 U/L (ref 0–40)
AST SERPL W P-5'-P-CCNC: 59 U/L (ref 0–40)
AST SERPL W P-5'-P-CCNC: 60 U/L (ref 0–40)
ATRIAL RATE - MUSE: 153 BPM
ATRIAL RATE - MUSE: 99 BPM
BACTERIA #/AREA URNS HPF: ABNORMAL /HPF
BACTERIA BLD CULT: ABNORMAL
BACTERIA PLR CULT: NO GROWTH
BASE EXCESS BLDA CALC-SCNC: 2.9 MMOL/L
BASOPHILS # BLD AUTO: 0 10E3/UL (ref 0–0.2)
BASOPHILS # BLD AUTO: 0.1 10E3/UL (ref 0–0.2)
BASOPHILS # BLD AUTO: 0.1 10E3/UL (ref 0–0.2)
BASOPHILS # BLD MANUAL: 0 10E3/UL (ref 0–0.2)
BASOPHILS # BLD MANUAL: 0 10E3/UL (ref 0–0.2)
BASOPHILS # BLD MANUAL: 0.1 10E3/UL (ref 0–0.2)
BASOPHILS NFR BLD AUTO: 0 %
BASOPHILS NFR BLD AUTO: 1 %
BASOPHILS NFR BLD AUTO: 1 %
BASOPHILS NFR BLD MANUAL: 0 %
BASOPHILS NFR BLD MANUAL: 0 %
BASOPHILS NFR BLD MANUAL: 2 %
BILIRUB SERPL-MCNC: 0.7 MG/DL (ref 0–1)
BILIRUB SERPL-MCNC: 0.8 MG/DL (ref 0–1)
BILIRUB SERPL-MCNC: 0.8 MG/DL (ref 0–1)
BILIRUB SERPL-MCNC: 1 MG/DL
BILIRUB SERPL-MCNC: 1.2 MG/DL (ref 0–1)
BILIRUB SERPL-MCNC: 1.2 MG/DL (ref 0–1)
BILIRUB SERPL-MCNC: 1.3 MG/DL (ref 0–1)
BILIRUB SERPL-MCNC: 2.8 MG/DL (ref 0–1)
BILIRUB SERPL-MCNC: 3.6 MG/DL (ref 0–1)
BILIRUB UR QL STRIP: NEGATIVE
BILIRUB UR QL STRIP: NEGATIVE
BNP SERPL-MCNC: 1107 PG/ML (ref 0–133)
BUN SERPL-MCNC: 14 MG/DL (ref 8–28)
BUN SERPL-MCNC: 15 MG/DL (ref 8–28)
BUN SERPL-MCNC: 16.5 MG/DL (ref 8–23)
BUN SERPL-MCNC: 17.7 MG/DL (ref 8–23)
BUN SERPL-MCNC: 21 MG/DL (ref 8–28)
BUN SERPL-MCNC: 22.5 MG/DL (ref 8–23)
BUN SERPL-MCNC: 24.9 MG/DL (ref 8–23)
BUN SERPL-MCNC: 25 MG/DL (ref 8–28)
BUN SERPL-MCNC: 27 MG/DL (ref 8–28)
BUN SERPL-MCNC: 31 MG/DL (ref 8–28)
BUN SERPL-MCNC: 34 MG/DL (ref 8–28)
BUN SERPL-MCNC: 38 MG/DL (ref 8–28)
BURR CELLS BLD QL SMEAR: ABNORMAL
C REACTIVE PROTEIN LHE: 24.6 MG/DL (ref 0–?)
CALCIUM SERPL-MCNC: 7.8 MG/DL (ref 8.5–10.5)
CALCIUM SERPL-MCNC: 7.9 MG/DL (ref 8.5–10.5)
CALCIUM SERPL-MCNC: 7.9 MG/DL (ref 8.8–10.2)
CALCIUM SERPL-MCNC: 7.9 MG/DL (ref 8.8–10.2)
CALCIUM SERPL-MCNC: 8 MG/DL (ref 8.8–10.2)
CALCIUM SERPL-MCNC: 8 MG/DL (ref 8.8–10.2)
CALCIUM SERPL-MCNC: 8.1 MG/DL (ref 8.5–10.5)
CALCIUM SERPL-MCNC: 8.3 MG/DL (ref 8.5–10.5)
CALCIUM SERPL-MCNC: 8.3 MG/DL (ref 8.5–10.5)
CALCIUM SERPL-MCNC: 8.4 MG/DL (ref 8.5–10.5)
CALCIUM SERPL-MCNC: 8.4 MG/DL (ref 8.5–10.5)
CALCIUM SERPL-MCNC: 9 MG/DL (ref 8.5–10.5)
CHLORIDE BLD-SCNC: 100 MMOL/L (ref 98–107)
CHLORIDE BLD-SCNC: 100 MMOL/L (ref 98–107)
CHLORIDE BLD-SCNC: 101 MMOL/L (ref 98–107)
CHLORIDE BLD-SCNC: 101 MMOL/L (ref 98–107)
CHLORIDE BLD-SCNC: 102 MMOL/L (ref 98–107)
CHLORIDE BLD-SCNC: 97 MMOL/L (ref 98–107)
CHLORIDE BLD-SCNC: 99 MMOL/L (ref 98–107)
CHLORIDE BLD-SCNC: 99 MMOL/L (ref 98–107)
CHLORIDE SERPL-SCNC: 97 MMOL/L (ref 98–107)
CHLORIDE SERPL-SCNC: 98 MMOL/L (ref 98–107)
CHLORIDE SERPL-SCNC: 98 MMOL/L (ref 98–107)
CHLORIDE SERPL-SCNC: 99 MMOL/L (ref 98–107)
CK SERPL-CCNC: 121 U/L (ref 30–190)
CK SERPL-CCNC: 2092 U/L (ref 30–190)
CK SERPL-CCNC: 419 U/L (ref 30–190)
CK SERPL-CCNC: 916 U/L (ref 30–190)
CO2 SERPL-SCNC: 21 MMOL/L (ref 22–31)
CO2 SERPL-SCNC: 22 MMOL/L (ref 22–31)
CO2 SERPL-SCNC: 24 MMOL/L (ref 22–31)
CO2 SERPL-SCNC: 25 MMOL/L (ref 22–31)
CO2 SERPL-SCNC: 26 MMOL/L (ref 22–31)
CO2 SERPL-SCNC: 27 MMOL/L (ref 22–31)
COHGB MFR BLD: 46 % (ref 95–96)
COLOR UR AUTO: NORMAL
COLOR UR AUTO: YELLOW
CREAT SERPL-MCNC: 0.71 MG/DL (ref 0.6–1.1)
CREAT SERPL-MCNC: 0.74 MG/DL (ref 0.6–1.1)
CREAT SERPL-MCNC: 0.75 MG/DL (ref 0.6–1.1)
CREAT SERPL-MCNC: 0.76 MG/DL (ref 0.6–1.1)
CREAT SERPL-MCNC: 0.8 MG/DL (ref 0.6–1.1)
CREAT SERPL-MCNC: 0.89 MG/DL (ref 0.6–1.1)
CREAT SERPL-MCNC: 1.09 MG/DL (ref 0.6–1.1)
CREAT SERPL-MCNC: 1.12 MG/DL (ref 0.6–1.1)
CREAT SERPL-MCNC: 1.15 MG/DL (ref 0.6–1.1)
CREAT SERPL-MCNC: 1.33 MG/DL (ref 0.51–0.95)
CREAT SERPL-MCNC: 1.34 MG/DL (ref 0.51–0.95)
CREAT SERPL-MCNC: 1.37 MG/DL (ref 0.6–1.1)
CREAT SERPL-MCNC: 1.43 MG/DL (ref 0.6–1.1)
CREAT SERPL-MCNC: 1.48 MG/DL (ref 0.6–1.1)
CREAT SERPL-MCNC: 1.78 MG/DL (ref 0.51–0.95)
CREAT SERPL-MCNC: 1.93 MG/DL (ref 0.51–0.95)
CREAT UR-MCNC: 68 MG/DL
DAT POLY: NORMAL
DAT, ANTI-IGG: NORMAL
DEPRECATED HCO3 PLAS-SCNC: 24 MMOL/L (ref 22–29)
DEPRECATED HCO3 PLAS-SCNC: 24 MMOL/L (ref 22–29)
DEPRECATED HCO3 PLAS-SCNC: 25 MMOL/L (ref 22–29)
DEPRECATED HCO3 PLAS-SCNC: 26 MMOL/L (ref 22–29)
DIASTOLIC BLOOD PRESSURE - MUSE: 88 MMHG
DIASTOLIC BLOOD PRESSURE - MUSE: NORMAL MMHG
ENTEROCOCCUS FAECALIS: NOT DETECTED
ENTEROCOCCUS FAECALIS: NOT DETECTED
ENTEROCOCCUS FAECIUM: NOT DETECTED
ENTEROCOCCUS FAECIUM: NOT DETECTED
EOSINOPHIL # BLD AUTO: 0 10E3/UL (ref 0–0.7)
EOSINOPHIL # BLD AUTO: 0.1 10E3/UL (ref 0–0.7)
EOSINOPHIL # BLD MANUAL: 0 10E3/UL (ref 0–0.7)
EOSINOPHIL NFR BLD AUTO: 0 %
EOSINOPHIL NFR BLD AUTO: 1 %
EOSINOPHIL NFR BLD MANUAL: 0 %
ERYTHROCYTE [DISTWIDTH] IN BLOOD BY AUTOMATED COUNT: 14.5 % (ref 10–15)
ERYTHROCYTE [DISTWIDTH] IN BLOOD BY AUTOMATED COUNT: 14.6 % (ref 10–15)
ERYTHROCYTE [DISTWIDTH] IN BLOOD BY AUTOMATED COUNT: 14.8 % (ref 10–15)
ERYTHROCYTE [DISTWIDTH] IN BLOOD BY AUTOMATED COUNT: 15 % (ref 10–15)
ERYTHROCYTE [DISTWIDTH] IN BLOOD BY AUTOMATED COUNT: 15.2 % (ref 10–15)
ERYTHROCYTE [DISTWIDTH] IN BLOOD BY AUTOMATED COUNT: 15.3 % (ref 10–15)
ERYTHROCYTE [DISTWIDTH] IN BLOOD BY AUTOMATED COUNT: 15.9 % (ref 10–15)
ETHANOL SERPL-MCNC: <10 MG/DL
FIBRINOGEN PPP-MCNC: 375 MG/DL (ref 170–490)
FLOW: 5 LPM
FLUAV RNA SPEC QL NAA+PROBE: NEGATIVE
FLUBV RNA RESP QL NAA+PROBE: NEGATIVE
FOLATE SERPL-MCNC: 17.6 NG/ML (ref 4.6–34.8)
GENTAMICIN SERPL-MCNC: 0.5 MG/L
GENTAMICIN SERPL-MCNC: 0.6 MG/L
GENTAMICIN SERPL-MCNC: 0.8 UG/ML
GENTAMICIN SERPL-MCNC: 1.1 UG/ML
GENTAMICIN SERPL-MCNC: 1.9 MG/L
GENTAMICIN SERPL-MCNC: 2 MG/L
GFR SERPL CREATININE-BSD FRML MDRD: 27 ML/MIN/1.73M2
GFR SERPL CREATININE-BSD FRML MDRD: 29 ML/MIN/1.73M2
GFR SERPL CREATININE-BSD FRML MDRD: 37 ML/MIN/1.73M2
GFR SERPL CREATININE-BSD FRML MDRD: 38 ML/MIN/1.73M2
GFR SERPL CREATININE-BSD FRML MDRD: 40 ML/MIN/1.73M2
GFR SERPL CREATININE-BSD FRML MDRD: 41 ML/MIN/1.73M2
GFR SERPL CREATININE-BSD FRML MDRD: 42 ML/MIN/1.73M2
GFR SERPL CREATININE-BSD FRML MDRD: 50 ML/MIN/1.73M2
GFR SERPL CREATININE-BSD FRML MDRD: 51 ML/MIN/1.73M2
GFR SERPL CREATININE-BSD FRML MDRD: 53 ML/MIN/1.73M2
GFR SERPL CREATININE-BSD FRML MDRD: 68 ML/MIN/1.73M2
GFR SERPL CREATININE-BSD FRML MDRD: 77 ML/MIN/1.73M2
GFR SERPL CREATININE-BSD FRML MDRD: 82 ML/MIN/1.73M2
GFR SERPL CREATININE-BSD FRML MDRD: 83 ML/MIN/1.73M2
GFR SERPL CREATININE-BSD FRML MDRD: 84 ML/MIN/1.73M2
GFR SERPL CREATININE-BSD FRML MDRD: 89 ML/MIN/1.73M2
GLUCOSE BLD-MCNC: 108 MG/DL (ref 70–125)
GLUCOSE BLD-MCNC: 110 MG/DL (ref 70–125)
GLUCOSE BLD-MCNC: 114 MG/DL (ref 70–125)
GLUCOSE BLD-MCNC: 118 MG/DL (ref 70–125)
GLUCOSE BLD-MCNC: 126 MG/DL (ref 70–125)
GLUCOSE BLD-MCNC: 127 MG/DL (ref 70–125)
GLUCOSE BLD-MCNC: 129 MG/DL (ref 70–125)
GLUCOSE BLD-MCNC: 98 MG/DL (ref 70–125)
GLUCOSE BLDC GLUCOMTR-MCNC: 77 MG/DL (ref 70–99)
GLUCOSE BLDC GLUCOMTR-MCNC: 99 MG/DL (ref 70–99)
GLUCOSE BODY FLUID SOURCE: NORMAL
GLUCOSE FLD-MCNC: 117 MG/DL
GLUCOSE SERPL-MCNC: 87 MG/DL (ref 70–99)
GLUCOSE SERPL-MCNC: 88 MG/DL (ref 70–99)
GLUCOSE SERPL-MCNC: 97 MG/DL (ref 70–99)
GLUCOSE SERPL-MCNC: 98 MG/DL (ref 70–99)
GLUCOSE UR STRIP-MCNC: NEGATIVE MG/DL
GLUCOSE UR STRIP-MCNC: NEGATIVE MG/DL
GRAM STAIN RESULT: NORMAL
GRAM STAIN RESULT: NORMAL
HAPTOGLOB SERPL-MCNC: 63 MG/DL (ref 32–197)
HAPTOGLOB SERPL-MCNC: 79 MG/DL (ref 32–197)
HCO3 BLD-SCNC: 25 MMOL/L (ref 23–29)
HCT VFR BLD AUTO: 26.2 % (ref 35–47)
HCT VFR BLD AUTO: 26.7 % (ref 35–47)
HCT VFR BLD AUTO: 27.1 % (ref 35–47)
HCT VFR BLD AUTO: 28.1 % (ref 35–47)
HCT VFR BLD AUTO: 28.7 % (ref 35–47)
HCT VFR BLD AUTO: 29.5 % (ref 35–47)
HCT VFR BLD AUTO: 31.5 % (ref 35–47)
HCT VFR BLD AUTO: 31.9 % (ref 35–47)
HCT VFR BLD AUTO: 33 % (ref 35–47)
HGB BLD-MCNC: 10.6 G/DL (ref 11.7–15.7)
HGB BLD-MCNC: 10.6 G/DL (ref 11.7–15.7)
HGB BLD-MCNC: 11 G/DL (ref 11.7–15.7)
HGB BLD-MCNC: 8.8 G/DL (ref 11.7–15.7)
HGB BLD-MCNC: 9.2 G/DL (ref 11.7–15.7)
HGB BLD-MCNC: 9.4 G/DL (ref 11.7–15.7)
HGB BLD-MCNC: 9.5 G/DL (ref 11.7–15.7)
HGB BLD-MCNC: 9.6 G/DL (ref 11.7–15.7)
HGB BLD-MCNC: 9.7 G/DL (ref 11.7–15.7)
HGB BLD-MCNC: 9.7 G/DL (ref 11.7–15.7)
HGB BLD-MCNC: 9.8 G/DL (ref 11.7–15.7)
HGB UR QL STRIP: ABNORMAL
HGB UR QL STRIP: NEGATIVE
HOLD SPECIMEN: NORMAL
HYALINE CASTS: 4 /LPF
IMM GRANULOCYTES # BLD: 0.1 10E3/UL
IMM GRANULOCYTES # BLD: 0.2 10E3/UL
IMM GRANULOCYTES NFR BLD: 1 %
IMM GRANULOCYTES NFR BLD: 2 %
INR PPP: 1.33 (ref 0.85–1.15)
INTERPRETATION ECG - MUSE: NORMAL
INTERPRETATION ECG - MUSE: NORMAL
KETONES UR STRIP-MCNC: ABNORMAL MG/DL
KETONES UR STRIP-MCNC: NEGATIVE MG/DL
LACTATE SERPL-SCNC: 1.6 MMOL/L (ref 0.7–2)
LACTATE SERPL-SCNC: 2.9 MMOL/L (ref 0.7–2)
LACTATE SERPL-SCNC: 4.3 MMOL/L (ref 0.7–2)
LD BODY BODY FLUID SOURCE: NORMAL
LDH FLD L TO P-CCNC: 100 U/L
LDH SERPL L TO P-CCNC: 271 U/L (ref 125–220)
LDH SERPL L TO P-CCNC: 340 U/L (ref 0–250)
LDH SERPL L TO P-CCNC: 418 U/L (ref 125–220)
LEUKOCYTE ESTERASE UR QL STRIP: NEGATIVE
LEUKOCYTE ESTERASE UR QL STRIP: NEGATIVE
LISTERIA SPECIES (DETECTED/NOT DETECTED): NOT DETECTED
LISTERIA SPECIES (DETECTED/NOT DETECTED): NOT DETECTED
LVEF ECHO: NORMAL
LYMPHOCYTES # BLD AUTO: 0.2 10E3/UL (ref 0.8–5.3)
LYMPHOCYTES # BLD AUTO: 0.4 10E3/UL (ref 0.8–5.3)
LYMPHOCYTES # BLD AUTO: 0.9 10E3/UL (ref 0.8–5.3)
LYMPHOCYTES # BLD MANUAL: 0.4 10E3/UL (ref 0.8–5.3)
LYMPHOCYTES # BLD MANUAL: 0.6 10E3/UL (ref 0.8–5.3)
LYMPHOCYTES # BLD MANUAL: 0.7 10E3/UL (ref 0.8–5.3)
LYMPHOCYTES NFR BLD AUTO: 10 %
LYMPHOCYTES NFR BLD AUTO: 2 %
LYMPHOCYTES NFR BLD AUTO: 5 %
LYMPHOCYTES NFR BLD AUTO: 7 %
LYMPHOCYTES NFR BLD AUTO: 7 %
LYMPHOCYTES NFR BLD MANUAL: 11 %
LYMPHOCYTES NFR BLD MANUAL: 5 %
LYMPHOCYTES NFR BLD MANUAL: 6 %
MAGNESIUM SERPL-MCNC: 1.5 MG/DL (ref 1.8–2.6)
MAGNESIUM SERPL-MCNC: 1.7 MG/DL (ref 1.8–2.6)
MAGNESIUM SERPL-MCNC: 1.8 MG/DL (ref 1.8–2.6)
MAGNESIUM SERPL-MCNC: 1.9 MG/DL (ref 1.7–2.3)
MAGNESIUM SERPL-MCNC: 1.9 MG/DL (ref 1.8–2.6)
MAGNESIUM SERPL-MCNC: 2 MG/DL (ref 1.8–2.6)
MAGNESIUM SERPL-MCNC: 2.4 MG/DL (ref 1.8–2.6)
MCH RBC QN AUTO: 32.1 PG (ref 26.5–33)
MCH RBC QN AUTO: 32.2 PG (ref 26.5–33)
MCH RBC QN AUTO: 32.3 PG (ref 26.5–33)
MCH RBC QN AUTO: 32.4 PG (ref 26.5–33)
MCH RBC QN AUTO: 32.5 PG (ref 26.5–33)
MCH RBC QN AUTO: 32.6 PG (ref 26.5–33)
MCH RBC QN AUTO: 32.7 PG (ref 26.5–33)
MCH RBC QN AUTO: 32.9 PG (ref 26.5–33)
MCH RBC QN AUTO: 33.1 PG (ref 26.5–33)
MCHC RBC AUTO-ENTMCNC: 33.2 G/DL (ref 31.5–36.5)
MCHC RBC AUTO-ENTMCNC: 33.2 G/DL (ref 31.5–36.5)
MCHC RBC AUTO-ENTMCNC: 33.3 G/DL (ref 31.5–36.5)
MCHC RBC AUTO-ENTMCNC: 33.6 G/DL (ref 31.5–36.5)
MCHC RBC AUTO-ENTMCNC: 33.7 G/DL (ref 31.5–36.5)
MCHC RBC AUTO-ENTMCNC: 33.8 G/DL (ref 31.5–36.5)
MCHC RBC AUTO-ENTMCNC: 34.2 G/DL (ref 31.5–36.5)
MCHC RBC AUTO-ENTMCNC: 34.5 G/DL (ref 31.5–36.5)
MCHC RBC AUTO-ENTMCNC: 34.7 G/DL (ref 31.5–36.5)
MCV RBC AUTO: 94 FL (ref 78–100)
MCV RBC AUTO: 95 FL (ref 78–100)
MCV RBC AUTO: 97 FL (ref 78–100)
MCV RBC AUTO: 97 FL (ref 78–100)
MCV RBC AUTO: 98 FL (ref 78–100)
MCV RBC AUTO: 99 FL (ref 78–100)
MCV RBC AUTO: 99 FL (ref 78–100)
METAMYELOCYTES # BLD MANUAL: 0.1 10E3/UL
METAMYELOCYTES # BLD MANUAL: 0.1 10E3/UL
METAMYELOCYTES # BLD MANUAL: 0.2 10E3/UL
METAMYELOCYTES NFR BLD MANUAL: 1 %
METAMYELOCYTES NFR BLD MANUAL: 2 %
METAMYELOCYTES NFR BLD MANUAL: 2 %
MONOCYTES # BLD AUTO: 0.1 10E3/UL (ref 0–1.3)
MONOCYTES # BLD AUTO: 0.6 10E3/UL (ref 0–1.3)
MONOCYTES # BLD AUTO: 0.8 10E3/UL (ref 0–1.3)
MONOCYTES # BLD AUTO: 0.9 10E3/UL (ref 0–1.3)
MONOCYTES # BLD AUTO: 1 10E3/UL (ref 0–1.3)
MONOCYTES # BLD MANUAL: 0.2 10E3/UL (ref 0–1.3)
MONOCYTES # BLD MANUAL: 0.3 10E3/UL (ref 0–1.3)
MONOCYTES # BLD MANUAL: 0.3 10E3/UL (ref 0–1.3)
MONOCYTES NFR BLD AUTO: 1 %
MONOCYTES NFR BLD AUTO: 7 %
MONOCYTES NFR BLD AUTO: 8 %
MONOCYTES NFR BLD AUTO: 9 %
MONOCYTES NFR BLD AUTO: 9 %
MONOCYTES NFR BLD MANUAL: 3 %
MONOCYTES NFR BLD MANUAL: 3 %
MONOCYTES NFR BLD MANUAL: 4 %
MUCOUS THREADS #/AREA URNS LPF: PRESENT /LPF
MYELOCYTES # BLD MANUAL: 0.1 10E3/UL
MYELOCYTES NFR BLD MANUAL: 1 %
NEUTROPHILS # BLD AUTO: 10.4 10E3/UL (ref 1.6–8.3)
NEUTROPHILS # BLD AUTO: 10.6 10E3/UL (ref 1.6–8.3)
NEUTROPHILS # BLD AUTO: 11.9 10E3/UL (ref 1.6–8.3)
NEUTROPHILS # BLD AUTO: 6 10E3/UL (ref 1.6–8.3)
NEUTROPHILS # BLD AUTO: 7.8 10E3/UL (ref 1.6–8.3)
NEUTROPHILS # BLD MANUAL: 5.1 10E3/UL (ref 1.6–8.3)
NEUTROPHILS # BLD MANUAL: 5.8 10E3/UL (ref 1.6–8.3)
NEUTROPHILS # BLD MANUAL: 9.9 10E3/UL (ref 1.6–8.3)
NEUTROPHILS NFR BLD AUTO: 79 %
NEUTROPHILS NFR BLD AUTO: 82 %
NEUTROPHILS NFR BLD AUTO: 83 %
NEUTROPHILS NFR BLD AUTO: 83 %
NEUTROPHILS NFR BLD AUTO: 96 %
NEUTROPHILS NFR BLD MANUAL: 83 %
NEUTROPHILS NFR BLD MANUAL: 88 %
NEUTROPHILS NFR BLD MANUAL: 89 %
NITRATE UR QL: NEGATIVE
NITRATE UR QL: NEGATIVE
NRBC # BLD AUTO: 0 10E3/UL
NRBC BLD AUTO-RTO: 0 /100
OXYHGB MFR BLD: 45.2 % (ref 95–96)
P AXIS - MUSE: 96 DEGREES
P AXIS - MUSE: NORMAL DEGREES
PATH REPORT.COMMENTS IMP SPEC: NORMAL
PATH REPORT.COMMENTS IMP SPEC: NORMAL
PATH REPORT.FINAL DX SPEC: NORMAL
PATH REPORT.MICROSCOPIC SPEC OTHER STN: NORMAL
PATH REPORT.RELEVANT HX SPEC: NORMAL
PCO2 BLD: 54 MM HG (ref 35–45)
PF4 HEPARIN CMPLX AB SER QL: NEGATIVE
PH BLD: 7.34 [PH] (ref 7.37–7.44)
PH UR STRIP: 5 [PH] (ref 5–7)
PH UR STRIP: 5.5 [PH] (ref 5–7)
PHOSPHATE SERPL-MCNC: 1.2 MG/DL (ref 2.5–4.5)
PHOSPHATE SERPL-MCNC: 1.8 MG/DL (ref 2.5–4.5)
PHOSPHATE SERPL-MCNC: 2.3 MG/DL (ref 2.5–4.5)
PHOSPHATE SERPL-MCNC: 2.5 MG/DL (ref 2.5–4.5)
PHOSPHATE SERPL-MCNC: 2.9 MG/DL (ref 2.5–4.5)
PHOSPHATE SERPL-MCNC: 2.9 MG/DL (ref 2.5–4.5)
PHOSPHATE SERPL-MCNC: 3.1 MG/DL (ref 2.5–4.5)
PHOSPHATE SERPL-MCNC: 3.4 MG/DL (ref 2.5–4.5)
PLAT MORPH BLD: ABNORMAL
PLATELET # BLD AUTO: 114 10E3/UL (ref 150–450)
PLATELET # BLD AUTO: 130 10E3/UL (ref 150–450)
PLATELET # BLD AUTO: 163 10E3/UL (ref 150–450)
PLATELET # BLD AUTO: 29 10E3/UL (ref 150–450)
PLATELET # BLD AUTO: 30 10E3/UL (ref 150–450)
PLATELET # BLD AUTO: 31 10E3/UL (ref 150–450)
PLATELET # BLD AUTO: 40 10E3/UL (ref 150–450)
PLATELET # BLD AUTO: 51 10E3/UL (ref 150–450)
PLATELET # BLD AUTO: 58 10E3/UL (ref 150–450)
PLATELET # BLD AUTO: 61 10E3/UL (ref 150–450)
PO2 BLD: 28 MM HG (ref 75–85)
POLYCHROMASIA BLD QL SMEAR: SLIGHT
POTASSIUM BLD-SCNC: 3.1 MMOL/L (ref 3.5–5)
POTASSIUM BLD-SCNC: 3.3 MMOL/L (ref 3.5–5)
POTASSIUM BLD-SCNC: 3.4 MMOL/L (ref 3.5–5)
POTASSIUM BLD-SCNC: 3.5 MMOL/L (ref 3.5–5)
POTASSIUM BLD-SCNC: 3.6 MMOL/L (ref 3.5–5)
POTASSIUM BLD-SCNC: 3.7 MMOL/L (ref 3.5–5)
POTASSIUM BLD-SCNC: 3.7 MMOL/L (ref 3.5–5)
POTASSIUM BLD-SCNC: 3.8 MMOL/L (ref 3.5–5)
POTASSIUM BLD-SCNC: 3.9 MMOL/L (ref 3.5–5)
POTASSIUM BLD-SCNC: 4 MMOL/L (ref 3.5–5)
POTASSIUM BLD-SCNC: 4.1 MMOL/L (ref 3.5–5)
POTASSIUM BLD-SCNC: 4.2 MMOL/L (ref 3.5–5)
POTASSIUM SERPL-SCNC: 4.1 MMOL/L (ref 3.4–5.3)
POTASSIUM SERPL-SCNC: 4.2 MMOL/L (ref 3.4–5.3)
PR INTERVAL - MUSE: 138 MS
PR INTERVAL - MUSE: NORMAL MS
PROCALCITONIN SERPL-MCNC: 27.23 NG/ML (ref 0–0.49)
PROT FLD-MCNC: 3 G/DL
PROT SERPL-MCNC: 5.6 G/DL (ref 6–8)
PROT SERPL-MCNC: 5.9 G/DL (ref 6–8)
PROT SERPL-MCNC: 6 G/DL (ref 6–8)
PROT SERPL-MCNC: 6.2 G/DL (ref 6–8)
PROT SERPL-MCNC: 6.3 G/DL (ref 6.4–8.3)
PROT SERPL-MCNC: 6.3 G/DL (ref 6–8)
PROT SERPL-MCNC: 6.3 G/DL (ref 6–8)
PROT SERPL-MCNC: 6.6 G/DL (ref 6–8)
PROT SERPL-MCNC: 7.3 G/DL (ref 6–8)
PROT SERPL-MCNC: 7.7 G/DL (ref 6–8)
PROT/CREAT 24H UR: 0.36 MG/MG CR
PROTEIN BODY FLUID SOURCE: NORMAL
QRS DURATION - MUSE: 84 MS
QRS DURATION - MUSE: 94 MS
QT - MUSE: 310 MS
QT - MUSE: 344 MS
QTC - MUSE: 441 MS
QTC - MUSE: 478 MS
R AXIS - MUSE: 79 DEGREES
R AXIS - MUSE: 89 DEGREES
RBC # BLD AUTO: 2.66 10E6/UL (ref 3.8–5.2)
RBC # BLD AUTO: 2.82 10E6/UL (ref 3.8–5.2)
RBC # BLD AUTO: 2.89 10E6/UL (ref 3.8–5.2)
RBC # BLD AUTO: 2.96 10E6/UL (ref 3.8–5.2)
RBC # BLD AUTO: 3.01 10E6/UL (ref 3.8–5.2)
RBC # BLD AUTO: 3.05 10E6/UL (ref 3.8–5.2)
RBC # BLD AUTO: 3.22 10E6/UL (ref 3.8–5.2)
RBC # BLD AUTO: 3.24 10E6/UL (ref 3.8–5.2)
RBC # BLD AUTO: 3.41 10E6/UL (ref 3.8–5.2)
RBC MORPH BLD: ABNORMAL
RBC URINE: <1 /HPF
RETICS # AUTO: 0.04 10E6/UL (ref 0.01–0.11)
RETICS # AUTO: 0.05 10E6/UL (ref 0.01–0.11)
RETICS # AUTO: 0.05 10E6/UL (ref 0.01–0.11)
RETICS/RBC NFR AUTO: 1.5 % (ref 0.8–2.7)
RSV RNA SPEC NAA+PROBE: NEGATIVE
SARS-COV-2 RNA RESP QL NAA+PROBE: NEGATIVE
SARS-COV-2 RNA RESP QL NAA+PROBE: NEGATIVE
SODIUM SERPL-SCNC: 130 MMOL/L (ref 136–145)
SODIUM SERPL-SCNC: 130 MMOL/L (ref 136–145)
SODIUM SERPL-SCNC: 131 MMOL/L (ref 136–145)
SODIUM SERPL-SCNC: 132 MMOL/L (ref 136–145)
SODIUM SERPL-SCNC: 133 MMOL/L (ref 136–145)
SODIUM SERPL-SCNC: 134 MMOL/L (ref 136–145)
SODIUM SERPL-SCNC: 135 MMOL/L (ref 136–145)
SODIUM SERPL-SCNC: 136 MMOL/L (ref 136–145)
SP GR UR STRIP: 1.02 (ref 1–1.03)
SP GR UR STRIP: 1.02 (ref 1–1.03)
SPECIMEN EXPIRATION DATE: NORMAL
SPECIMEN EXPIRATION DATE: NORMAL
SQUAMOUS EPITHELIAL: 1 /HPF
STAPHYLOCOCCUS AUREUS: DETECTED
STAPHYLOCOCCUS AUREUS: DETECTED
STAPHYLOCOCCUS EPIDERMIDIS: NOT DETECTED
STAPHYLOCOCCUS EPIDERMIDIS: NOT DETECTED
STAPHYLOCOCCUS LUGDUNENSIS: NOT DETECTED
STAPHYLOCOCCUS LUGDUNENSIS: NOT DETECTED
STREPTOCOCCUS AGALACTIAE: NOT DETECTED
STREPTOCOCCUS AGALACTIAE: NOT DETECTED
STREPTOCOCCUS ANGINOSUS GROUP: NOT DETECTED
STREPTOCOCCUS ANGINOSUS GROUP: NOT DETECTED
STREPTOCOCCUS PNEUMONIAE: NOT DETECTED
STREPTOCOCCUS PNEUMONIAE: NOT DETECTED
STREPTOCOCCUS PYOGENES: NOT DETECTED
STREPTOCOCCUS PYOGENES: NOT DETECTED
STREPTOCOCCUS SPECIES: NOT DETECTED
STREPTOCOCCUS SPECIES: NOT DETECTED
SYSTOLIC BLOOD PRESSURE - MUSE: 165 MMHG
SYSTOLIC BLOOD PRESSURE - MUSE: NORMAL MMHG
T AXIS - MUSE: -84 DEGREES
T AXIS - MUSE: 237 DEGREES
TEMPERATURE: 37 DEGREES C
TROPONIN I SERPL-MCNC: 0.09 NG/ML (ref 0–0.29)
TROPONIN I SERPL-MCNC: 0.11 NG/ML (ref 0–0.29)
TROPONIN I SERPL-MCNC: 0.11 NG/ML (ref 0–0.29)
TSH SERPL DL<=0.005 MIU/L-ACNC: 3.92 UIU/ML (ref 0.3–5)
UROBILINOGEN UR STRIP-MCNC: <2 MG/DL
UROBILINOGEN UR STRIP-MCNC: <2 MG/DL
VANCOMYCIN SERPL-MCNC: 14.7 MG/L
VANCOMYCIN SERPL-MCNC: 18.1 MG/L
VANCOMYCIN SERPL-MCNC: 18.9 MG/L
VANCOMYCIN SERPL-MCNC: 18.9 UG/ML
VANCOMYCIN SERPL-MCNC: 19.1 MG/L
VANCOMYCIN SERPL-MCNC: 20.6 UG/ML
VENTRICULAR RATE- MUSE: 143 BPM
VENTRICULAR RATE- MUSE: 99 BPM
VIT B12 SERPL-MCNC: 636 PG/ML (ref 232–1245)
WBC # BLD AUTO: 10.7 10E3/UL (ref 4–11)
WBC # BLD AUTO: 11.1 10E3/UL (ref 4–11)
WBC # BLD AUTO: 12.5 10E3/UL (ref 4–11)
WBC # BLD AUTO: 14.1 10E3/UL (ref 4–11)
WBC # BLD AUTO: 6.2 10E3/UL (ref 4–11)
WBC # BLD AUTO: 6.6 10E3/UL (ref 4–11)
WBC # BLD AUTO: 6.7 10E3/UL (ref 4–11)
WBC # BLD AUTO: 7.1 10E3/UL (ref 4–11)
WBC # BLD AUTO: 9.7 10E3/UL (ref 4–11)
WBC URINE: 4 /HPF

## 2022-01-01 PROCEDURE — 99356 PR PROLONGED SERV,INPATIENT,1ST HR: CPT | Performed by: PHYSICIAN ASSISTANT

## 2022-01-01 PROCEDURE — 250N000011 HC RX IP 250 OP 636: Performed by: INTERNAL MEDICINE

## 2022-01-01 PROCEDURE — 370N000017 HC ANESTHESIA TECHNICAL FEE, PER MIN

## 2022-01-01 PROCEDURE — 82550 ASSAY OF CK (CPK): CPT | Performed by: INTERNAL MEDICINE

## 2022-01-01 PROCEDURE — 99223 1ST HOSP IP/OBS HIGH 75: CPT | Performed by: INTERNAL MEDICINE

## 2022-01-01 PROCEDURE — A9585 GADOBUTROL INJECTION: HCPCS | Performed by: INTERNAL MEDICINE

## 2022-01-01 PROCEDURE — 80053 COMPREHEN METABOLIC PANEL: CPT | Performed by: EMERGENCY MEDICINE

## 2022-01-01 PROCEDURE — 93325 DOPPLER ECHO COLOR FLOW MAPG: CPT

## 2022-01-01 PROCEDURE — 999N000065 XR CHEST 2 VIEWS

## 2022-01-01 PROCEDURE — 250N000013 HC RX MED GY IP 250 OP 250 PS 637: Performed by: INTERNAL MEDICINE

## 2022-01-01 PROCEDURE — 99207 PR CDG-CUT & PASTE-POTENTIAL IMPACT ON LEVEL: CPT | Performed by: INTERNAL MEDICINE

## 2022-01-01 PROCEDURE — 71045 X-RAY EXAM CHEST 1 VIEW: CPT | Mod: 77

## 2022-01-01 PROCEDURE — 86880 COOMBS TEST DIRECT: CPT | Performed by: INTERNAL MEDICINE

## 2022-01-01 PROCEDURE — 74177 CT ABD & PELVIS W/CONTRAST: CPT

## 2022-01-01 PROCEDURE — 83605 ASSAY OF LACTIC ACID: CPT | Performed by: EMERGENCY MEDICINE

## 2022-01-01 PROCEDURE — 86022 PLATELET ANTIBODIES: CPT | Performed by: INTERNAL MEDICINE

## 2022-01-01 PROCEDURE — 83735 ASSAY OF MAGNESIUM: CPT | Performed by: INTERNAL MEDICINE

## 2022-01-01 PROCEDURE — 87077 CULTURE AEROBIC IDENTIFY: CPT | Performed by: INTERNAL MEDICINE

## 2022-01-01 PROCEDURE — 99233 SBSQ HOSP IP/OBS HIGH 50: CPT | Performed by: STUDENT IN AN ORGANIZED HEALTH CARE EDUCATION/TRAINING PROGRAM

## 2022-01-01 PROCEDURE — 99233 SBSQ HOSP IP/OBS HIGH 50: CPT | Performed by: INTERNAL MEDICINE

## 2022-01-01 PROCEDURE — 93306 TTE W/DOPPLER COMPLETE: CPT

## 2022-01-01 PROCEDURE — 83010 ASSAY OF HAPTOGLOBIN QUANT: CPT | Performed by: INTERNAL MEDICINE

## 2022-01-01 PROCEDURE — 36415 COLL VENOUS BLD VENIPUNCTURE: CPT | Performed by: INTERNAL MEDICINE

## 2022-01-01 PROCEDURE — 82077 ASSAY SPEC XCP UR&BREATH IA: CPT | Performed by: INTERNAL MEDICINE

## 2022-01-01 PROCEDURE — 99233 SBSQ HOSP IP/OBS HIGH 50: CPT | Mod: GC | Performed by: INTERNAL MEDICINE

## 2022-01-01 PROCEDURE — 99233 SBSQ HOSP IP/OBS HIGH 50: CPT | Performed by: PHYSICIAN ASSISTANT

## 2022-01-01 PROCEDURE — 120N000004 HC R&B MS OVERFLOW

## 2022-01-01 PROCEDURE — 85045 AUTOMATED RETICULOCYTE COUNT: CPT | Performed by: INTERNAL MEDICINE

## 2022-01-01 PROCEDURE — 85018 HEMOGLOBIN: CPT | Performed by: INTERNAL MEDICINE

## 2022-01-01 PROCEDURE — 81003 URINALYSIS AUTO W/O SCOPE: CPT | Performed by: INTERNAL MEDICINE

## 2022-01-01 PROCEDURE — 84155 ASSAY OF PROTEIN SERUM: CPT | Performed by: EMERGENCY MEDICINE

## 2022-01-01 PROCEDURE — 99232 SBSQ HOSP IP/OBS MODERATE 35: CPT | Performed by: INTERNAL MEDICINE

## 2022-01-01 PROCEDURE — 97161 PT EVAL LOW COMPLEX 20 MIN: CPT | Mod: GP

## 2022-01-01 PROCEDURE — 80202 ASSAY OF VANCOMYCIN: CPT | Performed by: STUDENT IN AN ORGANIZED HEALTH CARE EDUCATION/TRAINING PROGRAM

## 2022-01-01 PROCEDURE — 84100 ASSAY OF PHOSPHORUS: CPT | Performed by: INTERNAL MEDICINE

## 2022-01-01 PROCEDURE — 250N000009 HC RX 250: Performed by: INTERNAL MEDICINE

## 2022-01-01 PROCEDURE — 250N000013 HC RX MED GY IP 250 OP 250 PS 637: Performed by: EMERGENCY MEDICINE

## 2022-01-01 PROCEDURE — 83880 ASSAY OF NATRIURETIC PEPTIDE: CPT | Performed by: EMERGENCY MEDICINE

## 2022-01-01 PROCEDURE — 85027 COMPLETE CBC AUTOMATED: CPT | Performed by: INTERNAL MEDICINE

## 2022-01-01 PROCEDURE — 80170 ASSAY OF GENTAMICIN: CPT | Performed by: INTERNAL MEDICINE

## 2022-01-01 PROCEDURE — 32555 ASPIRATE PLEURA W/ IMAGING: CPT

## 2022-01-01 PROCEDURE — 210N000001 HC R&B IMCU HEART CARE

## 2022-01-01 PROCEDURE — 87149 DNA/RNA DIRECT PROBE: CPT | Performed by: INTERNAL MEDICINE

## 2022-01-01 PROCEDURE — 80053 COMPREHEN METABOLIC PANEL: CPT | Performed by: INTERNAL MEDICINE

## 2022-01-01 PROCEDURE — 80202 ASSAY OF VANCOMYCIN: CPT | Performed by: INTERNAL MEDICINE

## 2022-01-01 PROCEDURE — 99207 PR NO CHARGE LOS: CPT | Performed by: INTERNAL MEDICINE

## 2022-01-01 PROCEDURE — 250N000011 HC RX IP 250 OP 636: Performed by: EMERGENCY MEDICINE

## 2022-01-01 PROCEDURE — 250N000013 HC RX MED GY IP 250 OP 250 PS 637: Performed by: PHYSICIAN ASSISTANT

## 2022-01-01 PROCEDURE — 36415 COLL VENOUS BLD VENIPUNCTURE: CPT | Performed by: EMERGENCY MEDICINE

## 2022-01-01 PROCEDURE — 96375 TX/PRO/DX INJ NEW DRUG ADDON: CPT

## 2022-01-01 PROCEDURE — 99283 EMERGENCY DEPT VISIT LOW MDM: CPT

## 2022-01-01 PROCEDURE — 93010 ELECTROCARDIOGRAM REPORT: CPT | Performed by: INTERNAL MEDICINE

## 2022-01-01 PROCEDURE — 83615 LACTATE (LD) (LDH) ENZYME: CPT | Performed by: EMERGENCY MEDICINE

## 2022-01-01 PROCEDURE — 82746 ASSAY OF FOLIC ACID SERUM: CPT | Performed by: INTERNAL MEDICINE

## 2022-01-01 PROCEDURE — 80048 BASIC METABOLIC PNL TOTAL CA: CPT | Performed by: INTERNAL MEDICINE

## 2022-01-01 PROCEDURE — 5A2204Z RESTORATION OF CARDIAC RHYTHM, SINGLE: ICD-10-PCS | Performed by: INTERNAL MEDICINE

## 2022-01-01 PROCEDURE — 84157 ASSAY OF PROTEIN OTHER: CPT | Performed by: EMERGENCY MEDICINE

## 2022-01-01 PROCEDURE — 99233 SBSQ HOSP IP/OBS HIGH 50: CPT | Performed by: NURSE PRACTITIONER

## 2022-01-01 PROCEDURE — 85007 BL SMEAR W/DIFF WBC COUNT: CPT | Performed by: INTERNAL MEDICINE

## 2022-01-01 PROCEDURE — 99222 1ST HOSP IP/OBS MODERATE 55: CPT | Performed by: INTERNAL MEDICINE

## 2022-01-01 PROCEDURE — 99223 1ST HOSP IP/OBS HIGH 75: CPT | Performed by: SURGERY

## 2022-01-01 PROCEDURE — 258N000003 HC RX IP 258 OP 636: Performed by: INTERNAL MEDICINE

## 2022-01-01 PROCEDURE — 93312 ECHO TRANSESOPHAGEAL: CPT | Mod: 26 | Performed by: INTERNAL MEDICINE

## 2022-01-01 PROCEDURE — 85049 AUTOMATED PLATELET COUNT: CPT | Performed by: INTERNAL MEDICINE

## 2022-01-01 PROCEDURE — 82565 ASSAY OF CREATININE: CPT | Performed by: INTERNAL MEDICINE

## 2022-01-01 PROCEDURE — 85025 COMPLETE CBC W/AUTO DIFF WBC: CPT | Performed by: INTERNAL MEDICINE

## 2022-01-01 PROCEDURE — 72125 CT NECK SPINE W/O DYE: CPT

## 2022-01-01 PROCEDURE — 71045 X-RAY EXAM CHEST 1 VIEW: CPT

## 2022-01-01 PROCEDURE — 36415 COLL VENOUS BLD VENIPUNCTURE: CPT | Performed by: PHYSICIAN ASSISTANT

## 2022-01-01 PROCEDURE — 85384 FIBRINOGEN ACTIVITY: CPT | Performed by: INTERNAL MEDICINE

## 2022-01-01 PROCEDURE — 83615 LACTATE (LD) (LDH) ENZYME: CPT | Performed by: INTERNAL MEDICINE

## 2022-01-01 PROCEDURE — 72131 CT LUMBAR SPINE W/O DYE: CPT

## 2022-01-01 PROCEDURE — 80053 COMPREHEN METABOLIC PANEL: CPT | Performed by: PHYSICIAN ASSISTANT

## 2022-01-01 PROCEDURE — 258N000003 HC RX IP 258 OP 636: Performed by: EMERGENCY MEDICINE

## 2022-01-01 PROCEDURE — 85041 AUTOMATED RBC COUNT: CPT | Performed by: EMERGENCY MEDICINE

## 2022-01-01 PROCEDURE — 85730 THROMBOPLASTIN TIME PARTIAL: CPT | Performed by: INTERNAL MEDICINE

## 2022-01-01 PROCEDURE — 99232 SBSQ HOSP IP/OBS MODERATE 35: CPT | Performed by: STUDENT IN AN ORGANIZED HEALTH CARE EDUCATION/TRAINING PROGRAM

## 2022-01-01 PROCEDURE — 87205 SMEAR GRAM STAIN: CPT | Performed by: EMERGENCY MEDICINE

## 2022-01-01 PROCEDURE — 87149 DNA/RNA DIRECT PROBE: CPT | Performed by: EMERGENCY MEDICINE

## 2022-01-01 PROCEDURE — 78816 PET IMAGE W/CT FULL BODY: CPT | Mod: PI

## 2022-01-01 PROCEDURE — 93005 ELECTROCARDIOGRAM TRACING: CPT | Performed by: EMERGENCY MEDICINE

## 2022-01-01 PROCEDURE — 87070 CULTURE OTHR SPECIMN AEROBIC: CPT | Performed by: EMERGENCY MEDICINE

## 2022-01-01 PROCEDURE — 99233 SBSQ HOSP IP/OBS HIGH 50: CPT | Performed by: GENERAL PRACTICE

## 2022-01-01 PROCEDURE — 84484 ASSAY OF TROPONIN QUANT: CPT | Performed by: INTERNAL MEDICINE

## 2022-01-01 PROCEDURE — 84156 ASSAY OF PROTEIN URINE: CPT | Performed by: INTERNAL MEDICINE

## 2022-01-01 PROCEDURE — 97530 THERAPEUTIC ACTIVITIES: CPT | Mod: GP

## 2022-01-01 PROCEDURE — 83735 ASSAY OF MAGNESIUM: CPT | Performed by: PHYSICIAN ASSISTANT

## 2022-01-01 PROCEDURE — 83605 ASSAY OF LACTIC ACID: CPT | Performed by: INTERNAL MEDICINE

## 2022-01-01 PROCEDURE — 93005 ELECTROCARDIOGRAM TRACING: CPT | Performed by: INTERNAL MEDICINE

## 2022-01-01 PROCEDURE — 85060 BLOOD SMEAR INTERPRETATION: CPT | Performed by: PATHOLOGY

## 2022-01-01 PROCEDURE — 82140 ASSAY OF AMMONIA: CPT | Performed by: INTERNAL MEDICINE

## 2022-01-01 PROCEDURE — 84145 PROCALCITONIN (PCT): CPT | Performed by: INTERNAL MEDICINE

## 2022-01-01 PROCEDURE — 93005 ELECTROCARDIOGRAM TRACING: CPT

## 2022-01-01 PROCEDURE — 70450 CT HEAD/BRAIN W/O DYE: CPT

## 2022-01-01 PROCEDURE — 82310 ASSAY OF CALCIUM: CPT | Performed by: INTERNAL MEDICINE

## 2022-01-01 PROCEDURE — 999N000065 XR CHEST PORT 1 VIEW

## 2022-01-01 PROCEDURE — 84132 ASSAY OF SERUM POTASSIUM: CPT | Performed by: INTERNAL MEDICINE

## 2022-01-01 PROCEDURE — 99357 PR PROLONGED SERV,INPATIENT,EA ADDL 30 MIN: CPT | Performed by: PHYSICIAN ASSISTANT

## 2022-01-01 PROCEDURE — 82805 BLOOD GASES W/O2 SATURATION: CPT | Performed by: INTERNAL MEDICINE

## 2022-01-01 PROCEDURE — U0003 INFECTIOUS AGENT DETECTION BY NUCLEIC ACID (DNA OR RNA); SEVERE ACUTE RESPIRATORY SYNDROME CORONAVIRUS 2 (SARS-COV-2) (CORONAVIRUS DISEASE [COVID-19]), AMPLIFIED PROBE TECHNIQUE, MAKING USE OF HIGH THROUGHPUT TECHNOLOGIES AS DESCRIBED BY CMS-2020-01-R: HCPCS | Performed by: INTERNAL MEDICINE

## 2022-01-01 PROCEDURE — 99232 SBSQ HOSP IP/OBS MODERATE 35: CPT | Performed by: PHYSICIAN ASSISTANT

## 2022-01-01 PROCEDURE — A9552 F18 FDG: HCPCS | Performed by: INTERNAL MEDICINE

## 2022-01-01 PROCEDURE — 250N000009 HC RX 250: Performed by: EMERGENCY MEDICINE

## 2022-01-01 PROCEDURE — 87637 SARSCOV2&INF A&B&RSV AMP PRB: CPT | Performed by: EMERGENCY MEDICINE

## 2022-01-01 PROCEDURE — 82040 ASSAY OF SERUM ALBUMIN: CPT | Performed by: EMERGENCY MEDICINE

## 2022-01-01 PROCEDURE — 93325 DOPPLER ECHO COLOR FLOW MAPG: CPT | Mod: 26 | Performed by: INTERNAL MEDICINE

## 2022-01-01 PROCEDURE — 250N000009 HC RX 250: Performed by: STUDENT IN AN ORGANIZED HEALTH CARE EDUCATION/TRAINING PROGRAM

## 2022-01-01 PROCEDURE — 99285 EMERGENCY DEPT VISIT HI MDM: CPT | Mod: 25

## 2022-01-01 PROCEDURE — 96368 THER/DIAG CONCURRENT INF: CPT

## 2022-01-01 PROCEDURE — 70553 MRI BRAIN STEM W/O & W/DYE: CPT

## 2022-01-01 PROCEDURE — 85610 PROTHROMBIN TIME: CPT | Performed by: INTERNAL MEDICINE

## 2022-01-01 PROCEDURE — 99214 OFFICE O/P EST MOD 30 MIN: CPT | Performed by: INTERNAL MEDICINE

## 2022-01-01 PROCEDURE — 99223 1ST HOSP IP/OBS HIGH 75: CPT | Performed by: NURSE PRACTITIONER

## 2022-01-01 PROCEDURE — 343N000001 HC RX 343: Performed by: INTERNAL MEDICINE

## 2022-01-01 PROCEDURE — 32551 INSERTION OF CHEST TUBE: CPT | Mod: RT

## 2022-01-01 PROCEDURE — 71045 X-RAY EXAM CHEST 1 VIEW: CPT | Mod: 76

## 2022-01-01 PROCEDURE — 82945 GLUCOSE OTHER FLUID: CPT | Performed by: EMERGENCY MEDICINE

## 2022-01-01 PROCEDURE — 80170 ASSAY OF GENTAMICIN: CPT | Performed by: STUDENT IN AN ORGANIZED HEALTH CARE EDUCATION/TRAINING PROGRAM

## 2022-01-01 PROCEDURE — 250N000013 HC RX MED GY IP 250 OP 250 PS 637: Performed by: GENERAL PRACTICE

## 2022-01-01 PROCEDURE — 84295 ASSAY OF SERUM SODIUM: CPT | Performed by: INTERNAL MEDICINE

## 2022-01-01 PROCEDURE — 93320 DOPPLER ECHO COMPLETE: CPT | Mod: 26 | Performed by: INTERNAL MEDICINE

## 2022-01-01 PROCEDURE — 999N000157 HC STATISTIC RCP TIME EA 10 MIN

## 2022-01-01 PROCEDURE — 85004 AUTOMATED DIFF WBC COUNT: CPT | Performed by: INTERNAL MEDICINE

## 2022-01-01 PROCEDURE — 99239 HOSP IP/OBS DSCHRG MGMT >30: CPT | Performed by: INTERNAL MEDICINE

## 2022-01-01 PROCEDURE — 89050 BODY FLUID CELL COUNT: CPT | Performed by: EMERGENCY MEDICINE

## 2022-01-01 PROCEDURE — 36415 COLL VENOUS BLD VENIPUNCTURE: CPT | Performed by: STUDENT IN AN ORGANIZED HEALTH CARE EDUCATION/TRAINING PROGRAM

## 2022-01-01 PROCEDURE — 99152 MOD SED SAME PHYS/QHP 5/>YRS: CPT | Performed by: INTERNAL MEDICINE

## 2022-01-01 PROCEDURE — 96366 THER/PROPH/DIAG IV INF ADDON: CPT

## 2022-01-01 PROCEDURE — 85014 HEMATOCRIT: CPT | Performed by: INTERNAL MEDICINE

## 2022-01-01 PROCEDURE — 96365 THER/PROPH/DIAG IV INF INIT: CPT | Mod: 59

## 2022-01-01 PROCEDURE — 85025 COMPLETE CBC W/AUTO DIFF WBC: CPT | Performed by: PHYSICIAN ASSISTANT

## 2022-01-01 PROCEDURE — 80202 ASSAY OF VANCOMYCIN: CPT | Performed by: PHYSICIAN ASSISTANT

## 2022-01-01 PROCEDURE — C9803 HOPD COVID-19 SPEC COLLECT: HCPCS

## 2022-01-01 PROCEDURE — 87077 CULTURE AEROBIC IDENTIFY: CPT | Performed by: EMERGENCY MEDICINE

## 2022-01-01 PROCEDURE — 92960 CARDIOVERSION ELECTRIC EXT: CPT | Performed by: INTERNAL MEDICINE

## 2022-01-01 PROCEDURE — 82607 VITAMIN B-12: CPT | Performed by: INTERNAL MEDICINE

## 2022-01-01 PROCEDURE — 93306 TTE W/DOPPLER COMPLETE: CPT | Mod: 26 | Performed by: INTERNAL MEDICINE

## 2022-01-01 PROCEDURE — 84484 ASSAY OF TROPONIN QUANT: CPT | Performed by: EMERGENCY MEDICINE

## 2022-01-01 PROCEDURE — 86140 C-REACTIVE PROTEIN: CPT | Performed by: INTERNAL MEDICINE

## 2022-01-01 PROCEDURE — 92610 EVALUATE SWALLOWING FUNCTION: CPT | Mod: GN

## 2022-01-01 PROCEDURE — 80170 ASSAY OF GENTAMICIN: CPT | Performed by: PHYSICIAN ASSISTANT

## 2022-01-01 PROCEDURE — 84443 ASSAY THYROID STIM HORMONE: CPT | Performed by: INTERNAL MEDICINE

## 2022-01-01 PROCEDURE — 70491 CT SOFT TISSUE NECK W/DYE: CPT

## 2022-01-01 PROCEDURE — 81001 URINALYSIS AUTO W/SCOPE: CPT | Performed by: EMERGENCY MEDICINE

## 2022-01-01 PROCEDURE — 36600 WITHDRAWAL OF ARTERIAL BLOOD: CPT

## 2022-01-01 PROCEDURE — 0W993ZZ DRAINAGE OF RIGHT PLEURAL CAVITY, PERCUTANEOUS APPROACH: ICD-10-PCS | Performed by: INTERNAL MEDICINE

## 2022-01-01 PROCEDURE — 96361 HYDRATE IV INFUSION ADD-ON: CPT

## 2022-01-01 PROCEDURE — 255N000002 HC RX 255 OP 636: Performed by: INTERNAL MEDICINE

## 2022-01-01 PROCEDURE — HZ2ZZZZ DETOXIFICATION SERVICES FOR SUBSTANCE ABUSE TREATMENT: ICD-10-PCS | Performed by: INTERNAL MEDICINE

## 2022-01-01 RX ORDER — GARLIC 180 MG
120 TABLET, DELAYED RELEASE (ENTERIC COATED) ORAL DAILY
COMMUNITY

## 2022-01-01 RX ORDER — RIFAMPIN 300 MG/1
600 CAPSULE ORAL DAILY
Status: DISCONTINUED | OUTPATIENT
Start: 2022-01-01 | End: 2022-01-01

## 2022-01-01 RX ORDER — POLYETHYLENE GLYCOL 3350 17 G/17G
17 POWDER, FOR SOLUTION ORAL DAILY
Status: DISCONTINUED | OUTPATIENT
Start: 2022-01-01 | End: 2022-01-01 | Stop reason: HOSPADM

## 2022-01-01 RX ORDER — FLUMAZENIL 0.1 MG/ML
0.2 INJECTION, SOLUTION INTRAVENOUS
Status: DISCONTINUED | OUTPATIENT
Start: 2022-01-01 | End: 2022-01-01 | Stop reason: HOSPADM

## 2022-01-01 RX ORDER — MORPHINE SULFATE 2 MG/ML
2 INJECTION, SOLUTION INTRAMUSCULAR; INTRAVENOUS
Status: DISCONTINUED | OUTPATIENT
Start: 2022-01-01 | End: 2022-01-01

## 2022-01-01 RX ORDER — SENNOSIDES 8.6 MG
1 TABLET ORAL 2 TIMES DAILY
DISCHARGE
Start: 2022-01-01

## 2022-01-01 RX ORDER — MORPHINE SULFATE 10 MG/5ML
10 SOLUTION ORAL
Status: DISCONTINUED | OUTPATIENT
Start: 2022-01-01 | End: 2022-01-01

## 2022-01-01 RX ORDER — POLYETHYLENE GLYCOL 3350 17 G/17G
17 POWDER, FOR SOLUTION ORAL DAILY
Qty: 510 G | DISCHARGE
Start: 2022-01-01

## 2022-01-01 RX ORDER — LORAZEPAM 2 MG/ML
1-2 INJECTION INTRAMUSCULAR EVERY 30 MIN PRN
Status: DISCONTINUED | OUTPATIENT
Start: 2022-01-01 | End: 2022-01-01

## 2022-01-01 RX ORDER — DIGOXIN 0.25 MG/ML
125 INJECTION INTRAMUSCULAR; INTRAVENOUS EVERY 6 HOURS
Status: DISCONTINUED | OUTPATIENT
Start: 2022-01-01 | End: 2022-01-01

## 2022-01-01 RX ORDER — DIGOXIN 0.25 MG/ML
125 INJECTION INTRAMUSCULAR; INTRAVENOUS 3 TIMES DAILY
Status: COMPLETED | OUTPATIENT
Start: 2022-01-01 | End: 2022-01-01

## 2022-01-01 RX ORDER — LORAZEPAM 0.5 MG/1
0.25 TABLET ORAL
Status: SHIPPED | DISCHARGE
Start: 2022-01-01

## 2022-01-01 RX ORDER — DIAZEPAM 10 MG/2ML
5-10 INJECTION, SOLUTION INTRAMUSCULAR; INTRAVENOUS EVERY 30 MIN PRN
Status: DISCONTINUED | OUTPATIENT
Start: 2022-01-01 | End: 2022-01-01

## 2022-01-01 RX ORDER — NALOXONE HYDROCHLORIDE 0.4 MG/ML
0.2 INJECTION, SOLUTION INTRAMUSCULAR; INTRAVENOUS; SUBCUTANEOUS
Status: DISCONTINUED | OUTPATIENT
Start: 2022-01-01 | End: 2022-01-01 | Stop reason: HOSPADM

## 2022-01-01 RX ORDER — SODIUM CHLORIDE 9 MG/ML
INJECTION, SOLUTION INTRAVENOUS CONTINUOUS
Status: DISCONTINUED | OUTPATIENT
Start: 2022-01-01 | End: 2022-01-01

## 2022-01-01 RX ORDER — FAMOTIDINE 20 MG/1
20 TABLET, FILM COATED ORAL 2 TIMES DAILY
Status: DISCONTINUED | OUTPATIENT
Start: 2022-01-01 | End: 2022-01-01

## 2022-01-01 RX ORDER — POTASSIUM CHLORIDE 1500 MG/1
40 TABLET, EXTENDED RELEASE ORAL ONCE
Status: COMPLETED | OUTPATIENT
Start: 2022-01-01 | End: 2022-01-01

## 2022-01-01 RX ORDER — OXYCODONE HYDROCHLORIDE 5 MG/1
5 TABLET ORAL EVERY 4 HOURS PRN
Status: DISCONTINUED | OUTPATIENT
Start: 2022-01-01 | End: 2022-01-01

## 2022-01-01 RX ORDER — LIDOCAINE HYDROCHLORIDE 10 MG/ML
10 INJECTION, SOLUTION EPIDURAL; INFILTRATION; INTRACAUDAL; PERINEURAL ONCE
Status: COMPLETED | OUTPATIENT
Start: 2022-01-01 | End: 2022-01-01

## 2022-01-01 RX ORDER — OLANZAPINE 5 MG/1
5 TABLET, ORALLY DISINTEGRATING ORAL EVERY 6 HOURS PRN
Status: DISCONTINUED | OUTPATIENT
Start: 2022-01-01 | End: 2022-01-01 | Stop reason: HOSPADM

## 2022-01-01 RX ORDER — ONDANSETRON 2 MG/ML
4 INJECTION INTRAMUSCULAR; INTRAVENOUS EVERY 6 HOURS PRN
Status: DISCONTINUED | OUTPATIENT
Start: 2022-01-01 | End: 2022-01-01 | Stop reason: HOSPADM

## 2022-01-01 RX ORDER — FOLIC ACID 1 MG/1
1 TABLET ORAL DAILY
Status: DISCONTINUED | OUTPATIENT
Start: 2022-01-01 | End: 2022-01-01

## 2022-01-01 RX ORDER — LORAZEPAM 0.5 MG/1
0.25 TABLET ORAL EVERY 4 HOURS PRN
Status: DISCONTINUED | OUTPATIENT
Start: 2022-01-01 | End: 2022-01-01

## 2022-01-01 RX ORDER — CEFAZOLIN SODIUM 1 G/50ML
1250 SOLUTION INTRAVENOUS EVERY 24 HOURS
Status: DISCONTINUED | OUTPATIENT
Start: 2022-01-01 | End: 2022-01-01

## 2022-01-01 RX ORDER — AMOXICILLIN 250 MG
1 CAPSULE ORAL 2 TIMES DAILY PRN
DISCHARGE
Start: 2022-01-01

## 2022-01-01 RX ORDER — AMOXICILLIN 250 MG
2 CAPSULE ORAL 2 TIMES DAILY PRN
DISCHARGE
Start: 2022-01-01

## 2022-01-01 RX ORDER — LIDOCAINE HYDROCHLORIDE 20 MG/ML
SOLUTION OROPHARYNGEAL
Status: COMPLETED | OUTPATIENT
Start: 2022-01-01 | End: 2022-01-01

## 2022-01-01 RX ORDER — DRONABINOL 5 MG/1
5 CAPSULE ORAL 2 TIMES DAILY
Status: SHIPPED | DISCHARGE
Start: 2022-01-01

## 2022-01-01 RX ORDER — PROCHLORPERAZINE MALEATE 5 MG
5 TABLET ORAL EVERY 6 HOURS PRN
Status: DISCONTINUED | OUTPATIENT
Start: 2022-01-01 | End: 2022-01-01 | Stop reason: HOSPADM

## 2022-01-01 RX ORDER — NALOXONE HYDROCHLORIDE 0.4 MG/ML
0.4 INJECTION, SOLUTION INTRAMUSCULAR; INTRAVENOUS; SUBCUTANEOUS
Status: DISCONTINUED | OUTPATIENT
Start: 2022-01-01 | End: 2022-01-01 | Stop reason: HOSPADM

## 2022-01-01 RX ORDER — LORAZEPAM 0.5 MG/1
1-2 TABLET ORAL EVERY 30 MIN PRN
Status: DISCONTINUED | OUTPATIENT
Start: 2022-01-01 | End: 2022-01-01

## 2022-01-01 RX ORDER — SULFAMETHOXAZOLE/TRIMETHOPRIM 800-160 MG
1 TABLET ORAL 2 TIMES DAILY
DISCHARGE
Start: 2022-01-01

## 2022-01-01 RX ORDER — NALOXONE HYDROCHLORIDE 0.4 MG/ML
0.2 INJECTION, SOLUTION INTRAMUSCULAR; INTRAVENOUS; SUBCUTANEOUS
Status: DISCONTINUED | OUTPATIENT
Start: 2022-01-01 | End: 2022-01-01

## 2022-01-01 RX ORDER — SODIUM CHLORIDE 9 MG/ML
INJECTION, SOLUTION INTRAVENOUS CONTINUOUS PRN
Status: COMPLETED | OUTPATIENT
Start: 2022-01-01 | End: 2022-01-01

## 2022-01-01 RX ORDER — NALOXONE HYDROCHLORIDE 0.4 MG/ML
0.4 INJECTION, SOLUTION INTRAMUSCULAR; INTRAVENOUS; SUBCUTANEOUS
Status: DISCONTINUED | OUTPATIENT
Start: 2022-01-01 | End: 2022-01-01

## 2022-01-01 RX ORDER — VENLAFAXINE HYDROCHLORIDE 150 MG/1
300 CAPSULE, EXTENDED RELEASE ORAL DAILY
Status: DISCONTINUED | OUTPATIENT
Start: 2022-01-01 | End: 2022-01-01 | Stop reason: HOSPADM

## 2022-01-01 RX ORDER — IOPAMIDOL 755 MG/ML
74 INJECTION, SOLUTION INTRAVASCULAR ONCE
Status: COMPLETED | OUTPATIENT
Start: 2022-01-01 | End: 2022-01-01

## 2022-01-01 RX ORDER — DRONABINOL 2.5 MG/1
2.5 CAPSULE ORAL 2 TIMES DAILY
Status: DISCONTINUED | OUTPATIENT
Start: 2022-01-01 | End: 2022-01-01

## 2022-01-01 RX ORDER — FENTANYL CITRATE 50 UG/ML
25 INJECTION, SOLUTION INTRAMUSCULAR; INTRAVENOUS ONCE
Status: COMPLETED | OUTPATIENT
Start: 2022-01-01 | End: 2022-01-01

## 2022-01-01 RX ORDER — CHLORAL HYDRATE 500 MG
1 CAPSULE ORAL DAILY
COMMUNITY

## 2022-01-01 RX ORDER — MAGNESIUM SULFATE HEPTAHYDRATE 40 MG/ML
2 INJECTION, SOLUTION INTRAVENOUS ONCE
Status: COMPLETED | OUTPATIENT
Start: 2022-01-01 | End: 2022-01-01

## 2022-01-01 RX ORDER — ONDANSETRON 4 MG/1
4 TABLET, ORALLY DISINTEGRATING ORAL EVERY 6 HOURS PRN
DISCHARGE
Start: 2022-01-01

## 2022-01-01 RX ORDER — HYDROMORPHONE HYDROCHLORIDE 1 MG/ML
0.5 INJECTION, SOLUTION INTRAMUSCULAR; INTRAVENOUS; SUBCUTANEOUS EVERY 4 HOURS PRN
Status: DISCONTINUED | OUTPATIENT
Start: 2022-01-01 | End: 2022-01-01

## 2022-01-01 RX ORDER — METOPROLOL TARTRATE 1 MG/ML
5 INJECTION, SOLUTION INTRAVENOUS ONCE
Status: COMPLETED | OUTPATIENT
Start: 2022-01-01 | End: 2022-01-01

## 2022-01-01 RX ORDER — FAMOTIDINE 20 MG/1
20 TABLET, FILM COATED ORAL DAILY
Status: DISCONTINUED | OUTPATIENT
Start: 2022-01-01 | End: 2022-01-01

## 2022-01-01 RX ORDER — LORAZEPAM 0.5 MG/1
0.5 TABLET ORAL EVERY 4 HOURS PRN
Status: DISCONTINUED | OUTPATIENT
Start: 2022-01-01 | End: 2022-01-01

## 2022-01-01 RX ORDER — DILTIAZEM HYDROCHLORIDE 5 MG/ML
2.5 INJECTION INTRAVENOUS ONCE
Status: DISCONTINUED | OUTPATIENT
Start: 2022-01-01 | End: 2022-01-01

## 2022-01-01 RX ORDER — POTASSIUM CHLORIDE 7.45 MG/ML
10 INJECTION INTRAVENOUS
Status: DISPENSED | OUTPATIENT
Start: 2022-01-01 | End: 2022-01-01

## 2022-01-01 RX ORDER — LORAZEPAM 0.5 MG/1
0.25 TABLET ORAL
Status: DISCONTINUED | OUTPATIENT
Start: 2022-01-01 | End: 2022-01-01 | Stop reason: HOSPADM

## 2022-01-01 RX ORDER — ACETAMINOPHEN 325 MG/1
650 TABLET ORAL ONCE
Status: COMPLETED | OUTPATIENT
Start: 2022-01-01 | End: 2022-01-01

## 2022-01-01 RX ORDER — ACETAMINOPHEN 325 MG/1
975 TABLET ORAL EVERY 8 HOURS
Status: DISCONTINUED | OUTPATIENT
Start: 2022-01-01 | End: 2022-01-01 | Stop reason: HOSPADM

## 2022-01-01 RX ORDER — LEVOTHYROXINE SODIUM 88 UG/1
88 TABLET ORAL
Status: DISCONTINUED | OUTPATIENT
Start: 2022-01-01 | End: 2022-01-01 | Stop reason: HOSPADM

## 2022-01-01 RX ORDER — SENNOSIDES 8.6 MG
1 TABLET ORAL 2 TIMES DAILY
Status: DISCONTINUED | OUTPATIENT
Start: 2022-01-01 | End: 2022-01-01 | Stop reason: HOSPADM

## 2022-01-01 RX ORDER — TORSEMIDE 20 MG/1
20 TABLET ORAL DAILY
Qty: 90 TABLET | Refills: 3 | Status: SHIPPED | OUTPATIENT
Start: 2022-01-01

## 2022-01-01 RX ORDER — FUROSEMIDE 10 MG/ML
20 INJECTION INTRAMUSCULAR; INTRAVENOUS EVERY 12 HOURS
Status: DISCONTINUED | OUTPATIENT
Start: 2022-01-01 | End: 2022-01-01

## 2022-01-01 RX ORDER — GADOBUTROL 604.72 MG/ML
7 INJECTION INTRAVENOUS ONCE
Status: COMPLETED | OUTPATIENT
Start: 2022-01-01 | End: 2022-01-01

## 2022-01-01 RX ORDER — ACETAMINOPHEN 325 MG/1
975 TABLET ORAL EVERY 8 HOURS
DISCHARGE
Start: 2022-01-01

## 2022-01-01 RX ORDER — PIPERACILLIN SODIUM, TAZOBACTAM SODIUM 3; .375 G/15ML; G/15ML
3.38 INJECTION, POWDER, LYOPHILIZED, FOR SOLUTION INTRAVENOUS ONCE
Status: COMPLETED | OUTPATIENT
Start: 2022-01-01 | End: 2022-01-01

## 2022-01-01 RX ORDER — MORPHINE SULFATE 20 MG/ML
5 SOLUTION ORAL
Status: DISCONTINUED | OUTPATIENT
Start: 2022-01-01 | End: 2022-01-01

## 2022-01-01 RX ORDER — POTASSIUM CHLORIDE 1.5 G/1.58G
20 POWDER, FOR SOLUTION ORAL ONCE
Status: COMPLETED | OUTPATIENT
Start: 2022-01-01 | End: 2022-01-01

## 2022-01-01 RX ORDER — HALOPERIDOL 5 MG/ML
2.5-5 INJECTION INTRAMUSCULAR EVERY 6 HOURS PRN
Status: DISCONTINUED | OUTPATIENT
Start: 2022-01-01 | End: 2022-01-01

## 2022-01-01 RX ORDER — OLANZAPINE 5 MG/1
5 TABLET, ORALLY DISINTEGRATING ORAL EVERY 6 HOURS PRN
DISCHARGE
Start: 2022-01-01

## 2022-01-01 RX ORDER — ONDANSETRON 4 MG/1
4 TABLET, ORALLY DISINTEGRATING ORAL EVERY 6 HOURS PRN
Status: DISCONTINUED | OUTPATIENT
Start: 2022-01-01 | End: 2022-01-01 | Stop reason: HOSPADM

## 2022-01-01 RX ORDER — OLANZAPINE 5 MG/1
5-10 TABLET, ORALLY DISINTEGRATING ORAL EVERY 6 HOURS PRN
Status: DISCONTINUED | OUTPATIENT
Start: 2022-01-01 | End: 2022-01-01

## 2022-01-01 RX ORDER — LIDOCAINE 4 G/G
2 PATCH TOPICAL EVERY 24 HOURS
DISCHARGE
Start: 2022-01-01

## 2022-01-01 RX ORDER — AMOXICILLIN 250 MG
2 CAPSULE ORAL 2 TIMES DAILY PRN
Status: DISCONTINUED | OUTPATIENT
Start: 2022-01-01 | End: 2022-01-01 | Stop reason: HOSPADM

## 2022-01-01 RX ORDER — DILTIAZEM HCL IN NACL,ISO-OSM 125 MG/125
2.5 PLASTIC BAG, INJECTION (ML) INTRAVENOUS CONTINUOUS
Status: DISCONTINUED | OUTPATIENT
Start: 2022-01-01 | End: 2022-01-01

## 2022-01-01 RX ORDER — OXYCODONE HYDROCHLORIDE 5 MG/1
5-10 TABLET ORAL EVERY 4 HOURS PRN
Status: DISCONTINUED | OUTPATIENT
Start: 2022-01-01 | End: 2022-01-01

## 2022-01-01 RX ORDER — DIGOXIN 0.25 MG/ML
250 INJECTION INTRAMUSCULAR; INTRAVENOUS ONCE
Status: COMPLETED | OUTPATIENT
Start: 2022-01-01 | End: 2022-01-01

## 2022-01-01 RX ORDER — BISACODYL 10 MG
10 SUPPOSITORY, RECTAL RECTAL ONCE
Status: DISCONTINUED | OUTPATIENT
Start: 2022-01-01 | End: 2022-01-01

## 2022-01-01 RX ORDER — IOPAMIDOL 755 MG/ML
75 INJECTION, SOLUTION INTRAVASCULAR ONCE
Status: COMPLETED | OUTPATIENT
Start: 2022-01-01 | End: 2022-01-01

## 2022-01-01 RX ORDER — AMIODARONE HYDROCHLORIDE 200 MG/1
200 TABLET ORAL DAILY
DISCHARGE
Start: 2022-01-01

## 2022-01-01 RX ORDER — PIPERACILLIN SODIUM, TAZOBACTAM SODIUM 3; .375 G/15ML; G/15ML
3.38 INJECTION, POWDER, LYOPHILIZED, FOR SOLUTION INTRAVENOUS EVERY 8 HOURS
Status: DISCONTINUED | OUTPATIENT
Start: 2022-01-01 | End: 2022-01-01

## 2022-01-01 RX ORDER — LIDOCAINE 40 MG/G
CREAM TOPICAL
DISCHARGE
Start: 2022-01-01

## 2022-01-01 RX ORDER — MAGNESIUM SULFATE 4 G/50ML
4 INJECTION INTRAVENOUS ONCE
Status: COMPLETED | OUTPATIENT
Start: 2022-01-01 | End: 2022-01-01

## 2022-01-01 RX ORDER — PROCHLORPERAZINE MALEATE 5 MG
5 TABLET ORAL EVERY 6 HOURS PRN
DISCHARGE
Start: 2022-01-01

## 2022-01-01 RX ORDER — VANCOMYCIN HYDROCHLORIDE 1 G/200ML
1000 INJECTION, SOLUTION INTRAVENOUS EVERY 24 HOURS
Status: DISCONTINUED | OUTPATIENT
Start: 2022-01-01 | End: 2022-01-01

## 2022-01-01 RX ORDER — PROCHLORPERAZINE 25 MG
12.5 SUPPOSITORY, RECTAL RECTAL EVERY 12 HOURS PRN
DISCHARGE
Start: 2022-01-01

## 2022-01-01 RX ORDER — ACETAMINOPHEN 325 MG/1
975 TABLET ORAL
Status: DISCONTINUED | OUTPATIENT
Start: 2022-01-01 | End: 2022-01-01 | Stop reason: HOSPADM

## 2022-01-01 RX ORDER — LIDOCAINE HCL 4% 4 G/100G
1 CREAM TOPICAL
COMMUNITY

## 2022-01-01 RX ORDER — CEFAZOLIN SODIUM 1 G/50ML
1250 SOLUTION INTRAVENOUS ONCE
Status: COMPLETED | OUTPATIENT
Start: 2022-01-01 | End: 2022-01-01

## 2022-01-01 RX ORDER — SULFAMETHOXAZOLE/TRIMETHOPRIM 800-160 MG
1 TABLET ORAL 2 TIMES DAILY
Status: DISCONTINUED | OUTPATIENT
Start: 2022-01-01 | End: 2022-01-01 | Stop reason: HOSPADM

## 2022-01-01 RX ORDER — POTASSIUM CHLORIDE 7.45 MG/ML
10 INJECTION INTRAVENOUS ONCE
Status: DISCONTINUED | OUTPATIENT
Start: 2022-01-01 | End: 2022-01-01

## 2022-01-01 RX ORDER — MULTIPLE VITAMINS W/ MINERALS TAB 9MG-400MCG
1 TAB ORAL DAILY
Status: DISCONTINUED | OUTPATIENT
Start: 2022-01-01 | End: 2022-01-01

## 2022-01-01 RX ORDER — HALOPERIDOL 1 MG/1
1 TABLET ORAL EVERY 8 HOURS
Status: DISCONTINUED | OUTPATIENT
Start: 2022-01-01 | End: 2022-01-01

## 2022-01-01 RX ORDER — AMIODARONE HYDROCHLORIDE 200 MG/1
200 TABLET ORAL DAILY
Status: DISCONTINUED | OUTPATIENT
Start: 2022-01-01 | End: 2022-01-01 | Stop reason: HOSPADM

## 2022-01-01 RX ORDER — DIGOXIN 0.25 MG/ML
500 INJECTION INTRAMUSCULAR; INTRAVENOUS ONCE
Status: DISCONTINUED | OUTPATIENT
Start: 2022-01-01 | End: 2022-01-01

## 2022-01-01 RX ORDER — MORPHINE SULFATE 10 MG/5ML
15 SOLUTION ORAL
Refills: 0 | Status: SHIPPED | DISCHARGE
Start: 2022-01-01

## 2022-01-01 RX ORDER — ACETAMINOPHEN 325 MG/1
975 TABLET ORAL EVERY 8 HOURS
Status: DISCONTINUED | OUTPATIENT
Start: 2022-01-01 | End: 2022-01-01

## 2022-01-01 RX ORDER — LIDOCAINE 40 MG/G
CREAM TOPICAL
Status: DISCONTINUED | OUTPATIENT
Start: 2022-01-01 | End: 2022-01-01 | Stop reason: HOSPADM

## 2022-01-01 RX ORDER — AMIODARONE HYDROCHLORIDE 200 MG/1
200 TABLET ORAL 2 TIMES DAILY
Status: DISCONTINUED | OUTPATIENT
Start: 2022-01-01 | End: 2022-01-01

## 2022-01-01 RX ORDER — FENTANYL CITRATE 50 UG/ML
INJECTION, SOLUTION INTRAMUSCULAR; INTRAVENOUS
Status: COMPLETED | OUTPATIENT
Start: 2022-01-01 | End: 2022-01-01

## 2022-01-01 RX ORDER — DIAZEPAM 10 MG
10 TABLET ORAL EVERY 30 MIN PRN
Status: DISCONTINUED | OUTPATIENT
Start: 2022-01-01 | End: 2022-01-01

## 2022-01-01 RX ORDER — ACETAMINOPHEN 325 MG/1
975 TABLET ORAL DAILY PRN
DISCHARGE
Start: 2022-01-01

## 2022-01-01 RX ORDER — OXYCODONE HYDROCHLORIDE 5 MG/1
5 TABLET ORAL EVERY 6 HOURS
Status: DISCONTINUED | OUTPATIENT
Start: 2022-01-01 | End: 2022-01-01 | Stop reason: HOSPADM

## 2022-01-01 RX ORDER — DIPHENHYDRAMINE HCL 25 MG
25 CAPSULE ORAL EVERY 6 HOURS PRN
Status: DISCONTINUED | OUTPATIENT
Start: 2022-01-01 | End: 2022-01-01 | Stop reason: HOSPADM

## 2022-01-01 RX ORDER — PROCHLORPERAZINE 25 MG
12.5 SUPPOSITORY, RECTAL RECTAL EVERY 12 HOURS PRN
Status: DISCONTINUED | OUTPATIENT
Start: 2022-01-01 | End: 2022-01-01 | Stop reason: HOSPADM

## 2022-01-01 RX ORDER — OLANZAPINE 2.5 MG/1
2.5-5 TABLET, FILM COATED ORAL EVERY 6 HOURS PRN
Status: DISCONTINUED | OUTPATIENT
Start: 2022-01-01 | End: 2022-01-01

## 2022-01-01 RX ORDER — MORPHINE SULFATE 10 MG/5ML
15 SOLUTION ORAL
Status: DISCONTINUED | OUTPATIENT
Start: 2022-01-01 | End: 2022-01-01 | Stop reason: HOSPADM

## 2022-01-01 RX ORDER — DIGOXIN 0.25 MG/ML
250 INJECTION INTRAMUSCULAR; INTRAVENOUS EVERY 6 HOURS
Status: DISCONTINUED | OUTPATIENT
Start: 2022-01-01 | End: 2022-01-01

## 2022-01-01 RX ORDER — DIPHENHYDRAMINE HCL 25 MG
25 TABLET ORAL PRN
COMMUNITY

## 2022-01-01 RX ORDER — NICOTINE POLACRILEX 4 MG/1
20 GUM, CHEWING ORAL DAILY
COMMUNITY

## 2022-01-01 RX ORDER — OXYCODONE HYDROCHLORIDE 5 MG/1
5 TABLET ORAL EVERY 6 HOURS
Refills: 0 | Status: SHIPPED | DISCHARGE
Start: 2022-01-01

## 2022-01-01 RX ORDER — FAMOTIDINE 20 MG/1
20 TABLET, FILM COATED ORAL DAILY
DISCHARGE
Start: 2022-01-01

## 2022-01-01 RX ORDER — HEPARIN SODIUM 5000 [USP'U]/.5ML
5000 INJECTION, SOLUTION INTRAVENOUS; SUBCUTANEOUS EVERY 12 HOURS
Status: DISCONTINUED | OUTPATIENT
Start: 2022-01-01 | End: 2022-01-01

## 2022-01-01 RX ORDER — DRONABINOL 2.5 MG/1
5 CAPSULE ORAL 2 TIMES DAILY
Status: DISCONTINUED | OUTPATIENT
Start: 2022-01-01 | End: 2022-01-01 | Stop reason: HOSPADM

## 2022-01-01 RX ORDER — AMOXICILLIN 250 MG
1 CAPSULE ORAL 2 TIMES DAILY PRN
Status: DISCONTINUED | OUTPATIENT
Start: 2022-01-01 | End: 2022-01-01 | Stop reason: HOSPADM

## 2022-01-01 RX ORDER — FAMOTIDINE 20 MG/1
20 TABLET, FILM COATED ORAL DAILY
Status: DISCONTINUED | OUTPATIENT
Start: 2022-01-01 | End: 2022-01-01 | Stop reason: HOSPADM

## 2022-01-01 RX ORDER — TORSEMIDE 20 MG/1
20 TABLET ORAL DAILY
Status: DISCONTINUED | OUTPATIENT
Start: 2022-01-01 | End: 2022-01-01

## 2022-01-01 RX ORDER — LIDOCAINE 4 G/G
2 PATCH TOPICAL
Status: DISCONTINUED | OUTPATIENT
Start: 2022-01-01 | End: 2022-01-01 | Stop reason: HOSPADM

## 2022-01-01 RX ADMIN — OXYCODONE HYDROCHLORIDE 5 MG: 5 TABLET ORAL at 16:58

## 2022-01-01 RX ADMIN — DIAZEPAM 10 MG: 10 TABLET ORAL at 23:24

## 2022-01-01 RX ADMIN — ACETAMINOPHEN 975 MG: 325 TABLET, FILM COATED ORAL at 19:56

## 2022-01-01 RX ADMIN — PIPERACILLIN AND TAZOBACTAM 3.38 G: 3; .375 INJECTION, POWDER, LYOPHILIZED, FOR SOLUTION INTRAVENOUS at 23:30

## 2022-01-01 RX ADMIN — THIAMINE HCL TAB 100 MG 100 MG: 100 TAB at 11:33

## 2022-01-01 RX ADMIN — VENLAFAXINE HYDROCHLORIDE 300 MG: 150 CAPSULE, EXTENDED RELEASE ORAL at 09:03

## 2022-01-01 RX ADMIN — OXYCODONE HYDROCHLORIDE 5 MG: 5 TABLET ORAL at 19:09

## 2022-01-01 RX ADMIN — ACETAMINOPHEN 975 MG: 325 TABLET, FILM COATED ORAL at 20:15

## 2022-01-01 RX ADMIN — VENLAFAXINE HYDROCHLORIDE 300 MG: 150 CAPSULE, EXTENDED RELEASE ORAL at 08:59

## 2022-01-01 RX ADMIN — DICLOFENAC SODIUM 2 G: 10 GEL TOPICAL at 12:53

## 2022-01-01 RX ADMIN — APIXABAN 5 MG: 5 TABLET, FILM COATED ORAL at 09:05

## 2022-01-01 RX ADMIN — POLYETHYLENE GLYCOL 3350 17 G: 17 POWDER, FOR SOLUTION ORAL at 09:07

## 2022-01-01 RX ADMIN — GENTAMICIN SULFATE 60 MG: 40 INJECTION, SOLUTION INTRAMUSCULAR; INTRAVENOUS at 15:31

## 2022-01-01 RX ADMIN — PIPERACILLIN AND TAZOBACTAM 3.38 G: 3; .375 INJECTION, POWDER, LYOPHILIZED, FOR SOLUTION INTRAVENOUS at 21:27

## 2022-01-01 RX ADMIN — VENLAFAXINE HYDROCHLORIDE 300 MG: 150 CAPSULE, EXTENDED RELEASE ORAL at 07:47

## 2022-01-01 RX ADMIN — APIXABAN 5 MG: 5 TABLET, FILM COATED ORAL at 19:57

## 2022-01-01 RX ADMIN — FAMOTIDINE 20 MG: 20 TABLET ORAL at 10:36

## 2022-01-01 RX ADMIN — AMIODARONE HYDROCHLORIDE 200 MG: 200 TABLET ORAL at 09:05

## 2022-01-01 RX ADMIN — MAGNESIUM SULFATE IN WATER 2 G: 40 INJECTION, SOLUTION INTRAVENOUS at 14:04

## 2022-01-01 RX ADMIN — APIXABAN 5 MG: 5 TABLET, FILM COATED ORAL at 20:21

## 2022-01-01 RX ADMIN — FAMOTIDINE 20 MG: 20 TABLET ORAL at 09:00

## 2022-01-01 RX ADMIN — POTASSIUM CHLORIDE 40 MEQ: 20 TABLET, EXTENDED RELEASE ORAL at 11:32

## 2022-01-01 RX ADMIN — FAMOTIDINE 20 MG: 20 TABLET ORAL at 09:03

## 2022-01-01 RX ADMIN — SENNOSIDES 1 TABLET: 8.6 TABLET, FILM COATED ORAL at 09:09

## 2022-01-01 RX ADMIN — RIFAMPIN 600 MG: 300 CAPSULE ORAL at 08:07

## 2022-01-01 RX ADMIN — POTASSIUM CHLORIDE 10 MEQ: 7.46 INJECTION, SOLUTION INTRAVENOUS at 06:49

## 2022-01-01 RX ADMIN — DICLOFENAC SODIUM 2 G: 10 GEL TOPICAL at 21:01

## 2022-01-01 RX ADMIN — SENNOSIDES 1 TABLET: 8.6 TABLET, FILM COATED ORAL at 11:54

## 2022-01-01 RX ADMIN — VENLAFAXINE HYDROCHLORIDE 300 MG: 150 CAPSULE, EXTENDED RELEASE ORAL at 09:41

## 2022-01-01 RX ADMIN — DICLOFENAC SODIUM 2 G: 10 GEL TOPICAL at 17:58

## 2022-01-01 RX ADMIN — FAMOTIDINE 20 MG: 20 TABLET ORAL at 08:05

## 2022-01-01 RX ADMIN — ACETAMINOPHEN 975 MG: 325 TABLET, FILM COATED ORAL at 11:34

## 2022-01-01 RX ADMIN — HEPARIN SODIUM 5000 UNITS: 10000 INJECTION, SOLUTION INTRAVENOUS; SUBCUTANEOUS at 09:03

## 2022-01-01 RX ADMIN — VANCOMYCIN HYDROCHLORIDE 750 MG: 10 INJECTION, POWDER, LYOPHILIZED, FOR SOLUTION INTRAVENOUS at 19:05

## 2022-01-01 RX ADMIN — APIXABAN 5 MG: 5 TABLET, FILM COATED ORAL at 09:45

## 2022-01-01 RX ADMIN — ACETAMINOPHEN 975 MG: 325 TABLET, FILM COATED ORAL at 04:36

## 2022-01-01 RX ADMIN — FAMOTIDINE 20 MG: 20 TABLET ORAL at 19:58

## 2022-01-01 RX ADMIN — VENLAFAXINE HYDROCHLORIDE 300 MG: 150 CAPSULE, EXTENDED RELEASE ORAL at 09:20

## 2022-01-01 RX ADMIN — SENNOSIDES 1 TABLET: 8.6 TABLET, FILM COATED ORAL at 09:08

## 2022-01-01 RX ADMIN — POTASSIUM CHLORIDE 40 MEQ: 1500 TABLET, EXTENDED RELEASE ORAL at 14:04

## 2022-01-01 RX ADMIN — LEVOTHYROXINE SODIUM 88 MCG: 88 TABLET ORAL at 06:46

## 2022-01-01 RX ADMIN — OXYCODONE HYDROCHLORIDE 5 MG: 5 TABLET ORAL at 06:29

## 2022-01-01 RX ADMIN — LIDOCAINE HYDROCHLORIDE 15 ML: 20 SOLUTION ORAL; TOPICAL at 11:49

## 2022-01-01 RX ADMIN — PIPERACILLIN AND TAZOBACTAM 3.38 G: 3; .375 INJECTION, POWDER, LYOPHILIZED, FOR SOLUTION INTRAVENOUS at 16:44

## 2022-01-01 RX ADMIN — OXYCODONE HYDROCHLORIDE 5 MG: 5 TABLET ORAL at 03:40

## 2022-01-01 RX ADMIN — PIPERACILLIN AND TAZOBACTAM 3.38 G: 3; .375 INJECTION, POWDER, LYOPHILIZED, FOR SOLUTION INTRAVENOUS at 15:20

## 2022-01-01 RX ADMIN — POTASSIUM & SODIUM PHOSPHATES POWDER PACK 280-160-250 MG 1 PACKET: 280-160-250 PACK at 08:33

## 2022-01-01 RX ADMIN — Medication 1 TABLET: at 08:19

## 2022-01-01 RX ADMIN — FOLIC ACID 1 MG: 1 TABLET ORAL at 09:03

## 2022-01-01 RX ADMIN — PIPERACILLIN AND TAZOBACTAM 3.38 G: 3; .375 INJECTION, POWDER, LYOPHILIZED, FOR SOLUTION INTRAVENOUS at 21:57

## 2022-01-01 RX ADMIN — ACETAMINOPHEN 975 MG: 325 TABLET, FILM COATED ORAL at 20:06

## 2022-01-01 RX ADMIN — FOLIC ACID 1 MG: 1 TABLET ORAL at 09:53

## 2022-01-01 RX ADMIN — HALOPERIDOL 1 MG: 1 TABLET ORAL at 13:03

## 2022-01-01 RX ADMIN — SENNOSIDES 1 TABLET: 8.6 TABLET, FILM COATED ORAL at 20:38

## 2022-01-01 RX ADMIN — OXYCODONE HYDROCHLORIDE 5 MG: 5 TABLET ORAL at 11:59

## 2022-01-01 RX ADMIN — ACETAMINOPHEN 975 MG: 325 TABLET, FILM COATED ORAL at 03:56

## 2022-01-01 RX ADMIN — APIXABAN 5 MG: 5 TABLET, FILM COATED ORAL at 21:20

## 2022-01-01 RX ADMIN — GADOBUTROL 7 ML: 604.72 INJECTION INTRAVENOUS at 13:03

## 2022-01-01 RX ADMIN — DICLOFENAC SODIUM 2 G: 10 GEL TOPICAL at 16:31

## 2022-01-01 RX ADMIN — OLANZAPINE 5 MG: 5 TABLET, ORALLY DISINTEGRATING ORAL at 09:08

## 2022-01-01 RX ADMIN — OXYCODONE HYDROCHLORIDE 5 MG: 5 TABLET ORAL at 09:02

## 2022-01-01 RX ADMIN — ACETAMINOPHEN 975 MG: 325 TABLET, FILM COATED ORAL at 20:20

## 2022-01-01 RX ADMIN — VANCOMYCIN HYDROCHLORIDE 750 MG: 10 INJECTION, POWDER, LYOPHILIZED, FOR SOLUTION INTRAVENOUS at 08:15

## 2022-01-01 RX ADMIN — LORAZEPAM 0.5 MG: 0.5 TABLET ORAL at 12:20

## 2022-01-01 RX ADMIN — AMIODARONE HYDROCHLORIDE 200 MG: 200 TABLET ORAL at 09:03

## 2022-01-01 RX ADMIN — LIDOCAINE HYDROCHLORIDE 10 ML: 10 INJECTION, SOLUTION EPIDURAL; INFILTRATION; INTRACAUDAL; PERINEURAL at 00:06

## 2022-01-01 RX ADMIN — DICLOFENAC SODIUM 2 G: 10 GEL TOPICAL at 12:13

## 2022-01-01 RX ADMIN — APIXABAN 5 MG: 5 TABLET, FILM COATED ORAL at 09:21

## 2022-01-01 RX ADMIN — Medication 1 TABLET: at 11:30

## 2022-01-01 RX ADMIN — MORPHINE SULFATE 10 MG: 10 SOLUTION ORAL at 20:16

## 2022-01-01 RX ADMIN — ACETAMINOPHEN 975 MG: 325 TABLET, FILM COATED ORAL at 20:36

## 2022-01-01 RX ADMIN — FAMOTIDINE 20 MG: 20 TABLET ORAL at 20:37

## 2022-01-01 RX ADMIN — ACETAMINOPHEN 975 MG: 325 TABLET, FILM COATED ORAL at 03:17

## 2022-01-01 RX ADMIN — DICLOFENAC SODIUM 2 G: 10 GEL TOPICAL at 16:02

## 2022-01-01 RX ADMIN — OXYCODONE HYDROCHLORIDE 5 MG: 5 TABLET ORAL at 00:08

## 2022-01-01 RX ADMIN — AMIODARONE HYDROCHLORIDE 200 MG: 200 TABLET ORAL at 10:24

## 2022-01-01 RX ADMIN — VANCOMYCIN HYDROCHLORIDE 750 MG: 10 INJECTION, POWDER, LYOPHILIZED, FOR SOLUTION INTRAVENOUS at 10:36

## 2022-01-01 RX ADMIN — LEVOTHYROXINE SODIUM 88 MCG: 88 TABLET ORAL at 08:18

## 2022-01-01 RX ADMIN — OXYCODONE HYDROCHLORIDE 5 MG: 5 TABLET ORAL at 12:13

## 2022-01-01 RX ADMIN — AMIODARONE HYDROCHLORIDE 200 MG: 200 TABLET ORAL at 09:08

## 2022-01-01 RX ADMIN — LEVOTHYROXINE SODIUM 88 MCG: 88 TABLET ORAL at 06:47

## 2022-01-01 RX ADMIN — DRONABINOL 2.5 MG: 2.5 CAPSULE ORAL at 20:38

## 2022-01-01 RX ADMIN — POTASSIUM CHLORIDE 10 MEQ: 7.46 INJECTION, SOLUTION INTRAVENOUS at 08:17

## 2022-01-01 RX ADMIN — VANCOMYCIN HYDROCHLORIDE 750 MG: 5 INJECTION, POWDER, LYOPHILIZED, FOR SOLUTION INTRAVENOUS at 20:16

## 2022-01-01 RX ADMIN — APIXABAN 5 MG: 5 TABLET, FILM COATED ORAL at 20:20

## 2022-01-01 RX ADMIN — ACETAMINOPHEN 975 MG: 325 TABLET, FILM COATED ORAL at 04:08

## 2022-01-01 RX ADMIN — LIDOCAINE PATCH 4% 2 PATCH: 40 PATCH TOPICAL at 13:07

## 2022-01-01 RX ADMIN — ACETAMINOPHEN 975 MG: 325 TABLET, FILM COATED ORAL at 20:19

## 2022-01-01 RX ADMIN — GENTAMICIN SULFATE 60 MG: 40 INJECTION, SOLUTION INTRAMUSCULAR; INTRAVENOUS at 13:37

## 2022-01-01 RX ADMIN — Medication 1 TABLET: at 10:03

## 2022-01-01 RX ADMIN — DICLOFENAC SODIUM 2 G: 10 GEL TOPICAL at 09:10

## 2022-01-01 RX ADMIN — LIDOCAINE PATCH 4% 2 PATCH: 40 PATCH TOPICAL at 13:14

## 2022-01-01 RX ADMIN — PIPERACILLIN AND TAZOBACTAM 3.38 G: 3; .375 INJECTION, POWDER, LYOPHILIZED, FOR SOLUTION INTRAVENOUS at 06:27

## 2022-01-01 RX ADMIN — FAMOTIDINE 20 MG: 20 TABLET ORAL at 07:46

## 2022-01-01 RX ADMIN — Medication 1 TABLET: at 09:52

## 2022-01-01 RX ADMIN — VENLAFAXINE HYDROCHLORIDE 300 MG: 150 CAPSULE, EXTENDED RELEASE ORAL at 09:53

## 2022-01-01 RX ADMIN — ACETAMINOPHEN 975 MG: 325 TABLET, FILM COATED ORAL at 04:30

## 2022-01-01 RX ADMIN — LEVOTHYROXINE SODIUM 88 MCG: 88 TABLET ORAL at 05:03

## 2022-01-01 RX ADMIN — DRONABINOL 2.5 MG: 2.5 CAPSULE ORAL at 20:16

## 2022-01-01 RX ADMIN — DICLOFENAC SODIUM 2 G: 10 GEL TOPICAL at 16:48

## 2022-01-01 RX ADMIN — THIAMINE HCL TAB 100 MG 100 MG: 100 TAB at 09:52

## 2022-01-01 RX ADMIN — MORPHINE SULFATE 2 MG: 2 INJECTION, SOLUTION INTRAMUSCULAR; INTRAVENOUS at 00:10

## 2022-01-01 RX ADMIN — GENTAMICIN SULFATE 90 MG: 40 INJECTION, SOLUTION INTRAMUSCULAR; INTRAVENOUS at 12:49

## 2022-01-01 RX ADMIN — ACETAMINOPHEN 975 MG: 325 TABLET, FILM COATED ORAL at 04:32

## 2022-01-01 RX ADMIN — ACETAMINOPHEN 975 MG: 325 TABLET, FILM COATED ORAL at 20:29

## 2022-01-01 RX ADMIN — LEVOTHYROXINE SODIUM 88 MCG: 88 TABLET ORAL at 05:28

## 2022-01-01 RX ADMIN — OLANZAPINE 10 MG: 5 TABLET, ORALLY DISINTEGRATING ORAL at 19:05

## 2022-01-01 RX ADMIN — AMIODARONE HYDROCHLORIDE 200 MG: 200 TABLET ORAL at 20:21

## 2022-01-01 RX ADMIN — APIXABAN 5 MG: 5 TABLET, FILM COATED ORAL at 09:09

## 2022-01-01 RX ADMIN — VANCOMYCIN HYDROCHLORIDE 1000 MG: 1 INJECTION, SOLUTION INTRAVENOUS at 18:01

## 2022-01-01 RX ADMIN — AMIODARONE HYDROCHLORIDE 150 MG: 1.5 INJECTION, SOLUTION INTRAVENOUS at 22:28

## 2022-01-01 RX ADMIN — SULFAMETHOXAZOLE AND TRIMETHOPRIM 1 TABLET: 800; 160 TABLET ORAL at 09:08

## 2022-01-01 RX ADMIN — MIDAZOLAM 0.5 MG: 1 INJECTION INTRAMUSCULAR; INTRAVENOUS at 11:51

## 2022-01-01 RX ADMIN — SODIUM CHLORIDE 1000 ML: 9 INJECTION, SOLUTION INTRAVENOUS at 15:55

## 2022-01-01 RX ADMIN — DICLOFENAC SODIUM 2 G: 10 GEL TOPICAL at 09:02

## 2022-01-01 RX ADMIN — ACETAMINOPHEN 975 MG: 325 TABLET, FILM COATED ORAL at 12:05

## 2022-01-01 RX ADMIN — THIAMINE HCL TAB 100 MG 100 MG: 100 TAB at 09:03

## 2022-01-01 RX ADMIN — Medication 1 TABLET: at 07:47

## 2022-01-01 RX ADMIN — FAMOTIDINE 20 MG: 20 TABLET ORAL at 09:52

## 2022-01-01 RX ADMIN — FAMOTIDINE 20 MG: 20 TABLET ORAL at 09:44

## 2022-01-01 RX ADMIN — ACETAMINOPHEN 975 MG: 325 TABLET, FILM COATED ORAL at 20:07

## 2022-01-01 RX ADMIN — DICLOFENAC SODIUM 2 G: 10 GEL TOPICAL at 18:09

## 2022-01-01 RX ADMIN — PIPERACILLIN AND TAZOBACTAM 3.38 G: 3; .375 INJECTION, POWDER, LYOPHILIZED, FOR SOLUTION INTRAVENOUS at 17:03

## 2022-01-01 RX ADMIN — VANCOMYCIN HYDROCHLORIDE 750 MG: 10 INJECTION, POWDER, LYOPHILIZED, FOR SOLUTION INTRAVENOUS at 18:21

## 2022-01-01 RX ADMIN — OXYCODONE HYDROCHLORIDE 5 MG: 5 TABLET ORAL at 17:54

## 2022-01-01 RX ADMIN — ONDANSETRON 4 MG: 2 INJECTION INTRAMUSCULAR; INTRAVENOUS at 21:14

## 2022-01-01 RX ADMIN — ACETAMINOPHEN 975 MG: 325 TABLET, FILM COATED ORAL at 12:51

## 2022-01-01 RX ADMIN — DICLOFENAC SODIUM 2 G: 10 GEL TOPICAL at 12:00

## 2022-01-01 RX ADMIN — OXYCODONE HYDROCHLORIDE 5 MG: 5 TABLET ORAL at 17:57

## 2022-01-01 RX ADMIN — VANCOMYCIN HYDROCHLORIDE 750 MG: 10 INJECTION, POWDER, LYOPHILIZED, FOR SOLUTION INTRAVENOUS at 20:32

## 2022-01-01 RX ADMIN — OLANZAPINE 5 MG: 5 TABLET, ORALLY DISINTEGRATING ORAL at 04:42

## 2022-01-01 RX ADMIN — APIXABAN 5 MG: 5 TABLET, FILM COATED ORAL at 08:05

## 2022-01-01 RX ADMIN — APIXABAN 5 MG: 5 TABLET, FILM COATED ORAL at 08:59

## 2022-01-01 RX ADMIN — VANCOMYCIN HYDROCHLORIDE 750 MG: 10 INJECTION, POWDER, LYOPHILIZED, FOR SOLUTION INTRAVENOUS at 09:38

## 2022-01-01 RX ADMIN — RIFAMPIN 600 MG: 300 CAPSULE ORAL at 15:42

## 2022-01-01 RX ADMIN — PIPERACILLIN AND TAZOBACTAM 3.38 G: 3; .375 INJECTION, POWDER, LYOPHILIZED, FOR SOLUTION INTRAVENOUS at 13:49

## 2022-01-01 RX ADMIN — VENLAFAXINE HYDROCHLORIDE 300 MG: 150 CAPSULE, EXTENDED RELEASE ORAL at 16:08

## 2022-01-01 RX ADMIN — GENTAMICIN SULFATE 90 MG: 40 INJECTION, SOLUTION INTRAMUSCULAR; INTRAVENOUS at 13:03

## 2022-01-01 RX ADMIN — FAMOTIDINE 20 MG: 20 TABLET ORAL at 09:08

## 2022-01-01 RX ADMIN — DICLOFENAC SODIUM 2 G: 10 GEL TOPICAL at 20:16

## 2022-01-01 RX ADMIN — LIDOCAINE PATCH 4% 2 PATCH: 40 PATCH TOPICAL at 15:58

## 2022-01-01 RX ADMIN — Medication 1 TABLET: at 09:26

## 2022-01-01 RX ADMIN — OXYCODONE HYDROCHLORIDE 5 MG: 5 TABLET ORAL at 12:20

## 2022-01-01 RX ADMIN — LEVOTHYROXINE SODIUM 88 MCG: 88 TABLET ORAL at 06:44

## 2022-01-01 RX ADMIN — FOLIC ACID 1 MG: 1 TABLET ORAL at 10:04

## 2022-01-01 RX ADMIN — APIXABAN 5 MG: 5 TABLET, FILM COATED ORAL at 20:53

## 2022-01-01 RX ADMIN — OLANZAPINE 5 MG: 5 TABLET, ORALLY DISINTEGRATING ORAL at 12:06

## 2022-01-01 RX ADMIN — SENNOSIDES 1 TABLET: 8.6 TABLET, FILM COATED ORAL at 20:16

## 2022-01-01 RX ADMIN — PIPERACILLIN AND TAZOBACTAM 3.38 G: 3; .375 INJECTION, POWDER, LYOPHILIZED, FOR SOLUTION INTRAVENOUS at 22:04

## 2022-01-01 RX ADMIN — ACETAMINOPHEN 975 MG: 325 TABLET, FILM COATED ORAL at 13:12

## 2022-01-01 RX ADMIN — APIXABAN 5 MG: 5 TABLET, FILM COATED ORAL at 20:16

## 2022-01-01 RX ADMIN — VENLAFAXINE HYDROCHLORIDE 300 MG: 150 CAPSULE, EXTENDED RELEASE ORAL at 09:14

## 2022-01-01 RX ADMIN — GENTAMICIN SULFATE 60 MG: 40 INJECTION, SOLUTION INTRAMUSCULAR; INTRAVENOUS at 15:21

## 2022-01-01 RX ADMIN — ACETAMINOPHEN 975 MG: 325 TABLET, FILM COATED ORAL at 06:25

## 2022-01-01 RX ADMIN — AMIODARONE HYDROCHLORIDE 200 MG: 200 TABLET ORAL at 09:21

## 2022-01-01 RX ADMIN — VANCOMYCIN HYDROCHLORIDE 750 MG: 10 INJECTION, POWDER, LYOPHILIZED, FOR SOLUTION INTRAVENOUS at 21:01

## 2022-01-01 RX ADMIN — OXYCODONE HYDROCHLORIDE 5 MG: 5 TABLET ORAL at 13:35

## 2022-01-01 RX ADMIN — POTASSIUM & SODIUM PHOSPHATES POWDER PACK 280-160-250 MG 1 PACKET: 280-160-250 PACK at 11:34

## 2022-01-01 RX ADMIN — DICLOFENAC SODIUM 2 G: 10 GEL TOPICAL at 13:18

## 2022-01-01 RX ADMIN — FOLIC ACID 1 MG: 1 TABLET ORAL at 09:43

## 2022-01-01 RX ADMIN — OXYCODONE HYDROCHLORIDE 5 MG: 5 TABLET ORAL at 11:45

## 2022-01-01 RX ADMIN — APIXABAN 5 MG: 5 TABLET, FILM COATED ORAL at 07:47

## 2022-01-01 RX ADMIN — DIAZEPAM 5 MG: 5 INJECTION, SOLUTION INTRAMUSCULAR; INTRAVENOUS at 07:22

## 2022-01-01 RX ADMIN — TORSEMIDE 20 MG: 20 TABLET ORAL at 10:04

## 2022-01-01 RX ADMIN — DICLOFENAC SODIUM 2 G: 10 GEL TOPICAL at 20:49

## 2022-01-01 RX ADMIN — FOLIC ACID 1 MG: 1 TABLET ORAL at 09:20

## 2022-01-01 RX ADMIN — POTASSIUM CHLORIDE 10 MEQ: 7.46 INJECTION, SOLUTION INTRAVENOUS at 10:36

## 2022-01-01 RX ADMIN — ACETAMINOPHEN 975 MG: 325 TABLET, FILM COATED ORAL at 19:06

## 2022-01-01 RX ADMIN — FENTANYL CITRATE 25 MCG: 50 INJECTION, SOLUTION INTRAMUSCULAR; INTRAVENOUS at 23:34

## 2022-01-01 RX ADMIN — PIPERACILLIN AND TAZOBACTAM 3.38 G: 3; .375 INJECTION, POWDER, LYOPHILIZED, FOR SOLUTION INTRAVENOUS at 05:49

## 2022-01-01 RX ADMIN — ONDANSETRON 4 MG: 2 INJECTION INTRAMUSCULAR; INTRAVENOUS at 07:53

## 2022-01-01 RX ADMIN — LIDOCAINE PATCH 4% 2 PATCH: 40 PATCH TOPICAL at 13:18

## 2022-01-01 RX ADMIN — ACETAMINOPHEN 975 MG: 325 TABLET, FILM COATED ORAL at 11:51

## 2022-01-01 RX ADMIN — GENTAMICIN SULFATE 50 MG: 40 INJECTION, SOLUTION INTRAMUSCULAR; INTRAVENOUS at 00:49

## 2022-01-01 RX ADMIN — ACETAMINOPHEN 975 MG: 325 TABLET, FILM COATED ORAL at 11:27

## 2022-01-01 RX ADMIN — ACETAMINOPHEN 975 MG: 325 TABLET, FILM COATED ORAL at 05:00

## 2022-01-01 RX ADMIN — OXYCODONE HYDROCHLORIDE 5 MG: 5 TABLET ORAL at 11:27

## 2022-01-01 RX ADMIN — OLANZAPINE 5 MG: 5 TABLET, ORALLY DISINTEGRATING ORAL at 12:33

## 2022-01-01 RX ADMIN — MAGNESIUM SULFATE HEPTAHYDRATE 4 G: 80 INJECTION, SOLUTION INTRAVENOUS at 15:41

## 2022-01-01 RX ADMIN — ACETAMINOPHEN 975 MG: 325 TABLET, FILM COATED ORAL at 05:28

## 2022-01-01 RX ADMIN — LEVOTHYROXINE SODIUM 88 MCG: 88 TABLET ORAL at 06:37

## 2022-01-01 RX ADMIN — OXYCODONE HYDROCHLORIDE 5 MG: 5 TABLET ORAL at 12:05

## 2022-01-01 RX ADMIN — DRONABINOL 5 MG: 2.5 CAPSULE ORAL at 09:08

## 2022-01-01 RX ADMIN — ACETAMINOPHEN 975 MG: 325 TABLET, FILM COATED ORAL at 20:53

## 2022-01-01 RX ADMIN — ACETAMINOPHEN 975 MG: 325 TABLET, FILM COATED ORAL at 03:44

## 2022-01-01 RX ADMIN — LEVOTHYROXINE SODIUM 88 MCG: 88 TABLET ORAL at 05:56

## 2022-01-01 RX ADMIN — APIXABAN 5 MG: 5 TABLET, FILM COATED ORAL at 09:04

## 2022-01-01 RX ADMIN — FAMOTIDINE 20 MG: 20 TABLET ORAL at 09:05

## 2022-01-01 RX ADMIN — DIPHENHYDRAMINE HYDROCHLORIDE 25 MG: 25 CAPSULE ORAL at 20:38

## 2022-01-01 RX ADMIN — LIDOCAINE PATCH 4% 2 PATCH: 40 PATCH TOPICAL at 14:51

## 2022-01-01 RX ADMIN — ACETAMINOPHEN 975 MG: 325 TABLET, FILM COATED ORAL at 12:03

## 2022-01-01 RX ADMIN — APIXABAN 5 MG: 5 TABLET, FILM COATED ORAL at 20:37

## 2022-01-01 RX ADMIN — LEVOTHYROXINE SODIUM 88 MCG: 88 TABLET ORAL at 06:27

## 2022-01-01 RX ADMIN — ACETAMINOPHEN 650 MG: 325 TABLET, FILM COATED ORAL at 16:05

## 2022-01-01 RX ADMIN — AMIODARONE HYDROCHLORIDE 200 MG: 200 TABLET ORAL at 09:10

## 2022-01-01 RX ADMIN — DIGOXIN 125 MCG: 0.25 INJECTION INTRAMUSCULAR; INTRAVENOUS at 15:40

## 2022-01-01 RX ADMIN — APIXABAN 5 MG: 5 TABLET, FILM COATED ORAL at 08:19

## 2022-01-01 RX ADMIN — OXYCODONE HYDROCHLORIDE 5 MG: 5 TABLET ORAL at 05:11

## 2022-01-01 RX ADMIN — RIFAMPIN 600 MG: 300 CAPSULE ORAL at 15:32

## 2022-01-01 RX ADMIN — DRONABINOL 5 MG: 2.5 CAPSULE ORAL at 20:07

## 2022-01-01 RX ADMIN — VANCOMYCIN HYDROCHLORIDE 1000 MG: 1 INJECTION, SOLUTION INTRAVENOUS at 18:25

## 2022-01-01 RX ADMIN — ACETAMINOPHEN 975 MG: 325 TABLET, FILM COATED ORAL at 12:38

## 2022-01-01 RX ADMIN — OXYCODONE HYDROCHLORIDE 5 MG: 5 TABLET ORAL at 18:07

## 2022-01-01 RX ADMIN — OLANZAPINE 5 MG: 5 TABLET, ORALLY DISINTEGRATING ORAL at 01:01

## 2022-01-01 RX ADMIN — LIDOCAINE PATCH 4% 2 PATCH: 40 PATCH TOPICAL at 20:50

## 2022-01-01 RX ADMIN — RIFAMPIN 600 MG: 300 CAPSULE ORAL at 09:42

## 2022-01-01 RX ADMIN — SENNOSIDES AND DOCUSATE SODIUM 1 TABLET: 50; 8.6 TABLET ORAL at 16:58

## 2022-01-01 RX ADMIN — VANCOMYCIN HYDROCHLORIDE 1250 MG: 5 INJECTION, POWDER, LYOPHILIZED, FOR SOLUTION INTRAVENOUS at 16:36

## 2022-01-01 RX ADMIN — ACETAMINOPHEN 975 MG: 325 TABLET, FILM COATED ORAL at 05:18

## 2022-01-01 RX ADMIN — RIFAMPIN 600 MG: 300 CAPSULE ORAL at 08:19

## 2022-01-01 RX ADMIN — THIAMINE HCL TAB 100 MG 100 MG: 100 TAB at 09:20

## 2022-01-01 RX ADMIN — ACETAMINOPHEN 975 MG: 325 TABLET, FILM COATED ORAL at 04:07

## 2022-01-01 RX ADMIN — ACETAMINOPHEN 975 MG: 325 TABLET, FILM COATED ORAL at 11:59

## 2022-01-01 RX ADMIN — POLYETHYLENE GLYCOL 3350 17 G: 17 POWDER, FOR SOLUTION ORAL at 09:03

## 2022-01-01 RX ADMIN — FOLIC ACID 1 MG: 1 TABLET ORAL at 08:05

## 2022-01-01 RX ADMIN — METOPROLOL TARTRATE 5 MG: 5 INJECTION INTRAVENOUS at 13:43

## 2022-01-01 RX ADMIN — VENLAFAXINE HYDROCHLORIDE 300 MG: 150 CAPSULE, EXTENDED RELEASE ORAL at 09:05

## 2022-01-01 RX ADMIN — MIDAZOLAM HYDROCHLORIDE 1 MG: 1 INJECTION, SOLUTION INTRAMUSCULAR; INTRAVENOUS at 23:20

## 2022-01-01 RX ADMIN — APIXABAN 5 MG: 5 TABLET, FILM COATED ORAL at 20:38

## 2022-01-01 RX ADMIN — Medication 1 TABLET: at 09:20

## 2022-01-01 RX ADMIN — ACETAMINOPHEN 975 MG: 325 TABLET, FILM COATED ORAL at 09:25

## 2022-01-01 RX ADMIN — MORPHINE SULFATE 2 MG: 2 INJECTION, SOLUTION INTRAMUSCULAR; INTRAVENOUS at 17:01

## 2022-01-01 RX ADMIN — TORSEMIDE 20 MG: 20 TABLET ORAL at 11:40

## 2022-01-01 RX ADMIN — AMIODARONE HYDROCHLORIDE 200 MG: 200 TABLET ORAL at 08:05

## 2022-01-01 RX ADMIN — VENLAFAXINE HYDROCHLORIDE 300 MG: 150 CAPSULE, EXTENDED RELEASE ORAL at 10:05

## 2022-01-01 RX ADMIN — GENTAMICIN SULFATE 90 MG: 40 INJECTION, SOLUTION INTRAMUSCULAR; INTRAVENOUS at 12:10

## 2022-01-01 RX ADMIN — LIDOCAINE PATCH 4% 2 PATCH: 40 PATCH TOPICAL at 08:19

## 2022-01-01 RX ADMIN — ACETAMINOPHEN 975 MG: 325 TABLET, FILM COATED ORAL at 11:31

## 2022-01-01 RX ADMIN — OXYCODONE HYDROCHLORIDE 5 MG: 5 TABLET ORAL at 06:33

## 2022-01-01 RX ADMIN — ACETAMINOPHEN 975 MG: 325 TABLET, FILM COATED ORAL at 03:40

## 2022-01-01 RX ADMIN — VANCOMYCIN HYDROCHLORIDE 1250 MG: 5 INJECTION, POWDER, LYOPHILIZED, FOR SOLUTION INTRAVENOUS at 17:06

## 2022-01-01 RX ADMIN — AMIODARONE HYDROCHLORIDE 200 MG: 200 TABLET ORAL at 21:20

## 2022-01-01 RX ADMIN — MORPHINE SULFATE 10 MG: 10 SOLUTION ORAL at 11:05

## 2022-01-01 RX ADMIN — DICLOFENAC SODIUM 2 G: 10 GEL TOPICAL at 09:11

## 2022-01-01 RX ADMIN — FAMOTIDINE 20 MG: 20 TABLET ORAL at 10:04

## 2022-01-01 RX ADMIN — LEVOTHYROXINE SODIUM 88 MCG: 88 TABLET ORAL at 06:33

## 2022-01-01 RX ADMIN — APIXABAN 5 MG: 5 TABLET, FILM COATED ORAL at 20:48

## 2022-01-01 RX ADMIN — MAGNESIUM SULFATE IN WATER 2 G: 40 INJECTION, SOLUTION INTRAVENOUS at 11:51

## 2022-01-01 RX ADMIN — DICLOFENAC SODIUM 2 G: 10 GEL TOPICAL at 20:02

## 2022-01-01 RX ADMIN — DICLOFENAC SODIUM 2 G: 10 GEL TOPICAL at 20:38

## 2022-01-01 RX ADMIN — FENTANYL CITRATE 50 MCG: 50 INJECTION, SOLUTION INTRAMUSCULAR; INTRAVENOUS at 11:51

## 2022-01-01 RX ADMIN — FAMOTIDINE 20 MG: 20 TABLET ORAL at 09:21

## 2022-01-01 RX ADMIN — RIFAMPIN 600 MG: 300 CAPSULE ORAL at 09:22

## 2022-01-01 RX ADMIN — VANCOMYCIN HYDROCHLORIDE 1250 MG: 5 INJECTION, POWDER, LYOPHILIZED, FOR SOLUTION INTRAVENOUS at 17:41

## 2022-01-01 RX ADMIN — OXYCODONE HYDROCHLORIDE 5 MG: 5 TABLET ORAL at 19:02

## 2022-01-01 RX ADMIN — ACETAMINOPHEN 975 MG: 325 TABLET, FILM COATED ORAL at 20:32

## 2022-01-01 RX ADMIN — SENNOSIDES 1 TABLET: 8.6 TABLET, FILM COATED ORAL at 09:03

## 2022-01-01 RX ADMIN — AMIODARONE HYDROCHLORIDE 1 MG/MIN: 1.8 INJECTION, SOLUTION INTRAVENOUS at 22:46

## 2022-01-01 RX ADMIN — GENTAMICIN SULFATE 60 MG: 40 INJECTION, SOLUTION INTRAMUSCULAR; INTRAVENOUS at 13:14

## 2022-01-01 RX ADMIN — FOLIC ACID 1 MG: 1 TABLET ORAL at 07:46

## 2022-01-01 RX ADMIN — LIDOCAINE PATCH 4% 2 PATCH: 40 PATCH TOPICAL at 16:47

## 2022-01-01 RX ADMIN — POTASSIUM CHLORIDE 20 MEQ: 1.5 POWDER, FOR SOLUTION ORAL at 14:32

## 2022-01-01 RX ADMIN — AMIODARONE HYDROCHLORIDE 200 MG: 200 TABLET ORAL at 08:19

## 2022-01-01 RX ADMIN — FAMOTIDINE 20 MG: 20 TABLET ORAL at 20:16

## 2022-01-01 RX ADMIN — GENTAMICIN SULFATE 80 MG: 40 INJECTION, SOLUTION INTRAMUSCULAR; INTRAVENOUS at 13:18

## 2022-01-01 RX ADMIN — DICLOFENAC SODIUM 2 G: 10 GEL TOPICAL at 09:21

## 2022-01-01 RX ADMIN — SENNOSIDES 1 TABLET: 8.6 TABLET, FILM COATED ORAL at 09:05

## 2022-01-01 RX ADMIN — POLYETHYLENE GLYCOL 3350 17 G: 17 POWDER, FOR SOLUTION ORAL at 09:05

## 2022-01-01 RX ADMIN — OXYCODONE HYDROCHLORIDE 5 MG: 5 TABLET ORAL at 10:19

## 2022-01-01 RX ADMIN — FOLIC ACID 1 MG: 1 TABLET ORAL at 08:59

## 2022-01-01 RX ADMIN — OXYCODONE HYDROCHLORIDE 5 MG: 5 TABLET ORAL at 06:43

## 2022-01-01 RX ADMIN — DICLOFENAC SODIUM 2 G: 10 GEL TOPICAL at 09:06

## 2022-01-01 RX ADMIN — VENLAFAXINE HYDROCHLORIDE 300 MG: 150 CAPSULE, EXTENDED RELEASE ORAL at 08:06

## 2022-01-01 RX ADMIN — DICLOFENAC SODIUM 2 G: 10 GEL TOPICAL at 20:10

## 2022-01-01 RX ADMIN — LORAZEPAM 0.25 MG: 0.5 TABLET ORAL at 17:54

## 2022-01-01 RX ADMIN — GENTAMICIN SULFATE 80 MG: 40 INJECTION, SOLUTION INTRAMUSCULAR; INTRAVENOUS at 13:08

## 2022-01-01 RX ADMIN — AMIODARONE HYDROCHLORIDE 200 MG: 200 TABLET ORAL at 07:46

## 2022-01-01 RX ADMIN — SENNOSIDES 1 TABLET: 8.6 TABLET, FILM COATED ORAL at 20:07

## 2022-01-01 RX ADMIN — VENLAFAXINE HYDROCHLORIDE 300 MG: 150 CAPSULE, EXTENDED RELEASE ORAL at 11:40

## 2022-01-01 RX ADMIN — OXYCODONE HYDROCHLORIDE 5 MG: 5 TABLET ORAL at 01:32

## 2022-01-01 RX ADMIN — LORAZEPAM 0.25 MG: 0.5 TABLET ORAL at 19:21

## 2022-01-01 RX ADMIN — VANCOMYCIN HYDROCHLORIDE 750 MG: 10 INJECTION, POWDER, LYOPHILIZED, FOR SOLUTION INTRAVENOUS at 21:25

## 2022-01-01 RX ADMIN — APIXABAN 5 MG: 5 TABLET, FILM COATED ORAL at 09:00

## 2022-01-01 RX ADMIN — ACETAMINOPHEN 975 MG: 325 TABLET, FILM COATED ORAL at 05:02

## 2022-01-01 RX ADMIN — VANCOMYCIN HYDROCHLORIDE 750 MG: 10 INJECTION, POWDER, LYOPHILIZED, FOR SOLUTION INTRAVENOUS at 20:41

## 2022-01-01 RX ADMIN — DICLOFENAC SODIUM 2 G: 10 GEL TOPICAL at 20:37

## 2022-01-01 RX ADMIN — VENLAFAXINE HYDROCHLORIDE 300 MG: 150 CAPSULE, EXTENDED RELEASE ORAL at 15:39

## 2022-01-01 RX ADMIN — LEVOTHYROXINE SODIUM 88 MCG: 88 TABLET ORAL at 09:44

## 2022-01-01 RX ADMIN — ACETAMINOPHEN 975 MG: 325 TABLET, FILM COATED ORAL at 20:38

## 2022-01-01 RX ADMIN — APIXABAN 5 MG: 5 TABLET, FILM COATED ORAL at 10:35

## 2022-01-01 RX ADMIN — FAMOTIDINE 20 MG: 20 TABLET ORAL at 09:20

## 2022-01-01 RX ADMIN — FOLIC ACID 1 MG: 1 TABLET ORAL at 09:00

## 2022-01-01 RX ADMIN — ONDANSETRON 4 MG: 2 INJECTION INTRAMUSCULAR; INTRAVENOUS at 09:17

## 2022-01-01 RX ADMIN — LEVOTHYROXINE SODIUM 88 MCG: 88 TABLET ORAL at 06:25

## 2022-01-01 RX ADMIN — MORPHINE SULFATE 5 MG: 20 SOLUTION ORAL at 16:19

## 2022-01-01 RX ADMIN — SODIUM CHLORIDE 30 ML/HR: 9 INJECTION, SOLUTION INTRAVENOUS at 11:32

## 2022-01-01 RX ADMIN — SENNOSIDES AND DOCUSATE SODIUM 1 TABLET: 50; 8.6 TABLET ORAL at 12:25

## 2022-01-01 RX ADMIN — LORAZEPAM 0.25 MG: 0.5 TABLET ORAL at 15:00

## 2022-01-01 RX ADMIN — DRONABINOL 2.5 MG: 2.5 CAPSULE ORAL at 09:05

## 2022-01-01 RX ADMIN — VENLAFAXINE HYDROCHLORIDE 300 MG: 150 CAPSULE, EXTENDED RELEASE ORAL at 09:08

## 2022-01-01 RX ADMIN — FAMOTIDINE 20 MG: 20 TABLET ORAL at 08:19

## 2022-01-01 RX ADMIN — TORSEMIDE 20 MG: 20 TABLET ORAL at 09:52

## 2022-01-01 RX ADMIN — DIGOXIN 250 MCG: 0.25 INJECTION INTRAMUSCULAR; INTRAVENOUS at 14:32

## 2022-01-01 RX ADMIN — LEVOTHYROXINE SODIUM 88 MCG: 88 TABLET ORAL at 09:08

## 2022-01-01 RX ADMIN — FAMOTIDINE 20 MG: 20 TABLET ORAL at 11:32

## 2022-01-01 RX ADMIN — THIAMINE HCL TAB 100 MG 100 MG: 100 TAB at 10:04

## 2022-01-01 RX ADMIN — ACETAMINOPHEN 975 MG: 325 TABLET, FILM COATED ORAL at 20:47

## 2022-01-01 RX ADMIN — VANCOMYCIN HYDROCHLORIDE 750 MG: 5 INJECTION, POWDER, LYOPHILIZED, FOR SOLUTION INTRAVENOUS at 20:47

## 2022-01-01 RX ADMIN — Medication 1 TABLET: at 08:05

## 2022-01-01 RX ADMIN — IOPAMIDOL 74 ML: 755 INJECTION, SOLUTION INTRAVENOUS at 10:58

## 2022-01-01 RX ADMIN — OXYCODONE HYDROCHLORIDE 5 MG: 5 TABLET ORAL at 16:33

## 2022-01-01 RX ADMIN — POLYETHYLENE GLYCOL 3350 17 G: 17 POWDER, FOR SOLUTION ORAL at 11:28

## 2022-01-01 RX ADMIN — PIPERACILLIN AND TAZOBACTAM 3.38 G: 3; .375 INJECTION, POWDER, LYOPHILIZED, FOR SOLUTION INTRAVENOUS at 14:24

## 2022-01-01 RX ADMIN — LEVOTHYROXINE SODIUM 88 MCG: 88 TABLET ORAL at 11:30

## 2022-01-01 RX ADMIN — SENNOSIDES 1 TABLET: 8.6 TABLET, FILM COATED ORAL at 20:36

## 2022-01-01 RX ADMIN — DICLOFENAC SODIUM 2 G: 10 GEL TOPICAL at 09:00

## 2022-01-01 RX ADMIN — OXYCODONE HYDROCHLORIDE 5 MG: 5 TABLET ORAL at 00:49

## 2022-01-01 RX ADMIN — LIDOCAINE PATCH 4% 2 PATCH: 40 PATCH TOPICAL at 09:21

## 2022-01-01 RX ADMIN — TORSEMIDE 20 MG: 20 TABLET ORAL at 09:20

## 2022-01-01 RX ADMIN — ACETAMINOPHEN 975 MG: 325 TABLET, FILM COATED ORAL at 12:21

## 2022-01-01 RX ADMIN — Medication 1 TABLET: at 09:00

## 2022-01-01 RX ADMIN — VANCOMYCIN HYDROCHLORIDE 750 MG: 10 INJECTION, POWDER, LYOPHILIZED, FOR SOLUTION INTRAVENOUS at 09:00

## 2022-01-01 RX ADMIN — AMIODARONE HYDROCHLORIDE 200 MG: 200 TABLET ORAL at 08:59

## 2022-01-01 RX ADMIN — FLUDEOXYGLUCOSE F-18 10.13 MCI.: 500 INJECTION, SOLUTION INTRAVENOUS at 09:52

## 2022-01-01 RX ADMIN — ACETAMINOPHEN 975 MG: 325 TABLET, FILM COATED ORAL at 12:13

## 2022-01-01 RX ADMIN — ACETAMINOPHEN 975 MG: 325 TABLET, FILM COATED ORAL at 12:32

## 2022-01-01 RX ADMIN — BENZOCAINE 11 SPRAY: 220 SPRAY, METERED PERIODONTAL at 11:50

## 2022-01-01 RX ADMIN — VANCOMYCIN HYDROCHLORIDE 1250 MG: 5 INJECTION, POWDER, LYOPHILIZED, FOR SOLUTION INTRAVENOUS at 19:01

## 2022-01-01 RX ADMIN — LEVOTHYROXINE SODIUM 88 MCG: 88 TABLET ORAL at 05:11

## 2022-01-01 RX ADMIN — AMIODARONE HYDROCHLORIDE 200 MG: 200 TABLET ORAL at 20:20

## 2022-01-01 RX ADMIN — APIXABAN 5 MG: 5 TABLET, FILM COATED ORAL at 09:08

## 2022-01-01 RX ADMIN — PIPERACILLIN AND TAZOBACTAM 3.38 G: 3; .375 INJECTION, POWDER, LYOPHILIZED, FOR SOLUTION INTRAVENOUS at 05:20

## 2022-01-01 RX ADMIN — VANCOMYCIN HYDROCHLORIDE 1250 MG: 5 INJECTION, POWDER, LYOPHILIZED, FOR SOLUTION INTRAVENOUS at 17:10

## 2022-01-01 RX ADMIN — DIGOXIN 125 MCG: 0.25 INJECTION INTRAMUSCULAR; INTRAVENOUS at 21:12

## 2022-01-01 RX ADMIN — RIFAMPIN 600 MG: 300 CAPSULE ORAL at 07:46

## 2022-01-01 RX ADMIN — VANCOMYCIN HYDROCHLORIDE 750 MG: 10 INJECTION, POWDER, LYOPHILIZED, FOR SOLUTION INTRAVENOUS at 20:53

## 2022-01-01 RX ADMIN — FOLIC ACID 1 MG: 1 TABLET ORAL at 11:33

## 2022-01-01 RX ADMIN — LORAZEPAM 0.25 MG: 0.5 TABLET ORAL at 22:58

## 2022-01-01 RX ADMIN — IOPAMIDOL 75 ML: 755 INJECTION, SOLUTION INTRAVENOUS at 19:41

## 2022-01-01 RX ADMIN — VENLAFAXINE HYDROCHLORIDE 300 MG: 150 CAPSULE, EXTENDED RELEASE ORAL at 09:22

## 2022-01-01 RX ADMIN — OXYCODONE HYDROCHLORIDE 5 MG: 5 TABLET ORAL at 19:21

## 2022-01-01 RX ADMIN — LEVOTHYROXINE SODIUM 88 MCG: 88 TABLET ORAL at 16:08

## 2022-01-01 RX ADMIN — SODIUM PHOSPHATE, MONOBASIC, MONOHYDRATE 15 MMOL: 276; 142 INJECTION, SOLUTION INTRAVENOUS at 06:24

## 2022-01-01 RX ADMIN — METHOHEXITAL SODIUM 40 MG: 500 INJECTION, POWDER, LYOPHILIZED, FOR SOLUTION INTRAMUSCULAR; INTRAVENOUS; RECTAL at 13:12

## 2022-01-01 RX ADMIN — OXYCODONE HYDROCHLORIDE 5 MG: 5 TABLET ORAL at 23:58

## 2022-01-01 RX ADMIN — AMIODARONE HYDROCHLORIDE 200 MG: 200 TABLET ORAL at 09:00

## 2022-01-01 RX ADMIN — DIGOXIN 125 MCG: 0.25 INJECTION INTRAMUSCULAR; INTRAVENOUS at 10:34

## 2022-01-01 RX ADMIN — LORAZEPAM 0.25 MG: 0.5 TABLET ORAL at 17:44

## 2022-01-01 RX ADMIN — OXYCODONE HYDROCHLORIDE 5 MG: 5 TABLET ORAL at 17:09

## 2022-01-01 RX ADMIN — MORPHINE SULFATE 2 MG: 2 INJECTION, SOLUTION INTRAMUSCULAR; INTRAVENOUS at 18:07

## 2022-01-01 RX ADMIN — SODIUM PHOSPHATE, MONOBASIC, MONOHYDRATE 15 MMOL: 276; 142 INJECTION, SOLUTION INTRAVENOUS at 19:51

## 2022-01-01 RX ADMIN — LORAZEPAM 0.25 MG: 0.5 TABLET ORAL at 09:25

## 2022-01-01 RX ADMIN — FOLIC ACID 1 MG: 1 TABLET ORAL at 08:19

## 2022-01-01 RX ADMIN — ACETAMINOPHEN 975 MG: 325 TABLET, FILM COATED ORAL at 11:12

## 2022-01-01 RX ADMIN — FAMOTIDINE 20 MG: 20 TABLET ORAL at 08:59

## 2022-01-01 RX ADMIN — PIPERACILLIN AND TAZOBACTAM 3.38 G: 3; .375 INJECTION, POWDER, LYOPHILIZED, FOR SOLUTION INTRAVENOUS at 06:57

## 2022-01-01 RX ADMIN — VENLAFAXINE HYDROCHLORIDE 300 MG: 150 CAPSULE, EXTENDED RELEASE ORAL at 08:19

## 2022-01-01 RX ADMIN — DRONABINOL 2.5 MG: 2.5 CAPSULE ORAL at 11:27

## 2022-01-01 RX ADMIN — APIXABAN 5 MG: 5 TABLET, FILM COATED ORAL at 20:29

## 2022-01-01 RX ADMIN — LIDOCAINE PATCH 4% 2 PATCH: 40 PATCH TOPICAL at 07:55

## 2022-01-01 RX ADMIN — DIAZEPAM 10 MG: 10 TABLET ORAL at 13:34

## 2022-01-01 RX ADMIN — POTASSIUM & SODIUM PHOSPHATES POWDER PACK 280-160-250 MG 1 PACKET: 280-160-250 PACK at 16:17

## 2022-01-01 RX ADMIN — Medication 1 TABLET: at 08:59

## 2022-01-01 RX ADMIN — ACETAMINOPHEN 975 MG: 325 TABLET, FILM COATED ORAL at 05:10

## 2022-01-01 RX ADMIN — DIAZEPAM 10 MG: 5 INJECTION, SOLUTION INTRAMUSCULAR; INTRAVENOUS at 21:16

## 2022-01-01 RX ADMIN — Medication 1 TABLET: at 09:44

## 2022-01-01 RX ADMIN — ACETAMINOPHEN 975 MG: 325 TABLET, FILM COATED ORAL at 03:06

## 2022-01-01 RX ADMIN — ACETAMINOPHEN 975 MG: 325 TABLET, FILM COATED ORAL at 11:38

## 2022-01-01 RX ADMIN — MORPHINE SULFATE 2 MG: 2 INJECTION, SOLUTION INTRAMUSCULAR; INTRAVENOUS at 14:46

## 2022-01-01 RX ADMIN — DICLOFENAC SODIUM 2 G: 10 GEL TOPICAL at 09:01

## 2022-01-01 RX ADMIN — DRONABINOL 2.5 MG: 2.5 CAPSULE ORAL at 09:03

## 2022-01-01 RX ADMIN — DICLOFENAC SODIUM 2 G: 10 GEL TOPICAL at 15:08

## 2022-01-01 RX ADMIN — ACETAMINOPHEN 975 MG: 325 TABLET, FILM COATED ORAL at 19:50

## 2022-01-01 RX ADMIN — LIDOCAINE PATCH 4% 2 PATCH: 40 PATCH TOPICAL at 07:45

## 2022-01-01 RX ADMIN — FAMOTIDINE 20 MG: 20 TABLET ORAL at 20:50

## 2022-01-01 RX ADMIN — SODIUM CHLORIDE 250 ML: 9 INJECTION, SOLUTION INTRAVENOUS at 15:30

## 2022-01-01 RX ADMIN — PIPERACILLIN AND TAZOBACTAM 3.38 G: 3; .375 INJECTION, POWDER, LYOPHILIZED, FOR SOLUTION INTRAVENOUS at 03:22

## 2022-01-01 ASSESSMENT — ACTIVITIES OF DAILY LIVING (ADL)
ADLS_ACUITY_SCORE: 30
ADLS_ACUITY_SCORE: 33
ADLS_ACUITY_SCORE: 32
ADLS_ACUITY_SCORE: 30
ADLS_ACUITY_SCORE: 33
ADLS_ACUITY_SCORE: 30
ADLS_ACUITY_SCORE: 34
ADLS_ACUITY_SCORE: 32
ADLS_ACUITY_SCORE: 35
ADLS_ACUITY_SCORE: 38
ADLS_ACUITY_SCORE: 34
ADLS_ACUITY_SCORE: 34
ADLS_ACUITY_SCORE: 28
ADLS_ACUITY_SCORE: 35
ADLS_ACUITY_SCORE: 30
ADLS_ACUITY_SCORE: 32
ADLS_ACUITY_SCORE: 33
ADLS_ACUITY_SCORE: 38
NUMBER_OF_TIMES_PATIENT_HAS_FALLEN_WITHIN_LAST_SIX_MONTHS: 2
ADLS_ACUITY_SCORE: 35
ADLS_ACUITY_SCORE: 34
ADLS_ACUITY_SCORE: 30
ADLS_ACUITY_SCORE: 38
ADLS_ACUITY_SCORE: 35
ADLS_ACUITY_SCORE: 38
WEAR_GLASSES_OR_BLIND: YES
ADLS_ACUITY_SCORE: 34
CONCENTRATING,_REMEMBERING_OR_MAKING_DECISIONS_DIFFICULTY: NO
ADLS_ACUITY_SCORE: 28
ADLS_ACUITY_SCORE: 22
ADLS_ACUITY_SCORE: 38
ADLS_ACUITY_SCORE: 33
ADLS_ACUITY_SCORE: 35
ADLS_ACUITY_SCORE: 33
ADLS_ACUITY_SCORE: 34
ADLS_ACUITY_SCORE: 38
ADLS_ACUITY_SCORE: 33
ADLS_ACUITY_SCORE: 34
ADLS_ACUITY_SCORE: 20
ADLS_ACUITY_SCORE: 33
ADLS_ACUITY_SCORE: 28
ADLS_ACUITY_SCORE: 35
ADLS_ACUITY_SCORE: 30
CHANGE_IN_FUNCTIONAL_STATUS_SINCE_ONSET_OF_CURRENT_ILLNESS/INJURY: NO
ADLS_ACUITY_SCORE: 33
ADLS_ACUITY_SCORE: 34
ADLS_ACUITY_SCORE: 38
ADLS_ACUITY_SCORE: 33
ADLS_ACUITY_SCORE: 35
ADLS_ACUITY_SCORE: 33
ADLS_ACUITY_SCORE: 30
ADLS_ACUITY_SCORE: 35
ADLS_ACUITY_SCORE: 33
ADLS_ACUITY_SCORE: 38
ADLS_ACUITY_SCORE: 38
ADLS_ACUITY_SCORE: 20
ADLS_ACUITY_SCORE: 33
ADLS_ACUITY_SCORE: 30
ADLS_ACUITY_SCORE: 34
ADLS_ACUITY_SCORE: 35
ADLS_ACUITY_SCORE: 38
ADLS_ACUITY_SCORE: 34
ADLS_ACUITY_SCORE: 35
ADLS_ACUITY_SCORE: 32
ADLS_ACUITY_SCORE: 34
ADLS_ACUITY_SCORE: 32
ADLS_ACUITY_SCORE: 32
ADLS_ACUITY_SCORE: 34
TRANSFERRING: 0-->INDEPENDENT
ADLS_ACUITY_SCORE: 30
ADLS_ACUITY_SCORE: 30
ADLS_ACUITY_SCORE: 32
ADLS_ACUITY_SCORE: 35
ADLS_ACUITY_SCORE: 33
ADLS_ACUITY_SCORE: 30
ADLS_ACUITY_SCORE: 32
ADLS_ACUITY_SCORE: 30
ADLS_ACUITY_SCORE: 32
ADLS_ACUITY_SCORE: 22
ADLS_ACUITY_SCORE: 35
ADLS_ACUITY_SCORE: 33
ADLS_ACUITY_SCORE: 34
ADLS_ACUITY_SCORE: 32
DEPENDENT_IADLS:: INDEPENDENT
ADLS_ACUITY_SCORE: 38
ADLS_ACUITY_SCORE: 30
ADLS_ACUITY_SCORE: 35
ADLS_ACUITY_SCORE: 28
ADLS_ACUITY_SCORE: 28
ADLS_ACUITY_SCORE: 35
ADLS_ACUITY_SCORE: 32
ADLS_ACUITY_SCORE: 33
ADLS_ACUITY_SCORE: 33
ADLS_ACUITY_SCORE: 35
ADLS_ACUITY_SCORE: 35
ADLS_ACUITY_SCORE: 34
ADLS_ACUITY_SCORE: 34
ADLS_ACUITY_SCORE: 35
ADLS_ACUITY_SCORE: 34
ADLS_ACUITY_SCORE: 34
ADLS_ACUITY_SCORE: 33
ADLS_ACUITY_SCORE: 35
DOING_ERRANDS_INDEPENDENTLY_DIFFICULTY: NO
ADLS_ACUITY_SCORE: 33
ADLS_ACUITY_SCORE: 38
ADLS_ACUITY_SCORE: 33
WALKING_OR_CLIMBING_STAIRS_DIFFICULTY: NO
ADLS_ACUITY_SCORE: 30
ADLS_ACUITY_SCORE: 32
ADLS_ACUITY_SCORE: 33
ADLS_ACUITY_SCORE: 35
ADLS_ACUITY_SCORE: 32
ADLS_ACUITY_SCORE: 38
ADLS_ACUITY_SCORE: 30
ADLS_ACUITY_SCORE: 34
ADLS_ACUITY_SCORE: 28
ADLS_ACUITY_SCORE: 28
ADLS_ACUITY_SCORE: 30
ADLS_ACUITY_SCORE: 33
ADLS_ACUITY_SCORE: 35
ADLS_ACUITY_SCORE: 35
ADLS_ACUITY_SCORE: 22
ADLS_ACUITY_SCORE: 37
ADLS_ACUITY_SCORE: 28
WALKING_OR_CLIMBING_STAIRS: OTHER (SEE COMMENTS)
ADLS_ACUITY_SCORE: 33
ADLS_ACUITY_SCORE: 35
ADLS_ACUITY_SCORE: 32
ADLS_ACUITY_SCORE: 32
ADLS_ACUITY_SCORE: 28
ADLS_ACUITY_SCORE: 28
ADLS_ACUITY_SCORE: 38
ADLS_ACUITY_SCORE: 32
ADLS_ACUITY_SCORE: 34
ADLS_ACUITY_SCORE: 32
ADLS_ACUITY_SCORE: 30
ADLS_ACUITY_SCORE: 28
ADLS_ACUITY_SCORE: 28
ADLS_ACUITY_SCORE: 34
ADLS_ACUITY_SCORE: 34
ADLS_ACUITY_SCORE: 35
ADLS_ACUITY_SCORE: 33
ADLS_ACUITY_SCORE: 35
ADLS_ACUITY_SCORE: 38
ADLS_ACUITY_SCORE: 38
TOILETING_ISSUES: NO
ADLS_ACUITY_SCORE: 32
ADLS_ACUITY_SCORE: 35
ADLS_ACUITY_SCORE: 34
ADLS_ACUITY_SCORE: 28
ADLS_ACUITY_SCORE: 32
ADLS_ACUITY_SCORE: 28
DRESSING/BATHING_DIFFICULTY: NO
ADLS_ACUITY_SCORE: 33
ADLS_ACUITY_SCORE: 28
ADLS_ACUITY_SCORE: 33
ADLS_ACUITY_SCORE: 38
ADLS_ACUITY_SCORE: 33
ADLS_ACUITY_SCORE: 30
ADLS_ACUITY_SCORE: 30
ADLS_ACUITY_SCORE: 28
ADLS_ACUITY_SCORE: 32
ADLS_ACUITY_SCORE: 38
ADLS_ACUITY_SCORE: 35
ADLS_ACUITY_SCORE: 33
ADLS_ACUITY_SCORE: 20
ADLS_ACUITY_SCORE: 20
ADLS_ACUITY_SCORE: 33
ADLS_ACUITY_SCORE: 20
ADLS_ACUITY_SCORE: 35
ADLS_ACUITY_SCORE: 33
ADLS_ACUITY_SCORE: 35
ADLS_ACUITY_SCORE: 33
ADLS_ACUITY_SCORE: 38
ADLS_ACUITY_SCORE: 33
TRANSFERRING: 0-->INDEPENDENT
ADLS_ACUITY_SCORE: 28
ADLS_ACUITY_SCORE: 32
ADLS_ACUITY_SCORE: 33
FALL_HISTORY_WITHIN_LAST_SIX_MONTHS: YES
ADLS_ACUITY_SCORE: 34
VISION_MANAGEMENT: GLASSES
ADLS_ACUITY_SCORE: 20
ADLS_ACUITY_SCORE: 32
ADLS_ACUITY_SCORE: 38
ADLS_ACUITY_SCORE: 30
ADLS_ACUITY_SCORE: 28
ADLS_ACUITY_SCORE: 33
ADLS_ACUITY_SCORE: 34
ADLS_ACUITY_SCORE: 33
ADLS_ACUITY_SCORE: 35
ADLS_ACUITY_SCORE: 28
ADLS_ACUITY_SCORE: 33
ADLS_ACUITY_SCORE: 33
ADLS_ACUITY_SCORE: 28
ADLS_ACUITY_SCORE: 32
ADLS_ACUITY_SCORE: 30
ADLS_ACUITY_SCORE: 32
ADLS_ACUITY_SCORE: 35
DIFFICULTY_EATING/SWALLOWING: NO
ADLS_ACUITY_SCORE: 35
ADLS_ACUITY_SCORE: 32
ADLS_ACUITY_SCORE: 34
ADLS_ACUITY_SCORE: 35
ADLS_ACUITY_SCORE: 30
ADLS_ACUITY_SCORE: 38
ADLS_ACUITY_SCORE: 28
ADLS_ACUITY_SCORE: 34
ADLS_ACUITY_SCORE: 28
ADLS_ACUITY_SCORE: 32
ADLS_ACUITY_SCORE: 35
ADLS_ACUITY_SCORE: 38

## 2022-01-01 ASSESSMENT — ENCOUNTER SYMPTOMS
HEADACHES: 1
WOUND: 0
CONFUSION: 0
SHORTNESS OF BREATH: 1
DYSURIA: 0
SORE THROAT: 1
BACK PAIN: 1
ABDOMINAL PAIN: 1
BRUISES/BLEEDS EASILY: 0
COUGH: 1
DIZZINESS: 1
FEVER: 1

## 2022-01-13 NOTE — PROGRESS NOTES
HEART CARE ENCOUNTER CONSULTATON NOTE      [facility]  [phone]      Assessment/Recommendations   Assessment/Plan:71F w/ aortic stenosis s/p TAVR 09/21, HTN, AAA s.p repair, history of smoking, HFPEF here for f/u post-TAVR.     # Aortic stenosis - s/p TAVR w/ a # 23S3 mean gradient 16, no AI  - continue ASA, losartan, torsemide     # HFPEF - euvolemic on exam  - doing much better on torsemide 20mg daily for a more reliable absorption. Compliant w/ Na compliance  - continue losartan     # Dizziness/lightheadedness - resolved w/ Epley maneuvers     F/u in 12 mos or as needed         History of Present Illness/Subjective    HPI: Maren Fofana is a 71 year old female w/ aortic stenosis s/p TAVR 09/21, HTN, AAA s.p repair, history of smoking, HFPEF here for f/u post-TAVR.    She states that she is doing very well. Exercised twice a week doing bike/weights. Dramatically improved since starting torsemide. Not using her cane or walker any more.     Recent Echocardiogram Results:  The left atrium is severely dilated.  Left ventricular function is normal.The ejection fraction is 60-65%.  There is moderate to mod-severe (2-3+) tricuspid regurgitation.  Right ventricle systolic pressure estimate normal  There is a ISABELLA 3 bioprosthetic valve present in aortic valve position.  The mean AoV pressure gradient is 16mmHg.  Compared to prior study, there is no significant change.    Recent Coronary Angiogram Results 08/21:  71-year-old female, recently seen in the valve clinic for severe symptomatic aortic stenosis, being evaluated for transcatheter aortic valve replacement, and here for pre-TAVR coronary angiogram.     Coronary angiography showed:     No significant coronary artery disease, with mild atherosclerosis.     Details:     Left main mild disease  LAD with mild disease  Left circumflex has mild disease  RCA is dominant with mild atherosclerosis     Calcified aortic valve noted       Physical  Examination  Review of Systems   Vitals: /80 (BP Location: Left arm, Patient Position: Sitting, Cuff Size: Adult Regular)   Pulse 80   Resp 16   Wt 69.9 kg (154 lb)   BMI 29.10 kg/m    BMI= Body mass index is 29.1 kg/m .  Wt Readings from Last 3 Encounters:   22 69.9 kg (154 lb)   21 72.1 kg (159 lb)   10/27/21 74.4 kg (164 lb)       General Appearance:   no distress, normal body habitus   ENT/Mouth: membranes moist, no oral lesions or bleeding gums.      EYES:  no scleral icterus, normal conjunctivae   Neck: no carotid bruits or thyromegaly   Chest/Lungs:   lungs are clear to auscultation, no rales or wheezing, no sternal scar, equal chest wall expansion    Cardiovascular:   Regular. Normal first and second heart sounds with a 2/6 systolic murmur, rubs, or gallops; the carotid, radial and posterior tibial pulses are intact, Jugular venous pressure 6, no edema bilaterally    Abdomen:  no organomegaly, masses, bruits, or tenderness; bowel sounds are present   Extremities: no cyanosis or clubbing   Skin: no xanthelasma, warm.    Neurologic: normal  bilateral, no tremors     Psychiatric: alert and oriented x3, calm        Please refer above for cardiac ROS details.        Medical History  Surgical History Family History Social History   Past Medical History:   Diagnosis Date     Aortic stenosis      Collapsed lung      Fibromyalgia      GERD (gastroesophageal reflux disease)      Hypothyroid      Migraine      Peripheral vascular disease (H)      Reactive airway disease      Vertigo      Past Surgical History:   Procedure Laterality Date      SECTION Bilateral      CV CORONARY ANGIOGRAM N/A 2021    Procedure: Coronary Angiogram;  Surgeon: Sara Randall MD;  Location: [facility] CATH LAB CV     CV TRANSCATHETER AORTIC VALVE REPLACEMENT N/A 2021    Procedure: CV TRANSCATHETER AORTIC VALVE REPLACEMENT;  Surgeon: Geovani Kulkarni MD;  Location: [facility] CARDIAC CATH LAB      HEART CATH FEMORAL CANNULIZATION WITH OPEN STANDBY REPAIR AORTIC VALVE N/A 2021    Procedure: LEFT SUBCLAVIAN ACCESS FOR  TRANSCATHETER AORTIC VALVE REPLACEMENT.  CARDIOPULMONARY BYPASS STANDBY;  Surgeon: Jorge L Baeza MD;  Location:  HEART CARDIAC CATH LAB     HYSTERECTOMY       POSTERIOR LAMINECTOMY / DECOMPRESSION LUMBAR SPINE      L2-L3     SPINAL FUSION       TONSILLECTOMY & ADENOIDECTOMY       TOTAL KNEE ARTHROPLASTY Bilateral      Family History   Problem Relation Age of Onset     Pancreatic Cancer Mother      Pancreatic Cancer Father         Social History     Socioeconomic History     Marital status: Legally      Spouse name: Not on file     Number of children: Not on file     Years of education: Not on file     Highest education level: Not on file   Occupational History     Not on file   Tobacco Use     Smoking status: Former Smoker     Packs/day: 0.50     Quit date: 2021     Years since quittin.7     Smokeless tobacco: Never Used   Substance and Sexual Activity     Alcohol use: Yes     Drug use: Never     Sexual activity: Not on file   Other Topics Concern     Parent/sibling w/ CABG, MI or angioplasty before 65F 55M? Not Asked   Social History Narrative     Not on file     Social Determinants of Health     Financial Resource Strain: Not on file   Food Insecurity: Not on file   Transportation Needs: Not on file   Physical Activity: Not on file   Stress: Not on file   Social Connections: Not on file   Intimate Partner Violence: Not on file   Housing Stability: Not on file           Medications  Allergies   Current Outpatient Medications   Medication Sig Dispense Refill     aspirin (ASA) 81 MG EC tablet Take 1 tablet (81 mg) by mouth daily 90 tablet 0     atorvastatin (LIPITOR) 80 MG tablet [ATORVASTATIN (LIPITOR) 80 MG TABLET] Take 80 mg by mouth daily.       EPINEPHrine (EPIPEN/ADRENACLICK/AUVI-Q) 0.3 mg/0.3 mL injection 0.3 mg as needed        fluticasone (FLONASE) 50 MCG/ACT  nasal spray Spray 1 spray into both nostrils 2 times daily as needed for rhinitis or allergies       hydrOXYzine (ATARAX) 25 MG tablet Take 25 mg by mouth as needed       levothyroxine (SYNTHROID, LEVOTHROID) 88 MCG tablet [LEVOTHYROXINE (SYNTHROID, LEVOTHROID) 88 MCG TABLET] Take 88 mcg by mouth daily.       losartan (COZAAR) 25 MG tablet Take 1 tablet (25 mg) by mouth daily 90 tablet 3     Melatonin 10 MG TABS tablet Take 30 mg by mouth At Bedtime        potassium chloride ER (KLOR-CON M) 20 MEQ CR tablet Take 1 tablet (20 mEq) by mouth daily 90 tablet 3     torsemide (DEMADEX) 20 MG tablet Take 1 tablet (20 mg) by mouth daily 90 tablet 1     traZODone (DESYREL) 150 MG tablet Take 150 mg by mouth At Bedtime        venlafaxine (EFFEXOR-XR) 150 MG 24 hr capsule [VENLAFAXINE (EFFEXOR-XR) 150 MG 24 HR CAPSULE] Take 300 mg by mouth daily.         Allergies   Allergen Reactions     Bee Venom Anaphylaxis     Other Drug Allergy (See Comments) Hives     Ice Packs   Any kind of cold      Dilaudid [Hydromorphone] Other (See Comments)     Altered mental status, confusion, itching. Pt tolerates oxycodone.     Latex      hives          Lab Results    Chemistry/lipid CBC Cardiac Enzymes/BNP/TSH/INR   Recent Labs   Lab Test 05/19/21  1332   CHOL 150   HDL 52   LDL 81   TRIG 87     Recent Labs   Lab Test 05/19/21  1332   LDL 81     Recent Labs   Lab Test 11/11/21  1520      POTASSIUM 3.6   CHLORIDE 104   CO2 30      BUN 9   CR 1.10   GFRESTIMATED 50*   YENI 8.6     Recent Labs   Lab Test 11/11/21  1520 10/27/21  1659 10/06/21  1635   CR 1.10 1.01 1.00     No results for input(s): A1C in the last 27587 hours.       Recent Labs   Lab Test 10/27/21  1659   WBC 4.3   HGB 10.5*   HCT 32.2*   *   *     Recent Labs   Lab Test 10/27/21  1659 09/29/21  0640 09/28/21  1148   HGB 10.5* 11.0* 9.4*    No results for input(s): TROPONINI in the last 18758 hours.  Recent Labs   Lab Test 09/23/21  1251   *      Recent Labs   Lab Test 05/19/21  1332   TSH 1.68     Recent Labs   Lab Test 09/29/21  1044 09/23/21  1251 01/02/21  0912   INR 1.24* 1.21* 1.10        Geovani Kulkarni MD

## 2022-01-13 NOTE — PATIENT INSTRUCTIONS
It is great to see how well you have done,    Good job on compliance with the medications, exercise, and Na restriction    Keep up the good work    Try going to daily exercise    Follow up w/ Dr. Randall or I in 12 mos or as needed

## 2022-01-13 NOTE — LETTER
1/13/2022    Nora Mccarthy MD  [facility] [address] [place] [address]    RE: Maren Fofana       Dear Colleague,     I had the pleasure of seeing Maren Fofana in the [facility].    HEART CARE ENCOUNTER CONSULTATON NOTE      [facility]  [phone]      Assessment/Recommendations   Assessment/Plan:71F w/ aortic stenosis s/p TAVR 09/21, HTN, AAA s.p repair, history of smoking, HFPEF here for f/u post-TAVR.     # Aortic stenosis - s/p TAVR w/ a # 23S3 mean gradient 16, no AI  - continue ASA, losartan, torsemide     # HFPEF - euvolemic on exam  - doing much better on torsemide 20mg daily for a more reliable absorption. Compliant w/ Na compliance  - continue losartan     # Dizziness/lightheadedness - resolved w/ Epley maneuvers     F/u in 12 mos or as needed         History of Present Illness/Subjective    HPI: Maren Fofana is a 71 year old female w/ aortic stenosis s/p TAVR 09/21, HTN, AAA s.p repair, history of smoking, HFPEF here for f/u post-TAVR.    She states that she is doing very well. Exercised twice a week doing bike/weights. Dramatically improved since starting torsemide. Not using her cane or walker any more.     Recent Echocardiogram Results:  The left atrium is severely dilated.  Left ventricular function is normal.The ejection fraction is 60-65%.  There is moderate to mod-severe (2-3+) tricuspid regurgitation.  Right ventricle systolic pressure estimate normal  There is a ISABELLA 3 bioprosthetic valve present in aortic valve position.  The mean AoV pressure gradient is 16mmHg.  Compared to prior study, there is no significant change.    Recent Coronary Angiogram Results 08/21:  71-year-old female, recently seen in the valve clinic for severe symptomatic aortic stenosis, being evaluated for transcatheter aortic valve replacement, and here for pre-TAVR coronary angiogram.     Coronary angiography showed:     No significant coronary artery  disease, with mild atherosclerosis.     Details:     Left main mild disease  LAD with mild disease  Left circumflex has mild disease  RCA is dominant with mild atherosclerosis     Calcified aortic valve noted       Physical Examination  Review of Systems   Vitals: /80 (BP Location: Left arm, Patient Position: Sitting, Cuff Size: Adult Regular)   Pulse 80   Resp 16   Wt 69.9 kg (154 lb)   BMI 29.10 kg/m    BMI= Body mass index is 29.1 kg/m .  Wt Readings from Last 3 Encounters:   22 69.9 kg (154 lb)   21 72.1 kg (159 lb)   10/27/21 74.4 kg (164 lb)       General Appearance:   no distress, normal body habitus   ENT/Mouth: membranes moist, no oral lesions or bleeding gums.      EYES:  no scleral icterus, normal conjunctivae   Neck: no carotid bruits or thyromegaly   Chest/Lungs:   lungs are clear to auscultation, no rales or wheezing, no sternal scar, equal chest wall expansion    Cardiovascular:   Regular. Normal first and second heart sounds with a 2/6 systolic murmur, rubs, or gallops; the carotid, radial and posterior tibial pulses are intact, Jugular venous pressure 6, no edema bilaterally    Abdomen:  no organomegaly, masses, bruits, or tenderness; bowel sounds are present   Extremities: no cyanosis or clubbing   Skin: no xanthelasma, warm.    Neurologic: normal  bilateral, no tremors     Psychiatric: alert and oriented x3, calm        Please refer above for cardiac ROS details.        Medical History  Surgical History Family History Social History   Past Medical History:   Diagnosis Date     Aortic stenosis      Collapsed lung      Fibromyalgia      GERD (gastroesophageal reflux disease)      Hypothyroid      Migraine      Peripheral vascular disease (H)      Reactive airway disease      Vertigo      Past Surgical History:   Procedure Laterality Date      SECTION Bilateral      CV CORONARY ANGIOGRAM N/A 2021    Procedure: Coronary Angiogram;  Surgeon: Sara Randall MD;   Location: [facility] CATH LAB CV     CV TRANSCATHETER AORTIC VALVE REPLACEMENT N/A 2021    Procedure: CV TRANSCATHETER AORTIC VALVE REPLACEMENT;  Surgeon: Geovani Kulkarni MD;  Location: [facility] CARDIAC CATH LAB     HEART CATH FEMORAL CANNULIZATION WITH OPEN STANDBY REPAIR AORTIC VALVE N/A 2021    Procedure: LEFT SUBCLAVIAN ACCESS FOR  TRANSCATHETER AORTIC VALVE REPLACEMENT.  CARDIOPULMONARY BYPASS STANDBY;  Surgeon: Jorge L Baeza MD;  Location: [facility] CARDIAC CATH LAB     HYSTERECTOMY       POSTERIOR LAMINECTOMY / DECOMPRESSION LUMBAR SPINE      L2-L3     SPINAL FUSION       TONSILLECTOMY & ADENOIDECTOMY       TOTAL KNEE ARTHROPLASTY Bilateral      Family History   Problem Relation Age of Onset     Pancreatic Cancer Mother      Pancreatic Cancer Father         Social History     Socioeconomic History     Marital status: Legally      Spouse name: Not on file     Number of children: Not on file     Years of education: Not on file     Highest education level: Not on file   Occupational History     Not on file   Tobacco Use     Smoking status: Former Smoker     Packs/day: 0.50     Quit date: 2021     Years since quittin.7     Smokeless tobacco: Never Used   Substance and Sexual Activity     Alcohol use: Yes     Drug use: Never     Sexual activity: Not on file   Other Topics Concern     Parent/sibling w/ CABG, MI or angioplasty before 65F 55M? Not Asked   Social History Narrative     Not on file     Social Determinants of Health     Financial Resource Strain: Not on file   Food Insecurity: Not on file   Transportation Needs: Not on file   Physical Activity: Not on file   Stress: Not on file   Social Connections: Not on file   Intimate Partner Violence: Not on file   Housing Stability: Not on file           Medications  Allergies   Current Outpatient Medications   Medication Sig Dispense Refill     aspirin (ASA) 81 MG EC tablet Take 1 tablet (81 mg) by mouth daily 90 tablet 0      atorvastatin (LIPITOR) 80 MG tablet [ATORVASTATIN (LIPITOR) 80 MG TABLET] Take 80 mg by mouth daily.       EPINEPHrine (EPIPEN/ADRENACLICK/AUVI-Q) 0.3 mg/0.3 mL injection 0.3 mg as needed        fluticasone (FLONASE) 50 MCG/ACT nasal spray Spray 1 spray into both nostrils 2 times daily as needed for rhinitis or allergies       hydrOXYzine (ATARAX) 25 MG tablet Take 25 mg by mouth as needed       levothyroxine (SYNTHROID, LEVOTHROID) 88 MCG tablet [LEVOTHYROXINE (SYNTHROID, LEVOTHROID) 88 MCG TABLET] Take 88 mcg by mouth daily.       losartan (COZAAR) 25 MG tablet Take 1 tablet (25 mg) by mouth daily 90 tablet 3     Melatonin 10 MG TABS tablet Take 30 mg by mouth At Bedtime        potassium chloride ER (KLOR-CON M) 20 MEQ CR tablet Take 1 tablet (20 mEq) by mouth daily 90 tablet 3     torsemide (DEMADEX) 20 MG tablet Take 1 tablet (20 mg) by mouth daily 90 tablet 1     traZODone (DESYREL) 150 MG tablet Take 150 mg by mouth At Bedtime        venlafaxine (EFFEXOR-XR) 150 MG 24 hr capsule [VENLAFAXINE (EFFEXOR-XR) 150 MG 24 HR CAPSULE] Take 300 mg by mouth daily.         Allergies   Allergen Reactions     Bee Venom Anaphylaxis     Other Drug Allergy (See Comments) Hives     Ice Packs   Any kind of cold      Dilaudid [Hydromorphone] Other (See Comments)     Altered mental status, confusion, itching. Pt tolerates oxycodone.     Latex      hives          Lab Results    Chemistry/lipid CBC Cardiac Enzymes/BNP/TSH/INR   Recent Labs   Lab Test 05/19/21  1332   CHOL 150   HDL 52   LDL 81   TRIG 87     Recent Labs   Lab Test 05/19/21  1332   LDL 81     Recent Labs   Lab Test 11/11/21  1520      POTASSIUM 3.6   CHLORIDE 104   CO2 30      BUN 9   CR 1.10   GFRESTIMATED 50*   YENI 8.6     Recent Labs   Lab Test 11/11/21  1520 10/27/21  1659 10/06/21  1635   CR 1.10 1.01 1.00     No results for input(s): A1C in the last 16734 hours.       Recent Labs   Lab Test 10/27/21  1659   WBC 4.3   HGB 10.5*   HCT 32.2*   *    *     Recent Labs   Lab Test 10/27/21  1659 09/29/21  0640 09/28/21  1148   HGB 10.5* 11.0* 9.4*    No results for input(s): TROPONINI in the last 10024 hours.  Recent Labs   Lab Test 09/23/21  1251   *     Recent Labs   Lab Test 05/19/21  1332   TSH 1.68     Recent Labs   Lab Test 09/29/21  1044 09/23/21  1251 01/02/21  0912   INR 1.24* 1.21* 1.10          Geovani Kulkarni MD  [facility]

## 2022-08-22 NOTE — DISCHARGE INSTRUCTIONS
You seen here in the emergency department for evaluation of right ear pain.  It may be that he had a small tympanic membrane perforation, there does appear to be some serous fluid, some bulging of the tympanic membrane on the right side.  We will treat you with some antibiotics, and recommend you follow-up with your primary care doctor.  Return to the emergency department if you develop any new or worsening symptoms.  For pain at home use ibuprofen and Tylenol.    For pain or fever you may use:  -Tylenol 650 mg every 6 hours.  Max 4000 mg in 24 hours  Do not use thismedication with alcohol as it can cause liver problems.  -Ibuprofen 600 mg every 6 hours.  Max 3500 mg in 24 hours  Do not take this medication if you have a history of a GI bleed or have kidney problems.  You may use both of these medications at the same time or you can alternate them every 3 hours.  For example, Tylenol at 6 AM, ibuprofen at 9 AM, Tylenol at noon, etc.  
66 yr old male with PMhx of DMT2, Afib on xeralto, HTN, HLD who was admitted to [facility] on 11/27 and started on remdisivir and decadron on 11/28, received convalexcent plasma on 11/30, as well as Toci on 12/2, but was intubated on 12/2 due to worsening O2 sats. Also treated with Zosyn since 12/2 and 7 days of Levoquin (12/2- 12/8), along with  zyvox and caspofungin on 12/8. Transferred to [facility] 12/8 overnight.

## 2022-08-22 NOTE — ED TRIAGE NOTES
"  Patient here for bleeding at right ear for 2 days, unable to wear hearing aide in that ear because of pain. No trauma. \"I also have a very bad headache, I took everything that I could think of and it was no help\"   Triage Assessment     Row Name 08/22/22 0957       Triage Assessment (Adult)    Airway WDL WDL       Respiratory WDL    Respiratory WDL WDL       Skin Circulation/Temperature WDL    Skin Circulation/Temperature WDL WDL       Cardiac WDL    Cardiac WDL WDL       Peripheral/Neurovascular WDL    Peripheral Neurovascular WDL WDL       Cognitive/Neuro/Behavioral WDL    Cognitive/Neuro/Behavioral WDL WDL              "

## 2022-08-22 NOTE — ED PROVIDER NOTES
EMERGENCY DEPARTMENT ENCOUNTER      NAME: Maren Fofana  AGE: 72 year old female  YOB: 1950  MRN: [id number]  EVALUATION DATE & TIME: 8/22/2022  9:40 AM    PCP: Nora Mccarthy    ED PROVIDER: Alli Scruggs PA-C      Chief Complaint   Patient presents with     Ear Drainage     Headache         FINAL IMPRESSION:  1. Otitis media      ED COURSE & MEDICAL DECISION MAKING:    Pertinent Labs & Imaging studies reviewed. (See chart for details)  10:12 AM I met the patient and performed my initial interview and exam.   10:45 Rechecked and updated the patient. We discussed the plan for discharge and the patient is agreeable. Reviewed supportive cares, symptomatic treatment, outpatient follow up, and reasons to return to the Emergency Department. Patient to be discharged by ED RN.       72 year old female presents to the Emergency Department for evaluation of right ear pain     ED Course as of 08/22/22 1106   Mon Aug 22, 2022   1023 Patient is a 72-year-old female with past medical history of TAVR, aortic valve replacement, who presents emergency department for evaluation of 2 days of ear pain.  Patient does note that she has used some Q-tips to clean her ears.  On examination she has significant tenderness around the right ear, as well as in the ear canal.  No evidence of otitis externa, ear canal appears patent.  There is a small amount of blood, some mild swelling around the eardrum, with some potential fluid behind it.  Consistent with otitis media.  Patient does note that she has had low-grade fevers at home.  Notes that she has a bilateral headache.  Her neuro examination here is grossly unremarkable, I do not think she warrants any further imaging at this time.  Do not think this is a presentation of strokelike symptoms, or any vascular abnormality.  Diagnosis seems consistent with likely otitis media.  We will treat the patient with some Augmentin here, discharge, recommend she follow-up with  "primary care.  No other indication for further labs or testing at this time.  Plan will be to treat with antibiotics, and follow-up.  Recommend ibuprofen or Tylenol at home for headaches.  No changes in vision, dizziness, lightheadedness, negative pronator drift, sensation intact to upper and lower extremities.  Do not think she warrants laboratory studies at this time.  Not consistent with facial cellulitis, or other infectious etiology.  She is not tachycardic or tachypneic.  Do not think she warrants further lab studies or work-up.        At the conclusion of the encounter I discussed the results of all of the tests and the disposition. The questions were answered. The patient or family acknowledged understanding and was agreeable with the care plan.       0 minutes of critical care time     MEDICATIONS GIVEN IN THE EMERGENCY:  Medications - No data to display    NEW PRESCRIPTIONS STARTED AT TODAY'S ER VISIT  New Prescriptions    AMOXICILLIN-CLAVULANATE (AUGMENTIN) 875-125 MG TABLET    Take 1 tablet by mouth 2 times daily for 7 days          =================================================================    HPI    Patient information was obtained from: patient    Use of : N/A         Maren ROJAS Ct is a 72 year old female with a pertinent history of AAA, lumbar fusion, TAVR, bilateral total knee replacement who presents to this ED by walk in for evaluation of ear pain, drainage.     Patient reports right ear pain that has been causing her significant pain and headache for the past few days. She reports \"always cleaning ears\", and the other day was cleaning her ear when she started bleeding out of her ear, which continued for 2 days. The pain has prevented her from wearing her hearing aid, and it radiates slightly anteriorly, as well as down her neck on the side. She reports taking \"everything\" over the counter for her pain. Denies vision changes or any other concerning symptoms.     REVIEW OF SYSTEMS "   Review of Systems   HENT: Positive for ear discharge and ear pain.    Neurological: Positive for headaches.   All other systems reviewed and are negative.      PAST MEDICAL HISTORY:  Past Medical History:   Diagnosis Date     Aortic stenosis      Collapsed lung      Fibromyalgia      GERD (gastroesophageal reflux disease)      Hypothyroid      Migraine      Peripheral vascular disease (H)      Reactive airway disease      Vertigo        PAST SURGICAL HISTORY:  Past Surgical History:   Procedure Laterality Date      SECTION Bilateral      CV CORONARY ANGIOGRAM N/A 2021    Procedure: Coronary Angiogram;  Surgeon: Sara Randall MD;  Location: [facility] CATH LAB CV     CV TRANSCATHETER AORTIC VALVE REPLACEMENT N/A 2021    Procedure: CV TRANSCATHETER AORTIC VALVE REPLACEMENT;  Surgeon: Geovani Kulkarni MD;  Location: [facility] CARDIAC CATH LAB     HEART CATH FEMORAL CANNULIZATION WITH OPEN STANDBY REPAIR AORTIC VALVE N/A 2021    Procedure: LEFT SUBCLAVIAN ACCESS FOR  TRANSCATHETER AORTIC VALVE REPLACEMENT.  CARDIOPULMONARY BYPASS STANDBY;  Surgeon: Jorge L Baeza MD;  Location: [facility] CARDIAC CATH LAB     HYSTERECTOMY       POSTERIOR LAMINECTOMY / DECOMPRESSION LUMBAR SPINE      L2-L3     SPINAL FUSION       TONSILLECTOMY & ADENOIDECTOMY       TOTAL KNEE ARTHROPLASTY Bilateral            CURRENT MEDICATIONS:    amoxicillin-clavulanate (AUGMENTIN) 875-125 MG tablet  aspirin (ASA) 81 MG EC tablet  atorvastatin (LIPITOR) 80 MG tablet  EPINEPHrine (EPIPEN/ADRENACLICK/AUVI-Q) 0.3 mg/0.3 mL injection  fluticasone (FLONASE) 50 MCG/ACT nasal spray  hydrOXYzine (ATARAX) 25 MG tablet  levothyroxine (SYNTHROID, LEVOTHROID) 88 MCG tablet  losartan (COZAAR) 25 MG tablet  Melatonin 10 MG TABS tablet  potassium chloride ER (KLOR-CON M) 20 MEQ CR tablet  torsemide (DEMADEX) 20 MG tablet  traZODone (DESYREL) 150 MG tablet  venlafaxine (EFFEXOR-XR) 150 MG 24 hr capsule         ALLERGIES:  Allergies  "  Allergen Reactions     Bee Venom Anaphylaxis     Other Drug Allergy (See Comments) Hives     Ice Packs   Any kind of cold      Dilaudid [Hydromorphone] Other (See Comments)     Altered mental status, confusion, itching. Pt tolerates oxycodone.     Latex      hives       FAMILY HISTORY:  Family History   Problem Relation Age of Onset     Pancreatic Cancer Mother      Pancreatic Cancer Father        SOCIAL HISTORY:   Social History     Socioeconomic History     Marital status: Legally    Tobacco Use     Smoking status: Former Smoker     Packs/day: 0.50     Quit date: 2021     Years since quittin.3     Smokeless tobacco: Never Used   Substance and Sexual Activity     Alcohol use: Yes     Drug use: Never       VITALS:  BP (!) 140/62   Pulse 98   Temp 98.2  F (36.8  C)   Resp 20   Ht 1.549 m (5' 1\")   Wt 70.8 kg (156 lb)   SpO2 97%   BMI 29.48 kg/m      PHYSICAL EXAM    Physical Exam  Vitals and nursing note reviewed.   Constitutional:       General: She is not in acute distress.     Appearance: Normal appearance. She is normal weight. She is not toxic-appearing or diaphoretic.   HENT:      Right Ear: External ear normal. Drainage and tenderness present. No foreign body. Tympanic membrane is bulging. Tympanic membrane is not perforated.      Left Ear: External ear normal. No drainage, swelling or tenderness.   Eyes:      Conjunctiva/sclera: Conjunctivae normal.   Neurological:      Mental Status: She is alert. Mental status is at baseline.      Cranial Nerves: No cranial nerve deficit.      Sensory: No sensory deficit.      Motor: No weakness.         LAB:  All pertinent labs reviewed and interpreted.  Labs Ordered and Resulted from Time of ED Arrival to Time of ED Departure - No data to display    RADIOLOGY:  Reviewed all pertinent imaging. Please see official radiology report.  No orders to display       EKG:    None    PROCEDURES:   None      I, García Magana, am serving as a scribe to " document services personally performed by Alli Scruggs PA-C, based on my observation and the provider's statements to me. I, Alli Scruggs PA-C, attest that García Magana is acting in a scribe capacity, has observed my performance of the services and has documented them in accordance with my direction.    Alli Scruggs PA-C  Emergency Medicine  [facility] EMERGENCY DEPARTMENT  [facility] [facility] [address]  [phone]  Dept: [phone]       Alli Scruggs PA-C  08/22/22 1276

## 2022-09-08 PROBLEM — J94.8 HYDROPNEUMOTHORAX: Status: ACTIVE | Noted: 2022-01-01

## 2022-09-08 PROBLEM — R60.1 ANASARCA: Status: ACTIVE | Noted: 2022-01-01

## 2022-09-08 PROBLEM — N19 RENAL FAILURE, UNSPECIFIED CHRONICITY: Status: ACTIVE | Noted: 2022-01-01

## 2022-09-08 PROBLEM — A41.9 SEVERE SEPSIS (H): Status: ACTIVE | Noted: 2022-01-01

## 2022-09-08 PROBLEM — R65.20 SEVERE SEPSIS (H): Status: ACTIVE | Noted: 2022-01-01

## 2022-09-08 PROBLEM — R18.8 OTHER ASCITES: Status: ACTIVE | Noted: 2022-01-01

## 2022-09-08 PROBLEM — J90 PLEURAL EFFUSION: Status: ACTIVE | Noted: 2022-01-01

## 2022-09-08 NOTE — ED TRIAGE NOTES
Pt fell onto her rug in her bedroom due to weakness this am. Pt has chronic low back pain but no new pain reports from fall. Pt is febrile in triage.     Triage Assessment     Row Name 09/08/22 1513       Triage Assessment (Adult)    Airway WDL WDL       Respiratory WDL    Respiratory WDL X  sob       Skin Circulation/Temperature WDL    Skin Circulation/Temperature WDL X  orange tint to skin/hot to touch       Cardiac WDL    Cardiac WDL X  tachycardia       Peripheral/Neurovascular WDL    Peripheral Neurovascular WDL WDL       Cognitive/Neuro/Behavioral WDL    Cognitive/Neuro/Behavioral WDL WDL

## 2022-09-08 NOTE — ED NOTES
ED Provider In Triage Note  [facility]  Encounter Date: Sep 8, 2022    Chief Complaint   Patient presents with     Fall       Brief HPI:   Maren Fofana is a 72 year old female presenting to the Emergency Department with a chief complaint of dizziness. She had a fall today and endorses low back pain, but has history of low back pain. Febrile in triage. Endorses intermittent chest pain, denies cough.     Brief Physical Exam:  There were no vitals taken for this visit.  General: Non-toxic appearing  HEENT: Atraumatic  Resp: No respiratory distress  Abdomen: Non-peritoneal  Neuro: Alert, oriented, answers questions appropriately  Psych: Behavior appropriate      Plan Initiated in Triage:  Orders Placed This Encounter     XR Chest 2 Views     CT Head w/o Contrast     CT Cervical Spine w/o Contrast     CT Lumbar Spine w/o Contrast     Comprehensive metabolic panel     Troponin I     B-Type Natriuretic Peptide ([facility] Only)     UA with Microscopic reflex to Culture           PIT Dispo:   Place patient in the next available ED bed. Will obtain labs, urine, COVID test and imaging studies.     MATEO THOMAS MD on 9/8/2022 at 3:08 PM    Patient was evaluated by the Physician in Triage due to a limitation of available rooms in the Emergency Department. A plan of care was discussed based on the information obtained on the initial evaluation and patient was consuled to return back to the Emergency Department lobby after this initial evalutaiton until results were obtained or a room became available in the Emergency Department. Patient was counseled not to leave prior to receiving the results of their workup.     MATEO THOMAS MD  [facility] EMERGENCY DEPARTMENT  [address] [place] [id number]  [phone]       Mateo Thomas MD  09/08/22 0684

## 2022-09-08 NOTE — ED PROVIDER NOTES
EMERGENCY DEPARTMENT ENCOUNTER      NAME: Maren Fofana  AGE: 72 year old female  YOB: 1950  MRN: [id number]  EVALUATION DATE & TIME: 9/8/2022  3:22 PM    PCP: Nora Mccarthy    ED PROVIDER: Sean Roberson MD        Chief Complaint   Patient presents with     Fall         FINAL IMPRESSION:  1. Severe sepsis (H)    2. Pleural effusion    3. Renal failure, unspecified chronicity    4. Hydropneumothorax    5. Anasarca    6. Other ascites          ED COURSE & MEDICAL DECISION MAKING:    Pertinent Labs & Imaging studies reviewed. (See chart for details)  72 year old female presents to the Emergency Department for evaluation of shortness of breath, fall, found to be hypoxic.    Based on hypoxemia increased work of breathing and increasing pleural effusion ultrasound thoracentesis by radiology ordered.  Fluid analysis ordered.  Had some right upper quadrant tenderness and fullness therefore CT abdomen ordered.  Concern for sepsis with fever lactic acid greater than 4.  Given 1 L of fluids due to patient on Bumex.  Repeat lactic acid improved.  Got Zosyn and vancomycin.    CT and chest x-ray showed hydropneumothroax.  Repeat x-ray shows worsening.  Discussed with pulmonary recommends chest tube.    Chest tube placed under sterile technique.  Discussed with the hospitalist who agreed to admit patient.    4:01 PM I met with the patient, obtained history, performed an initial exam, and discussed options and plan for diagnostics and treatment here in the ED. PPE worn including N95 mask, surgical gloves, face shield.  4:21 PM Lactic acid 4.3.  5:17 PM I reevaluated the patient. Discussed ultrasound thoracentesis and she is amenable.  5:36 PM Patient is going up for an ultrasound thoracentesis.  7:57 PM I spoke with radiology.  9:02 PM I reevaluated and updated the patient.  9:26 PM I spoke with Dr. Humphrey, pulmonologist.  10:29 PM I spoke with pulmonology who recommends a chest tube.  10:41 PM I spoke with the  hospitalist Dr. Bansal who accepts the patient.      ED Course as of 09/09/22 0015   Thu Sep 08, 2022   1708 Patient presents with fever, cough, nasal congestion, also reportedly fell   1709 Based on history and examination pulmonary emboli unlikely.  Does have some right upper quadrant fullness and did fall for CT abdomen ordered, chest x-ray ordered, CT head and and CT spine imaging ordered by provider in triage   1711 Differential includes pneumonia, cholecystitis, pancreatitis, influenza, COVID, bronchitis, bacterial COPD exacerbation   1711 Lactic acid came back greater than 4 therefore IV fluids and broad-spectrum antibiotics were ordered   1711 Troponin I: 0.09   1712 WBC: 10.7   1712 Hemoglobin(!): 10.6   1712 Creatinine(!): 1.43  Worsening likely secondary to sepsis   1712 SARS CoV2 PCR: Negative   1712 Influenza A: Negative   1727 Patient received 1 L IV fluids based on uric acid greater than 4 and fever of 103   1727 Given Zosyn and vancomycin.  X-ray shows right lobe lower lobe infiltrate and worsening pleural effusion.  Plan for ultrasound thoracentesis with cultures.   1728 Troponin I: 0.09       At the conclusion of the encounter I discussed the results of all of the tests and the disposition. The questions were answered. The patient or family acknowledged understanding and was agreeable with the care plan.       The patient is critically ill and has required 30 minutes of critical care time exclusive of procedures. This includes time spent interviewing the patient, ordering tests and medications, monitoring vital signs, reviewing results, patient updates, discussing the case with family and consultants, and admission.      MEDICATIONS GIVEN IN THE EMERGENCY:  Medications   0.9% sodium chloride BOLUS (1,000 mLs Intravenous Not Given 9/8/22 1658)   sodium chloride (PF) 0.9% PF flush 3 mL (has no administration in time range)   sodium chloride (PF) 0.9% PF flush 3 mL (3 mLs Intracatheter Given 9/8/22  "2313)   acetaminophen (TYLENOL) tablet 650 mg (650 mg Oral Given 9/8/22 1605)   0.9% sodium chloride BOLUS (0 mLs Intravenous Stopped 9/8/22 1655)   piperacillin-tazobactam (ZOSYN) 3.375 g vial to attach to  mL bag (3.375 g Intravenous Given 9/8/22 1703)   vancomycin (VANCOCIN) 1,250 mg in sodium chloride 0.9 % 250 mL intermittent infusion (0 mg Intravenous Stopped 9/8/22 2130)   iopamidol (ISOVUE-370) solution 75 mL (75 mLs Intravenous Given 9/8/22 1941)   fentaNYL (PF) (SUBLIMAZE) injection 25 mcg (25 mcg Intravenous Given 9/8/22 2334)   midazolam (VERSED) injection 1 mg (1 mg Intravenous Given 9/8/22 2320)   lidocaine (PF) (XYLOCAINE) 1 % injection 10 mL (10 mLs Other Given by Other Clinician 9/9/22 0006)       NEW PRESCRIPTIONS STARTED AT TODAY'S ER VISIT  New Prescriptions    No medications on file          =================================================================    HPI    Triage note  \"  Pt fell onto her rug in her bedroom due to weakness this am. Pt has chronic low back pain but no new pain reports from fall. Pt is febrile in triage.     Triage Assessment     Row Name 09/08/22 1513       Triage Assessment (Adult)    Airway WDL WDL       Respiratory WDL    Respiratory WDL X  sob       Skin Circulation/Temperature WDL    Skin Circulation/Temperature WDL X  orange tint to skin/hot to touch       Cardiac WDL    Cardiac WDL X  tachycardia       Peripheral/Neurovascular WDL    Peripheral Neurovascular WDL WDL       Cognitive/Neuro/Behavioral WDL    Cognitive/Neuro/Behavioral WDL WDL              \"      Patient information was obtained from: patient          Maren Fofana is a 72 year old female with a pertinent history of aortic stenosis, valve replacement, AAA, who presents to this ED for evaluation of fall today.  Noted to have fever.  Has had a cough and increasing shortness of breath last few days as well as some nasal congestion.  Has low back pain but states she has chronic low back pain.  " Hard of hearing and did not bring her hearing aids with her.  COVID-19 test at home was reportedly negative.  Reportedly had a valve replacement in the last year denies any problems urinating.  No vomiting.      REVIEW OF SYSTEMS   Review of Systems   Constitutional: Positive for fever.   HENT: Positive for congestion and sore throat.    Respiratory: Positive for cough and shortness of breath.    Cardiovascular: Negative for chest pain.   Gastrointestinal: Positive for abdominal pain.   Genitourinary: Negative for dysuria.   Musculoskeletal: Positive for back pain.   Skin: Negative for wound.   Allergic/Immunologic: Negative for immunocompromised state.   Neurological: Positive for dizziness.   Hematological: Does not bruise/bleed easily.   Psychiatric/Behavioral: Negative for confusion.       PAST MEDICAL HISTORY:  Past Medical History:   Diagnosis Date     Aortic stenosis      Collapsed lung      Fibromyalgia      GERD (gastroesophageal reflux disease)      Hypothyroid      Migraine      Peripheral vascular disease (H)      Reactive airway disease      Vertigo        PAST SURGICAL HISTORY:  Past Surgical History:   Procedure Laterality Date      SECTION Bilateral      CV CORONARY ANGIOGRAM N/A 2021    Procedure: Coronary Angiogram;  Surgeon: Sara Randall MD;  Location: [facility] CATH LAB CV     CV TRANSCATHETER AORTIC VALVE REPLACEMENT N/A 2021    Procedure: CV TRANSCATHETER AORTIC VALVE REPLACEMENT;  Surgeon: Geovani Kulkarni MD;  Location: [facility] CARDIAC CATH LAB     HEART CATH FEMORAL CANNULIZATION WITH OPEN STANDBY REPAIR AORTIC VALVE N/A 2021    Procedure: LEFT SUBCLAVIAN ACCESS FOR  TRANSCATHETER AORTIC VALVE REPLACEMENT.  CARDIOPULMONARY BYPASS STANDBY;  Surgeon: Jorge L Baeza MD;  Location: [facility] CARDIAC CATH LAB     HYSTERECTOMY       POSTERIOR LAMINECTOMY / DECOMPRESSION LUMBAR SPINE      L2-L3     SPINAL FUSION       TONSILLECTOMY & ADENOIDECTOMY       TOTAL KNEE  ARTHROPLASTY Bilateral            CURRENT MEDICATIONS:    aspirin (ASA) 81 MG EC tablet  atorvastatin (LIPITOR) 80 MG tablet  diphenhydrAMINE (BENADRYL) 25 MG tablet  EPINEPHrine (EPIPEN/ADRENACLICK/AUVI-Q) 0.3 mg/0.3 mL injection  fish oil-omega-3 fatty acids 1000 MG capsule  ginkgo biloba 60 MG CAPS capsule  levothyroxine (SYNTHROID, LEVOTHROID) 88 MCG tablet  lidocaine (ASPERCREME LIDOCAINE) 4 % external cream  losartan (COZAAR) 25 MG tablet  Melatonin 10 MG TABS tablet  omeprazole 20 MG tablet  potassium chloride ER (KLOR-CON M) 20 MEQ CR tablet  torsemide (DEMADEX) 20 MG tablet  traZODone (DESYREL) 150 MG tablet  venlafaxine (EFFEXOR-XR) 150 MG 24 hr capsule        ALLERGIES:  Allergies   Allergen Reactions     Bee Venom Anaphylaxis     Other Drug Allergy (See Comments) Hives     Ice Packs   Any kind of cold      Dilaudid [Hydromorphone] Other (See Comments)     Altered mental status, confusion, itching. Pt tolerates oxycodone.     Latex      hives       FAMILY HISTORY:  Family History   Problem Relation Age of Onset     Pancreatic Cancer Mother      Pancreatic Cancer Father        SOCIAL HISTORY:   Social History     Socioeconomic History     Marital status: Legally    Tobacco Use     Smoking status: Former Smoker     Packs/day: 0.50     Quit date: 2021     Years since quittin.4     Smokeless tobacco: Never Used   Substance and Sexual Activity     Alcohol use: Yes     Drug use: Never       VITALS:  /70   Pulse 90   Temp (!) 100.9  F (38.3  C)   Resp 13   Wt 67.1 kg (148 lb)   SpO2 97%   BMI 27.96 kg/m      PHYSICAL EXAM      Vitals: /70   Pulse 90   Temp (!) 100.9  F (38.3  C)   Resp 13   Wt 67.1 kg (148 lb)   SpO2 97%   BMI 27.96 kg/m    General: Appears in no acute distress, awake, alert, interactive.  Eyes: Conjunctivae non-injected. Sclera anicteric.  HENT: Atraumatic.  Neck: Supple.  Respiratory/Chest: Respiration unlabored.  Mild hypoxemia with O2 sat of 89% based  on 2 L oxygen.  Decreased breath sounds right inferior posterior lung.  No wheezing or crackles  Abdomen: Right upper quadrant fullness and epigastric fullness and tenderness  Musculoskeletal: Normal extremities. No edema or erythema.  Skin: Normal color. No rash or diaphoresis.  Neurologic: Face symmetric, moves all extremities spontaneously. Speech clear.  Psychiatric: Oriented to person, place, and time. Affect appropriate.       LAB:  All pertinent labs reviewed and interpreted.  Results for orders placed or performed during the hospital encounter of 09/08/22   CT Head w/o Contrast    Impression    IMPRESSION:    1.  No evidence of acute intracranial hemorrhage or mass effect.  2.  Mild nonspecific white matter changes.  3.  Mild brain parenchymal volume loss.   CT Cervical Spine w/o Contrast    Impression    IMPRESSION:  1.  No evidence of acute fracture or subluxation of the cervical spine by CT imaging.  2.  Degenerative cervical spondylosis as described above.   CT Lumbar Spine w/o Contrast    Impression    IMPRESSION:    1.  No evidence of acute fracture or subluxation of the lumbar spine by CT imaging.  2.  Postsurgical changes posterior fusion at L3-L5 and interbody fusion at L3/L4 and L4/L5. No hardware complication.  3.  Degenerative lumbar spondylosis as described above.   CT Abdomen Pelvis w Contrast    Impression    IMPRESSION:   1.  Small to moderate right hydropneumothorax, status post large volume right thoracentesis earlier today. Patient may have a trapped right lung. Consider two-view chest radiograph.  2.  Cirrhotic appearing liver with mild ascites. No splenomegaly.  3.  Previous endovascular repair of infrarenal abdominal aortic aneurysm. Type II endoleak with interval enlargement of the aneurysm sac, compatible with endotension. Previous repair at [facility]. Consider nonemergent Interventional Radiology   consult.  4.  Generalized anasarca.    Report called to Dr. Roberson at 2000 hours    XR Chest Port 1 View    Impression    IMPRESSION: Small to moderate right effusion, slightly increased. Right lower lobe infiltrate or atelectasis.  No pneumothorax.  The left lung is clear.  The heart is normal in size.   US Thoracentesis    Impression    IMPRESSION:  Status post right  ultrasound-guided thoracentesis.    Reference CPT Code: 89780   XR Chest Port 1 View    Impression    IMPRESSION: Stable cardiomediastinal silhouette with prior aortic valve replacement. Small right hydropneumothorax, similar volume to same day CT given differences in modalities, without evidence of tension. Possible reexpansion edema in the right lower   lobe. Left lung is clear. No acute bony abnormality.   XR Chest Port 1 View    Impression    IMPRESSION: Persistent right pneumothorax measuring 8 mm at the apex (stable) and 1.7 cm at the lateral base (previously 1.4 cm). Small right pleural effusion. Increased airspace infiltrate in the right lower lobe. Surgical clips in the left axilla.   Calcified aortic arch. Prior aortic valve repair.   XR Chest Port 1 View    Impression    IMPRESSION: New right chest tube terminates at the apex medially. Right pneumothorax has resolved. Persistent opacity at the right base may represent an combination of atelectasis and edema. Normal heart size. Prior endovascular aortic valve replacement.   Left lung clear. Surgical clips cluster over the left shoulder.   Comprehensive metabolic panel   Result Value Ref Range    Sodium 134 (L) 136 - 145 mmol/L    Potassium 3.9 3.5 - 5.0 mmol/L    Chloride 100 98 - 107 mmol/L    Carbon Dioxide (CO2) 21 (L) 22 - 31 mmol/L    Anion Gap 13 5 - 18 mmol/L    Urea Nitrogen 21 8 - 28 mg/dL    Creatinine 1.43 (H) 0.60 - 1.10 mg/dL    Calcium 9.0 8.5 - 10.5 mg/dL    Glucose 129 (H) 70 - 125 mg/dL    Alkaline Phosphatase 90 45 - 120 U/L    AST 26 0 - 40 U/L    ALT 9 0 - 45 U/L    Protein Total 7.7 6.0 - 8.0 g/dL    Albumin 3.8 3.5 - 5.0 g/dL    Bilirubin Total 1.3  (H) 0.0 - 1.0 mg/dL    GFR Estimate 38 (L) >60 mL/min/1.73m2   Result Value Ref Range    Troponin I 0.09 0.00 - 0.29 ng/mL   B-Type Natriuretic Peptide ([facility] Only)   Result Value Ref Range    BNP 1,107 (H) 0 - 133 pg/mL   UA with Microscopic reflex to Culture    Specimen: Urine, Clean Catch   Result Value Ref Range    Color Urine Yellow Colorless, Straw, Light Yellow, Yellow    Appearance Urine Turbid (A) Clear    Glucose Urine Negative Negative mg/dL    Bilirubin Urine Negative Negative    Ketones Urine Trace (A) Negative mg/dL    Specific Gravity Urine 1.023 1.001 - 1.030    Blood Urine >1.0 mg/dL (A) Negative    pH Urine 5.5 5.0 - 7.0    Protein Albumin Urine 200  (A) Negative mg/dL    Urobilinogen Urine <2.0 <2.0 mg/dL    Nitrite Urine Negative Negative    Leukocyte Esterase Urine Negative Negative    Bacteria Urine Few (A) None Seen /HPF    Mucus Urine Present (A) None Seen /LPF    Amorphous Crystals Urine Few (A) None Seen /HPF    RBC Urine <1 <=2 /HPF    WBC Urine 4 <=5 /HPF    Squamous Epithelials Urine 1 <=1 /HPF    Hyaline Casts Urine 4 (H) <=2 /LPF   Symptomatic; Unknown Influenza A/B & SARS-CoV2 (COVID-19) Virus PCR Multiplex Nasopharyngeal    Specimen: Nasopharyngeal; Swab   Result Value Ref Range    Influenza A PCR Negative Negative    Influenza B PCR Negative Negative    RSV PCR Negative Negative    SARS CoV2 PCR Negative Negative   Lactic acid whole blood   Result Value Ref Range    Lactic Acid 4.3 (HH) 0.7 - 2.0 mmol/L   CBC with platelets and differential   Result Value Ref Range    WBC Count 10.7 4.0 - 11.0 10e3/uL    RBC Count 3.22 (L) 3.80 - 5.20 10e6/uL    Hemoglobin 10.6 (L) 11.7 - 15.7 g/dL    Hematocrit 31.5 (L) 35.0 - 47.0 %    MCV 98 78 - 100 fL    MCH 32.9 26.5 - 33.0 pg    MCHC 33.7 31.5 - 36.5 g/dL    RDW 14.5 10.0 - 15.0 %    Platelet Count 61 (L) 150 - 450 10e3/uL    % Neutrophils 96 %    % Lymphocytes 2 %    % Monocytes 1 %    % Eosinophils 0 %    % Basophils 0 %    % Immature  Granulocytes 1 %    NRBCs per 100 WBC 0 <1 /100    Absolute Neutrophils 10.4 (H) 1.6 - 8.3 10e3/uL    Absolute Lymphocytes 0.2 (L) 0.8 - 5.3 10e3/uL    Absolute Monocytes 0.1 0.0 - 1.3 10e3/uL    Absolute Eosinophils 0.0 0.0 - 0.7 10e3/uL    Absolute Basophils 0.0 0.0 - 0.2 10e3/uL    Absolute Immature Granulocytes 0.1 <=0.4 10e3/uL    Absolute NRBCs 0.0 10e3/uL   Extra Blue Top Tube   Result Value Ref Range    Hold Specimen JIC    Extra Red Top Tube   Result Value Ref Range    Hold Specimen JIC    Glucose fluid   Result Value Ref Range    Glucose Fluid Source Pleural Cavity, Right     Glucose fluid 117 mg/dL   Result Value Ref Range    Lactate Dehydrogenase 418 (H) 125 - 220 U/L   Result Value Ref Range    Protein Total 7.3 6.0 - 8.0 g/dL   Lactic acid whole blood   Result Value Ref Range    Lactic Acid 2.9 (H) 0.7 - 2.0 mmol/L   Pleural fluid Aerobic Bacterial Culture Routine with Gram Stain    Specimen: Pleural Cavity, Right; Pleural fluid   Result Value Ref Range    Gram Stain Result No organisms seen        RADIOLOGY:  Reviewed all pertinent imaging. Please see official radiology report.  XR Chest Port 1 View   Final Result   IMPRESSION: New right chest tube terminates at the apex medially. Right pneumothorax has resolved. Persistent opacity at the right base may represent an combination of atelectasis and edema. Normal heart size. Prior endovascular aortic valve replacement.    Left lung clear. Surgical clips cluster over the left shoulder.      XR Chest Port 1 View   Final Result   IMPRESSION: Persistent right pneumothorax measuring 8 mm at the apex (stable) and 1.7 cm at the lateral base (previously 1.4 cm). Small right pleural effusion. Increased airspace infiltrate in the right lower lobe. Surgical clips in the left axilla.    Calcified aortic arch. Prior aortic valve repair.      XR Chest Port 1 View   Final Result   IMPRESSION: Stable cardiomediastinal silhouette with prior aortic valve replacement.  Small right hydropneumothorax, similar volume to same day CT given differences in modalities, without evidence of tension. Possible reexpansion edema in the right lower    lobe. Left lung is clear. No acute bony abnormality.      CT Abdomen Pelvis w Contrast   Final Result   IMPRESSION:    1.  Small to moderate right hydropneumothorax, status post large volume right thoracentesis earlier today. Patient may have a trapped right lung. Consider two-view chest radiograph.   2.  Cirrhotic appearing liver with mild ascites. No splenomegaly.   3.  Previous endovascular repair of infrarenal abdominal aortic aneurysm. Type II endoleak with interval enlargement of the aneurysm sac, compatible with endotension. Previous repair at [facility]. Consider nonemergent Interventional Radiology    consult.   4.  Generalized anasarca.      Report called to Dr. Roberson at 2000 hours      CT Lumbar Spine w/o Contrast   Final Result   IMPRESSION:     1.  No evidence of acute fracture or subluxation of the lumbar spine by CT imaging.   2.  Postsurgical changes posterior fusion at L3-L5 and interbody fusion at L3/L4 and L4/L5. No hardware complication.   3.  Degenerative lumbar spondylosis as described above.      CT Cervical Spine w/o Contrast   Final Result   IMPRESSION:   1.  No evidence of acute fracture or subluxation of the cervical spine by CT imaging.   2.  Degenerative cervical spondylosis as described above.      CT Head w/o Contrast   Final Result   Addendum 1 of 1   Addendum/   impression:      INDICATION: Dizziness      Final   IMPRESSION:     1.  No evidence of acute intracranial hemorrhage or mass effect.   2.  Mild nonspecific white matter changes.   3.  Mild brain parenchymal volume loss.      US Thoracentesis   Final Result   IMPRESSION:   Status post right  ultrasound-guided thoracentesis.      Reference CPT Code: 41001      XR Chest Port 1 View   Final Result   IMPRESSION: Small to moderate right effusion, slightly  increased. Right lower lobe infiltrate or atelectasis.  No pneumothorax.  The left lung is clear.  The heart is normal in size.      XR Chest 2 Views    (Results Pending)       EKG:    Performed at: 15:41    Impression: Sinus rhythm with premature atrial complexes. Low voltage QRS. Septal infarct (cited on or before 29-SEP-2021)    Rate: 99 bpm  Rhythm: Sinus  Axis: 89  N[place] Interval: 138 ms  QRS Interval: 84 ms  QTc Interval: 441  ST Changes: None  Comparison: When compared with EKG of 27-OCT-2021, Premature atrial complexes are now present. Inverted T wave have replaced nonspecific T wave abnormality in lateral leads    I have independently reviewed and interpreted the EKG(s) documented above.    PROCEDURES:     PROCEDURE: Tube Thoracostomy   TYPE: Cook catheter   INDICATIONS: It is medically necessary to place a chest tube for hemo-pneumothorax   PROCEDURE PROVIDER: Dr Sean Roberson   CONSENT: Risks, benefits and alternatives were discussed with and Written consent was obtained from Patient.   TIME OUT: Universal protocol was followed. TIME OUT conducted just prior to starting procedure confirmed patient identity, site/side, procedure, patient position, and availability of correct equipment. Yes   SITE: Right axillary   MEDICATIONS 10 mLs of 1% Lidocaine without epinephrine    Fentanyl, 25 mcg, IV, 1 mg versed   NOTE: Patient was placed in a semirecumbent position with the head of the bed at 30 degrees.  The right prepped with betadine. Patient was medicated as above. Incision was made laterally in the midaxilary line.  Blunt dissection up and over the rib was preformed until access was obtained to the pleural cavity.  A Cook catheter or 10 Uzbek chest tube was placed and connected to Pleurovac on continuous suction. Tube was sutured in place with 2-0 silk, and all connections banded.    There was minimal.     Post procedure X-ray showing partial reexpansion of the lung.     COMPLICATIONS: Patient tolerated procedure  well, without complication         I, Connor Daigle, am serving as a scribe to document services personally performed by Edgar Roberson MD based on my observation and the provider's statements to me. I, Dr. Edgar Roberson, attest that Connor Daigle is acting in a scribe capacity, has observed my performance of the services and has documented them in accordance with my direction.    Edgar Roberson MD  Emergency Medicine  [facility] EMERGENCY DEPARTMENT  [address] [place] [id number]  [phone]     Edgar Roberson MD  09/09/22 0016

## 2022-09-08 NOTE — PHARMACY-ADMISSION MEDICATION HISTORY
Pharmacy Note - Admission Medication History    Pertinent Provider Information: Daughter Caitie is knowledgeable about patient medications     ______________________________________________________________________    Prior To Admission (PTA) med list completed and updated in EMR.       PTA Med List   Medication Sig Last Dose     aspirin (ASA) 81 MG EC tablet Take 1 tablet (81 mg) by mouth daily 9/7/2022 at AM     atorvastatin (LIPITOR) 80 MG tablet [ATORVASTATIN (LIPITOR) 80 MG TABLET] Take 80 mg by mouth daily. 9/7/2022 at AM     diphenhydrAMINE (BENADRYL) 25 MG tablet Take 25 mg by mouth as needed Past Week at Unknown time     EPINEPHrine (EPIPEN/ADRENACLICK/AUVI-Q) 0.3 mg/0.3 mL injection 0.3 mg as needed  More than a month at Unknown time     fish oil-omega-3 fatty acids 1000 MG capsule Take 1 g by mouth daily 9/7/2022 at AM     ginkgo biloba 60 MG CAPS capsule Take 120 mg by mouth daily 9/7/2022 at AM     levothyroxine (SYNTHROID, LEVOTHROID) 88 MCG tablet [LEVOTHYROXINE (SYNTHROID, LEVOTHROID) 88 MCG TABLET] Take 88 mcg by mouth daily. 9/7/2022 at AM     lidocaine (ASPERCREME LIDOCAINE) 4 % external cream Apply 1 g topically once as needed for mild pain 9/7/2022 at AM     losartan (COZAAR) 25 MG tablet Take 1 tablet (25 mg) by mouth daily 9/7/2022 at AM     Melatonin 10 MG TABS tablet Take 30 mg by mouth At Bedtime 9/7/2022 at Bedtime     omeprazole 20 MG tablet Take 20 mg by mouth daily 9/7/2022 at AM     potassium chloride ER (KLOR-CON M) 20 MEQ CR tablet Take 1 tablet (20 mEq) by mouth daily 9/7/2022 at AM     torsemide (DEMADEX) 20 MG tablet Take 1 tablet (20 mg) by mouth daily 9/7/2022 at AM     traZODone (DESYREL) 150 MG tablet Take 150 mg by mouth At Bedtime  9/7/2022 at Bedtime     venlafaxine (EFFEXOR-XR) 150 MG 24 hr capsule [VENLAFAXINE (EFFEXOR-XR) 150 MG 24 HR CAPSULE] Take 300 mg by mouth daily. 9/7/2022 at AM       Information source(s): Patient, Family member, Prescription bottles and  [facility]  Method of interview communication: in-person    Summary of Changes to PTA Med List  New: Fish Oil, Diphenhydramine, Omeprazole, Ginkgo Biloba, and Aspercreme  Discontinued: Fluticasone, Hydroxyzine  Changed: None    Patient was asked about OTC/herbal products specifically.  PTA med list reflects this.    In the past week, patient estimated taking medication this percent of the time:  greater than 90%.    Allergies were reviewed, assessed, and updated with the patient.      Patient does not use any multi-dose medications prior to admission.    The information provided in this note is only as accurate as the sources available at the time of the update(s).    Thank you for the opportunity to participate in the care of this patient.    YANELIS JAUREGUI RPH  9/8/2022 6:01 PM

## 2022-09-08 NOTE — PHARMACY-VANCOMYCIN DOSING SERVICE
"Pharmacy Vancomycin Initial Note  Date of Service 2022  Patient's  1950  72 year old, female    Indication: Sepsis    Current estimated CrCl = Estimated Creatinine Clearance: 30.2 mL/min (A) (based on SCr of 1.43 mg/dL (H)).    Creatinine for last 3 days  2022:  3:52 PM Creatinine 1.43 mg/dL    Recent Vancomycin Level(s) for last 3 days  No results found for requested labs within last 72 hours.      Vancomycin IV Administrations (past 72 hours)      No vancomycin orders with administrations in past 72 hours.                Nephrotoxins and other renal medications (From now, onward)    Start     Dose/Rate Route Frequency Ordered Stop    22 1800  vancomycin (VANCOCIN) 1,250 mg in sodium chloride 0.9 % 250 mL intermittent infusion         1,250 mg  over 90 Minutes Intravenous ONCE 22 1714      22 1630  piperacillin-tazobactam (ZOSYN) 3.375 g vial to attach to  mL bag        Note to Pharmacy: For [facility], [facility] and [facility]: For Zosyn-naive patients, use the \"Zosyn initial dose + extended infusion\" order panel.    3.375 g  over 240 Minutes Intravenous ONCE 22 1621            Contrast Orders - past 72 hours (72h ago, onward)    None              Plan:  1. Start vancomycin  1250 mg IV once.   2. Please consult pharmacy if you would like us to continue dosing. Thank you!    MELINA BAH RPH    "

## 2022-09-09 PROBLEM — R78.81 GRAM-POSITIVE BACTEREMIA: Status: ACTIVE | Noted: 2022-01-01

## 2022-09-09 NOTE — CONSULTS
Consultation - Infectious Disease  Maren Fofana,  1950, MRN [id number]    Admitting Dx: Other ascites [R18.8]    PCP: Nora Mccarthy, [phone]   Code status:  Full Code       Extended Emergency Contact Information  Primary Emergency Contact: karen rinaldi  Mobile Phone: [phone]  Relation: Other  Secondary Emergency Contact: Joselo fofana  Home Phone: [phone]  Relation: Son       ASSESSMENT   1. MRSA bacteremia. Onset of symptoms around . Typically from skin source, unclear if this was the cause of fall or result of skin injury leading to bacteremia. She has TAVR, AAA endovascular graft, bilateral TKAs, all at risk for infection.   2. TAVR   3. AAA endovascular repair with graft . CT showing slight enlargement of aneurysm sac, type II endoleak noted.   4. Bilateral TKAs.  5. Alcohol use. Imaging shows cirrhosis.   6. Pleural effusion. Transudative. Thoracentesis complicated by hydropneumothorax, now with chest tube.    Active Problems:    Hydropneumothorax    Anasarca    Pleural effusion    Other ascites    Severe sepsis (H)    Renal failure, unspecified chronicity    Gram-positive bacteremia       PLAN   Vancomycin  Zosyn likely stop next couple of days   Regimen may ultimately be 6 weeks IV vancomycin and PO rifampin; 2 weeks of IV gentamicin if tolerated  TTE today  Plan EMILY Monday. Due to reduced sensitivity of EMILY to detect endocarditis of TAVR, plan PET CT next week  Daily blood cultures until clear.     Josafat Recinos MD  [facility]  [phone] [facility] paging  ______________________________________________________________________        Reason For Consult: MRSA bacteremia, TAVR     HPI    We have been requested by Dr. Bansal to evaluate Maren Fofana who is a 72 year old year old female for the above. She has a PMH of aortic stenosis, s/p TAVR , HTN, s/p endovascular AAA repair , tobacco use disorder (remission), diastolic CHF,  HTN, known right-sided pleural effusion, alcohol use disorder, presented to ED yesterday for evaluation of fall.  She reported cough and increased dyspnea over the last few days, vomiting, urinary incontinence, diarrhea, nasal congestion.  COVID-19 test at home was negative. She fell down at home leading to coming in.     Temp to 103 on presentation. Imaging showed increased right-sided pleural effusion.  Underwent right-sided thoracentesis, developed hydropneumothorax post-thoracentesis, then had chest tube placement, noted resolution of PTX.    Blood cultures both positive for MRSA. Had otitis , treated with Augmentin. Denies other infection problems, previous skin infections. Daughter present and assists with history.     Bilateral knee replacements, low back hardware. Has pain upper back on right (bruising there), pain at chest tube site. Denies other back pain or joint pains. Recalls being well a week ago.          Medical History  Past Medical History:   Diagnosis Date     Aortic stenosis      Collapsed lung      Fibromyalgia      GERD (gastroesophageal reflux disease)      Hypothyroid      Migraine      Peripheral vascular disease (H)      Reactive airway disease      Vertigo     Surgical History  She  has a past surgical history that includes Total Knee Arthroplasty (Bilateral);  Section (Bilateral); Hysterectomy; tonsillectomy & adenoidectomy; Spinal Fusion; Posterior Laminectomy / Decompression Lumbar Spine; Coronary Angiogram (N/A, 2021); Transcatheter Aortic Valve Replacement (N/A, 2021); and Heart Cath Femoral Cannulization With Open Standby Repair Aortic Valve (N/A, 2021).   Social History  Reviewed, and she  reports that she quit smoking about 17 months ago. She smoked 0.50 packs per day. She has never used smokeless tobacco. She reports current alcohol use. She reports that she does not use drugs. Lives alone, no pets.    Allergies  Allergies   Allergen Reactions     Bee  Venom Anaphylaxis     Other Drug Allergy (See Comments) Hives     Ice Packs   Any kind of cold      Dilaudid [Hydromorphone] Other (See Comments)     Altered mental status, confusion, itching. Pt tolerates oxycodone.     Latex      hives    Family History  family history includes Pancreatic Cancer in her father and mother.      Psychosocial Needs  Social History     Social History Narrative     Not on file     Additional psychosocial needs reviewed per nursing assessment.       Immunization History   Administered Date(s) Administered     COVID-19,PF,Moderna 01/28/2021, 02/25/2021     Influenza, Quad, High Dose, Pf, 65yr+ (Fluzone HD) 09/29/2021        Prior to Admission Medications   (Not in a hospital admission)         Anti-infectives: vancomycin 9/8-  Pip/tazo 9/8-  Cultures: 9/8 blood both sets MRSA by verigene  Imaging: reviewed images          Review of Systems:  All other systems negative in detail except what is noted above. Physical Exam:  Temp:  [100.6  F (38.1  C)-103.1  F (39.5  C)] 100.6  F (38.1  C)  Pulse:  [] 81  Resp:  [12-45] 20  BP: (100-163)/(64-98) 120/66  SpO2:  [90 %-100 %] 99 %    GENERAL:  In no acute distress, is alert and oriented to person, place, time.  EYES: No conjunctival injection, pupils equally reactive to light, extra-ocular movement intact  HEAD, EARS, NOSE, MOUTH, AND THROAT: Nontraumatic, mouth without oral ulcers. Edentulous.   RESPIRATORY: Pretty clear. Chest tube on right.   CARDIOVASCULAR: Regular rate and rhythm, normal S1 and S2, no murmurs, rubs, or gallops.  Chest tube on right.   ABDOMEN: Soft, nontender, no masses, no organomegaly.  Normal bowel sounds.  MUSCULOSKELETAL: No synovitis. Bilateral TKAs. Bruising upper back on right, tender.   LYMPHATIC: No cervical, supraclavicular, axillary, or inguinal lymphadenopathy.  SKIN/HAIR/NAILS: No rashes, no signs of peripheral emboli.  NEUROLOGIC: Grossly intact.       Pertinent Labs         Recent Labs   Lab  09/09/22  0737 09/08/22  1552   * 134*   CO2 22 21*   BUN 25 21       Lab Results   Component Value Date    ALT 12 09/09/2022    AST 53 (H) 09/09/2022    ALKPHOS 68 09/09/2022          Pertinent Radiology  Radiology Results: Reviewed  XR Chest 2 Views    Result Date: 9/9/2022  EXAM: XR CHEST 2 VIEWS LOCATION: [facility] DATE/TIME: 9/9/2022 11:17 AM INDICATION: f u chest tube, PTX COMPARISON: 09/08/2022.     IMPRESSION: Right chest tube over the right lung apex in stable position. No recurrent pneumothorax. Small right pleural effusion with some infiltrates or atelectasis and some mild atelectasis left lung base all appear stable. Aortic valve prosthesis.    US Thoracentesis    Result Date: 9/8/2022  EXAM: 1. RIGHT  THORACENTESIS 2. ULTRASOUND GUIDANCE LOCATION: [facility] DATE/TIME: 9/8/2022 6:46 PM INDICATION: Pleural effusion. PROCEDURE: Informed consent obtained. Time out performed. The chest was prepped and draped in sterile fashion. 5  mL of 1 % lidocaine was infused into the local soft tissues. Under direct ultrasound guidance, a 5 Chinese catheter system was placed into the pleural effusion. 1.35  liters of clear yellow and red  fluid were removed and sent to lab, if requested. Patient tolerated procedure well. Ultrasound imaging was obtained and placed in the patient's permanent medical record.     IMPRESSION: Status post right  ultrasound-guided thoracentesis. Reference CPT Code: 29167    XR Chest Port 1 View    Result Date: 9/9/2022  EXAM: XR CHEST PORT 1 VIEW LOCATION: [facility] DATE/TIME: 9/9/2022 12:00 AM INDICATION: s p chest tube follow-up pneumothorax COMPARISON: 09/08/2022     IMPRESSION: New right chest tube terminates at the apex medially. Right pneumothorax has resolved. Persistent opacity at the right base may represent an combination of atelectasis and edema. Normal heart size. Prior endovascular aortic valve  replacement.  Left lung clear. Surgical clips cluster over the left shoulder.    XR Chest Port 1 View    Result Date: 9/8/2022  EXAM: XR CHEST PORT 1 VIEW LOCATION: [facility] DATE/TIME: 9/8/2022 9:44 PM INDICATION: ptx COMPARISON: 09/08/2022     IMPRESSION: Persistent right pneumothorax measuring 8 mm at the apex (stable) and 1.7 cm at the lateral base (previously 1.4 cm). Small right pleural effusion. Increased airspace infiltrate in the right lower lobe. Surgical clips in the left axilla. Calcified aortic arch. Prior aortic valve repair.    XR Chest Port 1 View    Result Date: 9/8/2022  EXAM: XR CHEST PORT 1 VIEW LOCATION: [facility] DATE/TIME: 9/8/2022 8:18 PM INDICATION: s p thoracentesis, ? ptx COMPARISON: Same date abdominal CT and chest radiograph.     IMPRESSION: Stable cardiomediastinal silhouette with prior aortic valve replacement. Small right hydropneumothorax, similar volume to same day CT given differences in modalities, without evidence of tension. Possible reexpansion edema in the right lower lobe. Left lung is clear. No acute bony abnormality.    XR Chest Port 1 View    Result Date: 9/8/2022  EXAM: XR CHEST PORT 1 VIEW LOCATION: [facility] DATE/TIME: 9/8/2022 4:30 PM INDICATION: fever, cough COMPARISON: 12/3/2021     IMPRESSION: Small to moderate right effusion, slightly increased. Right lower lobe infiltrate or atelectasis.  No pneumothorax.  The left lung is clear.  The heart is normal in size.    CT Abdomen Pelvis w Contrast    Result Date: 9/8/2022  EXAM: CT ABDOMEN PELVIS W CONTRAST LOCATION: [facility] DATE/TIME: 9/8/2022 7:39 PM INDICATION: Right upper abdominal pain and fever. COMPARISON: CTA 8/21/2021 TECHNIQUE: CT scan of the abdomen and pelvis was performed following injection of IV contrast. Multiplanar reformats were obtained. Dose reduction techniques were used. CONTRAST: 75ml isovue  370 FINDINGS: LOWER CHEST: Small right hydropneumothorax post right thoracentesis earlier today. Small residual right pleural effusion. Heavy mitral annular calcifications. Small sliding-type hiatal hernia. HEPATOBILIARY: Cirrhotic appearing liver redemonstrated without focal suspicious mass. Normal gallbladder and biliary system. PANCREAS: Normal. SPLEEN: Normal. ADRENAL GLANDS: Normal. KIDNEYS/BLADDER: Normal kidneys and ureters. Nondistended bladder with focal asymmetric enhancing bladder wall thickening measuring up to 8 mm in the right bladder base image 173 of series 3.. BOWEL: Diverticulosis of the colon. No acute inflammatory change. No obstruction. Large amount of stool in the rectum. Mild ascites. LYMPH NODES: No lymphadenopathy. VASCULATURE: Previous endovascular aortic aneurysm repair with bifurcated endograft. Aneurysm sac measures 5.0 x 5.6 cm, previously 4.7 x 5.4 cm. Type II endoleak is noted. PELVIC ORGANS: Hysterectomy. No adnexal mass. MUSCULOSKELETAL: Previous lower lumbar fusion. Degenerative changes are present within the spine and hips. Generalized anasarca.     IMPRESSION: 1.  Small to moderate right hydropneumothorax, status post large volume right thoracentesis earlier today. Patient may have a trapped right lung. Consider two-view chest radiograph. 2.  Cirrhotic appearing liver with mild ascites. No splenomegaly. 3.  Previous endovascular repair of infrarenal abdominal aortic aneurysm. Type II endoleak with interval enlargement of the aneurysm sac, compatible with endotension. Previous repair at [facility]. Consider nonemergent Interventional Radiology consult. 4.  Generalized anasarca. Report called to Dr. Roberson at 2000 hours    CT Cervical Spine w/o Contrast    Result Date: 9/8/2022  EXAM: CT CERVICAL SPINE W/O CONTRAST LOCATION: [facility] DATE/TIME: 9/8/2022 7:38 PM INDICATION: fall COMPARISON: None. TECHNIQUE: Routine CT Cervical Spine without IV  contrast. Multiplanar reformats. Dose reduction techniques were used. FINDINGS: No evidence of acute fracture or subluxation of the cervical spine by CT imaging. Straightening of the normal cervical lordosis. The vertebral bodies of the cervical spine otherwise have normal stature and alignment. Anterolisthesis of C3 on C4 measuring  2 mm. Anterior marginal osteophytes at C4/C5-C7/T1. Multilevel degenerative disc disease of the cervical spine with disc height loss, most pronounced at C4/C5-C7/T1. The partially imaged intracranial contents are unremarkable. No prevertebral soft tissue swelling. The partially imaged lung apices are unremarkable. Craniovertebral junction and C1-C2: The odontoid process is well approximated with the anterior body of C1 and well aligned between the lateral masses of C1. C2-C3: No posterior disc bulge or spinal canal narrowing. No neural foraminal narrowing. C3-C4: No posterior disc bulge or spinal canal narrowing. No neural foraminal narrowing. C4-C5: Broad bar disc osteophyte complex with moderate spinal canal narrowing. Uncovertebral joint disease and facet arthropathy with severe bilateral neural foraminal narrowing. C5-C6: No posterior disc bulge or spinal canal narrowing. Uncovertebral joint disease and facet arthropathy with severe right and moderate left neural foraminal narrowing. C6-C7: No posterior disc bulge or spinal canal narrowing. Uncovertebral joint disease and facet arthropathy with severe bilateral neural foraminal narrowing. C7-T1: No posterior disc bulge or spinal canal narrowing. Uncovertebral joint disease and facet arthropathy with moderate bilateral neural foraminal narrowing.     IMPRESSION: 1.  No evidence of acute fracture or subluxation of the cervical spine by CT imaging. 2.  Degenerative cervical spondylosis as described above.    CT Head w/o Contrast    Addendum Date: 9/8/2022    Addendum/ impression: INDICATION: Dizziness    Result Date: 9/8/2022  EXAM: CT  HEAD W/O CONTRAST LOCATION: [facility] DATE/TIME: 9/8/2022 7:34 PM INDICATION: fall COMPARISON: None. TECHNIQUE: Routine CT Head without IV contrast. Multiplanar reformats. Dose reduction techniques were used. FINDINGS: INTRACRANIAL CONTENTS: No evidence of acute intracranial hemorrhage or mass effect. Scattered foci of decreased attenuation within the cerebral hemispheric white matter which are nonspecific, though most commonly ascribed to chronic small vessel ischemic  disease. The ventricles and sulci are prominent consistent with mild brain parenchymal volume loss. Gray-white matter differentiation is maintained. The basilar cisterns are patent. VISUALIZED ORBITS/SINUSES/MASTOIDS: Bilateral cataract surgery. The partially imaged globes are otherwise unremarkable. The partially imaged paranasal sinuses, mastoid air cells and middle ear cavities are unremarkable. BONES/SOFT TISSUES: The visualized skull base and calvarium are unremarkable.     IMPRESSION:  1.  No evidence of acute intracranial hemorrhage or mass effect. 2.  Mild nonspecific white matter changes. 3.  Mild brain parenchymal volume loss.    CT Lumbar Spine w/o Contrast    Result Date: 9/8/2022  EXAM: CT LUMBAR SPINE W/O CONTRAST LOCATION: [facility] DATE/TIME: 9/8/2022 7:39 PM INDICATION: Back pain COMPARISON: None. TECHNIQUE: Routine CT Lumbar Spine without IV contrast. Multiplanar reformats. Dose reduction techniques were used. FINDINGS: No evidence of acute fracture or subluxation of the lumbar spine by CT imaging. Postsurgical changes posterior fusion at L3-L5 and interbody fusion at L3/L4 and L4/L5. No hardware complication. No evidence of acute fracture or subluxation of the lumbar spine by CT imaging. Retrolisthesis of T12 on L1 measuring 2 mm. Anterior marginal osteophytes at T12/L1 and L1/L2 and L2/L3. Multilevel degenerative disc disease of the lumbar spine with disc height loss, most  pronounced at T12/L1 and L2/L3. Aortobiiliac endograft. The partially imaged intra-abdominal contents are otherwise unremarkable. T12/L1:  Symmetric disc bulge without spinal canal narrowing. Mild bilateral neural foraminal narrowing. L1/L2:  No posterior disc bulge or spinal canal narrowing. Mild bilateral neural foraminal narrowing. L2/L3:  Broad bar disc osteophyte complex with mild spinal canal narrowing. Severe bilateral neural foraminal narrowing. L3/L4:  Spondylotic ridging without spinal canal narrowing. Moderate bilateral facet arthropathy. No neural foraminal narrowing.  L4/L5:  No posterior disc bulge or spinal canal narrowing. Mild bilateral facet arthropathy. No neural foraminal narrowing. L5/S1: Symmetric disc bulge and moderate facet arthropathy without spinal canal narrowing. Severe bilateral neural foraminal narrowing.     IMPRESSION:  1.  No evidence of acute fracture or subluxation of the lumbar spine by CT imaging. 2.  Postsurgical changes posterior fusion at L3-L5 and interbody fusion at L3/L4 and L4/L5. No hardware complication. 3.  Degenerative lumbar spondylosis as described above.

## 2022-09-09 NOTE — PROGRESS NOTES
A/P    72 year old female with PMH of aortic stenosis, s/p TAVR 09/21, HTN, s/p AAA repair, tobacco use disorder, diastolic CHF, HTN, known right-sided pleural effusion, alcohol use disorder, admitted on 9/8/2022.      Severe sepsis due to MRSA Bacteremia:  BC x2 on 9/8 positive for MRSA bacteremia.  TTE today no obvious vegetation.  -Daily blood cultures  - Vancomycin/Zosyn  - ID following.  Assistance appreciated  - EMILY on Monday and PET cardiac next week thereafter given TAVR    Exudative right pleural effusion complicated by PTX:  S/p 1.3 L thoracentesis on 9/8 followed by chest tube placement for PTX.  Pleural fluid cultures NGTD.  CXR today resolved PTX, small right effusion, mild atelectasis left lung base.  -pulm following.  Assistance appreciated.    -Pulmonary managing chest tube  .  HFpEF:  EF 60 to 65%. BNP 1,107.  TTE 9/9/2022 LVEF 60 to 65%, mild decreased RV systolic function, moderate-severe LAE, mild-moderate MR, 23 sapient 3 bioprosthetic valve with peak velocity 2.3 m/s, mean gradient 9 mmHg, dimensionless index 0.48, IVC diameter greater than 2.1 cm collapsing less than 50% with sniff suggests high RA pressure at 15 mm or greater  -Recent coronary angiogram on 8/21- no significant CAD.  -Resume torsemide on 9/10  -Repeat TTE    Status post TAVR: Refer to above.     Essential hypertension.  -Hold losartan  - Monitor     Rhabdomyolysis: CK 2,092.  Suspect traumatic vs less likely  infectious   -hold statin  -CK a.m.  -Would not initiate IV fluids at this time.     Alcohol use disorder.  History of withdrawal seizures/DT. EtOH level < 10  -CIWA, thiamine, folate     AAA, s/p endovascular repair in 2016: CT abdomen/pelvis on 9/8/2022 showed endovascular aortic aneurysm repair with bifurcated endograft, aneurysm sac measures 5 x 5.6 cm (previously 4.7 x 5.4 cm), type II endoleak  - Follow[facility] at [facility]    SKYLAR: Improved.  Creatinine 1.43-1.12.  - Strict intake and output and daily weights  - Labs  in a.m.    Hyponatremia: Sodium 132.  Monitor closely.    Cirrhosis: Per CT abdomen/pelvis imaging.  LFTs normal.    GERD-PPI.    Asymmetric bladder wall thickening: UA negative for UTI.    Fall at home:  CT head, CT cervical spine without traumatic injuries, stroke.  -PT/OT    MDD-Effexor, hold trazodone due to increased risk for serotonin syndrome.    Hypothyroidism-on levothyroxine.  -TSH    Chronic back pain with disc disease and spondylolisthesis, s/p lumbar laminectomy.     History of tobacco use disorder-quit smoking 6 months ago.    Hypomagnesemia:  -Mg 2g IV x1  -Mg in a.m.    Constipation: CT abdomen pelvis on 9/8/2022 showed large amount of stool in the rectum.  - Senna-S, MiraLAX       Diet: cardiac diet  DVT Prophylaxis: Pneumatic Compression Devices  Reynoso Catheter: Not present  Central Lines: None  Cardiac Monitoring: yes  Code Status:   full    Multiple day stay    S:  Febrile. Events overnight noted    O:  Temp: (!) 100.6  F (38.1  C) Temp src: Oral BP: 120/66 Pulse: 81   Resp: 20 SpO2: 99 % O2 Device: Nasal cannula    gen nad  cv rrr  Lungs decreased r>l, non abored, right chest tube  abd bs+, nttp  Neuro a&o    Lab/imaging reviewed

## 2022-09-09 NOTE — H&P
[facility]    History and Physical - Hospitalist Service       Date of Admission:  9/8/2022    Assessment & Plan       Maren Fofana is a 72 year old female with PMH of aortic stenosis, s/p TAVR 09/21, HTN, s/p AAA repair, tobacco use disorder, diastolic CHF, HTN, known right-sided pleural effusion, alcohol use disorder, admitted on 9/8/2022.     Severe sepsis, likely due to suspected empyema.  No other source of infection.  Urine does not look infected. Concern for bacterial endocarditis, given gram-positive bacteremia and s/p TAVR.  Lactate 4.3, with IVF/antibiotics down to 2.9.  S/p 2 L NS bolus.  Monitor for signs of septic shock.  BP stable.  Antibiotics as below: vanco/zocyn; daily surveillance Cx, cardiology consult if abnormal echo.    Gram-positive bacteremia.  Patient with s/p TAVR.  On vancomycin.  -Surveillance daily blood cultures  -Echo in a.m.  -ID consulted  -Vancomycin/Zosyn    Right-sided pleural effusion.  S/p 1.3 L thoracentesis on 9/8.  She has known from pre-- TAVR CTA right-sided pleural effusion.  Thoracentesis complicated by pneumothorax.  S/p chest tube placement in ED, post CT placement, PTX resolved.  -Antibiotics as above  -Pulmonology consulted for CT management    Fall at home.  DD CVA, ACS, sepsis.  No loss of consciousness.  Not anticoagulated.  CT head, CT cervical spine without traumatic injuries, stroke.  -Treating sepsis as above, PT/OT    Diastolic CHF.  EF 60 to 65%.  Recent coronary angiogram on 8/21- no significant CAD.  Holding torsemide in setting of severe sepsis.    Essential hypertension.  On losartan, holding for now, in the setting of severe sepsis.    Children-on high-dose Lipitor.    Alcohol use disorder.  History of withdrawal seizures/DT.  -CIWA    AAA, s/p endovascular repair.  GERD-on PPI.  MDD-on Effexor, trazodone.  Hypothyroidism-on levothyroxine.  Chronic back pain with disc disease and spondylolisthesis, s/p lumbar  "laminectomy.    History of tobacco use disorder-quit smoking 6 months ago.       Diet: NPO for Medical/Clinical Reasons Except for: Meds    DVT Prophylaxis: Pneumatic Compression Devices  Reynoso Catheter: Not present  Central Lines: None  Cardiac Monitoring: None  Code Status:   full    Clinically Significant Risk Factors Present on Admission                # Thrombocytopenia: Plts = 61 10e3/uL (Ref range: 150 - 450 10e3/uL) on admission, will monitor for bleeding  # Hypertension: home medication list includes antihypertensive(s)    # Overweight: Estimated body mass index is 27.96 kg/m  as calculated from the following:    Height as of 8/22/22: 1.549 m (5' 1\").    Weight as of this encounter: 67.1 kg (148 lb).        Disposition Plan         The patient's care was discussed with the Bedside Nurse, Patient and Patient's Family.    Kalpana Bansal MD  Hospitalist Service  [facility]  Securely message with the Vocera Web Console (learn more here)  Text page via [employer] Paging/Directory   _____________________________________________________________________    Chief Complaint   Dyspnea, fall at home, fever    History is obtained from the patient, electronic health record and emergency department physician    History of Present Illness   Maren Fofana is a 72 year old female with PMH of aortic stenosis, s/p TAVR 09/21, HTN, s/p AAA repair, tobacco use disorder, diastolic CHF, HTN, known right-sided pleural effusion, alcohol use disorder, presented to ED for evaluation of fall.  She reported cough and increased dyspnea over the last few days, with preceding nasal congestion.  COVID-19 test at home was negative.  Imaging showed increased right-sided pleural effusion.  Underwent right-sided thoracentesis, developed hydropneumothorax postthoracentesis.  Pulmonology recommended chest tube placement which was done with subsequent chest x-ray showing resolution of PTX.  Collateral information from " patient's daughter that patient drinks alcohol 4-5 times a week, and unknown quantities.  Drinks wine coolers, beer.  No known history of alcohol withdrawal DT/seizures.  Patient denies focal weakness, abdominal pain, nausea, vomiting.    Review of Systems    The 10 point Review of Systems is negative other than noted in the HPI or here.     Past Medical History    I have reviewed this patient's medical history and updated it with pertinent information if needed.   Past Medical History:   Diagnosis Date     Aortic stenosis      Collapsed lung      Fibromyalgia      GERD (gastroesophageal reflux disease)      Hypothyroid      Migraine      Peripheral vascular disease (H)      Reactive airway disease      Vertigo      Past Surgical History   I have reviewed this patient's surgical history and updated it with pertinent information if needed.  Past Surgical History:   Procedure Laterality Date      SECTION Bilateral      CV CORONARY ANGIOGRAM N/A 2021    Procedure: Coronary Angiogram;  Surgeon: Sara Randall MD;  Location: [facility] CATH LAB CV     CV TRANSCATHETER AORTIC VALVE REPLACEMENT N/A 2021    Procedure: CV TRANSCATHETER AORTIC VALVE REPLACEMENT;  Surgeon: Geovani Kulkarni MD;  Location: [facility] CARDIAC CATH LAB     HEART CATH FEMORAL CANNULIZATION WITH OPEN STANDBY REPAIR AORTIC VALVE N/A 2021    Procedure: LEFT SUBCLAVIAN ACCESS FOR  TRANSCATHETER AORTIC VALVE REPLACEMENT.  CARDIOPULMONARY BYPASS STANDBY;  Surgeon: Jorge L Baeza MD;  Location: [facility] CARDIAC CATH LAB     HYSTERECTOMY       POSTERIOR LAMINECTOMY / DECOMPRESSION LUMBAR SPINE      L2-L3     SPINAL FUSION       TONSILLECTOMY & ADENOIDECTOMY       TOTAL KNEE ARTHROPLASTY Bilateral      Social History   I have reviewed this patient's social history and updated it with pertinent information if needed.  Social History     Tobacco Use     Smoking status: Former Smoker     Packs/day: 0.50     Quit date: 2021     Years  since quittin.4     Smokeless tobacco: Never Used   Substance Use Topics     Alcohol use: Yes     Drug use: Never     Family History   I have reviewed this patient's family history and updated it with pertinent information if needed.  Family History   Problem Relation Age of Onset     Pancreatic Cancer Mother      Pancreatic Cancer Father      Prior to Admission Medications   Prior to Admission Medications   Prescriptions Last Dose Informant Patient Reported? Taking?   EPINEPHrine (EPIPEN/ADRENACLICK/AUVI-Q) 0.3 mg/0.3 mL injection More than a month at Unknown time  Yes Yes   Si.3 mg as needed    Melatonin 10 MG TABS tablet 2022 at Bedtime  Yes Yes   Sig: Take 30 mg by mouth At Bedtime   aspirin (ASA) 81 MG EC tablet 2022 at AM  No Yes   Sig: Take 1 tablet (81 mg) by mouth daily   atorvastatin (LIPITOR) 80 MG tablet 2022 at AM  Yes Yes   Sig: [ATORVASTATIN (LIPITOR) 80 MG TABLET] Take 80 mg by mouth daily.   diphenhydrAMINE (BENADRYL) 25 MG tablet Past Week at Unknown time  Yes Yes   Sig: Take 25 mg by mouth as needed   fish oil-omega-3 fatty acids 1000 MG capsule 2022 at AM  Yes Yes   Sig: Take 1 g by mouth daily   ginkgo biloba 60 MG CAPS capsule 2022 at AM  Yes Yes   Sig: Take 120 mg by mouth daily   levothyroxine (SYNTHROID, LEVOTHROID) 88 MCG tablet 2022 at AM  Yes Yes   Sig: [LEVOTHYROXINE (SYNTHROID, LEVOTHROID) 88 MCG TABLET] Take 88 mcg by mouth daily.   lidocaine (ASPERCREME LIDOCAINE) 4 % external cream 2022 at AM  Yes Yes   Sig: Apply 1 g topically once as needed for mild pain   losartan (COZAAR) 25 MG tablet 2022 at AM  No Yes   Sig: Take 1 tablet (25 mg) by mouth daily   omeprazole 20 MG tablet 2022 at AM  Yes Yes   Sig: Take 20 mg by mouth daily   potassium chloride ER (KLOR-CON M) 20 MEQ CR tablet 2022 at AM  No Yes   Sig: Take 1 tablet (20 mEq) by mouth daily   torsemide (DEMADEX) 20 MG tablet 2022 at AM  No Yes   Sig: Take 1 tablet (20 mg) by  mouth daily   traZODone (DESYREL) 150 MG tablet 9/7/2022 at Bedtime  Yes Yes   Sig: Take 150 mg by mouth At Bedtime    venlafaxine (EFFEXOR-XR) 150 MG 24 hr capsule 9/7/2022 at AM  Yes Yes   Sig: [VENLAFAXINE (EFFEXOR-XR) 150 MG 24 HR CAPSULE] Take 300 mg by mouth daily.      Facility-Administered Medications: None     Allergies   Allergies   Allergen Reactions     Bee Venom Anaphylaxis     Other Drug Allergy (See Comments) Hives     Ice Packs   Any kind of cold      Dilaudid [Hydromorphone] Other (See Comments)     Altered mental status, confusion, itching. Pt tolerates oxycodone.     Latex      hives     Physical Exam   Vital Signs: Temp: (!) 100.9  F (38.3  C) Temp src: Oral BP: 132/89 Pulse: 90   Resp: 28 SpO2: 98 % O2 Device: Nasal cannula    Weight: 148 lbs 0 oz  General: Alert and oriented x 2. Not in obvious distress.  Hard of hearing, without hearing aids.  HEENT: NC, AT. Neck- supple, No JVP elevation, lymphadenopathy or thyromegaly. Trachea-central.  Chest: Chest tube in the right lateral chest.  Diminished breath sounds bilaterally inferiorly.    Heart: S1S2 regular. No M/R/G.  Abdomen: Soft. NT, ND. No organomegaly. Bowel sounds- active.  Back: No spine tenderness. No CVA tenderness.  Extremities: No leg swelling. Peripheral pulses 2+ bilaterally.  Neuro: Cranial nerves 1-12 grossly normal. No focal neurological deficit    Data   Data reviewed today: I reviewed all medications, new labs and imaging results over the last 24 hours. I personally reviewed    Recent Labs   Lab 09/08/22  1558 09/08/22  1552   WBC  --  10.7   HGB  --  10.6*   MCV  --  98   PLT  --  61*   NA  --  134*   POTASSIUM  --  3.9   CHLORIDE  --  100   CO2  --  21*   BUN  --  21   CR  --  1.43*   ANIONGAP  --  13   YENI  --  9.0   GLC  --  129*   ALBUMIN  --  3.8   PROTTOTAL 7.3 7.7   BILITOTAL  --  1.3*   ALKPHOS  --  90   ALT  --  9   AST  --  26     10.7    \    10.6 (L)    /    61 (L)   N 96    L N/A    134 (L)    100    21 /    ------------------------------------ 129 (H)   ALT 9   AST 26   AP 90   ALB 3.8   Ca 9.0  3.9    21 (L)    1.43 (H) \    % RETIC N/A     (H)  Troponin N/A    BNP 1,107 (H)    CK N/A  INR N/A   PTT N/A    D-dimer N/A    Fibrinogen N/A    Antithrombin N/A  Ferritin N/A  CRP N/A    IL-6 N/A  Recent Results (from the past 24 hour(s))   XR Chest Port 1 View    Narrative    EXAM: XR CHEST PORT 1 VIEW  LOCATION: [facility]  DATE/TIME: 9/8/2022 4:30 PM    INDICATION: fever, cough  COMPARISON: 12/3/2021      Impression    IMPRESSION: Small to moderate right effusion, slightly increased. Right lower lobe infiltrate or atelectasis.  No pneumothorax.  The left lung is clear.  The heart is normal in size.   US Thoracentesis    Narrative    EXAM:   1. RIGHT  THORACENTESIS  2. ULTRASOUND GUIDANCE  LOCATION: [facility]  DATE/TIME: 9/8/2022 6:46 PM    INDICATION: Pleural effusion.    PROCEDURE: Informed consent obtained. Time out performed. The chest was prepped and draped in sterile fashion. 5  mL of 1 % lidocaine was infused into the local soft tissues. Under direct ultrasound guidance, a 5 [place] of the [place] catheter system was placed into   the pleural effusion.     1.35  liters of clear yellow and red  fluid were removed and sent to lab, if requested.    Patient tolerated procedure well.    Ultrasound imaging was obtained and placed in the patient's permanent medical record.      Impression    IMPRESSION:  Status post right  ultrasound-guided thoracentesis.    Reference CPT Code: 13212   CT Head w/o Contrast    Addendum: 9/8/2022    Addendum/  impression:    INDICATION: Dizziness      Narrative    EXAM: CT HEAD W/O CONTRAST  LOCATION: [facility]  DATE/TIME: 9/8/2022 7:34 PM    INDICATION: fall  COMPARISON: None.  TECHNIQUE: Routine CT Head without IV contrast. Multiplanar reformats. Dose reduction techniques were used.    FINDINGS:  INTRACRANIAL CONTENTS:  No evidence of acute intracranial hemorrhage or mass effect. Scattered foci of decreased attenuation within the cerebral hemispheric white matter which are nonspecific, though most commonly ascribed to chronic small vessel ischemic   disease. The ventricles and sulci are prominent consistent with mild brain parenchymal volume loss. Gray-white matter differentiation is maintained. The basilar cisterns are patent.    VISUALIZED ORBITS/SINUSES/MASTOIDS: Bilateral cataract surgery. The partially imaged globes are otherwise unremarkable. The partially imaged paranasal sinuses, mastoid air cells and middle ear cavities are unremarkable.     BONES/SOFT TISSUES: The visualized skull base and calvarium are unremarkable.      Impression    IMPRESSION:    1.  No evidence of acute intracranial hemorrhage or mass effect.  2.  Mild nonspecific white matter changes.  3.  Mild brain parenchymal volume loss.   CT Cervical Spine w/o Contrast    Narrative    EXAM: CT CERVICAL SPINE W/O CONTRAST  LOCATION: [facility]  DATE/TIME: 9/8/2022 7:38 PM    INDICATION: fall  COMPARISON: None.  TECHNIQUE: Routine CT Cervical Spine without IV contrast. Multiplanar reformats. Dose reduction techniques were used.    FINDINGS:  No evidence of acute fracture or subluxation of the cervical spine by CT imaging. Straightening of the normal cervical lordosis. The vertebral bodies of the cervical spine otherwise have normal stature and alignment. Anterolisthesis of C3 on C4 measuring   2 mm. Anterior marginal osteophytes at C4/C5-C7/T1. Multilevel degenerative disc disease of the cervical spine with disc height loss, most pronounced at C4/C5-C7/T1. The partially imaged intracranial contents are unremarkable. No prevertebral soft   tissue swelling. The partially imaged lung apices are unremarkable.     Craniovertebral junction and C1-C2: The odontoid process is well approximated with the anterior body of C1 and well aligned between  the lateral masses of C1.    C2-C3: No posterior disc bulge or spinal canal narrowing. No neural foraminal narrowing.     C3-C4: No posterior disc bulge or spinal canal narrowing. No neural foraminal narrowing.     C4-C5: Broad bar disc osteophyte complex with moderate spinal canal narrowing. Uncovertebral joint disease and facet arthropathy with severe bilateral neural foraminal narrowing.     C5-C6: No posterior disc bulge or spinal canal narrowing. Uncovertebral joint disease and facet arthropathy with severe right and moderate left neural foraminal narrowing.     C6-C7: No posterior disc bulge or spinal canal narrowing. Uncovertebral joint disease and facet arthropathy with severe bilateral neural foraminal narrowing.     C7-T1: No posterior disc bulge or spinal canal narrowing. Uncovertebral joint disease and facet arthropathy with moderate bilateral neural foraminal narrowing.       Impression    IMPRESSION:  1.  No evidence of acute fracture or subluxation of the cervical spine by CT imaging.  2.  Degenerative cervical spondylosis as described above.   CT Lumbar Spine w/o Contrast    Narrative    EXAM: CT LUMBAR SPINE W/O CONTRAST  LOCATION: [facility]  DATE/TIME: 9/8/2022 7:39 PM    INDICATION: Back pain  COMPARISON: None.  TECHNIQUE: Routine CT Lumbar Spine without IV contrast. Multiplanar reformats. Dose reduction techniques were used.     FINDINGS:  No evidence of acute fracture or subluxation of the lumbar spine by CT imaging. Postsurgical changes posterior fusion at L3-L5 and interbody fusion at L3/L4 and L4/L5. No hardware complication. No evidence of acute fracture or subluxation of the lumbar   spine by CT imaging. Retrolisthesis of T12 on L1 measuring 2 mm. Anterior marginal osteophytes at T12/L1 and L1/L2 and L2/L3. Multilevel degenerative disc disease of the lumbar spine with disc height loss, most pronounced at T12/L1 and L2/L3.   Aortobiiliac endograft. The partially  imaged intra-abdominal contents are otherwise unremarkable.    T12/L1:  Symmetric disc bulge without spinal canal narrowing. Mild bilateral neural foraminal narrowing.    L1/L2:  No posterior disc bulge or spinal canal narrowing. Mild bilateral neural foraminal narrowing.    L2/L3:  Broad bar disc osteophyte complex with mild spinal canal narrowing. Severe bilateral neural foraminal narrowing.    L3/L4:  Spondylotic ridging without spinal canal narrowing. Moderate bilateral facet arthropathy. No neural foraminal narrowing.      L4/L5:  No posterior disc bulge or spinal canal narrowing. Mild bilateral facet arthropathy. No neural foraminal narrowing.     L5/S1: Symmetric disc bulge and moderate facet arthropathy without spinal canal narrowing. Severe bilateral neural foraminal narrowing.       Impression    IMPRESSION:    1.  No evidence of acute fracture or subluxation of the lumbar spine by CT imaging.  2.  Postsurgical changes posterior fusion at L3-L5 and interbody fusion at L3/L4 and L4/L5. No hardware complication.  3.  Degenerative lumbar spondylosis as described above.   CT Abdomen Pelvis w Contrast    Narrative    EXAM: CT ABDOMEN PELVIS W CONTRAST  LOCATION: [facility]  DATE/TIME: 9/8/2022 7:39 PM    INDICATION: Right upper abdominal pain and fever.  COMPARISON: CTA 8/21/2021  TECHNIQUE: CT scan of the abdomen and pelvis was performed following injection of IV contrast. Multiplanar reformats were obtained. Dose reduction techniques were used.  CONTRAST: 75ml isovue 370    FINDINGS:   LOWER CHEST: Small right hydropneumothorax post right thoracentesis earlier today. Small residual right pleural effusion. Heavy mitral annular calcifications. Small sliding-type hiatal hernia.    HEPATOBILIARY: Cirrhotic appearing liver redemonstrated without focal suspicious mass. Normal gallbladder and biliary system.    PANCREAS: Normal.    SPLEEN: Normal.    ADRENAL GLANDS:  Normal.    KIDNEYS/BLADDER: Normal kidneys and ureters. Nondistended bladder with focal asymmetric enhancing bladder wall thickening measuring up to 8 mm in the right bladder base image 173 of series 3..    BOWEL: Diverticulosis of the colon. No acute inflammatory change. No obstruction. Large amount of stool in the rectum. Mild ascites.    LYMPH NODES: No lymphadenopathy.    VASCULATURE: Previous endovascular aortic aneurysm repair with bifurcated endograft. Aneurysm sac measures 5.0 x 5.6 cm, previously 4.7 x 5.4 cm. Type II endoleak is noted.    PELVIC ORGANS: Hysterectomy. No adnexal mass.    MUSCULOSKELETAL: Previous lower lumbar fusion. Degenerative changes are present within the spine and hips. Generalized anasarca.      Impression    IMPRESSION:   1.  Small to moderate right hydropneumothorax, status post large volume right thoracentesis earlier today. Patient may have a trapped right lung. Consider two-view chest radiograph.  2.  Cirrhotic appearing liver with mild ascites. No splenomegaly.  3.  Previous endovascular repair of infrarenal abdominal aortic aneurysm. Type II endoleak with interval enlargement of the aneurysm sac, compatible with endotension. Previous repair at [facility]. Consider nonemergent Interventional Radiology   consult.  4.  Generalized anasarca.    Report called to Dr. Roberson at 2000 hours   XR Chest Port 1 View    Narrative    EXAM: XR CHEST PORT 1 VIEW  LOCATION: [facility]  DATE/TIME: 9/8/2022 8:18 PM    INDICATION: s p thoracentesis, ? ptx  COMPARISON: Same date abdominal CT and chest radiograph.      Impression    IMPRESSION: Stable cardiomediastinal silhouette with prior aortic valve replacement. Small right hydropneumothorax, similar volume to same day CT given differences in modalities, without evidence of tension. Possible reexpansion edema in the right lower   lobe. Left lung is clear. No acute bony abnormality.   XR Chest Port 1 View     Narrative    EXAM: XR CHEST PORT 1 VIEW  LOCATION: [facility]  DATE/TIME: 9/8/2022 9:44 PM    INDICATION: ptx  COMPARISON: 09/08/2022      Impression    IMPRESSION: Persistent right pneumothorax measuring 8 mm at the apex (stable) and 1.7 cm at the lateral base (previously 1.4 cm). Small right pleural effusion. Increased airspace infiltrate in the right lower lobe. Surgical clips in the left axilla.   Calcified aortic arch. Prior aortic valve repair.   XR Chest Port 1 View    Narrative    EXAM: XR CHEST PORT 1 VIEW  LOCATION: [facility]  DATE/TIME: 9/9/2022 12:00 AM    INDICATION: s p chest tube follow-up pneumothorax  COMPARISON: 09/08/2022      Impression    IMPRESSION: New right chest tube terminates at the apex medially. Right pneumothorax has resolved. Persistent opacity at the right base may represent an combination of atelectasis and edema. Normal heart size. Prior endovascular aortic valve replacement.   Left lung clear. Surgical clips cluster over the left shoulder.

## 2022-09-09 NOTE — CONSULTS
Care Management Initial Consult    General Information  Assessment completed with: Patient, Children,    Type of CM/SW Visit: Initial Assessment    Primary Care Provider verified and updated as needed: Yes   Readmission within the last 30 days:  (ED visit)         Advance Care Planning: Advance Care Planning Reviewed: verified with patient (emailed HCD to Liam Lynn)          Communication Assessment  Patient's communication style: spoken language (English or Bilingual)             Cognitive  Cognitive/Neuro/Behavioral: WDL                      Living Environment:   People in home: alone     Current living Arrangements: apartment      Able to return to prior arrangements: yes       Family/Social Support:  Care provided by: self, child(gretel)  Provides care for: no one     Children          Description of Support System: Supportive, Involved         Current Resources:   Patient receiving home care services: No     Community Resources: None  Equipment currently used at home: walker, standard  Supplies currently used at home: None    Employment/Financial:  Employment Status:          Financial Concerns:             Lifestyle & Psychosocial Needs:  Social Determinants of Health     Tobacco Use: Medium Risk     Smoking Tobacco Use: Former Smoker     Smokeless Tobacco Use: Never Used   Alcohol Use: Not on file   Financial Resource Strain: Not on file   Food Insecurity: Not on file   Transportation Needs: Not on file   Physical Activity: Not on file   Stress: Not on file   Social Connections: Not on file   Intimate Partner Violence: Not on file   Depression: Not on file   Housing Stability: Not on file       Functional Status:  Prior to admission patient needed assistance:   Dependent ADLs:: Ambulation-walker  Dependent IADLs:: Independent                Additional Information:    Assessment completed with patient and her daughter Caitie. Patient lives in her apartment alone. There are no steps to ambulate. She is  independent with ADLs and IADLs. Patient ambulates with a walker recently and drives. HCD provided, emailed to [employer] and hard copy in paper chart. Both note possible need for IV antibiotic at discharge. Caitie states she will be able to administer. Referral sent to [facility] infusion to verify insurance coverage. Caitie is primary family contact and willing to transport home.    RNCM to follow for potential IV antibiotic at discharge.     Jyoti Granados RN

## 2022-09-09 NOTE — PROGRESS NOTES
"PULMONARY/CRITICAL CARE CONSULT NOTE    Date / Time of Admission:  9/8/2022  3:22 PM  Assessment:   72yoF with history of AORTIC STENOSIS s/p TAVR 2021, diastolic dysfunction, alcohol use, who presents with transudative pleural effusion that I believe is related to her cirrhosis (hepatohydrothorax) and developed a pneumothorax following the thoracentesis.     Clinically Significant Risk Factors Present on Admission         # Hyponatremia: Na = 132 mmol/L (Ref range: 136 - 145 mmol/L) on admission, will monitor as appropriate     # Hypoalbuminemia: Albumin = 3.2 g/dL (Ref range: 3.5 - 5.0 g/dL) on admission, will monitor as appropriate    # Thrombocytopenia: Plts = 58 10e3/uL (Ref range: 150 - 450 10e3/uL) on admission, will monitor for bleeding   # Anemia: based on hgb <11   # Overweight: Estimated body mass index is 27.96 kg/m  as calculated from the following:    Height as of 8/22/22: 1.549 m (5' 1\").    Weight as of this encounter: 67.1 kg (148 lb).          Active Problems:    Hydropneumothorax    Anasarca    Pleural effusion    Other ascites    Severe sepsis (H)    Renal failure, unspecified chronicity    Gram-positive bacteremia      Advance Directives:  Full Code      Plan:   Waterseal on hold as air leak present. Redressed tube, no obvious external leak. If airleak stops overnight, asked nursing to call ICU so this can be placed on waterseal.   CXR in am, if lung remains inflated, would then consider chest tube removal.  Effusion seems somewhat loculated but given its chronicity (present 8/2021), this is not surprising.  Follow up micro and pathology on the effusion  Management of volume status--avoid hypervolemia which will allow for re-accumulation of effusion. I recommend resuming Torsemide 9/10 and monitoring Is/Os closely.   Effusion was not source of her sepsis. Positive blood culture orignated from elsewhere. Still awaiting echo.               Subjective:    Cc: fall    HPI: 72 year old female with " history of alcohol use, AAA repair, Aortic stenosis s/p TAVR, diastolic dysfunction and chronic right-sided pleural effusion who presented from home with cough and dyspnea. Was found to have sepsis on presentation and chronic right-sided effusion. CT done that found impressive lower lobe pneumothorax and chest tube placed with reinflation of her lung.     Past Medical History:   Diagnosis Date     Aortic stenosis      Collapsed lung      Fibromyalgia      GERD (gastroesophageal reflux disease)      Hypothyroid      Migraine      Peripheral vascular disease (H)      Reactive airway disease      Vertigo        Social History     Tobacco Use     Smoking status: Former Smoker     Packs/day: 0.50     Quit date: 2021     Years since quittin.4     Smokeless tobacco: Never Used   Substance Use Topics     Alcohol use: Yes       Family History   Problem Relation Age of Onset     Pancreatic Cancer Mother      Pancreatic Cancer Father        Current Facility-Administered Medications   Medication     acetaminophen (TYLENOL) tablet 975 mg     diazepam (VALIUM) tablet 10 mg    Or     diazepam (VALIUM) injection 5-10 mg     famotidine (PEPCID) tablet 20 mg     flumazenil (ROMAZICON) injection 0.2 mg     folic acid (FOLVITE) tablet 1 mg     OLANZapine zydis (zyPREXA) ODT tab 5-10 mg    Or     haloperidol lactate (HALDOL) injection 2.5-5 mg     [Held by provider] heparin ANTICOAGULANT injection 5,000 Units     levothyroxine (SYNTHROID/LEVOTHROID) tablet 88 mcg     lidocaine (LMX4) cream     lidocaine 1 % 0.1-1 mL     melatonin tablet 5 mg     multivitamin w/minerals (THERA-VIT-M) tablet 1 tablet     ondansetron (ZOFRAN ODT) ODT tab 4 mg    Or     ondansetron (ZOFRAN) injection 4 mg     oxyCODONE (ROXICODONE) tablet 5 mg     piperacillin-tazobactam (ZOSYN) 3.375 g vial to attach to  mL bag     prochlorperazine (COMPAZINE) injection 5 mg    Or     prochlorperazine (COMPAZINE) tablet 5 mg    Or     prochlorperazine  (COMPAZINE) suppository 12.5 mg     senna-docusate (SENOKOT-S/PERICOLACE) 8.6-50 MG per tablet 1 tablet    Or     senna-docusate (SENOKOT-S/PERICOLACE) 8.6-50 MG per tablet 2 tablet     sodium chloride (PF) 0.9% PF flush 3 mL     sodium chloride (PF) 0.9% PF flush 3 mL     sodium chloride (PF) 0.9% PF flush 3 mL     thiamine (B-1) tablet 100 mg     vancomycin (VANCOCIN) 1000 mg in dextrose 5% 200 mL PREMIX     venlafaxine (EFFEXOR XR) 24 hr capsule 300 mg     Current Outpatient Medications   Medication     aspirin (ASA) 81 MG EC tablet     atorvastatin (LIPITOR) 80 MG tablet     diphenhydrAMINE (BENADRYL) 25 MG tablet     EPINEPHrine (EPIPEN/ADRENACLICK/AUVI-Q) 0.3 mg/0.3 mL injection     fish oil-omega-3 fatty acids 1000 MG capsule     ginkgo biloba 60 MG CAPS capsule     levothyroxine (SYNTHROID, LEVOTHROID) 88 MCG tablet     lidocaine (ASPERCREME LIDOCAINE) 4 % external cream     losartan (COZAAR) 25 MG tablet     Melatonin 10 MG TABS tablet     omeprazole 20 MG tablet     potassium chloride ER (KLOR-CON M) 20 MEQ CR tablet     torsemide (DEMADEX) 20 MG tablet     traZODone (DESYREL) 150 MG tablet     venlafaxine (EFFEXOR-XR) 150 MG 24 hr capsule         Review of Systems: 12-point Review performed and negative aside from that noted in HPI.    Objective:    Vital signs:  /66   Pulse 81   Temp (!) 100.6  F (38.1  C)   Resp 20   Wt 67.1 kg (148 lb)   SpO2 99%   BMI 27.96 kg/m      GENERAL APPEARANCE: healthy, alert and no distress     EYES: EOMI, - PERRL     NECK: no adenopathy, no asymmetry, masses, or scars and thyroid normal to palpation     RESP: lungs clear to auscultation - no rales, rhonchi or wheezes     CV: regular rates and rhythm, normal S1 S2, no S3 or S4 and no murmur, click or rub -     ABDOMEN:  soft, nontender, no HSM or masses and bowel sounds normal     MS: extremities normal- no gross deformities noted, no evidence of inflammation in joints, FROM in all extremities.     SKIN: no  suspicious lesions or rashes     NEURO: Normal strength and tone, sensory exam grossly normal, mentation intact and speech normal     PSYCH: mentation appears normal. and affect normal/bright     LYMPHATICS: No axillary, cervical, inguinal, or supraclavicular nodes        Data    CXR:  Personally reviewed  EXAM: XR CHEST 2 VIEWS  LOCATION: [facility]  DATE/TIME: 9/9/2022 11:17 AM     INDICATION: f u chest tube, PTX  COMPARISON: 09/08/2022.                                                                      IMPRESSION: Right chest tube over the right lung apex in stable position. No recurrent pneumothorax. Small right pleural effusion with some infiltrates or atelectasis and some mild atelectasis left lung base all appear stable. Aortic valve prosthesis.    Laboratory:  Results for orders placed or performed in visit on 11/11/21   Basic metabolic panel   Result Value Ref Range    Sodium 144 136 - 145 mmol/L    Potassium 3.6 3.5 - 5.0 mmol/L    Chloride 104 98 - 107 mmol/L    Carbon Dioxide (CO2) 30 22 - 31 mmol/L    Anion Gap 10 5 - 18 mmol/L    Urea Nitrogen 9 8 - 28 mg/dL    Creatinine 1.10 0.60 - 1.10 mg/dL    Calcium 8.6 8.5 - 10.5 mg/dL    Glucose 101 70 - 125 mg/dL    GFR Estimate 50 (L) >60 mL/min/1.73m2     Lab Results   Component Value Date    WBC 11.1 (H) 09/09/2022    HGB 10.6 (L) 09/09/2022    HCT 31.9 (L) 09/09/2022    MCV 99 09/09/2022    PLT 58 (L) 09/09/2022

## 2022-09-09 NOTE — PHARMACY-VANCOMYCIN DOSING SERVICE
"Pharmacy Vancomycin Initial Note  Date of Service 2022  Patient's  1950  72 year old, female  Indication: Sepsis  Current estimated CrCl = Estimated Creatinine Clearance: 30.2 mL/min (A) (based on SCr of 1.43 mg/dL (H)).  Creatinine for last 3 days  2022:  3:52 PM Creatinine 1.43 mg/dL  Recent Vancomycin Level(s) for last 3 days  No results found for requested labs within last 72 hours.      Vancomycin IV Administrations (past 72 hours)                   vancomycin (VANCOCIN) 1,250 mg in sodium chloride 0.9 % 250 mL intermittent infusion (mg) 1,250 mg New Bag 22                Nephrotoxins and other renal medications (From now, onward)    Start     Dose/Rate Route Frequency Ordered Stop    22 0300  piperacillin-tazobactam (ZOSYN) 3.375 g vial to attach to  mL bag        Note to Pharmacy: For [facility], [facility] and [facility]: For Zosyn-naive patients, use the \"Zosyn initial dose + extended infusion\" order panel.    3.375 g  over 240 Minutes Intravenous EVERY 8 HOURS 22 0238            Contrast Orders - past 72 hours (72h ago, onward)    Start     Dose/Rate Route Frequency Stop    22  iopamidol (ISOVUE-370) solution 75 mL         75 mL Intravenous ONCE 22      [employer]ight[employer] Prediction of Planned Initial Vancomycin Regimen    Loading dose: 1250 mg x 1 (18mg/kg) 1900  Regimen: 1000 mg IV every 24 hours.  Start time: 03:57 on 2022  Exposure target: AUC24 (range)400-600 mg/L.hr   AUC24,ss: 547 mg/L.hr  Probability of AUC24 > 400: 83 %  Ctrough,ss: 17.6 mg/L  Probability of Ctrough,ss > 20: 37 %  Probability of nephrotoxicity (Lodise HEATHER ): 13 %          Plan:  1. Start vancomycin  1000 mg IV q24h.   2. Vancomycin monitoring method: AUC  3. Vancomycin therapeutic monitoring goal: 400-600 mg*h/L  4. Pharmacy will check vancomycin levels as appropriate in 1-3 Days.    5. Serum creatinine levels will be ordered daily for the first week of therapy and at " least twice weekly for subsequent weeks.      Tiffany Boateng, RPH

## 2022-09-10 NOTE — PROGRESS NOTES
PULMONARY / CRITICAL CARE PROGRESS NOTE    Date / Time of Admission:  9/8/2022  3:22 PM    Assessment:     Maren Fofana is a 72 year old female with history of HTN, HFpEF, severe AS, s/p bioprosthetic aortic valve 9/2021, hypothyroidism, GERD, PVD, AAA s/p endovascular repair, s/p lumbar spine surgery and bilateral knee replacement.   Presents to ED on 9/8 after mechanical fall. Patient was febrile, hemodynamically stable.   Labs showed mild leukocytosis, increase BUN/Cr, elevated procalcitonin, U/A was negative.   CT scan showed right side effusion, no acute intra-abdomen pathology.   Blood cultures growing staph aureus. Patient underwent right side thoracentesis.   Procedure complicated with pneumothorax. Chest tube was placed. Pleural fluid analysis consistent with transudate effusion. Pulmonary consulted for chest tube management.     1. MRSA bacteremia   2. Right side pneumothorax post thoracentesis s/p chest tube  3. Anasarca, pleural effusions  4. Transudate effusion   5. SKYLAR  6. HTN, HFpEF  7. Severe AS, s/p bioprosthetic aortic valve 9/2021  8. AAA s/p endovascular repair  9. S/p lumbar spine surgery and bilateral knee replacement.   10. Anemia     Plan:   1. Titrate FiO2  2. Bronchodilators as needed  3. Heplock IV fluids  4. Titrate BP meds and diuresis   5. Chest tube connected to suction   6. Follow up CXR in AM  7. Daily antibiotics   8. Abx per ID recommendation, zosyn and vancomycin   9. Plan for EMILY  10. Monitor kidney function   11. Advance diet as tolerated  12. PPI for GI prophylaxis   13. DVT prophylaxis , patient is anticoagulated    Please contact me if you have any questions.      Bhavin Carcamo  Pulmonary / Critical Care  09/10/2022  10:39 AM    Reason for consult: right side pneumothorax s/p chest tube     HPI:  Maren Fofana is a 72 year old female with history of HTN, HFpEF, severe AS, s/p bioprosthetic aortic valve 9/2021, hypothyroidism, GERD, PVD, AAA s/p endovascular  repair, s/p lumbar spine surgery and bilateral knee replacement.   Presents to ED on 9/8 after mechanical fall. Patient was febrile, hemodynamically stable.   Labs showed mild leukocytosis, increase BUN/Cr, elevated procalcitonin, U/A was negative.   CT scan showed right side effusion, no acute intra-abdomen pathology.   Blood cultures growing staph aureus. Patient underwent right side thoracentesis.   Procedure complicated with pneumothorax. Chest tube was placed. Pleural fluid analysis consistent with transudate effusion. Pulmonary consulted for chest tube management.     Events overnight  - Persistent air leak when chest tube connected to suction  - Afebrile, borderline low BP  - Improvement of appetite    Past Medical History:   Diagnosis Date     Aortic stenosis      Collapsed lung      Fibromyalgia      GERD (gastroesophageal reflux disease)      Hypothyroid      Migraine      Peripheral vascular disease (H)      Reactive airway disease      Vertigo      Allergies: Bee venom, Other drug allergy (see comments), Dilaudid [hydromorphone], and Latex     MEDS:  Current Facility-Administered Medications   Medication     acetaminophen (TYLENOL) tablet 975 mg     diazepam (VALIUM) tablet 10 mg    Or     diazepam (VALIUM) injection 5-10 mg     famotidine (PEPCID) tablet 20 mg     flumazenil (ROMAZICON) injection 0.2 mg     folic acid (FOLVITE) tablet 1 mg     OLANZapine zydis (zyPREXA) ODT tab 5-10 mg    Or     haloperidol lactate (HALDOL) injection 2.5-5 mg     [Held by provider] heparin ANTICOAGULANT injection 5,000 Units     levothyroxine (SYNTHROID/LEVOTHROID) tablet 88 mcg     lidocaine (LMX4) cream     lidocaine 1 % 0.1-1 mL     melatonin tablet 5 mg     multivitamin w/minerals (THERA-VIT-M) tablet 1 tablet     ondansetron (ZOFRAN ODT) ODT tab 4 mg    Or     ondansetron (ZOFRAN) injection 4 mg     oxyCODONE (ROXICODONE) tablet 5 mg     piperacillin-tazobactam (ZOSYN) 3.375 g vial to attach to  mL bag      prochlorperazine (COMPAZINE) injection 5 mg    Or     prochlorperazine (COMPAZINE) tablet 5 mg    Or     prochlorperazine (COMPAZINE) suppository 12.5 mg     senna-docusate (SENOKOT-S/PERICOLACE) 8.6-50 MG per tablet 1 tablet    Or     senna-docusate (SENOKOT-S/PERICOLACE) 8.6-50 MG per tablet 2 tablet     sodium chloride (PF) 0.9% PF flush 3 mL     sodium chloride (PF) 0.9% PF flush 3 mL     sodium chloride (PF) 0.9% PF flush 3 mL     thiamine (B-1) tablet 100 mg     torsemide (DEMADEX) tablet 20 mg     vancomycin (VANCOCIN) 1000 mg in dextrose 5% 200 mL PREMIX     venlafaxine (EFFEXOR XR) 24 hr capsule 300 mg     Current Outpatient Medications   Medication     aspirin (ASA) 81 MG EC tablet     atorvastatin (LIPITOR) 80 MG tablet     diphenhydrAMINE (BENADRYL) 25 MG tablet     EPINEPHrine (EPIPEN/ADRENACLICK/AUVI-Q) 0.3 mg/0.3 mL injection     fish oil-omega-3 fatty acids 1000 MG capsule     ginkgo biloba 60 MG CAPS capsule     levothyroxine (SYNTHROID, LEVOTHROID) 88 MCG tablet     lidocaine (ASPERCREME LIDOCAINE) 4 % external cream     losartan (COZAAR) 25 MG tablet     Melatonin 10 MG TABS tablet     omeprazole 20 MG tablet     potassium chloride ER (KLOR-CON M) 20 MEQ CR tablet     torsemide (DEMADEX) 20 MG tablet     traZODone (DESYREL) 150 MG tablet     venlafaxine (EFFEXOR-XR) 150 MG 24 hr capsule     Social History     Socioeconomic History     Marital status: Legally      Spouse name: Not on file     Number of children: Not on file     Years of education: Not on file     Highest education level: Not on file   Occupational History     Not on file   Tobacco Use     Smoking status: Former Smoker     Packs/day: 0.50     Quit date: 2021     Years since quittin.4     Smokeless tobacco: Never Used   Substance and Sexual Activity     Alcohol use: Yes     Drug use: Never     Sexual activity: Not on file   Other Topics Concern     Parent/sibling w/ CABG, MI or angioplasty before 65F 55M? Not Asked    Social History Narrative     Not on file     Social Determinants of Health     Financial Resource Strain: Not on file   Food Insecurity: Not on file   Transportation Needs: Not on file   Physical Activity: Not on file   Stress: Not on file   Social Connections: Not on file   Intimate Partner Violence: Not on file   Housing Stability: Not on file     Family History   Problem Relation Age of Onset     Pancreatic Cancer Mother      Pancreatic Cancer Father      ROS  - Twelve point review of systems were discussed with patient, positive findings in HPI    Objective:   VITALS:  /65   Pulse 79   Temp 98  F (36.7  C) (Oral)   Resp 17   Wt 67.1 kg (148 lb)   SpO2 100%   BMI 27.96 kg/m    VENT:  Resp: 17    EXAM:   Gen: awake, alert, mild distress secondary to generalized weakness  HEENT: pale conjunctiva, moist mucosa, Mallampati II/IV  Neck: no thyromegaly, masses or JVD  Chest: right side chest tube, air leak   Lungs: decrease breath sounds at the bases  CV: regular, no murmurs or gallops appreciated  Abdomen: soft, NT, BS wnl  Ext: edema 1+  Neuro: CN II-XII intact, non focal       Data Review:  Recent Labs   Lab 09/10/22  0832 09/09/22  0737 09/08/22  1552   * 114 129*      09/10/22 08:32   Sodium 132 (L)   Potassium 4.1   Chloride 100   Carbon Dioxide 24   Urea Nitrogen 34 (H)   Creatinine 1.37 (H)   GFR Estimate 40 (L)   Calcium 8.4 (L)   Anion Gap 8   Albumin 2.9 (L)   Protein Total 6.2   Alkaline Phosphatase 61   ALT 14   AST 60 (H)   Bilirubin Total 0.7   CK Total 916 (HH)   Glucose 127 (H)   WBC 6.6 (P)   Hemoglobin 11.0 (L) (P)   Hematocrit 33.0 (L) (P)   Platelet Count See Comment (P)   RBC Count 3.41 (L) (P)   MCV 97 (P)   MCH 32.3 (P)   MCHC 33.3 (P)   RDW 14.6 (P)   % Neutrophils 88   % Lymphocytes 6   % Monocytes 4   % Eosinophils 0   % Basophils 0   % Metamyelocytes 2     Blood Culture Positive on the 1st day of incubation Abnormal        Staphylococcus aureus Panic     2 of 2 bottles           XR CHEST PORT 1 VIEW  LOCATION: [facility]  DATE/TIME: 9/8/2022 8:18 PM  INDICATION: s p thoracentesis, ? ptx  COMPARISON: Same date abdominal CT and chest radiograph.  IMPRESSION: Stable cardiomediastinal silhouette with prior aortic valve replacement. Small right hydropneumothorax, similar volume to same day CT given differences in modalities, without evidence of tension. Possible reexpansion edema in the right lower lobe. Left lung is clear. No acute bony abnormality.    XR CHEST 2 VIEWS  LOCATION: [facility]  DATE/TIME: 9/9/2022 11:17 AM   INDICATION: f u chest tube, PTX  COMPARISON: 09/08/2022.  IMPRESSION: Right chest tube over the right lung apex in stable position. No recurrent pneumothorax. Small right pleural effusion with some infiltrates or atelectasis and some mild atelectasis left lung base all appear stable. Aortic valve prosthesis.    XR CHEST PORT 1 VIEW  LOCATION: [facility]  DATE/TIME: 9/9/2022 12:00 AM   INDICATION: s p chest tube follow-up pneumothorax  COMPARISON: 09/08/2022  IMPRESSION: New right chest tube terminates at the apex medially. Right pneumothorax has resolved. Persistent opacity at the right base may represent an combination of atelectasis and edema. Normal heart size. Prior endovascular aortic valve replacement. Left lung clear. Surgical clips cluster over the left shoulder.    CT ABDOMEN PELVIS W CONTRAST  LOCATION: [facility]  DATE/TIME: 9/8/2022 7:39 PM  INDICATION: Right upper abdominal pain and fever.  COMPARISON: CTA 8/21/2021  FINDINGS:   LOWER CHEST: Small right hydropneumothorax post right thoracentesis earlier today. Small residual right pleural effusion. Heavy mitral annular calcifications. Small sliding-type hiatal hernia.  HEPATOBILIARY: Cirrhotic appearing liver redemonstrated without focal suspicious mass. Normal gallbladder and biliary system.  PANCREAS:  Normal.  SPLEEN: Normal.  ADRENAL GLANDS: Normal.  KIDNEYS/BLADDER: Normal kidneys and ureters. Nondistended bladder with focal asymmetric enhancing bladder wall thickening measuring up to 8 mm in the right bladder base image 173 of series 3..  BOWEL: Diverticulosis of the colon. No acute inflammatory change. No obstruction. Large amount of stool in the rectum. Mild ascites.  LYMPH NODES: No lymphadenopathy.  VASCULATURE: Previous endovascular aortic aneurysm repair with bifurcated endograft. Aneurysm sac measures 5.0 x 5.6 cm, previously 4.7 x 5.4 cm. Type II endoleak is noted.  PELVIC ORGANS: Hysterectomy. No adnexal mass.  MUSCULOSKELETAL: Previous lower lumbar fusion. Degenerative changes are present within the spine and hips. Generalized anasarca.  IMPRESSION:   1.  Small to moderate right hydropneumothorax, status post large volume right thoracentesis earlier today. Patient may have a trapped right lung. Consider two-view chest radiograph.  2.  Cirrhotic appearing liver with mild ascites. No splenomegaly.  3.  Previous endovascular repair of infrarenal abdominal aortic aneurysm. Type II endoleak with interval enlargement of the aneurysm sac, compatible with endotension. Previous repair at [facility]. Consider nonemergent Interventional Radiology consult.  4.  Generalized anasarca.    By:  Bhavin Carcamo MD, 09/10/2022  10:39 AM    Primary Care Physician:  Nora Mccarthy

## 2022-09-10 NOTE — PROGRESS NOTES
A/P    72 year old female with PMH of aortic stenosis, s/p TAVR 09/21, HTN, s/p AAA repair, tobacco use disorder, diastolic CHF, HTN, known right-sided pleural effusion, alcohol use disorder, admitted on 9/8/2022.      Severe sepsis due to MRSA Bacteremia:  BC x2 on 9/8 positive for MRSA bacteremia.  TTE today no obvious vegetation.  -Daily blood cultures  - Vancomycin/Zosyn  - ID following.  Assistance appreciated  - EMILY on Monday and PET cardiac next week thereafter given TAVR    Transudative right pleural effusion complicated by PTX:  S/p 1.3 L thoracentesis on 9/8 followed by chest tube placement for PTX.  Pleural fluid cultures NGTD.  Persistent mild air leak.  -pulm following.  Assistance appreciated.    -Pulmonary managing chest tube  .  HFpEF:  EF 60 to 65%. BNP 1,107.  TTE 9/9/2022 LVEF 60 to 65%, mild decreased RV systolic function, moderate-severe LAE, mild-moderate MR, 23 sapient 3 bioprosthetic valve with peak velocity 2.3 m/s, mean gradient 9 mmHg, dimensionless index 0.48, IVC diameter greater than 2.1 cm collapsing less than 50% with sniff suggests high RA pressure at 15 mm or greater  -Recent coronary angiogram on 8/21- no significant CAD.  -Resumed torsemide on 9/10    Status post TAVR: Refer to above.     Essential hypertension.  -Hold losartan  - Monitor     Rhabdomyolysis: CK 2,092->916.  Suspect traumatic vs less likely  infectious   -hold statin  -CK a.m.     Alcohol use disorder.  History of withdrawal seizures/DT. EtOH level < 10  -CIWA, thiamine, folate     AAA, s/p endovascular repair in 2016: CT abdomen/pelvis on 9/8/2022 showed endovascular aortic aneurysm repair with bifurcated endograft, aneurysm sac measures 5 x 5.6 cm (previously 4.7 x 5.4 cm), type II endoleak  - Follow[facility] at [facility]    SKYLAR: Improved.  Creatinine 1.43-1.12->1.37.  - Strict intake and output and daily weights  - Labs in a.m.    Hyponatremia: Sodium 132 x 2d.  Monitor closely.    Cirrhosis: Per CT abdomen/pelvis  imaging.  LFTs normal.    Normocytic anemia with acute on chronic thrombocytopenia: plt 40K from 61 on admission.  Multifactorial.  -hemolysis and DIC labs.  -Cbc a.m.    GERD-PPI.    Asymmetric bladder wall thickening: UA negative for UTI.    Fall at home:  CT head, CT cervical spine without traumatic injuries, stroke.  -PT/OT    MDD-Effexor, hold trazodone due to increased risk for serotonin syndrome.    Hypothyroidism-on levothyroxine.  -TSH 3.92    Chronic back pain with disc disease and spondylolisthesis, s/p lumbar laminectomy.     History of tobacco use disorder-quit smoking 6 months ago.    Hypomagnesemia:  -resolved    Constipation: CT abdomen pelvis on 9/8/2022 showed large amount of stool in the rectum.  - Senna-S, MiraLAX       Diet: cardiac diet  DVT Prophylaxis: Pneumatic Compression Devices  Reynoso Catheter: Not present  Central Lines: None  Cardiac Monitoring: yes  Code Status:   full    Multiple day stay          S:  afebrile. Events overnight noted    O:  Temp: 98.7  F (37.1  C) Temp src: Oral BP: 93/55 Pulse: 81   Resp: 14 SpO2: 94 % O2 Device: None (Room air)    gen nad  cv rrr  Lungs decreased r>l, non labored, right chest tube with air leak  abd bs+, nttp  Neuro a&o    Lab/imaging reviewed

## 2022-09-10 NOTE — PROGRESS NOTES
Pulmonary Note    As soon as patient arrived on the floor from ED.   RN informed that patient has an ongoing air leak in chest tube.    CXR was done and showed that the right side chest tube has retracted , the chest tube tip is on the chest wall at the level of the second rib. There is trace pneumothorax.     Patient was evaluated. Chest tube was is almost out.     PLAN    Removal of chest tube  O2 supplementation to keep O2sats 100%  Follow up CXR in one hour or sooner if respiratory distress.     Bhavin Carcamo MD  Pulmonary Critical Care  09/10/22 5:57 PM

## 2022-09-10 NOTE — PROGRESS NOTES
[facility]    Infectious Disease Progress Note    Date of Service (when I saw the patient): 09/10/2022     Assessment & Plan   Maren Fofana is a 72 year old female who was admitted on 9/8/2022.     1. MRSA bacteremia, onset of symptoms around 9/6/2022  2. History of TAVR, AAA endovascular graft, bilateral TKAs, high risk of infection  3. AAA endovascular repair with graft 2016.  CT showing slight enlargement of aneurysm type II endoleak noted  4. Alcohol use, imaging showing cirrhosis  5. Pleural effusion transudative, thoracentesis complicated by hydropneumothorax status post chest tube  6. Patient seen in ED      Recommendations    1. Vancomycin  2. Zosyn continue at least until 9/13/2022, and de-escalate based on final culture results  3. Anticipate patient will be discharged on a regimen of IV vancomycin for 6 weeks, p.o. rifampin, 2 weeks of gentamicin if tolerated  4. EMILY Monday  5. Plan for PET/CT next week  6. Daily blood cultures until clear  7. Patient seen in ED.  Patient and patient's daughter updated  8. ID will chart check, and will follow on 9/12/2022.  Please call with questions over the weekend    Yara Gill MD  [facility]  [phone]        Interval History   Pain at chest tube site, some abdominal pain    Physical Exam   Temp: 98.5  F (36.9  C) Temp src: Oral BP: 101/65 Pulse: 87   Resp: 19 SpO2: 96 % O2 Device: None (Room air)    Vitals:    09/08/22 1509   Weight: 67.1 kg (148 lb)     Vital Signs with Ranges  Temp:  [98  F (36.7  C)-98.5  F (36.9  C)] 98.5  F (36.9  C)  Pulse:  [79-95] 87  Resp:  [16-19] 19  BP: ()/(60-76) 101/65  SpO2:  [94 %-100 %] 96 %    Constitutional: Awake, alert, cooperative, moderate distress  Lungs: Chest tube in right  Cardiovascular: Tachycardia  Abdomen: Mild tenderness right lower quadrant, central  Skin: Warm  Neuro: Deconditioned    Medications       acetaminophen  975 mg Oral Q8H     famotidine   20 mg Oral Daily     folic acid  1 mg Oral Daily     [Held by provider] heparin ANTICOAGULANT  5,000 Units Subcutaneous Q12H     levothyroxine  88 mcg Oral QAM AC     multivitamin w/minerals  1 tablet Oral Daily     piperacillin-tazobactam  3.375 g Intravenous Q8H     sodium chloride (PF)  3 mL Intracatheter Q8H     thiamine  100 mg Oral Daily     torsemide  20 mg Oral Daily     vancomycin  1,000 mg Intravenous Q24H     venlafaxine  300 mg Oral Daily       Data   All microbiology laboratory data reviewed.  Recent Labs   Lab Test 09/10/22  0832 09/09/22  0737 09/08/22  1552   WBC 6.6 11.1* 10.7   HGB 11.0* 10.6* 10.6*   HCT 33.0* 31.9* 31.5*   MCV 97 99 98   PLT 40* 58* 61*     Recent Labs   Lab Test 09/10/22  0832 09/09/22  0737 09/08/22  1552   CR 1.37* 1.12* 1.43*     No lab results found.  No lab results found.    Invalid input(s):     MICRO:  Collected Updated Procedure Result Status    09/10/2022 0832 09/10/2022 0846 Blood Culture Peripheral Blood [[id number]]   Peripheral Blood    In process Component Value   No component results             09/09/2022 2123 09/09/2022 2133 Blood Culture Peripheral Blood [[id number]]   Peripheral Blood    In process Component Value   No component results             09/09/2022 1343 09/10/2022 0536 Blood Culture Peripheral Blood [[id number]]   (Abnormal)   Peripheral Blood    Preliminary result Component Value   Culture Positive on the 1st day of incubation Abnormal  P    Gram positive cocci in clusters Panic  P    1 of 2 bottles             09/08/2022 1847 09/10/2022 0715 Pleural fluid Aerobic Bacterial Culture Routine with Gram Stain [[id number]]    Pleural fluid from Pleural Cavity, Right    Preliminary result Component Value   Culture No growth after 1 day P   Gram Stain Result No organisms seen P    Gram Stain slide reviewed at the [facility]    2+ WBC seen P             09/08/2022 1847 09/08/2022 2033 Glucose fluid [[id number]]     Pleural fluid from Pleural Cavity, Right    Final result Component Value Units   Glucose Fluid Source Pleural Cavity, Right    Glucose fluid 117 mg/dL          09/08/2022 1847 09/09/2022 0351 Lactate dehydrogenase fluid [[id number]]    Pleural fluid from Pleural Cavity, Right    Final result Component Value Units   LD Fluid Source Pleural Cavity, Right    Lactate dehydrogenase fluid 100 U/L          09/08/2022 1847 09/09/2022 0351 Protein fluid [[id number]]    Pleural fluid from Pleural Cavity, Right    Final result Component Value Units   Protein Fluid Source Pleural Cavity, Right    Protein Total Fluid 3.0 g/dL          09/08/2022 1554 09/08/2022 1659 Symptomatic; Unknown Influenza A/B & SARS-CoV2 (COVID-19) Virus PCR Multiplex Nasopharyngeal [[id number]]    Swab from Nasopharyngeal    Final result Component Value   Influenza A PCR Negative   Influenza B PCR Negative   RSV PCR Negative   SARS CoV2 PCR Negative   NEGATIVE: SARS-CoV-2 (COVID-19) RNA not detected, presumed negative.          09/08/2022 1552 09/10/2022 1348 Blood Culture Peripheral Blood [[id number]]   (Abnormal)   Peripheral Blood    Preliminary result Component Value   Culture Positive on the 1st day of incubation Abnormal  P    Staphylococcus aureus Panic  P    2 of 2 bottles             09/08/2022 1552 09/10/2022 0704 Blood Culture Peripheral Blood [[id number]]    (Abnormal)   Peripheral Blood    Edited Component Value   Culture Positive on the 1st day of incubation Abnormal     Staphylococcus aureus MRSA Panic     2 of 2 bottles         Susceptibility     Staphylococcus aureus MRSA     [place]     Clindamycin <=0.25 ug/mL Susceptible 1     Erythromycin >=8 ug/mL Resistant     Gentamicin <=0.5 ug/mL Susceptible     Linezolid 2 ug/mL Susceptible     Oxacillin >=4 ug/mL Resistant 2     Tetracycline <=1 ug/mL Susceptible     Trimethoprim/Sulfamethoxazole <=0.5/9.5 u... Susceptible     Vancomycin 1 ug/mL Susceptible              1 This isolate  DOES NOT demonstrate inducible clindamycin resistance in vitro. Clindamycin is susceptible and could be used when indicated, however, erythromycin is resistant and should not be used.   2 Oxacillin susceptible isolates are susceptible to cephalosporins (example: cefazolin and cephalexin) and beta lactam combination agents. Oxacillin resistant isolates are resistant to these agents.        Susceptibility Comments    Staphylococcus aureus MRSA   MRSA requires contact precautions.         2022 1552 2022 0352 Verigene GP Panel [[id number]]    (Abnormal)   Peripheral Blood    Final result Component Value   Staphylococcus aureus Detected Abnormal    Positive for methicillin-resistant Staphylococcus aureus (MRSA) by [employer] multiplex nucleic acid test. Final identification and antimicrobial susceptibility testing will be verified by standard methods.   Staphylococcus epidermidis Not Detected   Staphylococcus lugdunensis Not Detected   Enterococcus faecalis Not Detected   Enterococcus faecium Not Detected   Streptococcus species Not Detected   Streptococcus agalactiae Not Detected   Streptococcus anginosus group Not Detected   Streptococcus pneumoniae Not Detected   Streptococcus pyogenes Not Detected   Listeria species Not Detected            RADIOLOGY:    XR Chest 2 Views    Result Date: 2022  EXAM: XR CHEST 2 VIEWS LOCATION: [facility] DATE/TIME: 2022 11:17 AM INDICATION: f u chest tube, PTX COMPARISON: 2022.     IMPRESSION: Right chest tube over the right lung apex in stable position. No recurrent pneumothorax. Small right pleural effusion with some infiltrates or atelectasis and some mild atelectasis left lung base all appear stable. Aortic valve prosthesis.    Echocardiogram Complete    Result Date: 2022  [id number]^[address] [address]  Name: LEELEE MARIN MRN: [id number] :  1950 Study Date: 09/09/2022 02:42 PM Age: 72 yrs Gender: Female Patient Location: [facility] Reason For Study: Abnormal Heart Sound Ordering Physician: PAT OBRIEN Performed By: SANDI  BSA: 1.7 m2 Height: 61 in Weight: 148 lb HR: 81 BP: 120/66 mmHg ______________________________________________________________________________ Procedure Complete Portable Echo Adult. Compared to the prior study dated 10/26/2021, there have been no changes. ______________________________________________________________________________ Interpretation Summary  1.Left ventricular size, wall motion and function are normal. The ejection fraction is 60-65%. 2.The right ventricle is normal size. Mildly decreased right ventricular systolic function 3.The left atrium is moderate to severely dilated. 4.Thickened mitral leaflets without stenosis. There is mild to moderate (1-2+) mitral regurgitation. 5.There is a 23 ISABELLA 3 bioprosthetic valve present in aortic valve position. At heart rate of 99 bpm peak velocity is 2.3 m/s, mean gradient is 9 mmHg, dimensionless index 0.48. 6.IVC diameter >2.1 cm collapsing <50% with sniff suggests a high RA pressure estimated at 15 mmHg or greater. Compared to the prior study dated 10/26/2021, the Tricuspid regurgitation is really not sampled well, left atrium is further enlarged, no essential change for the valve with prior peak velocity of 2.8 m/s and mean gradient of 16 mmHg. ______________________________________________________________________________ I      WMSI = 1.00     % Normal = 100  X - Cannot   0 -                      (2) - Mildly 2 -          Segments  Size Interpret    Hyperkinetic 1 - Normal  Hypokinetic  Hypokinetic  1-2     small                                                    7 -          3-5    moderate 3 - Akinetic 4 -          5 -         6 - Akinetic Dyskinetic   6-14    large              Dyskinetic   Aneurysmal  w/scar       w/scar       15-16   diffuse  Left Ventricle Left  ventricular size, wall motion and function are normal. The ejection fraction is 60-65%. There is normal left ventricular wall thickness. Left ventricular diastolic function is normal. No regional wall motion abnormalities noted.  Right Ventricle The right ventricle is normal size. Mildly decreased right ventricular systolic function. TAPSE is normal, which is consistent with normal right ventricular systolic function.  Atria The left atrium is moderate to severely dilated. Right atrial size is normal. There is no color Doppler evidence of an atrial shunt.  Mitral Valve Thickened mitral valve anterior leaflet. Thickened mitral valve posterior leaflet. There is mild to moderate (1-2+) mitral regurgitation. There is no mitral valve stenosis.  Tricuspid Valve The tricuspid valve is not well visualized, but is grossly normal. Right ventricle systolic pressure estimate normal. There is trace to mild tricuspid regurgitation. There is no tricuspid stenosis.  Aortic Valve There is a ISABELLA 3 bioprosthetic valve present in aortic valve position. 23 At heart rate of 99 bpm peak velocity is 2.3 m/s, mean gradient is 9 mmHg, dimensionless index 0.48.  Pulmonic Valve The pulmonic valve is not well seen, but is grossly normal. This degree of valvular regurgitation is within normal limits. There is no pulmonic valvular stenosis.  Vessels The aorta root is normal. Normal size ascending aorta. IVC diameter >2.1 cm collapsing <50% with sniff suggests a high RA pressure estimated at 15 mmHg or greater.  Pericardium There is no pericardial effusion.  Rhythm Sinus rhythm was noted.  ______________________________________________________________________________ MMode/2D Measurements & Calculations IVSd: 0.74 cm LVIDd: 4.7 cm LVIDs: 3.3 cm LVPWd: 0.75 cm FS: 28.2 %  LV mass(C)d: 109.3 grams LV mass(C)dI: 65.7 grams/m2 Ao root diam: 2.5 cm LA dimension: 5.8 cm LA/Ao: 2.3 LVOT diam: 1.6 cm LVOT area: 2.0 cm2 LA Volume Indexed (AL/bp): 86.6  ml/m2 RWT: 0.32  Time Measurements Aortic HR: 106.0 BPM MM HR: 91.0 BPM  Doppler Measurements & Calculations MV E max thomas: 102.3 cm/sec MV A max thomas: 108.5 cm/sec MV E/A: 0.94 MV dec slope: 322.0 cm/sec2 MV dec time: 0.32 sec Ao V2 max: 204.0 cm/sec Ao max P.0 mmHg Ao V2 mean: 165.0 cm/sec Ao mean P.0 mmHg Ao V2 VTI: 44.8 cm KP(I,D): 0.80 cm2 KP(V,D): 0.96 cm2 LV V1 max PG: 3.8 mmHg LV V1 max: 97.6 cm/sec LV V1 VTI: 17.8 cm CO(LVOT): 3.8 l/min CI(LVOT): 2.3 l/min/m2 SV(LVOT): 35.7 ml SI(LVOT): 21.5 ml/m2 TR max thomas: 244.0 cm/sec TR max P.8 mmHg AV Thomas Ratio (DI): 0.48 KP Index (cm2/m2): 0.48 E/E': 16.5 E/E' av.0 Lateral E/e': 20.0 Medial E/e': 13.9 Peak E' Thomas: 6.2 cm/sec  ______________________________________________________________________________ Report approved by: Yonatan Arroyo 2022 04:29 PM       US Thoracentesis    Result Date: 2022  EXAM: 1. RIGHT  THORACENTESIS 2. ULTRASOUND GUIDANCE LOCATION: [facility] DATE/TIME: 2022 6:46 PM INDICATION: Pleural effusion. PROCEDURE: Informed consent obtained. Time out performed. The chest was prepped and draped in sterile fashion. 5  mL of 1 % lidocaine was infused into the local soft tissues. Under direct ultrasound guidance, a 5 Azeri catheter system was placed into the pleural effusion. 1.35  liters of clear yellow and red  fluid were removed and sent to lab, if requested. Patient tolerated procedure well. Ultrasound imaging was obtained and placed in the patient's permanent medical record.     IMPRESSION: Status post right  ultrasound-guided thoracentesis. Reference CPT Code: 50744    XR Chest Port 1 View    Result Date: 9/10/2022  EXAM: XR CHEST PORT 1 VIEW LOCATION: [facility] DATE/TIME: 9/10/2022 2:21 PM INDICATION: follow up PTX for continued resolution COMPARISON: 2022     IMPRESSION: The heart is normal in size. Improved right pleural effusion. Right lower lobe  opacities, most likely representing infiltrate. No pneumothorax. Right-sided chest tube.    XR Chest Port 1 View    Result Date: 9/9/2022  EXAM: XR CHEST PORT 1 VIEW LOCATION: [facility] DATE/TIME: 9/9/2022 12:00 AM INDICATION: s p chest tube follow-up pneumothorax COMPARISON: 09/08/2022     IMPRESSION: New right chest tube terminates at the apex medially. Right pneumothorax has resolved. Persistent opacity at the right base may represent an combination of atelectasis and edema. Normal heart size. Prior endovascular aortic valve replacement.  Left lung clear. Surgical clips cluster over the left shoulder.    XR Chest Port 1 View    Result Date: 9/8/2022  EXAM: XR CHEST PORT 1 VIEW LOCATION: [facility] DATE/TIME: 9/8/2022 9:44 PM INDICATION: ptx COMPARISON: 09/08/2022     IMPRESSION: Persistent right pneumothorax measuring 8 mm at the apex (stable) and 1.7 cm at the lateral base (previously 1.4 cm). Small right pleural effusion. Increased airspace infiltrate in the right lower lobe. Surgical clips in the left axilla. Calcified aortic arch. Prior aortic valve repair.    XR Chest Port 1 View    Result Date: 9/8/2022  EXAM: XR CHEST PORT 1 VIEW LOCATION: [facility] DATE/TIME: 9/8/2022 8:18 PM INDICATION: s p thoracentesis, ? ptx COMPARISON: Same date abdominal CT and chest radiograph.     IMPRESSION: Stable cardiomediastinal silhouette with prior aortic valve replacement. Small right hydropneumothorax, similar volume to same day CT given differences in modalities, without evidence of tension. Possible reexpansion edema in the right lower lobe. Left lung is clear. No acute bony abnormality.    XR Chest Port 1 View    Result Date: 9/8/2022  EXAM: XR CHEST PORT 1 VIEW LOCATION: [facility] DATE/TIME: 9/8/2022 4:30 PM INDICATION: fever, cough COMPARISON: 12/3/2021     IMPRESSION: Small to moderate right effusion, slightly  increased. Right lower lobe infiltrate or atelectasis.  No pneumothorax.  The left lung is clear.  The heart is normal in size.    CT Abdomen Pelvis w Contrast    Result Date: 9/8/2022  EXAM: CT ABDOMEN PELVIS W CONTRAST LOCATION: [facility] DATE/TIME: 9/8/2022 7:39 PM INDICATION: Right upper abdominal pain and fever. COMPARISON: CTA 8/21/2021 TECHNIQUE: CT scan of the abdomen and pelvis was performed following injection of IV contrast. Multiplanar reformats were obtained. Dose reduction techniques were used. CONTRAST: 75ml isovue 370 FINDINGS: LOWER CHEST: Small right hydropneumothorax post right thoracentesis earlier today. Small residual right pleural effusion. Heavy mitral annular calcifications. Small sliding-type hiatal hernia. HEPATOBILIARY: Cirrhotic appearing liver redemonstrated without focal suspicious mass. Normal gallbladder and biliary system. PANCREAS: Normal. SPLEEN: Normal. ADRENAL GLANDS: Normal. KIDNEYS/BLADDER: Normal kidneys and ureters. Nondistended bladder with focal asymmetric enhancing bladder wall thickening measuring up to 8 mm in the right bladder base image 173 of series 3.. BOWEL: Diverticulosis of the colon. No acute inflammatory change. No obstruction. Large amount of stool in the rectum. Mild ascites. LYMPH NODES: No lymphadenopathy. VASCULATURE: Previous endovascular aortic aneurysm repair with bifurcated endograft. Aneurysm sac measures 5.0 x 5.6 cm, previously 4.7 x 5.4 cm. Type II endoleak is noted. PELVIC ORGANS: Hysterectomy. No adnexal mass. MUSCULOSKELETAL: Previous lower lumbar fusion. Degenerative changes are present within the spine and hips. Generalized anasarca.     IMPRESSION: 1.  Small to moderate right hydropneumothorax, status post large volume right thoracentesis earlier today. Patient may have a trapped right lung. Consider two-view chest radiograph. 2.  Cirrhotic appearing liver with mild ascites. No splenomegaly. 3.  Previous endovascular  repair of infrarenal abdominal aortic aneurysm. Type II endoleak with interval enlargement of the aneurysm sac, compatible with endotension. Previous repair at . Consider nonemergent Interventional Radiology consult. 4.  Generalized anasarca. Report called to Dr. Roberson at 2000 hours    CT Cervical Spine w/o Contrast    Result Date: 9/8/2022  EXAM: CT CERVICAL SPINE W/O CONTRAST LOCATION: [facility] DATE/TIME: 9/8/2022 7:38 PM INDICATION: fall COMPARISON: None. TECHNIQUE: Routine CT Cervical Spine without IV contrast. Multiplanar reformats. Dose reduction techniques were used. FINDINGS: No evidence of acute fracture or subluxation of the cervical spine by CT imaging. Straightening of the normal cervical lordosis. The vertebral bodies of the cervical spine otherwise have normal stature and alignment. Anterolisthesis of C3 on C4 measuring  2 mm. Anterior marginal osteophytes at C4/C5-C7/T1. Multilevel degenerative disc disease of the cervical spine with disc height loss, most pronounced at C4/C5-C7/T1. The partially imaged intracranial contents are unremarkable. No prevertebral soft tissue swelling. The partially imaged lung apices are unremarkable. Craniovertebral junction and C1-C2: The odontoid process is well approximated with the anterior body of C1 and well aligned between the lateral masses of C1. C2-C3: No posterior disc bulge or spinal canal narrowing. No neural foraminal narrowing. C3-C4: No posterior disc bulge or spinal canal narrowing. No neural foraminal narrowing. C4-C5: Broad bar disc osteophyte complex with moderate spinal canal narrowing. Uncovertebral joint disease and facet arthropathy with severe bilateral neural foraminal narrowing. C5-C6: No posterior disc bulge or spinal canal narrowing. Uncovertebral joint disease and facet arthropathy with severe right and moderate left neural foraminal narrowing. C6-C7: No posterior disc bulge or spinal canal narrowing.  Uncovertebral joint disease and facet arthropathy with severe bilateral neural foraminal narrowing. C7-T1: No posterior disc bulge or spinal canal narrowing. Uncovertebral joint disease and facet arthropathy with moderate bilateral neural foraminal narrowing.     IMPRESSION: 1.  No evidence of acute fracture or subluxation of the cervical spine by CT imaging. 2.  Degenerative cervical spondylosis as described above.    CT Head w/o Contrast    Addendum Date: 9/8/2022    Addendum/ impression: INDICATION: Dizziness    Result Date: 9/8/2022  EXAM: CT HEAD W/O CONTRAST LOCATION: [facility] DATE/TIME: 9/8/2022 7:34 PM INDICATION: fall COMPARISON: None. TECHNIQUE: Routine CT Head without IV contrast. Multiplanar reformats. Dose reduction techniques were used. FINDINGS: INTRACRANIAL CONTENTS: No evidence of acute intracranial hemorrhage or mass effect. Scattered foci of decreased attenuation within the cerebral hemispheric white matter which are nonspecific, though most commonly ascribed to chronic small vessel ischemic  disease. The ventricles and sulci are prominent consistent with mild brain parenchymal volume loss. Gray-white matter differentiation is maintained. The basilar cisterns are patent. VISUALIZED ORBITS/SINUSES/MASTOIDS: Bilateral cataract surgery. The partially imaged globes are otherwise unremarkable. The partially imaged paranasal sinuses, mastoid air cells and middle ear cavities are unremarkable. BONES/SOFT TISSUES: The visualized skull base and calvarium are unremarkable.     IMPRESSION:  1.  No evidence of acute intracranial hemorrhage or mass effect. 2.  Mild nonspecific white matter changes. 3.  Mild brain parenchymal volume loss.    CT Lumbar Spine w/o Contrast    Result Date: 9/8/2022  EXAM: CT LUMBAR SPINE W/O CONTRAST LOCATION: [facility] DATE/TIME: 9/8/2022 7:39 PM INDICATION: Back pain COMPARISON: None. TECHNIQUE: Routine CT Lumbar Spine without  IV contrast. Multiplanar reformats. Dose reduction techniques were used. FINDINGS: No evidence of acute fracture or subluxation of the lumbar spine by CT imaging. Postsurgical changes posterior fusion at L3-L5 and interbody fusion at L3/L4 and L4/L5. No hardware complication. No evidence of acute fracture or subluxation of the lumbar spine by CT imaging. Retrolisthesis of T12 on L1 measuring 2 mm. Anterior marginal osteophytes at T12/L1 and L1/L2 and L2/L3. Multilevel degenerative disc disease of the lumbar spine with disc height loss, most pronounced at T12/L1 and L2/L3. Aortobiiliac endograft. The partially imaged intra-abdominal contents are otherwise unremarkable. T12/L1:  Symmetric disc bulge without spinal canal narrowing. Mild bilateral neural foraminal narrowing. L1/L2:  No posterior disc bulge or spinal canal narrowing. Mild bilateral neural foraminal narrowing. L2/L3:  Broad bar disc osteophyte complex with mild spinal canal narrowing. Severe bilateral neural foraminal narrowing. L3/L4:  Spondylotic ridging without spinal canal narrowing. Moderate bilateral facet arthropathy. No neural foraminal narrowing.  L4/L5:  No posterior disc bulge or spinal canal narrowing. Mild bilateral facet arthropathy. No neural foraminal narrowing. L5/S1: Symmetric disc bulge and moderate facet arthropathy without spinal canal narrowing. Severe bilateral neural foraminal narrowing.     IMPRESSION:  1.  No evidence of acute fracture or subluxation of the lumbar spine by CT imaging. 2.  Postsurgical changes posterior fusion at L3-L5 and interbody fusion at L3/L4 and L4/L5. No hardware complication. 3.  Degenerative lumbar spondylosis as described above.

## 2022-09-10 NOTE — PROGRESS NOTES
Physical Therapy Evaluation:     09/10/22 0900   Quick Adds   Type of Visit Initial PT Evaluation   Living Environment   People in Home alone   Current Living Arrangements independent living facility   Home Accessibility no concerns   Self-Care   Usual Activity Tolerance good   Current Activity Tolerance fair   Regular Exercise No   Equipment Currently Used at Home walker, standard;cane, straight   Fall history within last six months yes   Number of times patient has fallen within last six months 1   Activity/Exercise/Self-Care Comment pt unable to explain mechanism of injury, but was sitting on bed and then fell   Post-Acute Assessment Only   Post-Acute Functional Assessment See IP Rehab Daily Documentation Flowsheet for Functional Mobility/ADL Assessment   General Information   Onset of Illness/Injury or Date of Surgery 09/08/22   Referring Physician Cheko Morales,    Patient/Family Therapy Goals Statement (PT) Return home   Pertinent History of Current Problem (include personal factors and/or comorbidities that impact the POC) pt is a 73 y/o female with PMH of aortic stenosis, s/p TAVR 9/21, HTN, AAA repair, CHF and HTN. She reports to ED following fall at home, chest tube placed for R pleural effusion   Existing Precautions/Restrictions fall   Cognition   Affect/Mental Status (Cognition) WNL   Orientation Status (Cognition) oriented x 4   Follows Commands (Cognition) WNL   Pain Assessment   Patient Currently in Pain Yes, see Vital Sign flowsheet   Integumentary/Edema   Integumentary/Edema other (describe)   Integumentary/Edema Comments bruising centrally on back from fall   Posture    Posture Forward head position;Kyphosis   Range of Motion (ROM)   Range of Motion ROM is WFL   Strength (Manual Muscle Testing)   Strength (Manual Muscle Testing) strength is WFL   Bed Mobility   Bed Mobility supine-sit   Supine-Sit [place] (Bed Mobility) modified independence   Bed Mobility Limitations decreased  ability to use arms for pushing/pulling   Impairments Contributing to Impaired Bed Mobility pain  (lines)   Transfers   Transfers sit-stand transfer   Sit-Stand Transfer   Sit-Stand [place] (Transfers) contact guard   Assistive Device (Sit-Stand Transfers) walker, 4-wheeled   Comment, (Sit-Stand Transfer) required verbal cues for hand placement on walker and for appropriate body positioning as bed is high.   Gait/Stairs (Locomotion)   [place] Level (Gait) contact guard   Assistive Device (Gait) walker, front-wheeled   Distance in Feet (Required for LE Total Joints) 0   Deviations/Abnormal Patterns (Gait) milla decreased  (likely due to pain in back)   Comment, (Gait/Stairs) marching in place due to nausea and lines - able to complete approx 1 minute   Balance   Balance other (describe)   Sitting Balance: Static good balance   Standing Balance: Static fair balance   Balance Quick Add Sitting balance: Static;Standing balance: static   Clinical Impression   Criteria for Skilled Therapeutic Intervention Yes, treatment indicated   PT Diagnosis (PT) impaired functional mobility   Influenced by the following impairments pain, nausea, dizziness   Functional limitations due to impairments Gait   Clinical Presentation (PT Evaluation Complexity) Evolving/Changing   Clinical Presentation Rationale Presents as clinically diagnosed   Clinical Decision Making (Complexity) low complexity   Planned Therapy Interventions (PT) bed mobility training;home exercise program;transfer training;gait training   Risk & Benefits of therapy have been explained evaluation/treatment results reviewed;risks/benefits reviewed;participants voiced agreement with care plan;participants included;patient   PT Discharge Planning   PT Discharge Recommendation (DC Rec) home   PT Rationale for DC Rec Patient requires [place] - CGA for mobility at this time due to nausea, pain and line managment, but anticipate progression of mobility with decreased  nausea and pain.   Total Evaluation Time   Total Evaluation Time (Minutes) 15   Physical Therapy Goals   PT Frequency Daily   PT Predicted Duration/Target Date for Goal Attainment 09/14/22   PT Goals Transfers;Gait   PT: Transfers Modified independent   PT: Gait Modified independent     Rina Reddy DPT

## 2022-09-10 NOTE — ED NOTES
[facility] ED Handoff Report    ED Chief Complaint: Fall    ED Diagnosis:  (A41.9,  R65.20) Severe sepsis (H)  Comment:   Plan:     (J90) Pleural effusion  Comment:   Plan:     (N19) Renal failure, unspecified chronicity  Comment:   Plan:     (J94.8) Hydropneumothorax  Comment:   Plan:     (R60.1) Anasarca  Comment:   Plan:     (R18.8) Other ascites  Comment:   Plan:        PMH:    Past Medical History:   Diagnosis Date     Aortic stenosis      Collapsed lung      Fibromyalgia      GERD (gastroesophageal reflux disease)      Hypothyroid      Migraine      Peripheral vascular disease (H)      Reactive airway disease      Vertigo         Code Status:  Full Code     Falls Risk: Yes Band: Applied    Current Living Situation/Residence: lives alone     Elimination Status: Continent: Yes    Activity Level: Independent    Patients Preferred Language:  English     Needed: No    Vital Signs:  BP 93/55 (BP Location: Right arm, Patient Position: Supine)   Pulse 81   Temp 98.7  F (37.1  C) (Oral)   Resp 14   Wt 67.1 kg (148 lb)   SpO2 94%   BMI 27.96 kg/m       Cardiac Rhythm: NSR    Pain Score: 0/10    Is the Patient Confused:  Yes    Last Food or Drink: 09/10/22 at 1200    Focused Assessment:  Lung/heart    Tests Performed: Done: Labs, imaging    Treatments Provided:  IVAB    Family Dynamics/Concerns: No    Family Updated On Visitor Policy: Yes    Plan of Care Communicated to Family: Yes    Who Was Updated about Plan of Care: Patient and daughter    Belongings Checklist Done and Signed by Patient: Yes    Medications sent with patient: No    Covid: symptomatic, negative    Additional Information: MARY    RN: Fred Leon RN   9/10/2022 3:33 PM

## 2022-09-10 NOTE — PROGRESS NOTES
"I received a call from Violetta at [facility] services. She states, \"she has run the insurance and her medicare advantage plan would not approve for her to have IV home infusion at home. She would qualify for the once a day V[place] at an infusion center if that is still the need at discharge. If she would be willing to private pay she could have the IV Home Infusion, but would also need to be home bound so the RN could be ordered\".  "

## 2022-09-10 NOTE — PLAN OF CARE
Problem: Plan of Care - These are the overarching goals to be used throughout the patient stay.    Goal: Plan of Care Review/Shift Note  Description: The Plan of Care Review/Shift note should be completed every shift.  The Outcome Evaluation is a brief statement about your assessment that the patient is improving, declining, or no change.  This information will be displayed automatically on your shift note.  Outcome: Ongoing, Progressing     Problem: Plan of Care - These are the overarching goals to be used throughout the patient stay.    Goal: Absence of Hospital-Acquired Illness or Injury  Intervention: Identify and Manage Fall Risk  Recent Flowsheet Documentation  Taken 9/9/2022 2000 by Kimberly Fermin RN  Safety Promotion/Fall Prevention: activity supervised   Goal Outcome Evaluation:  VSS Air leak to chest tube cont. Tolerating IV abx without concern. Cont with pos blood cultures.

## 2022-09-11 NOTE — PROGRESS NOTES
Care Management Follow Up    Length of Stay (days): 3    Expected Discharge Date: 09/13/2022     Concerns to be Addressed:     Medical Management-EMILY Monday and PET cardiac next week, Monitor labs. ID following-IV antibiotics, final cultures pending, daily cultures until negative.   Patient plan of care discussed at interdisciplinary rounds: Yes    Anticipated Discharge Disposition: Goal Home, likely outpatient infusion     Anticipated Discharge Services:  To be determined   Anticipated Discharge DME:  To be determined     Patient/family educated on Medicare website which has current facility and service quality ratings:  N/A  Education Provided on the Discharge Plan:  Per Team  Patient/Family in Agreement with the Plan:  Yes    Referrals Placed by CM/SW:  [facility]-no coverage  Private pay costs discussed: Not applicable    Additional Information:  Chart reviewed. Final antibiotic plan pending. From home alone in an apartment and independent at baseline, uses a walker. Goal to return home. May need IV antibiotics at discharge. Previous care manager sent referral to [facility]; response received that patient would not have coverage for home infusion under insurance. Would be able to have home infusion services if private pay; would need to be home bound so RN could be ordered. Otherwise, will need outpatient infusion pending final antibiotic plan. Family can likely transport. RN Care manager to continue to follow.       Magda Nicholas RN

## 2022-09-11 NOTE — PLAN OF CARE
Problem: Alcohol Withdrawal  Goal: Alcohol Withdrawal Symptom Control  Outcome: Ongoing, Not Progressing   Goal Outcome Evaluation:    Plan of Care Reviewed With: patient     Pt's CIWA score 4 this am and later 15 this afternoon due to hallucinations and disorientation.  Pt given oral Valium and CIWA score came down to a 4.  This evening CIWA score 4.  Pt continues on 5 L O2.  Chest x-ray scheduled for tomorrow.  Plan for NPO at midnight for EMILY tomorrow.  Daughter here and updated.  Will continue to monitor pt for any changes.

## 2022-09-11 NOTE — PROGRESS NOTES
[facility]    Infectious Disease Progress Note    Date of Service (when I saw the patient): 09/11/2022     Assessment & Plan   Maren Foafna is a 72 year old female who was admitted on 9/8/2022.     1. MRSA bacteremia, onset of symptoms around 9/6/2022--active issue.  MRSA bacteremia, high-grade, persistent positive blood cultures, 9/10/2022  2. History of TAVR, AAA endovascular graft, bilateral TKAs, high risk of infection  3. AAA endovascular repair with graft 2016.  CT showing slight enlargement of aneurysm type II endoleak noted  4. Alcohol use, imaging showing cirrhosis  5. Pleural effusion transudative, thoracentesis complicated by hydropneumothorax status post chest tube        Recommendations    1. Vancomycin  2. Zosyn continue at least until 9/13/2022, and de-escalate based on final culture results  3. Anticipate patient will be discharged on a regimen of IV vancomycin for 6 weeks, p.o. rifampin, 2 weeks of gentamicin if tolerated  4. EMILY Monday  5. Plan for PET/CT next week  6. Daily blood cultures until clear  7. Appreciate input from patient's nurse at bedside.  8. Updated patient    Yara Gill MD  [facility]  [phone]        Interval History   Continues to have some chest, generalized pain  Sleepy      Physical Exam   Temp: 99.5  F (37.5  C) Temp src: Oral BP: 110/69 Pulse: 76   Resp: 18 SpO2: 97 % O2 Device: Nasal cannula Oxygen Delivery: 5 LPM  Vitals:    09/08/22 1509 09/10/22 1622 09/11/22 0432   Weight: 67.1 kg (148 lb) 68.1 kg (150 lb 3.2 oz) 67.9 kg (149 lb 9.6 oz)     Vital Signs with Ranges  Temp:  [97.2  F (36.2  C)-99.5  F (37.5  C)] 99.5  F (37.5  C)  Pulse:  [76-90] 76  Resp:  [14-20] 18  BP: ()/(55-75) 110/69  SpO2:  [93 %-100 %] 97 %    Constitutional: Appears chronically ill, sleepy  Lungs: Tenderness  Cardiovascular: Tachycardia  Abdomen: Mild tenderness right lower quadrant, central  Skin: Warm  Neuro:  Deconditioned    Medications       acetaminophen  975 mg Oral Q8H     famotidine  20 mg Oral Daily     folic acid  1 mg Oral Daily     [Held by provider] heparin ANTICOAGULANT  5,000 Units Subcutaneous Q12H     levothyroxine  88 mcg Oral QAM AC     multivitamin w/minerals  1 tablet Oral Daily     piperacillin-tazobactam  3.375 g Intravenous Q8H     sodium chloride (PF)  3 mL Intracatheter Q8H     thiamine  100 mg Oral Daily     torsemide  20 mg Oral Daily     vancomycin  750 mg Intravenous Q24H     venlafaxine  300 mg Oral Daily       Data   All microbiology laboratory data reviewed.  Recent Labs   Lab Test 09/11/22  0426 09/10/22  0832 09/09/22  0737   WBC 6.2 6.6 11.1*   HGB 9.8* 11.0* 10.6*   HCT 29.5* 33.0* 31.9*   MCV 97 97 99   PLT 30* 40* 58*     Recent Labs   Lab Test 09/11/22  0426 09/10/22  0832 09/09/22  0737   CR 1.48* 1.37* 1.12*     No lab results found.  No lab results found.    Invalid input(s):     MICRO:  Collected Updated Procedure Result Status    09/10/2022 0832 09/10/2022 0846 Blood Culture Peripheral Blood [[id number]]   Peripheral Blood    In process Component Value   No component results             09/09/2022 2123 09/09/2022 2133 Blood Culture Peripheral Blood [[id number]]   Peripheral Blood    In process Component Value   No component results             09/09/2022 1343 09/10/2022 0536 Blood Culture Peripheral Blood [[id number]]   (Abnormal)   Peripheral Blood    Preliminary result Component Value   Culture Positive on the 1st day of incubation Abnormal  P    Gram positive cocci in clusters Panic  P    1 of 2 bottles             09/08/2022 1847 09/10/2022 0715 Pleural fluid Aerobic Bacterial Culture Routine with Gram Stain [[id number]]    Pleural fluid from Pleural Cavity, Right    Preliminary result Component Value   Culture No growth after 1 day P   Gram Stain Result No organisms seen P    Gram Stain slide reviewed at the [facility]    2+ WBC seen  P             09/08/2022 1847 09/08/2022 2033 Glucose fluid [[id number]]    Pleural fluid from Pleural Cavity, Right    Final result Component Value Units   Glucose Fluid Source Pleural Cavity, Right    Glucose fluid 117 mg/dL          09/08/2022 1847 09/09/2022 0351 Lactate dehydrogenase fluid [[id number]]    Pleural fluid from Pleural Cavity, Right    Final result Component Value Units   LD Fluid Source Pleural Cavity, Right    Lactate dehydrogenase fluid 100 U/L          09/08/2022 1847 09/09/2022 0351 Protein fluid [[id number]]    Pleural fluid from Pleural Cavity, Right    Final result Component Value Units   Protein Fluid Source Pleural Cavity, Right    Protein Total Fluid 3.0 g/dL          09/08/2022 1554 09/08/2022 1659 Symptomatic; Unknown Influenza A/B & SARS-CoV2 (COVID-19) Virus PCR Multiplex Nasopharyngeal [[id number]]    Swab from Nasopharyngeal    Final result Component Value   Influenza A PCR Negative   Influenza B PCR Negative   RSV PCR Negative   SARS CoV2 PCR Negative   NEGATIVE: SARS-CoV-2 (COVID-19) RNA not detected, presumed negative.          09/08/2022 1552 09/10/2022 1348 Blood Culture Peripheral Blood [[id number]]   (Abnormal)   Peripheral Blood    Preliminary result Component Value   Culture Positive on the 1st day of incubation Abnormal  P    Staphylococcus aureus Panic  P    2 of 2 bottles             09/08/2022 1552 09/10/2022 0704 Blood Culture Peripheral Blood [[id number]]    (Abnormal)   Peripheral Blood    Edited Component Value   Culture Positive on the 1st day of incubation Abnormal     Staphylococcus aureus MRSA Panic     2 of 2 bottles         Susceptibility     Staphylococcus aureus MRSA     [place]     Clindamycin <=0.25 ug/mL Susceptible 1     Erythromycin >=8 ug/mL Resistant     Gentamicin <=0.5 ug/mL Susceptible     Linezolid 2 ug/mL Susceptible     Oxacillin >=4 ug/mL Resistant 2     Tetracycline <=1 ug/mL Susceptible     Trimethoprim/Sulfamethoxazole <=0.5/9.5  u... Susceptible     Vancomycin 1 ug/mL Susceptible              1 This isolate DOES NOT demonstrate inducible clindamycin resistance in vitro. Clindamycin is susceptible and could be used when indicated, however, erythromycin is resistant and should not be used.   2 Oxacillin susceptible isolates are susceptible to cephalosporins (example: cefazolin and cephalexin) and beta lactam combination agents. Oxacillin resistant isolates are resistant to these agents.        Susceptibility Comments    Staphylococcus aureus MRSA   MRSA requires contact precautions.         09/08/2022 1552 09/09/2022 0352 Verigene GP Panel [[id number]]    (Abnormal)   Peripheral Blood    Final result Component Value   Staphylococcus aureus Detected Abnormal    Positive for methicillin-resistant Staphylococcus aureus (MRSA) by [employer] multiplex nucleic acid test. Final identification and antimicrobial susceptibility testing will be verified by standard methods.   Staphylococcus epidermidis Not Detected   Staphylococcus lugdunensis Not Detected   Enterococcus faecalis Not Detected   Enterococcus faecium Not Detected   Streptococcus species Not Detected   Streptococcus agalactiae Not Detected   Streptococcus anginosus group Not Detected   Streptococcus pneumoniae Not Detected   Streptococcus pyogenes Not Detected   Listeria species Not Detected            RADIOLOGY:    XR Chest 2 Views    Result Date: 9/9/2022  EXAM: XR CHEST 2 VIEWS LOCATION: [facility] DATE/TIME: 9/9/2022 11:17 AM INDICATION: f u chest tube, PTX COMPARISON: 09/08/2022.     IMPRESSION: Right chest tube over the right lung apex in stable position. No recurrent pneumothorax. Small right pleural effusion with some infiltrates or atelectasis and some mild atelectasis left lung base all appear stable. Aortic valve prosthesis.    Echocardiogram Complete    Result Date: 9/9/2022  [id number]^CAMILLE^PAT^O  [facility] [address]  [place] [address]  Name: LEELEE MARIN MRN: [id number] : 1950 Study Date: 2022 02:42 PM Age: 72 yrs Gender: Female Patient Location: [facility] Reason For Study: Abnormal Heart Sound Ordering Physician: PAT OBRIEN Performed By: SANDI  BSA: 1.7 m2 Height: 61 in Weight: 148 lb HR: 81 BP: 120/66 mmHg ______________________________________________________________________________ Procedure Complete Portable Echo Adult. Compared to the prior study dated 10/26/2021, there have been no changes. ______________________________________________________________________________ Interpretation Summary  1.Left ventricular size, wall motion and function are normal. The ejection fraction is 60-65%. 2.The right ventricle is normal size. Mildly decreased right ventricular systolic function 3.The left atrium is moderate to severely dilated. 4.Thickened mitral leaflets without stenosis. There is mild to moderate (1-2+) mitral regurgitation. 5.There is a 23 ISABELLA 3 bioprosthetic valve present in aortic valve position. At heart rate of 99 bpm peak velocity is 2.3 m/s, mean gradient is 9 mmHg, dimensionless index 0.48. 6.IVC diameter >2.1 cm collapsing <50% with sniff suggests a high RA pressure estimated at 15 mmHg or greater. Compared to the prior study dated 10/26/2021, the Tricuspid regurgitation is really not sampled well, left atrium is further enlarged, no essential change for the valve with prior peak velocity of 2.8 m/s and mean gradient of 16 mmHg. ______________________________________________________________________________ I      WMSI = 1.00     % Normal = 100  X - Cannot   0 -                      (2) - Mildly 2 -          Segments  Size Interpret    Hyperkinetic 1 - Normal  Hypokinetic  Hypokinetic  1-2     small                                                    7 -          3-5    moderate 3 - Akinetic 4 -          5 -         6 - Akinetic Dyskinetic   6-14    large              Dyskinetic    Aneurysmal  w/scar       w/scar       15-16   diffuse  Left Ventricle Left ventricular size, wall motion and function are normal. The ejection fraction is 60-65%. There is normal left ventricular wall thickness. Left ventricular diastolic function is normal. No regional wall motion abnormalities noted.  Right Ventricle The right ventricle is normal size. Mildly decreased right ventricular systolic function. TAPSE is normal, which is consistent with normal right ventricular systolic function.  Atria The left atrium is moderate to severely dilated. Right atrial size is normal. There is no color Doppler evidence of an atrial shunt.  Mitral Valve Thickened mitral valve anterior leaflet. Thickened mitral valve posterior leaflet. There is mild to moderate (1-2+) mitral regurgitation. There is no mitral valve stenosis.  Tricuspid Valve The tricuspid valve is not well visualized, but is grossly normal. Right ventricle systolic pressure estimate normal. There is trace to mild tricuspid regurgitation. There is no tricuspid stenosis.  Aortic Valve There is a ISABELLA 3 bioprosthetic valve present in aortic valve position. 23 At heart rate of 99 bpm peak velocity is 2.3 m/s, mean gradient is 9 mmHg, dimensionless index 0.48.  Pulmonic Valve The pulmonic valve is not well seen, but is grossly normal. This degree of valvular regurgitation is within normal limits. There is no pulmonic valvular stenosis.  Vessels The aorta root is normal. Normal size ascending aorta. IVC diameter >2.1 cm collapsing <50% with sniff suggests a high RA pressure estimated at 15 mmHg or greater.  Pericardium There is no pericardial effusion.  Rhythm Sinus rhythm was noted.  ______________________________________________________________________________ MMode/2D Measurements & Calculations IVSd: 0.74 cm LVIDd: 4.7 cm LVIDs: 3.3 cm LVPWd: 0.75 cm FS: 28.2 %  LV mass(C)d: 109.3 grams LV mass(C)dI: 65.7 grams/m2 Ao root diam: 2.5 cm LA dimension: 5.8 cm LA/Ao:  2.3 LVOT diam: 1.6 cm LVOT area: 2.0 cm2 LA Volume Indexed (AL/bp): 86.6 ml/m2 RWT: 0.32  Time Measurements Aortic HR: 106.0 BPM MM HR: 91.0 BPM  Doppler Measurements & Calculations MV E max thomas: 102.3 cm/sec MV A max thomas: 108.5 cm/sec MV E/A: 0.94 MV dec slope: 322.0 cm/sec2 MV dec time: 0.32 sec Ao V2 max: 204.0 cm/sec Ao max P.0 mmHg Ao V2 mean: 165.0 cm/sec Ao mean P.0 mmHg Ao V2 VTI: 44.8 cm KP(I,D): 0.80 cm2 KP(V,D): 0.96 cm2 LV V1 max PG: 3.8 mmHg LV V1 max: 97.6 cm/sec LV V1 VTI: 17.8 cm CO(LVOT): 3.8 l/min CI(LVOT): 2.3 l/min/m2 SV(LVOT): 35.7 ml SI(LVOT): 21.5 ml/m2 TR max thomas: 244.0 cm/sec TR max P.8 mmHg AV Thomas Ratio (DI): 0.48 KP Index (cm2/m2): 0.48 E/E': 16.5 E/E' av.0 Lateral E/e': 20.0 Medial E/e': 13.9 Peak E' Thomas: 6.2 cm/sec  ______________________________________________________________________________ Report approved by: Yonatan Arroyo 2022 04:29 PM       US Thoracentesis    Result Date: 2022  EXAM: 1. RIGHT  THORACENTESIS 2. ULTRASOUND GUIDANCE LOCATION: [facility] DATE/TIME: 2022 6:46 PM INDICATION: Pleural effusion. PROCEDURE: Informed consent obtained. Time out performed. The chest was prepped and draped in sterile fashion. 5  mL of 1 % lidocaine was infused into the local soft tissues. Under direct ultrasound guidance, a 5 [place]bourgish catheter system was placed into the pleural effusion. 1.35  liters of clear yellow and red  fluid were removed and sent to lab, if requested. Patient tolerated procedure well. Ultrasound imaging was obtained and placed in the patient's permanent medical record.     IMPRESSION: Status post right  ultrasound-guided thoracentesis. Reference CPT Code: 20977    XR Chest Port 1 View    Result Date: 9/10/2022  EXAM: XR CHEST PORT 1 VIEW LOCATION: [facility] DATE/TIME: 9/10/2022 2:21 PM INDICATION: follow up PTX for continued resolution COMPARISON: 2022     IMPRESSION: The  heart is normal in size. Improved right pleural effusion. Right lower lobe opacities, most likely representing infiltrate. No pneumothorax. Right-sided chest tube.    XR Chest Port 1 View    Result Date: 9/9/2022  EXAM: XR CHEST PORT 1 VIEW LOCATION: [facility] DATE/TIME: 9/9/2022 12:00 AM INDICATION: s p chest tube follow-up pneumothorax COMPARISON: 09/08/2022     IMPRESSION: New right chest tube terminates at the apex medially. Right pneumothorax has resolved. Persistent opacity at the right base may represent an combination of atelectasis and edema. Normal heart size. Prior endovascular aortic valve replacement.  Left lung clear. Surgical clips cluster over the left shoulder.    XR Chest Port 1 View    Result Date: 9/8/2022  EXAM: XR CHEST PORT 1 VIEW LOCATION: [facility] DATE/TIME: 9/8/2022 9:44 PM INDICATION: ptx COMPARISON: 09/08/2022     IMPRESSION: Persistent right pneumothorax measuring 8 mm at the apex (stable) and 1.7 cm at the lateral base (previously 1.4 cm). Small right pleural effusion. Increased airspace infiltrate in the right lower lobe. Surgical clips in the left axilla. Calcified aortic arch. Prior aortic valve repair.    XR Chest Port 1 View    Result Date: 9/8/2022  EXAM: XR CHEST PORT 1 VIEW LOCATION: [facility] DATE/TIME: 9/8/2022 8:18 PM INDICATION: s p thoracentesis, ? ptx COMPARISON: Same date abdominal CT and chest radiograph.     IMPRESSION: Stable cardiomediastinal silhouette with prior aortic valve replacement. Small right hydropneumothorax, similar volume to same day CT given differences in modalities, without evidence of tension. Possible reexpansion edema in the right lower lobe. Left lung is clear. No acute bony abnormality.    XR Chest Port 1 View    Result Date: 9/8/2022  EXAM: XR CHEST PORT 1 VIEW LOCATION: [facility] DATE/TIME: 9/8/2022 4:30 PM INDICATION: fever, cough  COMPARISON: 12/3/2021     IMPRESSION: Small to moderate right effusion, slightly increased. Right lower lobe infiltrate or atelectasis.  No pneumothorax.  The left lung is clear.  The heart is normal in size.    CT Abdomen Pelvis w Contrast    Result Date: 9/8/2022  EXAM: CT ABDOMEN PELVIS W CONTRAST LOCATION: [facility] DATE/TIME: 9/8/2022 7:39 PM INDICATION: Right upper abdominal pain and fever. COMPARISON: CTA 8/21/2021 TECHNIQUE: CT scan of the abdomen and pelvis was performed following injection of IV contrast. Multiplanar reformats were obtained. Dose reduction techniques were used. CONTRAST: 75ml isovue 370 FINDINGS: LOWER CHEST: Small right hydropneumothorax post right thoracentesis earlier today. Small residual right pleural effusion. Heavy mitral annular calcifications. Small sliding-type hiatal hernia. HEPATOBILIARY: Cirrhotic appearing liver redemonstrated without focal suspicious mass. Normal gallbladder and biliary system. PANCREAS: Normal. SPLEEN: Normal. ADRENAL GLANDS: Normal. KIDNEYS/BLADDER: Normal kidneys and ureters. Nondistended bladder with focal asymmetric enhancing bladder wall thickening measuring up to 8 mm in the right bladder base image 173 of series 3.. BOWEL: Diverticulosis of the colon. No acute inflammatory change. No obstruction. Large amount of stool in the rectum. Mild ascites. LYMPH NODES: No lymphadenopathy. VASCULATURE: Previous endovascular aortic aneurysm repair with bifurcated endograft. Aneurysm sac measures 5.0 x 5.6 cm, previously 4.7 x 5.4 cm. Type II endoleak is noted. PELVIC ORGANS: Hysterectomy. No adnexal mass. MUSCULOSKELETAL: Previous lower lumbar fusion. Degenerative changes are present within the spine and hips. Generalized anasarca.     IMPRESSION: 1.  Small to moderate right hydropneumothorax, status post large volume right thoracentesis earlier today. Patient may have a trapped right lung. Consider two-view chest radiograph. 2.   Cirrhotic appearing liver with mild ascites. No splenomegaly. 3.  Previous endovascular repair of infrarenal abdominal aortic aneurysm. Type II endoleak with interval enlargement of the aneurysm sac, compatible with endotension. Previous repair at [facility]. Consider nonemergent Interventional Radiology consult. 4.  Generalized anasarca. Report called to Dr. Roberson at 2000 hours    CT Cervical Spine w/o Contrast    Result Date: 9/8/2022  EXAM: CT CERVICAL SPINE W/O CONTRAST LOCATION: [facility] DATE/TIME: 9/8/2022 7:38 PM INDICATION: fall COMPARISON: None. TECHNIQUE: Routine CT Cervical Spine without IV contrast. Multiplanar reformats. Dose reduction techniques were used. FINDINGS: No evidence of acute fracture or subluxation of the cervical spine by CT imaging. Straightening of the normal cervical lordosis. The vertebral bodies of the cervical spine otherwise have normal stature and alignment. Anterolisthesis of C3 on C4 measuring  2 mm. Anterior marginal osteophytes at C4/C5-C7/T1. Multilevel degenerative disc disease of the cervical spine with disc height loss, most pronounced at C4/C5-C7/T1. The partially imaged intracranial contents are unremarkable. No prevertebral soft tissue swelling. The partially imaged lung apices are unremarkable. Craniovertebral junction and C1-C2: The odontoid process is well approximated with the anterior body of C1 and well aligned between the lateral masses of C1. C2-C3: No posterior disc bulge or spinal canal narrowing. No neural foraminal narrowing. C3-C4: No posterior disc bulge or spinal canal narrowing. No neural foraminal narrowing. C4-C5: Broad bar disc osteophyte complex with moderate spinal canal narrowing. Uncovertebral joint disease and facet arthropathy with severe bilateral neural foraminal narrowing. C5-C6: No posterior disc bulge or spinal canal narrowing. Uncovertebral joint disease and facet arthropathy with severe right and moderate  left neural foraminal narrowing. C6-C7: No posterior disc bulge or spinal canal narrowing. Uncovertebral joint disease and facet arthropathy with severe bilateral neural foraminal narrowing. C7-T1: No posterior disc bulge or spinal canal narrowing. Uncovertebral joint disease and facet arthropathy with moderate bilateral neural foraminal narrowing.     IMPRESSION: 1.  No evidence of acute fracture or subluxation of the cervical spine by CT imaging. 2.  Degenerative cervical spondylosis as described above.    CT Head w/o Contrast    Addendum Date: 9/8/2022    Addendum/ impression: INDICATION: Dizziness    Result Date: 9/8/2022  EXAM: CT HEAD W/O CONTRAST LOCATION: [facility] DATE/TIME: 9/8/2022 7:34 PM INDICATION: fall COMPARISON: None. TECHNIQUE: Routine CT Head without IV contrast. Multiplanar reformats. Dose reduction techniques were used. FINDINGS: INTRACRANIAL CONTENTS: No evidence of acute intracranial hemorrhage or mass effect. Scattered foci of decreased attenuation within the cerebral hemispheric white matter which are nonspecific, though most commonly ascribed to chronic small vessel ischemic  disease. The ventricles and sulci are prominent consistent with mild brain parenchymal volume loss. Gray-white matter differentiation is maintained. The basilar cisterns are patent. VISUALIZED ORBITS/SINUSES/MASTOIDS: Bilateral cataract surgery. The partially imaged globes are otherwise unremarkable. The partially imaged paranasal sinuses, mastoid air cells and middle ear cavities are unremarkable. BONES/SOFT TISSUES: The visualized skull base and calvarium are unremarkable.     IMPRESSION:  1.  No evidence of acute intracranial hemorrhage or mass effect. 2.  Mild nonspecific white matter changes. 3.  Mild brain parenchymal volume loss.    CT Lumbar Spine w/o Contrast    Result Date: 9/8/2022  EXAM: CT LUMBAR SPINE W/O CONTRAST LOCATION: [facility] DATE/TIME: 9/8/2022  7:39 PM INDICATION: Back pain COMPARISON: None. TECHNIQUE: Routine CT Lumbar Spine without IV contrast. Multiplanar reformats. Dose reduction techniques were used. FINDINGS: No evidence of acute fracture or subluxation of the lumbar spine by CT imaging. Postsurgical changes posterior fusion at L3-L5 and interbody fusion at L3/L4 and L4/L5. No hardware complication. No evidence of acute fracture or subluxation of the lumbar spine by CT imaging. Retrolisthesis of T12 on L1 measuring 2 mm. Anterior marginal osteophytes at T12/L1 and L1/L2 and L2/L3. Multilevel degenerative disc disease of the lumbar spine with disc height loss, most pronounced at T12/L1 and L2/L3. Aortobiiliac endograft. The partially imaged intra-abdominal contents are otherwise unremarkable. T12/L1:  Symmetric disc bulge without spinal canal narrowing. Mild bilateral neural foraminal narrowing. L1/L2:  No posterior disc bulge or spinal canal narrowing. Mild bilateral neural foraminal narrowing. L2/L3:  Broad bar disc osteophyte complex with mild spinal canal narrowing. Severe bilateral neural foraminal narrowing. L3/L4:  Spondylotic ridging without spinal canal narrowing. Moderate bilateral facet arthropathy. No neural foraminal narrowing.  L4/L5:  No posterior disc bulge or spinal canal narrowing. Mild bilateral facet arthropathy. No neural foraminal narrowing. L5/S1: Symmetric disc bulge and moderate facet arthropathy without spinal canal narrowing. Severe bilateral neural foraminal narrowing.     IMPRESSION:  1.  No evidence of acute fracture or subluxation of the lumbar spine by CT imaging. 2.  Postsurgical changes posterior fusion at L3-L5 and interbody fusion at L3/L4 and L4/L5. No hardware complication. 3.  Degenerative lumbar spondylosis as described above.    Total Time Spent 35 minutes with >50% of the time spent in chart review, evaluation, counseling, education and care coordination.

## 2022-09-11 NOTE — PROGRESS NOTES
A/P    72 year old female with PMH of aortic stenosis, s/p TAVR 09/21, HTN, s/p AAA repair, tobacco use disorder, diastolic CHF, HTN, known right-sided pleural effusion, alcohol use disorder, admitted on 9/8/2022.      Severe sepsis due to MRSA Bacteremia:  BC x2 on 9/8 positive for MRSA bacteremia.  TTE today no obvious vegetation.  -Daily blood cultures  - Vancomycin/Zosyn  - ID following.  Assistance appreciated  - EMILY on Monday and PET cardiac next week thereafter given TAVR  -NPO MN  Transudative right pleural effusion complicated by PTX:  S/p 1.3 L thoracentesis on 9/8 followed by chest tube placement for PTX on 9/9.  Chest tube retracted to chest wall while transferring patient to her room and was pulled out on 9/10. Follow up CXR showed small pneumothorax . CXR 9/11 this morning showed decrease size of right pneumothorax.  Pleural fluid cultures NGTD.  Persistent mild air leak.  -pulm following.  Assistance appreciated.    -CXR a.m.  .  HFpEF:  EF 60 to 65%. BNP 1,107.  TTE 9/9/2022 LVEF 60 to 65%, mild decreased RV systolic function, moderate-severe LAE, mild-moderate MR, 23 sapient 3 bioprosthetic valve with peak velocity 2.3 m/s, mean gradient 9 mmHg, dimensionless index 0.48, IVC diameter greater than 2.1 cm collapsing less than 50% with sniff suggests high RA pressure at 15 mm or greater  -Recent coronary angiogram on 8/21- no significant CAD.  -Resumed torsemide on 9/10. Cr up slightly monitor.    Status post TAVR: Refer to above.     Essential hypertension.  -Hold losartan  - Monitor     Rhabdomyolysis: CK 2,092->916->419.  Suspect traumatic vs less likely  infectious   -hold statin  -CK a.m.     Alcohol use disorder.  History of withdrawal seizures/DT. EtOH level < 10  -CIWA, thiamine, folate  -stop CIWA in a.m.     AAA, s/p endovascular repair in 2016: CT abdomen/pelvis on 9/8/2022 showed endovascular aortic aneurysm repair with bifurcated endograft, aneurysm sac measures 5 x 5.6 cm (previously 4.7  x 5.4 cm), type II endoleak  - [facility] at [facility]    SKYLAR: Creatinine 1.43-1.12->1.37->1.48.  - Strict intake and output and daily weights  - Labs in a.m.    Hyponatremia: Sodium 132 and stable.  Monitor closely.    Cirrhosis: Per CT abdomen/pelvis imaging.  LFTs normal.    Normocytic anemia with acute on chronic thrombocytopenia: Multifactorial.  -hemolysis and DIC labs.  -w/u as ordered  -treat sepsis  -Cbc a.m.    GERD-PPI.    Asymmetric bladder wall thickening: UA negative for UTI.    Fall at home:  CT head, CT cervical spine without traumatic injuries, stroke.  -PT/OT    MDD-Effexor, hold trazodone due to increased risk for serotonin syndrome.    Hypothyroidism-on levothyroxine.  -TSH 3.92    Chronic back pain with disc disease and spondylolisthesis, s/p lumbar laminectomy.     History of tobacco use disorder-quit smoking 6 months ago.    Hypomagnesemia:  -monitor and replace accordingly    Constipation: CT abdomen pelvis on 9/8/2022 showed large amount of stool in the rectum.  - Senna-S, MiraLAX       Diet: cardiac diet  DVT Prophylaxis: Pneumatic Compression Devices  Reynoso Catheter: Not present  Central Lines: None  Cardiac Monitoring: yes  Code Status:   full    Multiple day stay          S:  afebrile. Events overnight noted    O:  Temp: 99.5  F (37.5  C) Temp src: Oral BP: 97/64 Pulse: 81   Resp: 18 SpO2: 99 % O2 Device: Nasal cannula Oxygen Delivery: 5 LPM  gen nad  cv rrr  Lungs decreased r>l, non labored  abd bs+, nttp  Neuro a&o    Lab/imaging reviewed

## 2022-09-11 NOTE — PHARMACY-VANCOMYCIN DOSING SERVICE
Pharmacy Vancomycin Note  Date of Service 2022  Patient's  1950   72 year old, female    Indication: Severe sepsis due to MRSA bacteremia  Day of Therapy: 4  Current vancomycin regimen:  1000 mg IV q24h  Current vancomycin monitoring method: AUC  Current vancomycin therapeutic monitoring goal: 400-600 mg*h/L    InsightRX Prediction of Current Vancomycin Regimen  Loading dose: 1250 mg  Regimen: 1000 mg IV every 24 hours.  Start time: 08:10 on 2022  Exposure target: AUC24 (range)400-600 mg/L.hr   AUC24,ss: 596 mg/L.hr  Probability of AUC24 > 400: 98 %  Ctrough,ss: 19.3 mg/L  Probability of Ctrough,ss > 20: 45 %  Probability of nephrotoxicity (Lodise HEATHER ): 16 %      Current estimated CrCl = Estimated Creatinine Clearance: 29.4 mL/min (A) (based on SCr of 1.48 mg/dL (H)).    Creatinine for last 3 days  2022:  3:52 PM Creatinine 1.43 mg/dL  2022:  7:37 AM Creatinine 1.12 mg/dL  9/10/2022:  8:32 AM Creatinine 1.37 mg/dL  2022:  4:26 AM Creatinine 1.48 mg/dL    Recent Vancomycin Levels (past 3 days)  2022:  4:26 AM Vancomycin 19.1 mg/L    Vancomycin IV Administrations (past 72 hours)                   vancomycin (VANCOCIN) 1000 mg in dextrose 5% 200 mL PREMIX (mg) 1,000 mg New Bag 09/10/22 1801     1,000 mg New Bag 22 1825    vancomycin (VANCOCIN) 1,250 mg in sodium chloride 0.9 % 250 mL intermittent infusion (mg) 1,250 mg New Bag 22 1901                Nephrotoxins and other renal medications (From now, onward)    Start     Dose/Rate Route Frequency Ordered Stop    22 1800  vancomycin 750 mg in 0.9% NaCl 250 mL intermittent infusion 750 mg         750 mg  over 60 Minutes Intravenous EVERY 24 HOURS 22 0713      09/10/22 0800  torsemide (DEMADEX) tablet 20 mg         20 mg Oral DAILY 22 1815      22 0300  piperacillin-tazobactam (ZOSYN) 3.375 g vial to attach to  mL bag        Note to Pharmacy: For POONAM, ANAM and FRANCESCA: For Zosyn-naive  "patients, use the \"Zosyn initial dose + extended infusion\" order panel.    3.375 g  over 240 Minutes Intravenous EVERY 8 HOURS 09/09/22 0238               Contrast Orders - past 72 hours (72h ago, onward)    Start     Dose/Rate Route Frequency Stop    09/08/22 2000  iopamidol (ISOVUE-370) solution 75 mL         75 mL Intravenous ONCE 09/08/22 1941          Interpretation of levels and current regimen:  Vancomycin level is reflective of -600    Has serum creatinine changed greater than 50% in last 72 hours: No    Renal Function: Stable    InsightRX Prediction of Planned New Vancomycin Regimen  Regimen: 750 mg IV every 24 hours.  Start time: 08:10 on 09/11/2022  Exposure target: AUC24 (range)400-600 mg/L.hr   AUC24,ss: 457 mg/L.hr  Probability of AUC24 > 400: 74 %  Ctrough,ss: 14.8 mg/L  Probability of Ctrough,ss > 20: 14 %  Probability of nephrotoxicity (Lodise HEATHER 2009): 10 %      Plan:  1. Decrease Dose to 750 mg IV q24h. Under previous regimen of 1000 mg IV q24h AUC is just under upper limit of 600 mg/L, and Ctrough (toxicity) was at 45%.  2. Vancomycin monitoring method: AUC  3. Vancomycin therapeutic monitoring goal: 400-600 mg*h/L  4. Pharmacy will check vancomycin levels as appropriate in 1-3 Days.  5. Serum creatinine levels will be ordered every 48 hours.    Kathie Harris, [facility]    "

## 2022-09-11 NOTE — PLAN OF CARE
Problem: Risk for Delirium  Goal: Improved Attention and Thought Clarity  Outcome: Ongoing, Progressing     Problem: Risk for Delirium  Goal: Improved Sleep  Outcome: Ongoing, Progressing     Problem: Alcohol Withdrawal  Goal: Alcohol Withdrawal Symptom Control  Outcome: Ongoing, Progressing     Problem: Infection Progression (Sepsis/Septic Shock)  Goal: Absence of Infection Signs and Symptoms  Pt restful over noc. IV Abx infusing per md order. CIWA protocol ongoing. Pt having mild hallucinations. Denies shortness of breath, denies pain. O2 sats stable on 5L nasal cannula. Repeat cxr completed x2.

## 2022-09-11 NOTE — PROGRESS NOTES
Pulmonary update    Reviewed CXR from 8:13pm, showing increase in R apical PTX now measuring 1.5cm at the apex and 1.2cm laterally (previously trace measuring 1mm).    Patient's vitals are stable, no chest pain, breathing comfortably on 3Lpm O2.    Bedside US performed, did not find a pocket of pleural fluid to tap; anterior lung US did confirm seashore sign c/w pneumothorax.    Discussed with the patient that we'd do another CXR around midnight to keep an eye on the PTX. If it continues to expand or she develops worsening chest pain, SOB or hemodynamic instability then we would need to place a chest tube more urgently.     I spoke with the nurse and recommended increasing O2 to 5Lpm and keeping SpO2 >96% to facilitate pneumothorax resorption without need for chest tube.    Patient agreed with the plan.    Arash (Simeon) MD Karina  [facility] Pulmonary & Critical Care  Clinic [phone]  Fax [phone]    UPDATE: 12:19am  12am CXR reviewed, R pneumothorax decreased in size.  Patient remains vitally stable.  Next CXR @7am.  Pulmonary consult team will see her in the morning.

## 2022-09-11 NOTE — PLAN OF CARE
"  Problem: Alcohol Withdrawal  Goal: Alcohol Withdrawal Symptom Control  Outcome: Ongoing, Progressing     Problem: Respiratory Compromise (Pneumothorax)  Goal: Optimal Oxygenation and Ventilation  Outcome: Ongoing, Progressing    Pt came up from ER around 1615. The daughter was with and supportive. Pt was sleepy and resting. The daughter put the call light on around 1730 to say her mom was c/o pain around her chest tube. The pt rated her pain as 10/10, she said it felt like it was \"burning and hurt with movement.\" The Dr. Barlow  was notified and he ordered a chest x-ray that showed the chest tube was dislodged (see note). He came to the bedside and removed the chest tube and ordered a repeat chest x-ray for 2130. John from [place] radi[facility] called to let us know the pneumothorax had increased in size. Dr. Collins was notified (see DrWandy Note). Dr. Collins also gave a verbal order to put the pt on O2 at 5L NC, and recheck chest x-ray to be performed at midnight.     The pt is on CIWA protocol and was scoring less than 5. The  daughter said that her mother has a tendency to hallucinate when in the hospital.      Tele- SR with PAC's.                     "

## 2022-09-11 NOTE — PROGRESS NOTES
PULMONARY / CRITICAL CARE PROGRESS NOTE    Date / Time of Admission:  9/8/2022  3:22 PM    Assessment:     Maren Fofana is a 72 year old female with history of HTN, HFpEF, severe AS, s/p bioprosthetic aortic valve 9/2021, hypothyroidism, GERD, PVD, AAA s/p endovascular repair, s/p lumbar spine surgery and bilateral knee replacement.   Presents to ED on 9/8 after mechanical fall. Patient was febrile, hemodynamically stable.   Labs showed mild leukocytosis, increase BUN/Cr, elevated procalcitonin, U/A was negative.   CT scan showed right side effusion, no acute intra-abdomen pathology.   Blood cultures growing staph aureus. Patient underwent right side thoracentesis.   Procedure complicated with pneumothorax. Chest tube was placed. Pleural fluid analysis consistent with transudate effusion. Pulmonary consulted for chest tube management.     1. MRSA bacteremia   Unclear source of infection.   Schedule for EMILY in AM. PET CT scan  ID service following.   2. Right side pneumothorax post thoracentesis.   Patient required chest tube placement 9/9, unfortunately, chest tube retracted to chest wall while transferring patient to her room.   Chest tube was removed 9/10. Follow up CXR showed small pneumothorax   CXR 9/11 showed decrease size of right pneumothorax.   Continue O2 supplementation. Follow up CXR in AM.   3. Anasarca, bilateral pleural effusions  4. Transudate effusion   5. SKYLAR  6. HTN, HFpEF  7. Severe AS, s/p bioprosthetic aortic valve 9/2021  8. AAA s/p endovascular repair  9. S/p lumbar spine surgery and bilateral knee replacement.   10. Anemia     Plan:   1. Keep O2 supplementation 5 LPM  2. Bronchodilators as needed  3. Titrate BP meds and diuresis   4. Follow up CXR in AM  5. Abx per ID recommendation, zosyn and vancomycin   6. Plan for EMILY and PET CT scan   7. Monitor kidney function   8. Advance diet as tolerated  9. PPI for GI prophylaxis   10. DVT prophylaxis , patient is anticoagulated    Please contact  me if you have any questions.      Bhavin Carcamo  Pulmonary / Critical Care  09/11/2022  10:05 AM    Subjective      HPI:  Maren Fofana is a 72 year old female with history of HTN, HFpEF, severe AS, s/p bioprosthetic aortic valve 9/2021, hypothyroidism, GERD, PVD, AAA s/p endovascular repair, s/p lumbar spine surgery and bilateral knee replacement.   Presents to ED on 9/8 after mechanical fall. Patient was febrile, hemodynamically stable.   Labs showed mild leukocytosis, increase BUN/Cr, elevated procalcitonin, U/A was negative.   CT scan showed right side effusion, no acute intra-abdomen pathology.   Blood cultures growing staph aureus. Patient underwent right side thoracentesis.   Procedure complicated with pneumothorax. Chest tube was placed. Pleural fluid analysis consistent with transudate effusion. Pulmonary consulted for chest tube management.     Events overnight  - S/p removal of chest tube  - Small pneumothorax in follow up CXR    Past Medical History:   Diagnosis Date     Aortic stenosis      Collapsed lung      Fibromyalgia      GERD (gastroesophageal reflux disease)      Hypothyroid      Migraine      Peripheral vascular disease (H)      Reactive airway disease      Vertigo      Allergies: Bee venom, Other drug allergy (see comments), Dilaudid [hydromorphone], and Latex     MEDS:  Current Facility-Administered Medications   Medication     acetaminophen (TYLENOL) tablet 975 mg     diazepam (VALIUM) tablet 10 mg    Or     diazepam (VALIUM) injection 5-10 mg     famotidine (PEPCID) tablet 20 mg     flumazenil (ROMAZICON) injection 0.2 mg     folic acid (FOLVITE) tablet 1 mg     OLANZapine zydis (zyPREXA) ODT tab 5-10 mg    Or     haloperidol lactate (HALDOL) injection 2.5-5 mg     [Held by provider] heparin ANTICOAGULANT injection 5,000 Units     levothyroxine (SYNTHROID/LEVOTHROID) tablet 88 mcg     lidocaine (LMX4) cream     lidocaine 1 % 0.1-1 mL     melatonin tablet 5 mg      "multivitamin w/minerals (THERA-VIT-M) tablet 1 tablet     ondansetron (ZOFRAN ODT) ODT tab 4 mg    Or     ondansetron (ZOFRAN) injection 4 mg     oxyCODONE (ROXICODONE) tablet 5 mg     piperacillin-tazobactam (ZOSYN) 3.375 g vial to attach to  mL bag     prochlorperazine (COMPAZINE) injection 5 mg    Or     prochlorperazine (COMPAZINE) tablet 5 mg    Or     prochlorperazine (COMPAZINE) suppository 12.5 mg     senna-docusate (SENOKOT-S/PERICOLACE) 8.6-50 MG per tablet 1 tablet    Or     senna-docusate (SENOKOT-S/PERICOLACE) 8.6-50 MG per tablet 2 tablet     sodium chloride (PF) 0.9% PF flush 3 mL     sodium chloride (PF) 0.9% PF flush 3 mL     sodium chloride (PF) 0.9% PF flush 3 mL     thiamine (B-1) tablet 100 mg     torsemide (DEMADEX) tablet 20 mg     vancomycin 750 mg in 0.9% NaCl 250 mL intermittent infusion 750 mg     venlafaxine (EFFEXOR XR) 24 hr capsule 300 mg     Objective:   VITALS:  /61 (BP Location: Left arm)   Pulse 79   Temp 98.8  F (37.1  C) (Oral)   Resp 16   Ht 1.549 m (5' 1\")   Wt 67.9 kg (149 lb 9.6 oz)   SpO2 100%   BMI 28.27 kg/m    VENT:  Resp: 16    EXAM:   Gen: awake, alert, mild distress secondary to generalized weakness  HEENT: pale conjunctiva, moist mucosa, Mallampati II/IV  Neck: no thyromegaly, masses or JVD  Chest: clear  Lungs: decrease breath sounds at the bases  CV: regular, no murmurs or gallops appreciated  Abdomen: soft, NT, BS wnl  Ext: trace edema, dressings both LEs  Neuro: CN II-XII intact, non focal       Data Review:  Recent Labs   Lab 09/11/22  0426 09/10/22  0832 09/09/22  0737 09/08/22  1552    127* 114 129*      09/11/22 04:26   Sodium 132 (L)   Potassium 3.5   Chloride 99   Carbon Dioxide 25   Urea Nitrogen 38 (H)   Creatinine 1.48 (H)   GFR Estimate 37 (L)   Calcium 8.3 (L)   Anion Gap 8   Albumin 3.0 (L)   Protein Total 6.3   Alkaline Phosphatase 69   ALT 15   AST 59 (H)   Bilirubin Total 0.8   CK Total 419 (H)      09/11/22 04:26   WBC 6.2 "   Hemoglobin 9.8 (L)   Hematocrit 29.5 (L)   Platelet Count 30 (LL)   RBC Count 3.05 (L)   MCV 97   MCH 32.1   MCHC 33.2   RDW 14.6   % Neutrophils 83   % Lymphocytes 11   % Monocytes 3   % Eosinophils 0   % Basophils 2   % Metamyelocytes 1     Blood Culture Positive on the 1st day of incubation Abnormal        Staphylococcus aureus Panic     2 of 2 bottles          XR CHEST PORT 1 VIEW  LOCATION: [facility]  DATE/TIME: 9/8/2022 8:18 PM  INDICATION: s p thoracentesis, ? ptx  COMPARISON: Same date abdominal CT and chest radiograph.  IMPRESSION: Stable cardiomediastinal silhouette with prior aortic valve replacement. Small right hydropneumothorax, similar volume to same day CT given differences in modalities, without evidence of tension. Possible reexpansion edema in the right lower lobe. Left lung is clear. No acute bony abnormality.    XR CHEST 2 VIEWS  LOCATION: [facility]  DATE/TIME: 9/9/2022 11:17 AM   INDICATION: f u chest tube, PTX  COMPARISON: 09/08/2022.  IMPRESSION: Right chest tube over the right lung apex in stable position. No recurrent pneumothorax. Small right pleural effusion with some infiltrates or atelectasis and some mild atelectasis left lung base all appear stable. Aortic valve prosthesis.    XR CHEST PORT 1 VIEW  LOCATION: [facility]  DATE/TIME: 9/9/2022 12:00 AM   INDICATION: s p chest tube follow-up pneumothorax  COMPARISON: 09/08/2022  IMPRESSION: New right chest tube terminates at the apex medially. Right pneumothorax has resolved. Persistent opacity at the right base may represent an combination of atelectasis and edema. Normal heart size. Prior endovascular aortic valve replacement. Left lung clear. Surgical clips cluster over the left shoulder.    CT ABDOMEN PELVIS W CONTRAST  LOCATION: [facility]  DATE/TIME: 9/8/2022 7:39 PM  INDICATION: Right upper abdominal pain and fever.  COMPARISON:  CTA 8/21/2021  FINDINGS:   LOWER CHEST: Small right hydropneumothorax post right thoracentesis earlier today. Small residual right pleural effusion. Heavy mitral annular calcifications. Small sliding-type hiatal hernia.  HEPATOBILIARY: Cirrhotic appearing liver redemonstrated without focal suspicious mass. Normal gallbladder and biliary system.  PANCREAS: Normal.  SPLEEN: Normal.  ADRENAL GLANDS: Normal.  KIDNEYS/BLADDER: Normal kidneys and ureters. Nondistended bladder with focal asymmetric enhancing bladder wall thickening measuring up to 8 mm in the right bladder base image 173 of series 3..  BOWEL: Diverticulosis of the colon. No acute inflammatory change. No obstruction. Large amount of stool in the rectum. Mild ascites.  LYMPH NODES: No lymphadenopathy.  VASCULATURE: Previous endovascular aortic aneurysm repair with bifurcated endograft. Aneurysm sac measures 5.0 x 5.6 cm, previously 4.7 x 5.4 cm. Type II endoleak is noted.  PELVIC ORGANS: Hysterectomy. No adnexal mass.  MUSCULOSKELETAL: Previous lower lumbar fusion. Degenerative changes are present within the spine and hips. Generalized anasarca.  IMPRESSION:   1.  Small to moderate right hydropneumothorax, status post large volume right thoracentesis earlier today. Patient may have a trapped right lung. Consider two-view chest radiograph.  2.  Cirrhotic appearing liver with mild ascites. No splenomegaly.  3.  Previous endovascular repair of infrarenal abdominal aortic aneurysm. Type II endoleak with interval enlargement of the aneurysm sac, compatible with endotension. Previous repair at [facility]. Consider nonemergent Interventional Radiology consult.  4.  Generalized anasarca.    XR CHEST 1 VIEW  LOCATION: [facility]  DATE/TIME: 9/10/2022 8:13 PM   INDICATION: S p removal of chest tube, follow up residual pneumothorax  COMPARISON: 9/10/2022 1658 hours  IMPRESSION: Interval removal of right pleural catheter. There has been  development of a right pneumothorax measuring 1.2 cm laterally and 1.5 cm at the right apex. Small right pleural effusion. Increasing bibasilar atelectasis. Heart size normal. TAVR.   Atherosclerotic change of the aorta. Surgical clips in the left axilla. Degenerative change in the spine and both shoulders.    XR CHEST 1 VIEW  LOCATION: [facility]  DATE/TIME: 9/11/2022 5:42 AM   INDICATION: removal of chest tube, follow up residual pneumothorax  COMPARISON: X-ray from earlier today  IMPRESSION: Stable right pneumothorax remains. Remainder of the exam is unchanged.    By:  Bhavin Carcamo MD, 09/11/2022  10:05 AM    Primary Care Physician:  Nora Mccarthy

## 2022-09-12 NOTE — PROGRESS NOTES
[facility]    Infectious Disease Progress Note    Date of Service: 09/12/2022     Assessment & Plan   Maren Fofana is a 72 year old female who was admitted on 9/8/2022.     1. MRSA bacteremia, onset of symptoms around 9/6/2022--active issue.  MRSA bacteremia, high-grade, persistent positive blood cultures, 9/11/2022  2. History of TAVR, AAA endovascular graft, bilateral TKAs, high risk of infection -- PET CT indicating TAVR endocarditis. PET also suggesting fluid at R knee may be infected.  3. AAA endovascular repair with graft 2016.  CT showing slight enlargement of aneurysm type II endoleak noted. Not lighting up on PET.  4. Alcohol use, imaging showing cirrhosis  5. Pleural effusion transudative, thoracentesis complicated by hydropneumothorax status post chest tube        Recommendations    1. Vancomycin  2. Zosyn stop tomorrow  3. Anticipate plan for IV vancomycin for 6 weeks, p.o. rifampin, 2 weeks of gentamicin if tolerated  4. Daily blood cultures until clear  5. Consider R knee aspirate    Josafat Recinos MD   [facility]  [phone]  ________________________________________________________________________________    Interval History   Seen this morning. Sleepy. Back from PET CT. Pain at back in area of fall.     EMILY deferred due to risk and lower yield than PET CT.      Physical Exam   Temp: 97.8  F (36.6  C) Temp src: Oral BP: 96/66 Pulse: 75   Resp: 18 SpO2: 100 % O2 Device: Nasal cannula Oxygen Delivery: 5 LPM  Vitals:    09/10/22 1622 09/11/22 0432 09/12/22 0500   Weight: 68.1 kg (150 lb 3.2 oz) 67.9 kg (149 lb 9.6 oz) 67.7 kg (149 lb 4.8 oz)     Vital Signs with Ranges  Temp:  [97.7  F (36.5  C)-100  F (37.8  C)] 97.8  F (36.6  C)  Pulse:  [75-98] 75  Resp:  [18-22] 18  BP: ()/(64-81) 96/66  SpO2:  [99 %-100 %] 100 %    Constitutional: Appears chronically ill, sleepy  Lungs: Tenderness  Cardiovascular: Tachycardia  Abdomen: not  tender  Skin: Warm  Neuro: Deconditioned, non-focal    Medications       acetaminophen  975 mg Oral Q8H     famotidine  20 mg Oral Daily     folic acid  1 mg Oral Daily     [Held by provider] heparin ANTICOAGULANT  5,000 Units Subcutaneous Q12H     levothyroxine  88 mcg Oral QAM AC     multivitamin w/minerals  1 tablet Oral Daily     piperacillin-tazobactam  3.375 g Intravenous Q8H     sodium chloride (PF)  3 mL Intracatheter Q8H     thiamine  100 mg Oral Daily     torsemide  20 mg Oral Daily     vancomycin  750 mg Intravenous Q24H     venlafaxine  300 mg Oral Daily       Data   All microbiology laboratory data reviewed.  Recent Labs   Lab Test 09/12/22  0431 09/11/22  0426 09/10/22  0832   WBC 6.7 6.2 6.6   HGB 9.7* 9.8* 11.0*   HCT 28.7* 29.5* 33.0*   MCV 95 97 97   PLT 29* 30* 40*     Recent Labs   Lab Test 09/12/22  0431 09/11/22  0426 09/10/22  0832   CR 1.15* 1.48* 1.37*     No lab results found.  No lab results found.    Invalid input(s):     MICRO:  Collected Updated Procedure Result Status    09/10/2022 0832 09/10/2022 0846 Blood Culture Peripheral Blood [[id number]]   Peripheral Blood    In process Component Value   No component results             09/09/2022 2123 09/09/2022 2133 Blood Culture Peripheral Blood [[id number]]   Peripheral Blood    In process Component Value   No component results             09/09/2022 1343 09/10/2022 0536 Blood Culture Peripheral Blood [[id number]]   (Abnormal)   Peripheral Blood    Preliminary result Component Value   Culture Positive on the 1st day of incubation Abnormal  P    Gram positive cocci in clusters Panic  P    1 of 2 bottles             09/08/2022 1847 09/10/2022 0715 Pleural fluid Aerobic Bacterial Culture Routine with Gram Stain [[id number]]    Pleural fluid from Pleural Cavity, Right    Preliminary result Component Value   Culture No growth after 1 day P   Gram Stain Result No organisms seen P    Gram Stain slide reviewed at the Infectious Diseases  Diagnostic Lab - [facility]    2+ WBC seen P             09/08/2022 1847 09/08/2022 2033 Glucose fluid [[id number]]    Pleural fluid from Pleural Cavity, Right    Final result Component Value Units   Glucose Fluid Source Pleural Cavity, Right    Glucose fluid 117 mg/dL          09/08/2022 1847 09/09/2022 0351 Lactate dehydrogenase fluid [[id number]]    Pleural fluid from Pleural Cavity, Right    Final result Component Value Units   LD Fluid Source Pleural Cavity, Right    Lactate dehydrogenase fluid 100 U/L          09/08/2022 1847 09/09/2022 0351 Protein fluid [[id number]]    Pleural fluid from Pleural Cavity, Right    Final result Component Value Units   Protein Fluid Source Pleural Cavity, Right    Protein Total Fluid 3.0 g/dL          09/08/2022 1554 09/08/2022 1659 Symptomatic; Unknown Influenza A/B & SARS-CoV2 (COVID-19) Virus PCR Multiplex Nasopharyngeal [[id number]]    Swab from Nasopharyngeal    Final result Component Value   Influenza A PCR Negative   Influenza B PCR Negative   RSV PCR Negative   SARS CoV2 PCR Negative   NEGATIVE: SARS-CoV-2 (COVID-19) RNA not detected, presumed negative.          09/08/2022 1552 09/10/2022 1348 Blood Culture Peripheral Blood [[id number]]   (Abnormal)   Peripheral Blood    Preliminary result Component Value   Culture Positive on the 1st day of incubation Abnormal  P    Staphylococcus aureus Panic  P    2 of 2 bottles             09/08/2022 1552 09/10/2022 0704 Blood Culture Peripheral Blood [[id number]]    (Abnormal)   Peripheral Blood    Edited Component Value   Culture Positive on the 1st day of incubation Abnormal     Staphylococcus aureus MRSA Panic     2 of 2 bottles         Susceptibility     Staphylococcus aureus MRSA     [place]     Clindamycin <=0.25 ug/mL Susceptible 1     Erythromycin >=8 ug/mL Resistant     Gentamicin <=0.5 ug/mL Susceptible     Linezolid 2 ug/mL Susceptible     Oxacillin >=4 ug/mL Resistant 2     Tetracycline <=1 ug/mL Susceptible      Trimethoprim/Sulfamethoxazole <=0.5/9.5 u... Susceptible     Vancomycin 1 ug/mL Susceptible              1 This isolate DOES NOT demonstrate inducible clindamycin resistance in vitro. Clindamycin is susceptible and could be used when indicated, however, erythromycin is resistant and should not be used.   2 Oxacillin susceptible isolates are susceptible to cephalosporins (example: cefazolin and cephalexin) and beta lactam combination agents. Oxacillin resistant isolates are resistant to these agents.        Susceptibility Comments    Staphylococcus aureus MRSA   MRSA requires contact precautions.         09/08/2022 1552 09/09/2022 0352 Graduwayigene GP Panel [[id number]]    (Abnormal)   Peripheral Blood    Final result Component Value   Staphylococcus aureus Detected Abnormal    Positive for methicillin-resistant Staphylococcus aureus (MRSA) by [employer] multiplex nucleic acid test. Final identification and antimicrobial susceptibility testing will be verified by standard methods.   Staphylococcus epidermidis Not Detected   Staphylococcus lugdunensis Not Detected   Enterococcus faecalis Not Detected   Enterococcus faecium Not Detected   Streptococcus species Not Detected   Streptococcus agalactiae Not Detected   Streptococcus anginosus group Not Detected   Streptococcus pneumoniae Not Detected   Streptococcus pyogenes Not Detected   Listeria species Not Detected            RADIOLOGY:    XR Chest 2 Views    Result Date: 9/9/2022  EXAM: XR CHEST 2 VIEWS LOCATION: [facility] DATE/TIME: 9/9/2022 11:17 AM INDICATION: f u chest tube, PTX COMPARISON: 09/08/2022.     IMPRESSION: Right chest tube over the right lung apex in stable position. No recurrent pneumothorax. Small right pleural effusion with some infiltrates or atelectasis and some mild atelectasis left lung base all appear stable. Aortic valve prosthesis.    Echocardiogram Complete    Result Date: 9/9/2022  [id number]  [id number]^CAMILLE^PAT^[place] [address]  Name: LEELEE MARIN MRN: [id number] : 1950 Study Date: 2022 02:42 PM Age: 72 yrs Gender: Female Patient Location: [facility] Reason For Study: Abnormal Heart Sound Ordering Physician: PAT OBRIEN Performed By: SANDI  BSA: 1.7 m2 Height: 61 in Weight: 148 lb HR: 81 BP: 120/66 mmHg ______________________________________________________________________________ Procedure Complete Portable Echo Adult. Compared to the prior study dated 10/26/2021, there have been no changes. ______________________________________________________________________________ Interpretation Summary  1.Left ventricular size, wall motion and function are normal. The ejection fraction is 60-65%. 2.The right ventricle is normal size. Mildly decreased right ventricular systolic function 3.The left atrium is moderate to severely dilated. 4.Thickened mitral leaflets without stenosis. There is mild to moderate (1-2+) mitral regurgitation. 5.There is a 23 ISABELLA 3 bioprosthetic valve present in aortic valve position. At heart rate of 99 bpm peak velocity is 2.3 m/s, mean gradient is 9 mmHg, dimensionless index 0.48. 6.IVC diameter >2.1 cm collapsing <50% with sniff suggests a high RA pressure estimated at 15 mmHg or greater. Compared to the prior study dated 10/26/2021, the Tricuspid regurgitation is really not sampled well, left atrium is further enlarged, no essential change for the valve with prior peak velocity of 2.8 m/s and mean gradient of 16 mmHg. ______________________________________________________________________________ I      WMSI = 1.00     % Normal = 100  X - Cannot   0 -                      (2) - Mildly 2 -          Segments  Size Interpret    Hyperkinetic 1 - Normal  Hypokinetic  Hypokinetic  1-2     small                                                    7 -          3-5    moderate 3 - Akinetic 4 -          5 -         6 - Akinetic  Dyskinetic   6-14    large              Dyskinetic   Aneurysmal  w/scar       w/scar       15-16   diffuse  Left Ventricle Left ventricular size, wall motion and function are normal. The ejection fraction is 60-65%. There is normal left ventricular wall thickness. Left ventricular diastolic function is normal. No regional wall motion abnormalities noted.  Right Ventricle The right ventricle is normal size. Mildly decreased right ventricular systolic function. TAPSE is normal, which is consistent with normal right ventricular systolic function.  Atria The left atrium is moderate to severely dilated. Right atrial size is normal. There is no color Doppler evidence of an atrial shunt.  Mitral Valve Thickened mitral valve anterior leaflet. Thickened mitral valve posterior leaflet. There is mild to moderate (1-2+) mitral regurgitation. There is no mitral valve stenosis.  Tricuspid Valve The tricuspid valve is not well visualized, but is grossly normal. Right ventricle systolic pressure estimate normal. There is trace to mild tricuspid regurgitation. There is no tricuspid stenosis.  Aortic Valve There is a ISABELLA 3 bioprosthetic valve present in aortic valve position. 23 At heart rate of 99 bpm peak velocity is 2.3 m/s, mean gradient is 9 mmHg, dimensionless index 0.48.  Pulmonic Valve The pulmonic valve is not well seen, but is grossly normal. This degree of valvular regurgitation is within normal limits. There is no pulmonic valvular stenosis.  Vessels The aorta root is normal. Normal size ascending aorta. IVC diameter >2.1 cm collapsing <50% with sniff suggests a high RA pressure estimated at 15 mmHg or greater.  Pericardium There is no pericardial effusion.  Rhythm Sinus rhythm was noted.  ______________________________________________________________________________ MMode/2D Measurements & Calculations IVSd: 0.74 cm LVIDd: 4.7 cm LVIDs: 3.3 cm LVPWd: 0.75 cm FS: 28.2 %  LV mass(C)d: 109.3 grams LV mass(C)dI: 65.7  grams/m2 Ao root diam: 2.5 cm LA dimension: 5.8 cm LA/Ao: 2.3 LVOT diam: 1.6 cm LVOT area: 2.0 cm2 LA Volume Indexed (AL/bp): 86.6 ml/m2 RWT: 0.32  Time Measurements Aortic HR: 106.0 BPM MM HR: 91.0 BPM  Doppler Measurements & Calculations MV E max thomas: 102.3 cm/sec MV A max thomas: 108.5 cm/sec MV E/A: 0.94 MV dec slope: 322.0 cm/sec2 MV dec time: 0.32 sec Ao V2 max: 204.0 cm/sec Ao max P.0 mmHg Ao V2 mean: 165.0 cm/sec Ao mean P.0 mmHg Ao V2 VTI: 44.8 cm KP(I,D): 0.80 cm2 KP(V,D): 0.96 cm2 LV V1 max PG: 3.8 mmHg LV V1 max: 97.6 cm/sec LV V1 VTI: 17.8 cm CO(LVOT): 3.8 l/min CI(LVOT): 2.3 l/min/m2 SV(LVOT): 35.7 ml SI(LVOT): 21.5 ml/m2 TR max thomas: 244.0 cm/sec TR max P.8 mmHg AV Thomas Ratio (DI): 0.48 KP Index (cm2/m2): 0.48 E/E': 16.5 E/E' av.0 Lateral E/e': 20.0 Medial E/e': 13.9 Peak E' Thomas: 6.2 cm/sec  ______________________________________________________________________________ Report approved by: Yonatan Arroyo 2022 04:29 PM       US Thoracentesis    Result Date: 2022  EXAM: 1. RIGHT  THORACENTESIS 2. ULTRASOUND GUIDANCE LOCATION: [facility] DATE/TIME: 2022 6:46 PM INDICATION: Pleural effusion. PROCEDURE: Informed consent obtained. Time out performed. The chest was prepped and draped in sterile fashion. 5  mL of 1 % lidocaine was infused into the local soft tissues. Under direct ultrasound guidance, a 5 Mongolian catheter system was placed into the pleural effusion. 1.35  liters of clear yellow and red  fluid were removed and sent to lab, if requested. Patient tolerated procedure well. Ultrasound imaging was obtained and placed in the patient's permanent medical record.     IMPRESSION: Status post right  ultrasound-guided thoracentesis. Reference CPT Code: 48647    XR Chest Port 1 View    Result Date: 9/10/2022  EXAM: XR CHEST PORT 1 VIEW LOCATION: [facility] DATE/TIME: 9/10/2022 2:21 PM INDICATION: follow up PTX for continued  resolution COMPARISON: 09/09/2022     IMPRESSION: The heart is normal in size. Improved right pleural effusion. Right lower lobe opacities, most likely representing infiltrate. No pneumothorax. Right-sided chest tube.    XR Chest Port 1 View    Result Date: 9/9/2022  EXAM: XR CHEST PORT 1 VIEW LOCATION: [facility] DATE/TIME: 9/9/2022 12:00 AM INDICATION: s p chest tube follow-up pneumothorax COMPARISON: 09/08/2022     IMPRESSION: New right chest tube terminates at the apex medially. Right pneumothorax has resolved. Persistent opacity at the right base may represent an combination of atelectasis and edema. Normal heart size. Prior endovascular aortic valve replacement.  Left lung clear. Surgical clips cluster over the left shoulder.    XR Chest Port 1 View    Result Date: 9/8/2022  EXAM: XR CHEST PORT 1 VIEW LOCATION: [facility] DATE/TIME: 9/8/2022 9:44 PM INDICATION: ptx COMPARISON: 09/08/2022     IMPRESSION: Persistent right pneumothorax measuring 8 mm at the apex (stable) and 1.7 cm at the lateral base (previously 1.4 cm). Small right pleural effusion. Increased airspace infiltrate in the right lower lobe. Surgical clips in the left axilla. Calcified aortic arch. Prior aortic valve repair.    XR Chest Port 1 View    Result Date: 9/8/2022  EXAM: XR CHEST PORT 1 VIEW LOCATION: [facility] DATE/TIME: 9/8/2022 8:18 PM INDICATION: s p thoracentesis, ? ptx COMPARISON: Same date abdominal CT and chest radiograph.     IMPRESSION: Stable cardiomediastinal silhouette with prior aortic valve replacement. Small right hydropneumothorax, similar volume to same day CT given differences in modalities, without evidence of tension. Possible reexpansion edema in the right lower lobe. Left lung is clear. No acute bony abnormality.    XR Chest Port 1 View    Result Date: 9/8/2022  EXAM: XR CHEST PORT 1 VIEW LOCATION: [facility]  DATE/TIME: 9/8/2022 4:30 PM INDICATION: fever, cough COMPARISON: 12/3/2021     IMPRESSION: Small to moderate right effusion, slightly increased. Right lower lobe infiltrate or atelectasis.  No pneumothorax.  The left lung is clear.  The heart is normal in size.    CT Abdomen Pelvis w Contrast    Result Date: 9/8/2022  EXAM: CT ABDOMEN PELVIS W CONTRAST LOCATION: [facility] DATE/TIME: 9/8/2022 7:39 PM INDICATION: Right upper abdominal pain and fever. COMPARISON: CTA 8/21/2021 TECHNIQUE: CT scan of the abdomen and pelvis was performed following injection of IV contrast. Multiplanar reformats were obtained. Dose reduction techniques were used. CONTRAST: 75ml isovue 370 FINDINGS: LOWER CHEST: Small right hydropneumothorax post right thoracentesis earlier today. Small residual right pleural effusion. Heavy mitral annular calcifications. Small sliding-type hiatal hernia. HEPATOBILIARY: Cirrhotic appearing liver redemonstrated without focal suspicious mass. Normal gallbladder and biliary system. PANCREAS: Normal. SPLEEN: Normal. ADRENAL GLANDS: Normal. KIDNEYS/BLADDER: Normal kidneys and ureters. Nondistended bladder with focal asymmetric enhancing bladder wall thickening measuring up to 8 mm in the right bladder base image 173 of series 3.. BOWEL: Diverticulosis of the colon. No acute inflammatory change. No obstruction. Large amount of stool in the rectum. Mild ascites. LYMPH NODES: No lymphadenopathy. VASCULATURE: Previous endovascular aortic aneurysm repair with bifurcated endograft. Aneurysm sac measures 5.0 x 5.6 cm, previously 4.7 x 5.4 cm. Type II endoleak is noted. PELVIC ORGANS: Hysterectomy. No adnexal mass. MUSCULOSKELETAL: Previous lower lumbar fusion. Degenerative changes are present within the spine and hips. Generalized anasarca.     IMPRESSION: 1.  Small to moderate right hydropneumothorax, status post large volume right thoracentesis earlier today. Patient may have a trapped right  lung. Consider two-view chest radiograph. 2.  Cirrhotic appearing liver with mild ascites. No splenomegaly. 3.  Previous endovascular repair of infrarenal abdominal aortic aneurysm. Type II endoleak with interval enlargement of the aneurysm sac, compatible with endotension. Previous repair at [facility]. Consider nonemergent Interventional Radiology consult. 4.  Generalized anasarca. Report called to Dr. Roberson at 2000 hours    CT Cervical Spine w/o Contrast    Result Date: 9/8/2022  EXAM: CT CERVICAL SPINE W/O CONTRAST LOCATION: [facility] DATE/TIME: 9/8/2022 7:38 PM INDICATION: fall COMPARISON: None. TECHNIQUE: Routine CT Cervical Spine without IV contrast. Multiplanar reformats. Dose reduction techniques were used. FINDINGS: No evidence of acute fracture or subluxation of the cervical spine by CT imaging. Straightening of the normal cervical lordosis. The vertebral bodies of the cervical spine otherwise have normal stature and alignment. Anterolisthesis of C3 on C4 measuring  2 mm. Anterior marginal osteophytes at C4/C5-C7/T1. Multilevel degenerative disc disease of the cervical spine with disc height loss, most pronounced at C4/C5-C7/T1. The partially imaged intracranial contents are unremarkable. No prevertebral soft tissue swelling. The partially imaged lung apices are unremarkable. Craniovertebral junction and C1-C2: The odontoid process is well approximated with the anterior body of C1 and well aligned between the lateral masses of C1. C2-C3: No posterior disc bulge or spinal canal narrowing. No neural foraminal narrowing. C3-C4: No posterior disc bulge or spinal canal narrowing. No neural foraminal narrowing. C4-C5: Broad bar disc osteophyte complex with moderate spinal canal narrowing. Uncovertebral joint disease and facet arthropathy with severe bilateral neural foraminal narrowing. C5-C6: No posterior disc bulge or spinal canal narrowing. Uncovertebral joint disease and facet  arthropathy with severe right and moderate left neural foraminal narrowing. C6-C7: No posterior disc bulge or spinal canal narrowing. Uncovertebral joint disease and facet arthropathy with severe bilateral neural foraminal narrowing. C7-T1: No posterior disc bulge or spinal canal narrowing. Uncovertebral joint disease and facet arthropathy with moderate bilateral neural foraminal narrowing.     IMPRESSION: 1.  No evidence of acute fracture or subluxation of the cervical spine by CT imaging. 2.  Degenerative cervical spondylosis as described above.    CT Head w/o Contrast    Addendum Date: 9/8/2022    Addendum/ impression: INDICATION: Dizziness    Result Date: 9/8/2022  EXAM: CT HEAD W/O CONTRAST LOCATION: [facility] DATE/TIME: 9/8/2022 7:34 PM INDICATION: fall COMPARISON: None. TECHNIQUE: Routine CT Head without IV contrast. Multiplanar reformats. Dose reduction techniques were used. FINDINGS: INTRACRANIAL CONTENTS: No evidence of acute intracranial hemorrhage or mass effect. Scattered foci of decreased attenuation within the cerebral hemispheric white matter which are nonspecific, though most commonly ascribed to chronic small vessel ischemic  disease. The ventricles and sulci are prominent consistent with mild brain parenchymal volume loss. Gray-white matter differentiation is maintained. The basilar cisterns are patent. VISUALIZED ORBITS/SINUSES/MASTOIDS: Bilateral cataract surgery. The partially imaged globes are otherwise unremarkable. The partially imaged paranasal sinuses, mastoid air cells and middle ear cavities are unremarkable. BONES/SOFT TISSUES: The visualized skull base and calvarium are unremarkable.     IMPRESSION:  1.  No evidence of acute intracranial hemorrhage or mass effect. 2.  Mild nonspecific white matter changes. 3.  Mild brain parenchymal volume loss.    CT Lumbar Spine w/o Contrast    Result Date: 9/8/2022  EXAM: CT LUMBAR SPINE W/O CONTRAST LOCATION: [facility]  [facility] DATE/TIME: 9/8/2022 7:39 PM INDICATION: Back pain COMPARISON: None. TECHNIQUE: Routine CT Lumbar Spine without IV contrast. Multiplanar reformats. Dose reduction techniques were used. FINDINGS: No evidence of acute fracture or subluxation of the lumbar spine by CT imaging. Postsurgical changes posterior fusion at L3-L5 and interbody fusion at L3/L4 and L4/L5. No hardware complication. No evidence of acute fracture or subluxation of the lumbar spine by CT imaging. Retrolisthesis of T12 on L1 measuring 2 mm. Anterior marginal osteophytes at T12/L1 and L1/L2 and L2/L3. Multilevel degenerative disc disease of the lumbar spine with disc height loss, most pronounced at T12/L1 and L2/L3. Aortobiiliac endograft. The partially imaged intra-abdominal contents are otherwise unremarkable. T12/L1:  Symmetric disc bulge without spinal canal narrowing. Mild bilateral neural foraminal narrowing. L1/L2:  No posterior disc bulge or spinal canal narrowing. Mild bilateral neural foraminal narrowing. L2/L3:  Broad bar disc osteophyte complex with mild spinal canal narrowing. Severe bilateral neural foraminal narrowing. L3/L4:  Spondylotic ridging without spinal canal narrowing. Moderate bilateral facet arthropathy. No neural foraminal narrowing.  L4/L5:  No posterior disc bulge or spinal canal narrowing. Mild bilateral facet arthropathy. No neural foraminal narrowing. L5/S1: Symmetric disc bulge and moderate facet arthropathy without spinal canal narrowing. Severe bilateral neural foraminal narrowing.     IMPRESSION:  1.  No evidence of acute fracture or subluxation of the lumbar spine by CT imaging. 2.  Postsurgical changes posterior fusion at L3-L5 and interbody fusion at L3/L4 and L4/L5. No hardware complication. 3.  Degenerative lumbar spondylosis as described above.    Total Time Spent 35 minutes with >50% of the time spent in chart review, evaluation, counseling, education and care coordination.

## 2022-09-12 NOTE — PROGRESS NOTES
CLINICAL NUTRITION SERVICES - ASSESSMENT NOTE     Nutrition Prescription    RECOMMENDATIONS FOR MDs/PROVIDERS TO ORDER:      Malnutrition Status:    Moderate in chronic    Recommendations already ordered by Registered Dietitian (RD):  Magic cup 2x/day.    Future/Additional Recommendations:       REASON FOR ASSESSMENT  Maren Fofana is a/an 72 year old female assessed by the dietitian for Admission Nutrition Risk Screen for unsure wt loss but decreased intake    NUTRITION HISTORY  Pt lives independently.  Pt's daughter visiting, she helps pt with groceries.  Pt reports not eating regularly at home.  Pt's daughter reports that pt lost her sense of taste back when she had her TAVR.  She has been eating small amounts this admission.  Pt states she likes sweets but  does not like Ensure.  Pt wears dentures, she has her upper plate here.    CURRENT NUTRITION ORDERS  Diet: 2 g Sodium  Intake/Tolerance: ate 25% supper yesterday, small meal.    LABS  Labs:  Electrolytes  Potassium (mmol/L)   Date Value   09/12/2022 3.4 (L)   09/12/2022 3.1 (L)   09/11/2022 3.5     Phosphorus (mg/dL)   Date Value   09/09/2022 3.4   09/29/2021 3.1   09/28/2021 3.9    Blood Glucose  Glucose (mg/dL)   Date Value   09/12/2022 98   09/11/2022 118   09/10/2022 127 (H)   09/09/2022 114   09/08/2022 129 (H)     GLUCOSE BY METER POCT (mg/dL)   Date Value   09/12/2022 77   09/12/2022 99    Inflammatory Markers  WBC Count (10e3/uL)   Date Value   09/12/2022 6.7   09/11/2022 6.2   09/10/2022 6.6     Albumin (g/dL)   Date Value   09/12/2022 2.8 (L)   09/11/2022 3.0 (L)   09/10/2022 2.9 (L)      Magnesium (mg/dL)   Date Value   09/12/2022 1.8   09/10/2022 2.4   09/09/2022 1.7 (L)     Sodium (mmol/L)   Date Value   09/12/2022 135 (L)   09/11/2022 132 (L)   09/10/2022 132 (L)    Renal  Urea Nitrogen (mg/dL)   Date Value   09/12/2022 31 (H)   09/11/2022 38 (H)   09/10/2022 34 (H)     Creatinine (mg/dL)   Date Value   09/12/2022 1.15 (H)   09/11/2022 1.48 (H)  "  09/10/2022 1.37 (H)     Additional  Triglycerides (mg/dL)   Date Value   05/19/2021 87     Ketones Urine (mg/dL)   Date Value   09/08/2022 Trace (A)        Labs reviewed    MEDICATIONS    acetaminophen  975 mg Oral Q8H     famotidine  20 mg Oral Daily     folic acid  1 mg Oral Daily     [Held by provider] heparin ANTICOAGULANT  5,000 Units Subcutaneous Q12H     levothyroxine  88 mcg Oral QAM AC     magnesium sulfate  2 g Intravenous Once     multivitamin w/minerals  1 tablet Oral Daily     piperacillin-tazobactam  3.375 g Intravenous Q8H     sodium chloride (PF)  3 mL Intracatheter Q8H     thiamine  100 mg Oral Daily     torsemide  20 mg Oral Daily     vancomycin  750 mg Intravenous Q24H     venlafaxine  300 mg Oral Daily         flumazenil, OLANZapine zydis **OR** haloperidol lactate, lidocaine 4%, lidocaine (buffered or not buffered), melatonin, ondansetron **OR** ondansetron, oxyCODONE, prochlorperazine **OR** prochlorperazine **OR** prochlorperazine, senna-docusate **OR** senna-docusate, sodium chloride (PF), sodium chloride (PF)   Medications reviewed    ANTHROPOMETRICS  Height: 154.9 cm (5' 1\")  Most Recent Weight: 67.7 kg (149 lb 4.8 oz)    BMI: Overweight BMI 25-29.9  Weight History:   Wt Readings from Last 8 Encounters:   09/12/22 67.7 kg (149 lb 4.8 oz)   08/22/22 70.8 kg (156 lb)   01/13/22 69.9 kg (154 lb)   11/11/21 72.1 kg (159 lb)   10/27/21 74.4 kg (164 lb)   10/11/21 75.6 kg (166 lb 9.6 oz)   09/28/21 74.3 kg (163 lb 12.8 oz)   09/23/21 73.5 kg (162 lb)     10% loss in 11 months notable though does not meet malnutrition criteria.    Dosing Weight: 67.7 kg    ASSESSED NUTRITION NEEDS  Estimated Energy Needs: 1355+ kcals/day (20+)  Justification: Overweight, repletion  Estimated Protein Needs: 68+ grams protein/day (1+)  Justification: Repletion  Estimated Fluid Needs: 1355+ mL/day (1 mL/kcal)   Justification: Maintenance    PHYSICAL FINDINGS  See malnutrition section below.    MALNUTRITION:  % " Weight Loss:  Weight loss does not meet criteria for malnutrition 10% in 11 months  % Intake:  </= 75% for >/= 1 month (severe malnutrition)  Subcutaneous Fat Loss:  Orbital region moderate depletion  Muscle Loss:  Clavicle bone region moderate depletion and Acromion bone region moderate depletion  Fluid Retention:  None noted    Malnutrition Diagnosis: Moderate malnutrition  In Context of:  Chronic illness or disease    NUTRITION DIAGNOSIS  Malnutrition related to chronic illness as evidenced by decreased intake, loss of lean body mass      INTERVENTIONS  Implementation  Nutrition Education: we discussed protein foods to include with meals for healing/repletion.  Pt likes omelets, eggs, soft meat.  Medical food supplement therapy, magic cup 2x/day  Pt declines ground meat.    Goals  Consume 75% or more of adequate meals  Maintain wt     Monitoring/Evaluation  Progress toward goals will be monitored and evaluated per protocol.

## 2022-09-12 NOTE — PROGRESS NOTES
Pulmonary Progress Note  9/12/2022   2:17 PM    Problem List:   Active Problems:    Hydropneumothorax    Anasarca    Pleural effusion    Other ascites    Severe sepsis (H)    Renal failure, unspecified chronicity    Gram-positive bacteremia       Plan:   - CXR reviewed; PTX about the same. Continue to monitor; will likely re-expand over time.   - If she develops chest pressure, acute increase in shortness of breath, should get STAT CXR to re-eval for recurrence.   - Treatment of MRSA per ID; will need 6-weeks of IV Vancomycin.   - Very high risk of infected multiple sites: Valve, TAVR, AAA graft, bilateral TKAs...   - Had been on CIWA and received a few doses of Valium resulting in over sedation; blood gas was drawn - on review it is Venous and stable. Avoid further bzd dosing if not absolutely necessary.     Genie Dawson, [facility] Pulmonary/Critical Care    ________________________________________________________________      CC:   Chief Complaint   Patient presents with     Fall     HPI: Maren Fofana is a 72 year old female with history of HTN, HFpEF, severe AS, s/p bioprosthetic aortic valve 9/2021, hypothyroidism, GERD, PVD, AAA s/p endovascular repair, s/p lumbar spine surgery and bilateral knee replacement.   Presents to ED on 9/8 after mechanical fall. Patient was febrile, hemodynamically stable.   Labs showed mild leukocytosis, increase BUN/Cr, elevated procalcitonin, U/A was negative.   CT scan showed right side effusion, no acute intra-abdomen pathology.   Blood cultures growing staph aureus. Patient underwent right side thoracentesis.   Procedure complicated with pneumothorax. Chest tube was placed. Pleural fluid analysis consistent with transudate effusion. Pulmonary consulted for chest tube management.     ROS: seeing black spots and dirt over the walls and dirt; more awake this afternoon per her daughter who is at the bedside.     Objective:   Vitals:    09/12/22 0500 09/12/22 0738  09/12/22 1131 09/12/22 1144   BP:  99/64  102/68   BP Location:  Left arm  Left arm   Patient Position:       Cuff Size:       Pulse:  82  84   Resp:  18  18   Temp:  99  F (37.2  C) 98.2  F (36.8  C) 97.7  F (36.5  C)   TempSrc:  Axillary  Oral   SpO2:  99%  99%   Weight: 67.7 kg (149 lb 4.8 oz)      Height:           I/O:     Intake/Output Summary (Last 24 hours) at 9/12/2022 1417  Last data filed at 9/11/2022 2318  Gross per 24 hour   Intake 326 ml   Output 800 ml   Net -474 ml     Weight change: -0.408 kg (-14.4 oz)    Medications Reviewed: yes    Physical Exam:   General: awake and interactive. No distress.   HEENT: AT/NC  NEURO: grossly nonfocal.   CARDIAC: RRR S1S2  PULMONARY: unlabored on NC; lungs are coarse, no wheeze.   GASTROINTESTINAL: abdomen is soft without significant tenderness, masses, organomegaly or guarding  INTEGUMENT: visible skin intact.   PSYCH: calm; hallucinating - seeing dirt, bugs, black circles on the walls and circles.     Lab Results Reviewed:   Recent Labs   Lab 09/12/22  0431   *   CO2 25   BUN 31*   ALKPHOS 113   ALT 16   AST 50*       Recent Labs   Lab 09/12/22  0431   WBC 6.7   HGB 9.7*   HCT 28.7*   PLT 29*       Micro:   9/12 - pending   9/11 - Gram positive cocci in 1 of 2 bottles   9/10 - MRSA      Imaging: all imaging personally reviewed   9/12 PET whole body - 1. Patchy opacities throughout much of the right lung suspicious for infectious pneumonia, most prominently in the right lower lobe.  2. Intracardiac uptake about a TAVR and dense mitral annulus calcification suspicious for underlying endocarditis.  3. Heterogeneous uptake about both knees, more so on the right, indeterminate for underlying infection. Small right knee effusion present, amenable to aspiration.            Genie Dawson, [facility] Pulmonary/Critical Care

## 2022-09-12 NOTE — PROGRESS NOTES
A/P    72 year old female with PMH of aortic stenosis, s/p TAVR 09/21, HTN, s/p AAA repair, tobacco use disorder, diastolic CHF, HTN, known right-sided pleural effusion, alcohol use disorder, admitted on 9/8/2022.      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  TTE today no obvious vegetation.  PET CT (9/12) showed moderate uptake about TAVR and moderate uptake about dense mitral annulus calcification, suspicious for underlying endocarditis.  -Daily blood cultures  - Vancomycin. Zosyn stop tomorrow per ID.   -Per ID: Anticipate plan for IV vancomycin for 6 weeks, p.o. rifampin, 2 weeks of gentamicin if tolerated    Transudative right pleural effusion complicated by PTX:  S/p 1.3 L thoracentesis on 9/8 followed by chest tube placement for PTX on 9/9.  Chest tube retracted to chest wall while transferring patient to her room and was pulled out on 9/10. Follow up CXR showed small pneumothorax . CXR 9/12 this morning showed small unchanged right PTX.  Pleural fluid cultures NGTD.  Persistent mild air leak.  -pulm following.  Assistance appreciated.    -defer CXR ordering to pulm  -  .  HFpEF:  EF 60 to 65%. BNP 1,107.  TTE 9/9/2022 LVEF 60 to 65%, mild decreased RV systolic function, moderate-severe LAE, mild-moderate MR, 23 sapient 3 bioprosthetic valve with peak velocity 2.3 m/s, mean gradient 9 mmHg, dimensionless index 0.48, IVC diameter greater than 2.1 cm collapsing less than 50% with sniff suggests high RA pressure at 15 mm or greater  -Recent coronary angiogram on 8/21- no significant CAD.  -Resumed torsemide on 9/10. Cr up slightly monitor.    Status post TAVR: Refer to above.     Essential hypertension.  -Hold losartan  - Monitor     Rhabdomyolysis: CK 2,092->916->419->121.  Suspect traumatic vs less likely  infectious   -hold statin     Alcohol use disorder.  History of withdrawal seizures/DT. EtOH level < 10  -CIWA,  thiamine, folate  -stop valium, no benzo's due to pulmonary status     AAA, s/p endovascular repair in 2016: CT abdomen/pelvis on 9/8/2022 showed endovascular aortic aneurysm repair with bifurcated endograft, aneurysm sac measures 5 x 5.6 cm (previously 4.7 x 5.4 cm), type II endoleak.  PET CT (9/12/22) showed No increased uptake about the stent graft to suggest associated infection.  - Followed at [facility]    SKYLAR: Creatinine 1.43-1.12->1.37->1.48->1.15.  - Strict intake and output and daily weights  - Labs in a.m.    Acute toxic/metabolic encephalopathy:  Noted more somnolent today. On CIWA, received valium and oxycodone resulting in respiratory suppression and worsening acute hypercarbic respiratory acidosis.  NH3 normal.   -stop valium, avoid opiates.    Hyponatremia: Sodium 132->135.  Monitor    Cirrhosis: Suspect EtOH mediated. Per CT abdomen/pelvis imaging.  LFTs normal. Not decompensated. No significant ascites.    Normocytic anemia with acute on chronic thrombocytopenia: Multifactorial.  plt now 29K.  Hgb 9.7.  PBS mild normochromic normocytic anemia which may be secondary to anemia chronic disease, renal insufficiency or blood loss.  The moderate thrombocytopenia may be secondary to bone marrow suppression by drugs or infection.  Hemolysis labs neg.  Folate nml. B12 nml.  FERNANDA positive.  -heme consult    NSVT: noted on monitor 9/12 this morning.  HD stable.  LVEF 60-65%  -replace K goal above 4  -replace Mg  -tele    H/O Bilateral TKA:  PET CT today showed right > left knee effusion with eterogeneous uptake about both knees, more so on the right, indeterminate for underlying infection.   -orthopedic surgery consult given known high grade bacteremia, and hardware in place.      GERD-PPI.    Asymmetric bladder wall thickening: UA negative for UTI.    Hypokalemia:  -monitor and replete per protocol    Hypomagnesemia:  -Mg 2g IV x1    Fall at home:  CT head, CT cervical spine without traumatic injuries,  stroke.  -PT/OT    MDD-Effexor, hold trazodone due to increased risk for serotonin syndrome.    Hypothyroidism-on levothyroxine.  -TSH 3.92    Chronic back pain with disc disease and spondylolisthesis, s/p lumbar laminectomy.     History of tobacco use disorder-quit smoking 6 months ago.    Hypomagnesemia:  -monitor and replace accordingly    Constipation: CT abdomen pelvis on 9/8/2022 showed large amount of stool in the rectum.  - Senna-S, MiraLAX       Diet: cardiac diet  DVT Prophylaxis: Pneumatic Compression Devices  Reynoso Catheter: Not present  Central Lines: None  Cardiac Monitoring: yes  Code Status:   full    Multiple day stay          S:  afebrile. Events overnight noted    O:  Temp: 97.8  F (36.6  C) Temp src: Oral BP: 96/66 Pulse: 75   Resp: 18 SpO2: 100 % O2 Device: Nasal cannula Oxygen Delivery: 5 LPM  gen nad  cv rrr  Lungs decreased r>l, non labored  abd bs+, nttp  Neuro a&o    Lab/imaging reviewed

## 2022-09-12 NOTE — PROGRESS NOTES
[facility]    Referral received  from Cintia Granados CM, for IV antibiotics.    Benefits verified. Pt does not have coverage for IV antibiotics at home under her [employer] Medicare plan.  The drug would be billed to the Part D and supplies would be self-pay.  Based on Zosyn 3.375g IV q 8 hours and Vancomycin 750 mg IV q 24 hours, pt's self-pay amount would be approximately $60.91/day for drug/supplies.    Nursing is only covered if the patient is homebound.  IF not, the cost is $90 per visit, if [facility] bills for it.  Pt will require a regional agency for her home care services. Patient may have coverage in a TCU or, if on a once daily drug, at an infusion center.    If it's determined pt will require IV antibiotics at discharge, writer will speak with pt to review benefits, home infusion and to offer choice of agency/home infusion provider.    Thank you for the referral.    Marion Kellogg RN, BSN  [facility] Liaison  [phone] (Mon through Fri, 8:00 am-5:00 pm)  [phone] (Office)

## 2022-09-12 NOTE — PROGRESS NOTES
Care Management Follow Up    Length of Stay (days): 4    Expected Discharge Date: 09/14/2022     Concerns to be Addressed: EMILY amd PET, cardiac, pulm, and ID following, monitor labs and electrolytes.   Patient plan of care discussed at interdisciplinary rounds: Yes    Anticipated Discharge Disposition:  Home with outpatient I     Anticipated Discharge Services:  TBD  Anticipated Discharge DME:  TBD    Patient/family educated on Medicare website which has current facility and service quality ratings:    Education Provided on the Discharge Plan:    Patient/Family in Agreement with the Plan:      Referrals Placed by CM/SW:  [facility]  Private pay costs discussed: Not applicable    Additional Information:  Chart review:  Pt lives alone in an apartment. Independent with ADLs uses a walker for mobility. Goal is home at discharge. Pt may need IV ABX at discharge, but pt does not have coverage for IV ABX under her insurance plan she would need to be self pay (infusion service note 9/12). RNCM to follow for medical progression, recommendations, and final discharge plan.        Cintia Restrepo RN

## 2022-09-12 NOTE — CONSULTS
[facility] Hematology and Oncology Consult Note    Patient: Maren Fofana  MRN: [id number]  Date of Service: Sep 8, 2022           Reason for consultation      Problem List Items Addressed This Visit        Respiratory    Hydropneumothorax    Pleural effusion       Immune    Severe sepsis (H)    Relevant Medications    diphenhydrAMINE (BENADRYL) 25 MG tablet       Urinary    Renal failure, unspecified chronicity       Other    Anasarca    Relevant Medications    diphenhydrAMINE (BENADRYL) 25 MG tablet    Other ascites            Assessment / number of problems addressed      1.  A very pleasant 72-year-old woman with acute on chronic thrombocytopenia.    2.  MSSA infection with endocarditis.  3.  History of alcohol use for a long time. This can definitely contribute to thrombocytopenia.  4.  History of right-sided congestive heart failure which can also cause some degree of splenic congestion and lead to thrombocytopenia.  5.  Mild renal insufficiency.  6.  Chronically sick.    Plan and medical decision making      1.  At this time it looks like that this might be secondary to sepsis.  Additional effects of possible cirrhosis/portal hypertension are contributory.  2.  Some contribution of splenic congestion.  3.  No primary bone marrow disorder appears to be the culprit.  4.  No clear-cut evidence of any TTP or any serious primary hematological disorder like acute myeloid leukemia, TTP, HUS, severe DIC.  She still could have a low-grade DIC going on.    Discussed with the daughter who was by her bedside.    Clinical/pathological stage      Cancer Staging  No matching staging information was found for the patient.    History of present illness      Ms. Maren Fofana is a 72 year old woman we have been asked to see regarding acute severe thrombocytopenia.  The patient was admitted on 8 September with low back pain after fall.  She was rapidly found to have sepsis.  This was felt to be secondary to initially  to empyema.  There was also concern for endocarditis.  She grew MRSA in her blood.  Evaluation is suggestive of endocarditis.  She is status post TAVR in 2021.  She also has knee repair/replacement done.  That might also be infected.    Her platelet count has dropped precipitously.  When she came in on the eighth her platelet count was 61,000.  Has dropped down to 29,000 in 4 days.  She does have mild anemia.  Her white count is normal.  Her thrombocytopenia however is not new.  Review of her lab work-up shows that she has had always slight thrombocytopenia going back  at least.  However her platelet count has never been this low.  Her anemia is also not a completely new finding.    Her work-up is shown that her LDH is slightly elevated.  Dr. Coleman is concerned that this might be TTP.  Her creatinine is slightly elevated.  However the creatinine has not really gotten much worse since her admission.  She is also has slight hyponatremia.  Potassium is also slightly low.  Bilirubin minimally elevated.    The patient is chronically sick.  She had PET scan done which confirmed the presence of infection in her heart valve according to Dr. Bentley.  She is on antibiotics.    Detailed review of systems      A 14 point review of systems was obtained.  Positive findings noted in the history.  Rest of the review of system is otherwise negative.      Past medical/surgical/social/family history        Past Medical History:   Diagnosis Date     Aortic stenosis      Collapsed lung      Fibromyalgia      GERD (gastroesophageal reflux disease)      Hypothyroid      Migraine      Peripheral vascular disease (H)      Reactive airway disease      Vertigo      Past Surgical History:   Procedure Laterality Date      SECTION Bilateral      CV CORONARY ANGIOGRAM N/A 2021    Procedure: Coronary Angiogram;  Surgeon: Sara Randall MD;  Location: [facility] CATH LAB CV     CV TRANSCATHETER AORTIC VALVE REPLACEMENT  "N/A 2021    Procedure: CV TRANSCATHETER AORTIC VALVE REPLACEMENT;  Surgeon: Geovani Kulkarni MD;  Location:  HEART CARDIAC CATH LAB     HEART CATH FEMORAL CANNULIZATION WITH OPEN STANDBY REPAIR AORTIC VALVE N/A 2021    Procedure: LEFT SUBCLAVIAN ACCESS FOR  TRANSCATHETER AORTIC VALVE REPLACEMENT.  CARDIOPULMONARY BYPASS STANDBY;  Surgeon: Jorge L Baeza MD;  Location:  HEART CARDIAC CATH LAB     HYSTERECTOMY       POSTERIOR LAMINECTOMY / DECOMPRESSION LUMBAR SPINE      L2-L3     SPINAL FUSION       TONSILLECTOMY & ADENOIDECTOMY       TOTAL KNEE ARTHROPLASTY Bilateral      Family History   Problem Relation Age of Onset     Pancreatic Cancer Mother      Pancreatic Cancer Father      Social History     Socioeconomic History     Marital status: Legally    Tobacco Use     Smoking status: Former Smoker     Packs/day: 0.50     Quit date: 2021     Years since quittin.4     Smokeless tobacco: Never Used   Substance and Sexual Activity     Alcohol use: Yes     Drug use: Never           Allergies      Allergies   Allergen Reactions     Bee Venom Anaphylaxis     Other Drug Allergy (See Comments) Hives     Ice Packs   Any kind of cold      Dilaudid [Hydromorphone] Other (See Comments)     Altered mental status, confusion, itching. Pt tolerates oxycodone.     Latex      hives         Physical exam        BP 96/66 (BP Location: Left arm)   Pulse 75   Temp 97.8  F (36.6  C) (Oral)   Resp 18   Ht 1.549 m (5' 1\")   Wt 67.7 kg (149 lb 4.8 oz)   SpO2 100%   BMI 28.21 kg/m        GENERAL: Alert and oriented to time place and person.  Looks somewhat sick.    HEAD: Atraumatic and normocephalic.    EYES: KATHY, EOMI. No pallor. No icterus.    Oral cavity: no mucosal lesion or tonsillar enlargement.    NECK: supple. JVP normal.No thyroid enlargement.    LYMPH NODES: No palpable, cervical, axillary or inguinal lymphadenopathy.    CHEST: clear to auscultation bilaterally. Symmetrical breath movements " bilaterally.    CVS: S1 and S2 are Regular rate and rhythm. No murmur or gallop or rub heard. No peripheral edema.    ABDOMEN: Soft. Not tender. Not distended. No palpable hepatomegaly or splenomegaly. No other mass palpable. Bowel sounds heard.    EXTREMITIES: Warm.  Minimal arthritic changes.    NEUROLOGICAL: Alert awake oriented.  Otherwise intact.  Cranial nerves appears to be preserved.    SKIN: no rash, or bruising or purpura.      Laboratory data      Recent Results (from the past 168 hour(s))   ECG 12-LEAD WITH MUSE ([facility])   Result Value Ref Range    Systolic Blood Pressure 165 mmHg    Diastolic Blood Pressure 88 mmHg    Ventricular Rate 99 BPM    Atrial Rate 99 BPM    AL Interval 138 ms    QRS Duration 84 ms     ms    QTc 441 ms    P Axis 96 degrees    R AXIS 89 degrees    T Axis 237 degrees    Interpretation ECG       Sinus rhythm with Premature atrial complexes  Low voltage QRS  Septal infarct (cited on or before 29-SEP-2021)  Abnormal ECG  When compared with ECG of 27-OCT-2021 16:09,  Premature atrial complexes are now Present  Inverted T waves have replaced nonspecific T wave abnormality in Lateral leads  Confirmed by SEE ED PROVIDER NOTE FOR, ECG INTERPRETATION ([id number]),  VALENTIN ALAN ([id number]) on 9/10/2022 11:09:49 AM     Comprehensive metabolic panel   Result Value Ref Range    Sodium 134 (L) 136 - 145 mmol/L    Potassium 3.9 3.5 - 5.0 mmol/L    Chloride 100 98 - 107 mmol/L    Carbon Dioxide (CO2) 21 (L) 22 - 31 mmol/L    Anion Gap 13 5 - 18 mmol/L    Urea Nitrogen 21 8 - 28 mg/dL    Creatinine 1.43 (H) 0.60 - 1.10 mg/dL    Calcium 9.0 8.5 - 10.5 mg/dL    Glucose 129 (H) 70 - 125 mg/dL    Alkaline Phosphatase 90 45 - 120 U/L    AST 26 0 - 40 U/L    ALT 9 0 - 45 U/L    Protein Total 7.7 6.0 - 8.0 g/dL    Albumin 3.8 3.5 - 5.0 g/dL    Bilirubin Total 1.3 (H) 0.0 - 1.0 mg/dL    GFR Estimate 38 (L) >60 mL/min/1.73m2   Troponin I   Result Value Ref Range    Troponin I 0.09 0.00 - 0.29 ng/mL    B-Type Natriuretic Peptide ([facility] Only)   Result Value Ref Range    BNP 1,107 (H) 0 - 133 pg/mL   Lactic acid whole blood   Result Value Ref Range    Lactic Acid 4.3 (HH) 0.7 - 2.0 mmol/L   Blood Culture Peripheral Blood    Specimen: Peripheral Blood   Result Value Ref Range    Culture Positive on the 1st day of incubation (A)     Culture Staphylococcus aureus MRSA (AA)    Blood Culture Peripheral Blood    Specimen: Peripheral Blood   Result Value Ref Range    Culture Positive on the 1st day of incubation (A)     Culture Staphylococcus aureus MRSA (AA)        Susceptibility    Staphylococcus aureus MRSA - [place]*     Oxacillin* >=4 Resistant ug/mL      * Oxacillin susceptible isolates are susceptible to cephalosporins (example: cefazolin and cephalexin) and beta lactam combination agents. Oxacillin resistant isolates are resistant to these agents.     Gentamicin <=0.5 Susceptible ug/mL     Erythromycin >=8 Resistant ug/mL     Clindamycin* <=0.25 Susceptible ug/mL      * This isolate DOES NOT demonstrate inducible clindamycin resistance in vitro. Clindamycin is susceptible and could be used when indicated, however, erythromycin is resistant and should not be used.     Linezolid 2 Susceptible ug/mL     Vancomycin 1 Susceptible ug/mL     Tetracycline <=1 Susceptible ug/mL     Trimethoprim/Sulfamethoxazole <=0.5/9.5 Susceptible ug/mL     * MRSA requires contact precautions.   CBC with platelets and differential   Result Value Ref Range    WBC Count 10.7 4.0 - 11.0 10e3/uL    RBC Count 3.22 (L) 3.80 - 5.20 10e6/uL    Hemoglobin 10.6 (L) 11.7 - 15.7 g/dL    Hematocrit 31.5 (L) 35.0 - 47.0 %    MCV 98 78 - 100 fL    MCH 32.9 26.5 - 33.0 pg    MCHC 33.7 31.5 - 36.5 g/dL    RDW 14.5 10.0 - 15.0 %    Platelet Count 61 (L) 150 - 450 10e3/uL    % Neutrophils 96 %    % Lymphocytes 2 %    % Monocytes 1 %    % Eosinophils 0 %    % Basophils 0 %    % Immature Granulocytes 1 %    NRBCs per 100 WBC 0 <1 /100    Absolute Neutrophils  10.4 (H) 1.6 - 8.3 10e3/uL    Absolute Lymphocytes 0.2 (L) 0.8 - 5.3 10e3/uL    Absolute Monocytes 0.1 0.0 - 1.3 10e3/uL    Absolute Eosinophils 0.0 0.0 - 0.7 10e3/uL    Absolute Basophils 0.0 0.0 - 0.2 10e3/uL    Absolute Immature Granulocytes 0.1 <=0.4 10e3/uL    Absolute NRBCs 0.0 10e3/uL   Verig[facility] GP Panel    Specimen: Peripheral Blood   Result Value Ref Range    Staphylococcus aureus Detected (A) Not Detected    Staphylococcus epidermidis Not Detected Not Detected    Staphylococcus lugdunensis Not Detected Not Detected    Enterococcus faecalis Not Detected Not Detected    Enterococcus faecium Not Detected Not Detected    Streptococcus species Not Detected Not Detected    Streptococcus agalactiae Not Detected Not Detected    Streptococcus anginosus group Not Detected Not Detected    Streptococcus pneumoniae Not Detected Not Detected    Streptococcus pyogenes Not Detected Not Detected    Listeria species Not Detected Not Detected   Symptomatic; Unknown Influenza A/B & SARS-CoV2 (COVID-19) Virus PCR Multiplex Nasopharyngeal    Specimen: Nasopharyngeal; Swab   Result Value Ref Range    Influenza A PCR Negative Negative    Influenza B PCR Negative Negative    RSV PCR Negative Negative    SARS CoV2 PCR Negative Negative   Extra Blue Top Tube   Result Value Ref Range    Hold Specimen JIC    Extra Red Top Tube   Result Value Ref Range    Hold Specimen JIC    Lactate Dehydrogenase   Result Value Ref Range    Lactate Dehydrogenase 418 (H) 125 - 220 U/L   Protein total   Result Value Ref Range    Protein Total 7.3 6.0 - 8.0 g/dL   Procalcitonin   Result Value Ref Range    Procalcitonin 27.23 (H) 0.00 - 0.49 ng/mL   UA with Microscopic reflex to Culture    Specimen: Urine, Clean Catch   Result Value Ref Range    Color Urine Yellow Colorless, Straw, Light Yellow, Yellow    Appearance Urine Turbid (A) Clear    Glucose Urine Negative Negative mg/dL    Bilirubin Urine Negative Negative    Ketones Urine Trace (A) Negative  mg/dL    Specific Gravity Urine 1.023 1.001 - 1.030    Blood Urine >1.0 mg/dL (A) Negative    pH Urine 5.5 5.0 - 7.0    Protein Albumin Urine 200  (A) Negative mg/dL    Urobilinogen Urine <2.0 <2.0 mg/dL    Nitrite Urine Negative Negative    Leukocyte Esterase Urine Negative Negative    Bacteria Urine Few (A) None Seen /HPF    Mucus Urine Present (A) None Seen /LPF    Amorphous Crystals Urine Few (A) None Seen /HPF    RBC Urine <1 <=2 /HPF    WBC Urine 4 <=5 /HPF    Squamous Epithelials Urine 1 <=1 /HPF    Hyaline Casts Urine 4 (H) <=2 /LPF   Pleural fluid Aerobic Bacterial Culture Routine with Gram Stain    Specimen: Pleural Cavity, Right; Pleural fluid   Result Value Ref Range    Culture No growth after 3 days     Gram Stain Result No organisms seen     Gram Stain Result 2+ WBC seen    Glucose fluid   Result Value Ref Range    Glucose Fluid Source Pleural Cavity, Right     Glucose fluid 117 mg/dL   Lactate dehydrogenase fluid   Result Value Ref Range    LD Fluid Source Pleural Cavity, Right     Lactate dehydrogenase fluid 100 U/L   Protein fluid   Result Value Ref Range    Protein Fluid Source Pleural Cavity, Right     Protein Total Fluid 3.0 g/dL   Lactic acid whole blood   Result Value Ref Range    Lactic Acid 2.9 (H) 0.7 - 2.0 mmol/L   Alcohol ethyl   Result Value Ref Range    Alcohol, Blood <10 None detected mg/dL   Phosphorus   Result Value Ref Range    Phosphorus 3.4 2.5 - 4.5 mg/dL   Ammonia   Result Value Ref Range    Ammonia 32 11 - 35 umol/L   Magnesium   Result Value Ref Range    Magnesium 1.7 (L) 1.8 - 2.6 mg/dL   Comprehensive metabolic panel   Result Value Ref Range    Sodium 132 (L) 136 - 145 mmol/L    Potassium 3.7 3.5 - 5.0 mmol/L    Chloride 101 98 - 107 mmol/L    Carbon Dioxide (CO2) 22 22 - 31 mmol/L    Anion Gap 9 5 - 18 mmol/L    Urea Nitrogen 25 8 - 28 mg/dL    Creatinine 1.12 (H) 0.60 - 1.10 mg/dL    Calcium 8.1 (L) 8.5 - 10.5 mg/dL    Glucose 114 70 - 125 mg/dL    Alkaline Phosphatase 68  45 - 120 U/L    AST 53 (H) 0 - 40 U/L    ALT 12 0 - 45 U/L    Protein Total 6.6 6.0 - 8.0 g/dL    Albumin 3.2 (L) 3.5 - 5.0 g/dL    Bilirubin Total 0.8 0.0 - 1.0 mg/dL    GFR Estimate 51 (L) >60 mL/min/1.73m2   Lactic acid whole blood   Result Value Ref Range    Lactic Acid 1.6 0.7 - 2.0 mmol/L   CBC with platelets and differential   Result Value Ref Range    WBC Count 11.1 (H) 4.0 - 11.0 10e3/uL    RBC Count 3.24 (L) 3.80 - 5.20 10e6/uL    Hemoglobin 10.6 (L) 11.7 - 15.7 g/dL    Hematocrit 31.9 (L) 35.0 - 47.0 %    MCV 99 78 - 100 fL    MCH 32.7 26.5 - 33.0 pg    MCHC 33.2 31.5 - 36.5 g/dL    RDW 14.8 10.0 - 15.0 %    Platelet Count 58 (L) 150 - 450 10e3/uL   Manual Differential   Result Value Ref Range    % Neutrophils 89 %    % Lymphocytes 5 %    % Monocytes 3 %    % Eosinophils 0 %    % Basophils 0 %    % Metamyelocytes 2 %    % Myelocytes 1 %    Absolute Neutrophils 9.9 (H) 1.6 - 8.3 10e3/uL    Absolute Lymphocytes 0.6 (L) 0.8 - 5.3 10e3/uL    Absolute Monocytes 0.3 0.0 - 1.3 10e3/uL    Absolute Eosinophils 0.0 0.0 - 0.7 10e3/uL    Absolute Basophils 0.0 0.0 - 0.2 10e3/uL    Absolute Metamyelocytes 0.2 (H) <=0.0 10e3/uL    Absolute Myelocytes 0.1 (H) <=0.0 10e3/uL    RBC Morphology Confirmed RBC Indices     Platelet Assessment  Automated Count Confirmed. Platelet morphology is normal.     Automated Count Confirmed. Platelet morphology is normal.   CK total   Result Value Ref Range    CK 2,092 (HH) 30 - 190 U/L   TSH with free T4 reflex   Result Value Ref Range    TSH 3.92 0.30 - 5.00 uIU/mL   Troponin I   Result Value Ref Range    Troponin I 0.11 0.00 - 0.29 ng/mL   Blood Culture Peripheral Blood    Specimen: Peripheral Blood   Result Value Ref Range    Culture Positive on the 1st day of incubation (A)     Culture Staphylococcus aureus MRSA (AA)    Echocardiogram Complete   Result Value Ref Range    LVEF  60-65%    Troponin I   Result Value Ref Range    Troponin I 0.11 0.00 - 0.29 ng/mL   Blood Culture  Peripheral Blood    Specimen: Peripheral Blood   Result Value Ref Range    Culture Positive on the 1st day of incubation (A)     Culture Staphylococcus aureus MRSA (AA)    Comprehensive metabolic panel   Result Value Ref Range    Sodium 132 (L) 136 - 145 mmol/L    Potassium 4.1 3.5 - 5.0 mmol/L    Chloride 100 98 - 107 mmol/L    Carbon Dioxide (CO2) 24 22 - 31 mmol/L    Anion Gap 8 5 - 18 mmol/L    Urea Nitrogen 34 (H) 8 - 28 mg/dL    Creatinine 1.37 (H) 0.60 - 1.10 mg/dL    Calcium 8.4 (L) 8.5 - 10.5 mg/dL    Glucose 127 (H) 70 - 125 mg/dL    Alkaline Phosphatase 61 45 - 120 U/L    AST 60 (H) 0 - 40 U/L    ALT 14 0 - 45 U/L    Protein Total 6.2 6.0 - 8.0 g/dL    Albumin 2.9 (L) 3.5 - 5.0 g/dL    Bilirubin Total 0.7 0.0 - 1.0 mg/dL    GFR Estimate 40 (L) >60 mL/min/1.73m2   CK total   Result Value Ref Range     (HH) 30 - 190 U/L   Blood Culture Peripheral Blood    Specimen: Peripheral Blood   Result Value Ref Range    Culture Positive on the 1st day of incubation (A)     Culture Staphylococcus aureus MRSA (AA)    CBC with platelets and differential   Result Value Ref Range    WBC Count 6.6 4.0 - 11.0 10e3/uL    RBC Count 3.41 (L) 3.80 - 5.20 10e6/uL    Hemoglobin 11.0 (L) 11.7 - 15.7 g/dL    Hematocrit 33.0 (L) 35.0 - 47.0 %    MCV 97 78 - 100 fL    MCH 32.3 26.5 - 33.0 pg    MCHC 33.3 31.5 - 36.5 g/dL    RDW 14.6 10.0 - 15.0 %    Platelet Count 40 (LL) 150 - 450 10e3/uL   Manual Differential   Result Value Ref Range    % Neutrophils 88 %    % Lymphocytes 6 %    % Monocytes 4 %    % Eosinophils 0 %    % Basophils 0 %    % Metamyelocytes 2 %    Absolute Neutrophils 5.8 1.6 - 8.3 10e3/uL    Absolute Lymphocytes 0.4 (L) 0.8 - 5.3 10e3/uL    Absolute Monocytes 0.3 0.0 - 1.3 10e3/uL    Absolute Eosinophils 0.0 0.0 - 0.7 10e3/uL    Absolute Basophils 0.0 0.0 - 0.2 10e3/uL    Absolute Metamyelocytes 0.1 (H) <=0.0 10e3/uL    RBC Morphology Confirmed RBC Indices     Platelet Assessment  Automated Count Confirmed.  Platelet morphology is normal.     Automated Count Confirmed. Platelet morphology is normal.   Magnesium   Result Value Ref Range    Magnesium 2.4 1.8 - 2.6 mg/dL   Lactate Dehydrogenase   Result Value Ref Range    Lactate Dehydrogenase 340 (H) 0 - 250 U/L   Vitamin B12   Result Value Ref Range    Vitamin B12 636 232 - 1,245 pg/mL   Direct antiglobulin test, adult   Result Value Ref Range    FERNANDA Anti-IgG,-C3d Positive 1+     SPECIMEN EXPIRATION DATE [id number]    Direct Antiglobulin Test, IgG   Result Value Ref Range    SPECIMEN EXPIRATION DATE [id number]     FERNANDA Anti-IgG Positive 2+    Haptoglobin   Result Value Ref Range    Haptoglobin 63 32 - 197 mg/dL   Reticulocyte count   Result Value Ref Range    % Reticulocyte 1.5 0.8 - 2.7 %    Absolute Reticulocyte 0.049 0.010 - 0.110 10e6/uL   Folate   Result Value Ref Range    Folic Acid 17.6 4.6 - 34.8 ng/mL   INR   Result Value Ref Range    INR 1.33 (H) 0.85 - 1.15   Partial thromboplastin time   Result Value Ref Range    aPTT 35 22 - 38 Seconds   Fibrinogen activity   Result Value Ref Range    Fibrinogen Activity 375 170 - 490 mg/dL   Extra Green Top (Lithium Heparin) Tube   Result Value Ref Range    Hold Specimen JIC    Extra Red Top Tube   Result Value Ref Range    Hold Specimen JIC    Blood Culture Arm, Right    Specimen: Arm, Right; Blood   Result Value Ref Range    Culture Positive on the 1st day of incubation (A)     Culture Gram positive cocci in clusters (AA)    Vancomycin level   Result Value Ref Range    Vancomycin 19.1   mg/L   Comprehensive metabolic panel   Result Value Ref Range    Sodium 132 (L) 136 - 145 mmol/L    Potassium 3.5 3.5 - 5.0 mmol/L    Chloride 99 98 - 107 mmol/L    Carbon Dioxide (CO2) 25 22 - 31 mmol/L    Anion Gap 8 5 - 18 mmol/L    Urea Nitrogen 38 (H) 8 - 28 mg/dL    Creatinine 1.48 (H) 0.60 - 1.10 mg/dL    Calcium 8.3 (L) 8.5 - 10.5 mg/dL    Glucose 118 70 - 125 mg/dL    Alkaline Phosphatase 69 45 - 120 U/L    AST 59 (H) 0 -  40 U/L    ALT 15 0 - 45 U/L    Protein Total 6.3 6.0 - 8.0 g/dL    Albumin 3.0 (L) 3.5 - 5.0 g/dL    Bilirubin Total 0.8 0.0 - 1.0 mg/dL    GFR Estimate 37 (L) >60 mL/min/1.73m2   CK total   Result Value Ref Range     (H) 30 - 190 U/L   CBC with platelets and differential   Result Value Ref Range    WBC Count 6.2 4.0 - 11.0 10e3/uL    RBC Count 3.05 (L) 3.80 - 5.20 10e6/uL    Hemoglobin 9.8 (L) 11.7 - 15.7 g/dL    Hematocrit 29.5 (L) 35.0 - 47.0 %    MCV 97 78 - 100 fL    MCH 32.1 26.5 - 33.0 pg    MCHC 33.2 31.5 - 36.5 g/dL    RDW 14.6 10.0 - 15.0 %    Platelet Count 30 (LL) 150 - 450 10e3/uL   Manual Differential   Result Value Ref Range    % Neutrophils 83 %    % Lymphocytes 11 %    % Monocytes 3 %    % Eosinophils 0 %    % Basophils 2 %    % Metamyelocytes 1 %    Absolute Neutrophils 5.1 1.6 - 8.3 10e3/uL    Absolute Lymphocytes 0.7 (L) 0.8 - 5.3 10e3/uL    Absolute Monocytes 0.2 0.0 - 1.3 10e3/uL    Absolute Eosinophils 0.0 0.0 - 0.7 10e3/uL    Absolute Basophils 0.1 0.0 - 0.2 10e3/uL    Absolute Metamyelocytes 0.1 (H) <=0.0 10e3/uL    RBC Morphology Confirmed RBC Indices     Platelet Assessment  Automated Count Confirmed. Platelet morphology is normal.     Automated Count Confirmed. Platelet morphology is normal.    [place] Cells Moderate (A) None Seen    Polychromasia Slight (A) None Seen   Bld morphology pathology review   Result Value Ref Range    Final Diagnosis       PERIPHERAL BLOOD SMEAR MORPHOLOGY:  -MILD NORMOCHROMIC NORMOCYTIC ANEMIA  -ABSOLUTE LYMPHOCYTOPENIA  -MODERATE THROMBOCYTOPENIA      Comment       There is a mild normochromic normocytic anemia which may be secondary to anemia chronic disease, renal insufficiency or blood loss.  The moderate thrombocytopenia may be secondary to bone marrow suppression by drugs or infection.      Clinical Information       Anemia, thrombocytopenia      Peripheral Smear       Erythrocytes: The red cells are mildly decreased in number and are normochromic  normocytic.  There are frequent acanthocytes and few red cell fragments.  Schistocytes are not seen.  Polychromasia is not increased.    Leukocytes: The white cells are overall normal in number with an absolute lymphocytopenia.  The neutrophils show toxic granulation and cytoplasmic vacuolization.    Platelets: Platelets are moderately decreased in number and overall normal in morphology.      Performing Labs       The technical component of this testing was completed at the [facility] and [facility] laboratories     Reticulocyte count   Result Value Ref Range    % Reticulocyte 1.5 0.8 - 2.7 %    Absolute Reticulocyte 0.046 0.010 - 0.110 10e6/uL   Protein  random urine   Result Value Ref Range    Total Protein Urine mg/dL 24.4 mg/dL    Total Protein UR MG/MG CR 0.36 mg/mg Cr    Creatinine Urine mg/dL 68 mg/dL   CBC with platelets   Result Value Ref Range    WBC Count 6.7 4.0 - 11.0 10e3/uL    RBC Count 3.01 (L) 3.80 - 5.20 10e6/uL    Hemoglobin 9.7 (L) 11.7 - 15.7 g/dL    Hematocrit 28.7 (L) 35.0 - 47.0 %    MCV 95 78 - 100 fL    MCH 32.2 26.5 - 33.0 pg    MCHC 33.8 31.5 - 36.5 g/dL    RDW 14.6 10.0 - 15.0 %    Platelet Count 29 (LL) 150 - 450 10e3/uL   Comprehensive metabolic panel   Result Value Ref Range    Sodium 135 (L) 136 - 145 mmol/L    Potassium 3.1 (L) 3.5 - 5.0 mmol/L    Chloride 101 98 - 107 mmol/L    Carbon Dioxide (CO2) 25 22 - 31 mmol/L    Anion Gap 9 5 - 18 mmol/L    Urea Nitrogen 31 (H) 8 - 28 mg/dL    Creatinine 1.15 (H) 0.60 - 1.10 mg/dL    Calcium 8.3 (L) 8.5 - 10.5 mg/dL    Glucose 98 70 - 125 mg/dL    Alkaline Phosphatase 113 45 - 120 U/L    AST 50 (H) 0 - 40 U/L    ALT 16 0 - 45 U/L    Protein Total 6.3 6.0 - 8.0 g/dL    Albumin 2.8 (L) 3.5 - 5.0 g/dL    Bilirubin Total 1.2 (H) 0.0 - 1.0 mg/dL    GFR Estimate 50 (L) >60 mL/min/1.73m2   Extra Red Top Tube   Result Value Ref Range    Hold Specimen JIC    Magnesium   Result Value Ref Range     Magnesium 1.8 1.8 - 2.6 mg/dL   CK total   Result Value Ref Range     30 - 190 U/L   Reticulocyte count   Result Value Ref Range    % Reticulocyte 1.5 0.8 - 2.7 %    Absolute Reticulocyte 0.043 0.010 - 0.110 10e6/uL   Glucose by meter   Result Value Ref Range    GLUCOSE BY METER POCT 99 70 - 99 mg/dL   Potassium   Result Value Ref Range    Potassium 3.4 (L) 3.5 - 5.0 mmol/L   Ammonia   Result Value Ref Range    Ammonia 14 11 - 35 umol/L   Lactate Dehydrogenase   Result Value Ref Range    Lactate Dehydrogenase 271 (H) 125 - 220 U/L   Extra Red Top Tube   Result Value Ref Range    Hold Specimen JIC    Glucose by meter   Result Value Ref Range    GLUCOSE BY METER POCT 77 70 - 99 mg/dL   Blood gas arterial   Result Value Ref Range    pH Arterial 7.34 (L) 7.37 - 7.44    pCO2 Arterial 54 (H) 35 - 45 mm Hg    pO2 Arterial 28 (LL) 75 - 85 mm Hg    Bicarbonate Arterial 25 23 - 29 mmol/L    O2 Sat, Arterial 46.0 (LL) 95.0 - 96.0 %    Oxyhemoglobin 45.2 (LL) 95.0 - 96.0 %    Base Excess/Deficit (+/-) 2.9   mmol/L    Flow 5.0 LPM    Sample Stabilized Temperature 37.0 degrees C       Imaging results        CT Chest/Abdomen/Pelvis w Contrast    Result Date: 9/12/2022  EXAM: PET ONCOLOGY WHOLE BODY, CT SOFT TISSUE NECK W CONTRAST, CT CHEST/ABDOMEN/PELVIS W CONTRAST LOCATION: [facility] DATE/TIME: 9/12/2022 11:30 AM INDICATION: Other nonspecific abnormal finding of lung field. Abnormal findings on diagnostic imaging of other abdominal regions, including retroperitoneum. MRSA bacteremia. Fever of unknown origin. History of TAVR, AAA , bilateral TKAs. Assess for nidus  of infection. Initial treatment strategy. COMPARISON: CT abdomen pelvis 09/08/2022, numerous recent chest radiographs most recent 09/12/2022 reviewed. CONTRAST: 74 mL Isovue-370 TECHNIQUE: Serum glucose level 99 mg/dL. One hour post intravenous administration of 10.1 mCi F-18 FDG, PET imaging was performed from the skull vertex to below  knees, utilizing attenuation correction with concurrent axial CT and PET/CT image fusion. Separate diagnostic CT of the neck, chest, abdomen, and pelvis was performed. Dose reduction techniques were used. PET/CT FINDINGS: Moderate severity patchy irregular ground glass and consolidative opacities in the right lung, mostly in the right lower lobe although also involving the right upper lobe, associated with varying degrees of heterogeneous mild to moderate  uptake, likely infectious/inflammatory. Mild left basilar atelectasis with low-level inflammatory uptake. Moderate uptake about TAVR and moderate uptake about dense mitral annulus calcification, suspicious for underlying endocarditis. Focus of moderate uptake involves the lumbosacral junction, likely inflammatory, without other findings to suggest osteomyelitis discitis. Heterogeneous uptake about both knees, marked on the right and moderate on the left. Small right knee joint effusion. Abdominal aortic aneurysm status post aortobiiliac stent graft placement. No increased uptake about the stent graft to suggest associated infection. No FDG avid adenopathy. No focal organized fluid collection or abscess. CT FINDINGS: Moderate senescent intracranial changes. Large right pleural effusion. Small right pneumothorax. Moderate size left pleural effusion. Enlarged central pulmonary arteries suggesting pulmonary arterial hypertension. Mild soft tissue emphysema right chest wall. Diffuse heterogeneity of the liver with some surface nodularity suggesting underlying fibrosis/cirrhosis. Small volume ascites. Tiny nonobstructing bilateral intrarenal stones. Hysterectomy. Mild colonic diverticulosis. Moderate anasarca.     IMPRESSION: 1. Patchy opacities throughout much of the right lung suspicious for infectious pneumonia, most prominently in the right lower lobe. 2. Intracardiac uptake about a TAVR and dense mitral annulus calcification suspicious for underlying endocarditis.  3. Heterogeneous uptake about both knees, more so on the right, indeterminate for underlying infection. Small right knee effusion present, amenable to aspiration.    XR Chest 2 Views    Result Date: 9/9/2022  EXAM: XR CHEST 2 VIEWS LOCATION: [facility] DATE/TIME: 9/9/2022 11:17 AM INDICATION: f u chest tube, PTX COMPARISON: 09/08/2022.     IMPRESSION: Right chest tube over the right lung apex in stable position. No recurrent pneumothorax. Small right pleural effusion with some infiltrates or atelectasis and some mild atelectasis left lung base all appear stable. Aortic valve prosthesis.    XR Chest 1 View    Result Date: 9/12/2022  EXAM: XR CHEST 1 VIEW LOCATION: [facility] DATE/TIME: 9/12/2022 6:07 AM INDICATION: Follow up pneumothorax COMPARISON: 11/2022     IMPRESSION: The cardiac silhouette is unchanged in size and contour. There is a small right-sided pneumothorax, similar to prior exam. Increasing opacification of the right lung base is noted, likely reflecting combination of right pleural effusion and superimposed atelectasis and/or airspace disease. A small left-sided pleural effusion has developed in the interval.    XR Chest 1 View    Result Date: 9/11/2022  EXAM: XR CHEST 1 VIEW LOCATION: [facility] DATE/TIME: 9/11/2022 5:42 AM INDICATION: removal of chest tube, follow up residual pneumothorax COMPARISON: X-ray from earlier today     IMPRESSION: Stable right pneumothorax remains. Remainder of the exam is unchanged.    XR Chest 1 View    Result Date: 9/10/2022  EXAM: XR CHEST 1 VIEW LOCATION: [facility] DATE/TIME: 9/10/2022 8:13 PM INDICATION: S p removal of chest tube, follow up residual pneumothorax COMPARISON: 9/10/2022 1658 hours     IMPRESSION: Interval removal of right pleural catheter. There has been development of a right pneumothorax measuring 1.2 cm laterally and 1.5 cm at the right apex. Small  right pleural effusion. Increasing bibasilar atelectasis. Heart size normal. TAVR. Atherosclerotic change of the aorta. Surgical clips in the left axilla. Degenerative change in the spine and both shoulders. Report was discussed with the patient's nurse Narda 9/10/2022 2130 hours.    XR Chest 1 View    Result Date: 9/10/2022  EXAM: XR CHEST 1 VIEW LOCATION: [facility] DATE/TIME: 9/10/2022 5:25 PM INDICATION: Increase air leak COMPARISON: 9/10/2022 1400 hours     IMPRESSION: The right pleural catheter has been retracted from the apex and now resides with tip overlying the anterior second rib. There is a trace pneumothorax measuring less than 1 mm. Increased subcutaneous air in the right chest wall. Basilar predominant interstitial and airspace opacities right greater than left are similar. Small right pleural effusion slightly increased. Heart size is normal. TAVR. Atherosclerotic change of the aorta. Surgical clips in the left axilla. Degenerative change in the right shoulder.    Echocardiogram Complete    Result Date: 2022  [id number]^CAMILLE^[place] [address]  Name: LEELEE MARIN MRN: [id number] : 1950 Study Date: 2022 02:42 PM Age: 72 yrs Gender: Female Patient Location: [facility] Reason For Study: Abnormal Heart Sound Ordering Physician: PAT OBRIEN Performed By: SANDI  BSA: 1.7 m2 Height: 61 in Weight: 148 lb HR: 81 BP: 120/66 mmHg ______________________________________________________________________________ Procedure Complete Portable Echo Adult. Compared to the prior study dated 10/26/2021, there have been no changes. ______________________________________________________________________________ Interpretation Summary  1.Left ventricular size, wall motion and function are normal. The ejection fraction is 60-65%. 2.The right ventricle is normal size. Mildly decreased right ventricular  systolic function 3.The left atrium is moderate to severely dilated. 4.Thickened mitral leaflets without stenosis. There is mild to moderate (1-2+) mitral regurgitation. 5.There is a 23 ISABELLA 3 bioprosthetic valve present in aortic valve position. At heart rate of 99 bpm peak velocity is 2.3 m/s, mean gradient is 9 mmHg, dimensionless index 0.48. 6.IVC diameter >2.1 cm collapsing <50% with sniff suggests a high RA pressure estimated at 15 mmHg or greater. Compared to the prior study dated 10/26/2021, the Tricuspid regurgitation is really not sampled well, left atrium is further enlarged, no essential change for the valve with prior peak velocity of 2.8 m/s and mean gradient of 16 mmHg. ______________________________________________________________________________ I      WMSI = 1.00     % Normal = 100  X - Cannot   0 -                      (2) - Mildly 2 -          Segments  Size Interpret    Hyperkinetic 1 - Normal  Hypokinetic  Hypokinetic  1-2     small                                                    7 -          3-5    moderate 3 - Akinetic 4 -          5 -         6 - Akinetic Dyskinetic   6-14    large              Dyskinetic   Aneurysmal  w/scar       w/scar       15-16   diffuse  Left Ventricle Left ventricular size, wall motion and function are normal. The ejection fraction is 60-65%. There is normal left ventricular wall thickness. Left ventricular diastolic function is normal. No regional wall motion abnormalities noted.  Right Ventricle The right ventricle is normal size. Mildly decreased right ventricular systolic function. TAPSE is normal, which is consistent with normal right ventricular systolic function.  Atria The left atrium is moderate to severely dilated. Right atrial size is normal. There is no color Doppler evidence of an atrial shunt.  Mitral Valve Thickened mitral valve anterior leaflet. Thickened mitral valve posterior leaflet. There is mild to moderate (1-2+) mitral regurgitation. There  is no mitral valve stenosis.  Tricuspid Valve The tricuspid valve is not well visualized, but is grossly normal. Right ventricle systolic pressure estimate normal. There is trace to mild tricuspid regurgitation. There is no tricuspid stenosis.  Aortic Valve There is a ISABELLA 3 bioprosthetic valve present in aortic valve position. 23 At heart rate of 99 bpm peak velocity is 2.3 m/s, mean gradient is 9 mmHg, dimensionless index 0.48.  Pulmonic Valve The pulmonic valve is not well seen, but is grossly normal. This degree of valvular regurgitation is within normal limits. There is no pulmonic valvular stenosis.  Vessels The aorta root is normal. Normal size ascending aorta. IVC diameter >2.1 cm collapsing <50% with sniff suggests a high RA pressure estimated at 15 mmHg or greater.  Pericardium There is no pericardial effusion.  Rhythm Sinus rhythm was noted.  ______________________________________________________________________________ MMode/2D Measurements & Calculations IVSd: 0.74 cm LVIDd: 4.7 cm LVIDs: 3.3 cm LVPWd: 0.75 cm FS: 28.2 %  LV mass(C)d: 109.3 grams LV mass(C)dI: 65.7 grams/m2 Ao root diam: 2.5 cm LA dimension: 5.8 cm LA/Ao: 2.3 LVOT diam: 1.6 cm LVOT area: 2.0 cm2 LA Volume Indexed (AL/bp): 86.6 ml/m2 RWT: 0.32  Time Measurements Aortic HR: 106.0 BPM MM HR: 91.0 BPM  Doppler Measurements & Calculations MV E max thomas: 102.3 cm/sec MV A max thomas: 108.5 cm/sec MV E/A: 0.94 MV dec slope: 322.0 cm/sec2 MV dec time: 0.32 sec Ao V2 max: 204.0 cm/sec Ao max P.0 mmHg Ao V2 mean: 165.0 cm/sec Ao mean P.0 mmHg Ao V2 VTI: 44.8 cm KP(I,D): 0.80 cm2 KP(V,D): 0.96 cm2 LV V1 max PG: 3.8 mmHg LV V1 max: 97.6 cm/sec LV V1 VTI: 17.8 cm CO(LVOT): 3.8 l/min CI(LVOT): 2.3 l/min/m2 SV(LVOT): 35.7 ml SI(LVOT): 21.5 ml/m2 TR max thomas: 244.0 cm/sec TR max P.8 mmHg AV Thomas Ratio (DI): 0.48 KP Index (cm2/m2): 0.48 E/E': 16.5 E/E' av.0 Lateral E/e': 20.0 Medial E/e': 13.9 Peak E' Thomas: 6.2 cm/sec   ______________________________________________________________________________ Report approved by: Yonatan Arroyo 09/09/2022 04:29 PM       US Thoracentesis    Result Date: 9/8/2022  EXAM: 1. RIGHT  THORACENTESIS 2. ULTRASOUND GUIDANCE LOCATION: [facility] DATE/TIME: 9/8/2022 6:46 PM INDICATION: Pleural effusion. PROCEDURE: Informed consent obtained. Time out performed. The chest was prepped and draped in sterile fashion. 5  mL of 1 % lidocaine was infused into the local soft tissues. Under direct ultrasound guidance, a 5 Vincentian catheter system was placed into the pleural effusion. 1.35  liters of clear yellow and red  fluid were removed and sent to lab, if requested. Patient tolerated procedure well. Ultrasound imaging was obtained and placed in the patient's permanent medical record.     IMPRESSION: Status post right  ultrasound-guided thoracentesis. Reference CPT Code: 30420    XR Chest Port 1 View    Result Date: 9/11/2022  EXAM: XR CHEST PORT 1 VIEW LOCATION: [facility] DATE/TIME: 9/11/2022 12:07 AM INDICATION: right pneumothorax, eval for interval change COMPARISON: Prior study dated 9/10/2022 at 1954 hours     IMPRESSION: The cardiac mediastinal silhouette is unchanged in size and contour. The aortic valve prosthesis is again noted. There is mild central pulmonary venous congestion patchy airspace opacity seen in the right lung base, similar to prior exam. There is a small right-sided pneumothorax, slightly decreased in size from prior study. Subcutaneous emphysema seen overlying the right chest wall, unchanged from prior exam.    XR Chest Port 1 View    Result Date: 9/10/2022  EXAM: XR CHEST PORT 1 VIEW LOCATION: [facility] DATE/TIME: 9/10/2022 2:21 PM INDICATION: follow up PTX for continued resolution COMPARISON: 09/09/2022     IMPRESSION: The heart is normal in size. Improved right pleural effusion. Right lower lobe opacities,  most likely representing infiltrate. No pneumothorax. Right-sided chest tube.    XR Chest Port 1 View    Result Date: 9/9/2022  EXAM: XR CHEST PORT 1 VIEW LOCATION: [facility] DATE/TIME: 9/9/2022 12:00 AM INDICATION: s p chest tube follow-up pneumothorax COMPARISON: 09/08/2022     IMPRESSION: New right chest tube terminates at the apex medially. Right pneumothorax has resolved. Persistent opacity at the right base may represent an combination of atelectasis and edema. Normal heart size. Prior endovascular aortic valve replacement.  Left lung clear. Surgical clips cluster over the left shoulder.    XR Chest Port 1 View    Result Date: 9/8/2022  EXAM: XR CHEST PORT 1 VIEW LOCATION: [facility] DATE/TIME: 9/8/2022 9:44 PM INDICATION: ptx COMPARISON: 09/08/2022     IMPRESSION: Persistent right pneumothorax measuring 8 mm at the apex (stable) and 1.7 cm at the lateral base (previously 1.4 cm). Small right pleural effusion. Increased airspace infiltrate in the right lower lobe. Surgical clips in the left axilla. Calcified aortic arch. Prior aortic valve repair.    XR Chest Port 1 View    Result Date: 9/8/2022  EXAM: XR CHEST PORT 1 VIEW LOCATION: [facility] DATE/TIME: 9/8/2022 8:18 PM INDICATION: s p thoracentesis, ? ptx COMPARISON: Same date abdominal CT and chest radiograph.     IMPRESSION: Stable cardiomediastinal silhouette with prior aortic valve replacement. Small right hydropneumothorax, similar volume to same day CT given differences in modalities, without evidence of tension. Possible reexpansion edema in the right lower lobe. Left lung is clear. No acute bony abnormality.    XR Chest Port 1 View    Result Date: 9/8/2022  EXAM: XR CHEST PORT 1 VIEW LOCATION: [facility] DATE/TIME: 9/8/2022 4:30 PM INDICATION: fever, cough COMPARISON: 12/3/2021     IMPRESSION: Small to moderate right effusion, slightly increased.  Right lower lobe infiltrate or atelectasis.  No pneumothorax.  The left lung is clear.  The heart is normal in size.    PET Oncology Whole Body    Result Date: 9/12/2022  EXAM: PET ONCOLOGY WHOLE BODY, CT SOFT TISSUE NECK W CONTRAST, CT CHEST/ABDOMEN/PELVIS W CONTRAST LOCATION: [facility] DATE/TIME: 9/12/2022 11:30 AM INDICATION: Other nonspecific abnormal finding of lung field. Abnormal findings on diagnostic imaging of other abdominal regions, including retroperitoneum. MRSA bacteremia. Fever of unknown origin. History of TAVR, AAA , bilateral TKAs. Assess for nidus  of infection. Initial treatment strategy. COMPARISON: CT abdomen pelvis 09/08/2022, numerous recent chest radiographs most recent 09/12/2022 reviewed. CONTRAST: 74 mL Isovue-370 TECHNIQUE: Serum glucose level 99 mg/dL. One hour post intravenous administration of 10.1 mCi F-18 FDG, PET imaging was performed from the skull vertex to below knees, utilizing attenuation correction with concurrent axial CT and PET/CT image fusion. Separate diagnostic CT of the neck, chest, abdomen, and pelvis was performed. Dose reduction techniques were used. PET/CT FINDINGS: Moderate severity patchy irregular ground glass and consolidative opacities in the right lung, mostly in the right lower lobe although also involving the right upper lobe, associated with varying degrees of heterogeneous mild to moderate  uptake, likely infectious/inflammatory. Mild left basilar atelectasis with low-level inflammatory uptake. Moderate uptake about TAVR and moderate uptake about dense mitral annulus calcification, suspicious for underlying endocarditis. Focus of moderate uptake involves the lumbosacral junction, likely inflammatory, without other findings to suggest osteomyelitis discitis. Heterogeneous uptake about both knees, marked on the right and moderate on the left. Small right knee joint effusion. Abdominal aortic aneurysm status post aortobiiliac stent  graft placement. No increased uptake about the stent graft to suggest associated infection. No FDG avid adenopathy. No focal organized fluid collection or abscess. CT FINDINGS: Moderate senescent intracranial changes. Large right pleural effusion. Small right pneumothorax. Moderate size left pleural effusion. Enlarged central pulmonary arteries suggesting pulmonary arterial hypertension. Mild soft tissue emphysema right chest wall. Diffuse heterogeneity of the liver with some surface nodularity suggesting underlying fibrosis/cirrhosis. Small volume ascites. Tiny nonobstructing bilateral intrarenal stones. Hysterectomy. Mild colonic diverticulosis. Moderate anasarca.     IMPRESSION: 1. Patchy opacities throughout much of the right lung suspicious for infectious pneumonia, most prominently in the right lower lobe. 2. Intracardiac uptake about a TAVR and dense mitral annulus calcification suspicious for underlying endocarditis. 3. Heterogeneous uptake about both knees, more so on the right, indeterminate for underlying infection. Small right knee effusion present, amenable to aspiration.    CT Abdomen Pelvis w Contrast    Result Date: 9/8/2022  EXAM: CT ABDOMEN PELVIS W CONTRAST LOCATION: [facility] DATE/TIME: 9/8/2022 7:39 PM INDICATION: Right upper abdominal pain and fever. COMPARISON: CTA 8/21/2021 TECHNIQUE: CT scan of the abdomen and pelvis was performed following injection of IV contrast. Multiplanar reformats were obtained. Dose reduction techniques were used. CONTRAST: 75ml isovue 370 FINDINGS: LOWER CHEST: Small right hydropneumothorax post right thoracentesis earlier today. Small residual right pleural effusion. Heavy mitral annular calcifications. Small sliding-type hiatal hernia. HEPATOBILIARY: Cirrhotic appearing liver redemonstrated without focal suspicious mass. Normal gallbladder and biliary system. PANCREAS: Normal. SPLEEN: Normal. ADRENAL GLANDS: Normal. KIDNEYS/BLADDER:  Normal kidneys and ureters. Nondistended bladder with focal asymmetric enhancing bladder wall thickening measuring up to 8 mm in the right bladder base image 173 of series 3.. BOWEL: Diverticulosis of the colon. No acute inflammatory change. No obstruction. Large amount of stool in the rectum. Mild ascites. LYMPH NODES: No lymphadenopathy. VASCULATURE: Previous endovascular aortic aneurysm repair with bifurcated endograft. Aneurysm sac measures 5.0 x 5.6 cm, previously 4.7 x 5.4 cm. Type II endoleak is noted. PELVIC ORGANS: Hysterectomy. No adnexal mass. MUSCULOSKELETAL: Previous lower lumbar fusion. Degenerative changes are present within the spine and hips. Generalized anasarca.     IMPRESSION: 1.  Small to moderate right hydropneumothorax, status post large volume right thoracentesis earlier today. Patient may have a trapped right lung. Consider two-view chest radiograph. 2.  Cirrhotic appearing liver with mild ascites. No splenomegaly. 3.  Previous endovascular repair of infrarenal abdominal aortic aneurysm. Type II endoleak with interval enlargement of the aneurysm sac, compatible with endotension. Previous repair at [facility]. Consider nonemergent Interventional Radiology consult. 4.  Generalized anasarca. Report called to Dr. Roberson at 2000 hours    CT Cervical Spine w/o Contrast    Result Date: 9/8/2022  EXAM: CT CERVICAL SPINE W/O CONTRAST LOCATION: [facility] DATE/TIME: 9/8/2022 7:38 PM INDICATION: fall COMPARISON: None. TECHNIQUE: Routine CT Cervical Spine without IV contrast. Multiplanar reformats. Dose reduction techniques were used. FINDINGS: No evidence of acute fracture or subluxation of the cervical spine by CT imaging. Straightening of the normal cervical lordosis. The vertebral bodies of the cervical spine otherwise have normal stature and alignment. Anterolisthesis of C3 on C4 measuring  2 mm. Anterior marginal osteophytes at C4/C5-C7/T1. Multilevel degenerative disc  disease of the cervical spine with disc height loss, most pronounced at C4/C5-C7/T1. The partially imaged intracranial contents are unremarkable. No prevertebral soft tissue swelling. The partially imaged lung apices are unremarkable. Craniovertebral junction and C1-C2: The odontoid process is well approximated with the anterior body of C1 and well aligned between the lateral masses of C1. C2-C3: No posterior disc bulge or spinal canal narrowing. No neural foraminal narrowing. C3-C4: No posterior disc bulge or spinal canal narrowing. No neural foraminal narrowing. C4-C5: Broad bar disc osteophyte complex with moderate spinal canal narrowing. Uncovertebral joint disease and facet arthropathy with severe bilateral neural foraminal narrowing. C5-C6: No posterior disc bulge or spinal canal narrowing. Uncovertebral joint disease and facet arthropathy with severe right and moderate left neural foraminal narrowing. C6-C7: No posterior disc bulge or spinal canal narrowing. Uncovertebral joint disease and facet arthropathy with severe bilateral neural foraminal narrowing. C7-T1: No posterior disc bulge or spinal canal narrowing. Uncovertebral joint disease and facet arthropathy with moderate bilateral neural foraminal narrowing.     IMPRESSION: 1.  No evidence of acute fracture or subluxation of the cervical spine by CT imaging. 2.  Degenerative cervical spondylosis as described above.    CT Head w/o Contrast    Addendum Date: 9/8/2022    Addendum/ impression: INDICATION: Dizziness    Result Date: 9/8/2022  EXAM: CT HEAD W/O CONTRAST LOCATION: [facility] DATE/TIME: 9/8/2022 7:34 PM INDICATION: fall COMPARISON: None. TECHNIQUE: Routine CT Head without IV contrast. Multiplanar reformats. Dose reduction techniques were used. FINDINGS: INTRACRANIAL CONTENTS: No evidence of acute intracranial hemorrhage or mass effect. Scattered foci of decreased attenuation within the cerebral hemispheric white matter  which are nonspecific, though most commonly ascribed to chronic small vessel ischemic  disease. The ventricles and sulci are prominent consistent with mild brain parenchymal volume loss. Gray-white matter differentiation is maintained. The basilar cisterns are patent. VISUALIZED ORBITS/SINUSES/MASTOIDS: Bilateral cataract surgery. The partially imaged globes are otherwise unremarkable. The partially imaged paranasal sinuses, mastoid air cells and middle ear cavities are unremarkable. BONES/SOFT TISSUES: The visualized skull base and calvarium are unremarkable.     IMPRESSION:  1.  No evidence of acute intracranial hemorrhage or mass effect. 2.  Mild nonspecific white matter changes. 3.  Mild brain parenchymal volume loss.    CT Lumbar Spine w/o Contrast    Result Date: 9/8/2022  EXAM: CT LUMBAR SPINE W/O CONTRAST LOCATION: [facility] DATE/TIME: 9/8/2022 7:39 PM INDICATION: Back pain COMPARISON: None. TECHNIQUE: Routine CT Lumbar Spine without IV contrast. Multiplanar reformats. Dose reduction techniques were used. FINDINGS: No evidence of acute fracture or subluxation of the lumbar spine by CT imaging. Postsurgical changes posterior fusion at L3-L5 and interbody fusion at L3/L4 and L4/L5. No hardware complication. No evidence of acute fracture or subluxation of the lumbar spine by CT imaging. Retrolisthesis of T12 on L1 measuring 2 mm. Anterior marginal osteophytes at T12/L1 and L1/L2 and L2/L3. Multilevel degenerative disc disease of the lumbar spine with disc height loss, most pronounced at T12/L1 and L2/L3. Aortobiiliac endograft. The partially imaged intra-abdominal contents are otherwise unremarkable. T12/L1:  Symmetric disc bulge without spinal canal narrowing. Mild bilateral neural foraminal narrowing. L1/L2:  No posterior disc bulge or spinal canal narrowing. Mild bilateral neural foraminal narrowing. L2/L3:  Broad bar disc osteophyte complex with mild spinal canal narrowing. Severe  bilateral neural foraminal narrowing. L3/L4:  Spondylotic ridging without spinal canal narrowing. Moderate bilateral facet arthropathy. No neural foraminal narrowing.  L4/L5:  No posterior disc bulge or spinal canal narrowing. Mild bilateral facet arthropathy. No neural foraminal narrowing. L5/S1: Symmetric disc bulge and moderate facet arthropathy without spinal canal narrowing. Severe bilateral neural foraminal narrowing.     IMPRESSION:  1.  No evidence of acute fracture or subluxation of the lumbar spine by CT imaging. 2.  Postsurgical changes posterior fusion at L3-L5 and interbody fusion at L3/L4 and L4/L5. No hardware complication. 3.  Degenerative lumbar spondylosis as described above.    CT Soft Tissue Neck w Contrast    Result Date: 9/12/2022  EXAM: PET ONCOLOGY WHOLE BODY, CT SOFT TISSUE NECK W CONTRAST, CT CHEST/ABDOMEN/PELVIS W CONTRAST LOCATION: [facility] DATE/TIME: 9/12/2022 11:30 AM INDICATION: Other nonspecific abnormal finding of lung field. Abnormal findings on diagnostic imaging of other abdominal regions, including retroperitoneum. MRSA bacteremia. Fever of unknown origin. History of TAVR, AAA , bilateral TKAs. Assess for nidus  of infection. Initial treatment strategy. COMPARISON: CT abdomen pelvis 09/08/2022, numerous recent chest radiographs most recent 09/12/2022 reviewed. CONTRAST: 74 mL Isovue-370 TECHNIQUE: Serum glucose level 99 mg/dL. One hour post intravenous administration of 10.1 mCi F-18 FDG, PET imaging was performed from the skull vertex to below knees, utilizing attenuation correction with concurrent axial CT and PET/CT image fusion. Separate diagnostic CT of the neck, chest, abdomen, and pelvis was performed. Dose reduction techniques were used. PET/CT FINDINGS: Moderate severity patchy irregular ground glass and consolidative opacities in the right lung, mostly in the right lower lobe although also involving the right upper lobe, associated with  varying degrees of heterogeneous mild to moderate  uptake, likely infectious/inflammatory. Mild left basilar atelectasis with low-level inflammatory uptake. Moderate uptake about TAVR and moderate uptake about dense mitral annulus calcification, suspicious for underlying endocarditis. Focus of moderate uptake involves the lumbosacral junction, likely inflammatory, without other findings to suggest osteomyelitis discitis. Heterogeneous uptake about both knees, marked on the right and moderate on the left. Small right knee joint effusion. Abdominal aortic aneurysm status post aortobiiliac stent graft placement. No increased uptake about the stent graft to suggest associated infection. No FDG avid adenopathy. No focal organized fluid collection or abscess. CT FINDINGS: Moderate senescent intracranial changes. Large right pleural effusion. Small right pneumothorax. Moderate size left pleural effusion. Enlarged central pulmonary arteries suggesting pulmonary arterial hypertension. Mild soft tissue emphysema right chest wall. Diffuse heterogeneity of the liver with some surface nodularity suggesting underlying fibrosis/cirrhosis. Small volume ascites. Tiny nonobstructing bilateral intrarenal stones. Hysterectomy. Mild colonic diverticulosis. Moderate anasarca.     IMPRESSION: 1. Patchy opacities throughout much of the right lung suspicious for infectious pneumonia, most prominently in the right lower lobe. 2. Intracardiac uptake about a TAVR and dense mitral annulus calcification suspicious for underlying endocarditis. 3. Heterogeneous uptake about both knees, more so on the right, indeterminate for underlying infection. Small right knee effusion present, amenable to aspiration.    CT scan reviewed.    Reviewed notes from other consultants and hospitalist.      This note has been dictated using voice recognition software. Any grammatical or context distortions are unintentional and inherent to the software      Signed by:  Sukhdeep Michelle MD, MD

## 2022-09-13 NOTE — PROGRESS NOTES
Pulmonary Update Note    Will sign off, please let our service know if any change in clinical condition or questions.    D/w Dr Carmen Bowers, DO  Pulmonary and Critical Care Attending  pgr [phone]

## 2022-09-13 NOTE — PLAN OF CARE
"  Problem: Plan of Care - These are the overarching goals to be used throughout the patient stay.    Goal: Plan of Care Review/Shift Note  Description: The Plan of Care Review/Shift note should be completed every shift.  The Outcome Evaluation is a brief statement about your assessment that the patient is improving, declining, or no change.  This information will be displayed automatically on your shift note.  Outcome: Ongoing, Progressing  Flowsheets (Taken 9/13/2022 0410)  Plan of Care Reviewed With:   patient   daughter  Goal: Patient-Specific Goal (Individualized)  Description: You can add care plan individualizations to a care plan. Examples of Individualization might be:  \"Parent requests to be called daily at 9am for status\", \"I have a hard time hearing out of my right ear\", or \"Do not touch me to wake me up as it startles me\".  Outcome: Ongoing, Progressing  Goal: Absence of Hospital-Acquired Illness or Injury  Outcome: Ongoing, Progressing  Intervention: Identify and Manage Fall Risk  Recent Flowsheet Documentation  Taken 9/12/2022 1930 by Jamila Hall, RN  Safety Promotion/Fall Prevention:   activity supervised   bed alarm on   room door open   fall prevention program maintained   lighting adjusted   mobility aid in reach  Intervention: Prevent Skin Injury  Recent Flowsheet Documentation  Taken 9/13/2022 0000 by Jamila Hall, RN  Body Position: position changed independently  Taken 9/12/2022 1930 by Jamila Hall, RN  Body Position: position changed independently     Problem: Risk for Delirium  Goal: Optimal Coping  Outcome: Ongoing, Progressing  Goal: Improved Behavioral Control  Outcome: Ongoing, Progressing  Goal: Improved Attention and Thought Clarity  Outcome: Ongoing, Progressing     Problem: Alcohol Withdrawal  Goal: Alcohol Withdrawal Symptom Control  Outcome: Ongoing, Progressing     Problem: Acute Neurologic Deterioration (Alcohol Withdrawal)  Goal: Optimal Neurologic " Function  Outcome: Ongoing, Progressing     Problem: Substance Misuse (Alcohol Withdrawal)  Goal: Readiness for Change Identified  Outcome: Ongoing, Progressing     Problem: Risk for Delirium  Goal: Optimal Coping  Outcome: Ongoing, Progressing   Goal Outcome Evaluation:    Plan of Care Reviewed With: patient, daughter

## 2022-09-13 NOTE — PROGRESS NOTES
Occupational Therapy  Met with patient. Patient declining OT services/evaluation. Will discontinue order    Marissa WILSON, OTR/L, CLT 9/13/2022 , 8:52 AM

## 2022-09-13 NOTE — PHARMACY-AMINOGLYCOSIDE DOSING SERVICE
Pharmacy Aminoglycoside Initial Note  Date of Service 2022  Patient's  1950  72 year old, female    Weight (Adjusted):  55.6 kg    Indication: Endocarditis/synergy dosing    Current estimated CrCl = Estimated Creatinine Clearance: 39.7 mL/min (based on SCr of 1.09 mg/dL).    Creatinine for last 3 days  2022:  4:26 AM Creatinine 1.48 mg/dL  2022:  4:31 AM Creatinine 1.15 mg/dL  2022:  4:31 AM Creatinine 1.09 mg/dL     Nephrotoxins and other renal medications (From now, onward)    Start     Dose/Rate Route Frequency Ordered Stop    22 1800  vancomycin (VANCOCIN) 1,250 mg in sodium chloride 0.9 % 250 mL intermittent infusion         1,250 mg  over 90 Minutes Intravenous EVERY 24 HOURS 22 0744      22 1400  gentamicin (GARAMYCIN) 60 mg in sodium chloride 0.9 % 50 mL intermittent infusion         1 mg/kg × 55.6 kg (Adjusted)  over 60 Minutes Intravenous EVERY 24 HOURS 22 1353      22 1330  rifampin (RIFADIN) capsule 600 mg         600 mg Oral DAILY 22 1319      09/10/22 0800  torsemide (DEMADEX) tablet 20 mg         20 mg Oral DAILY 22 1815            Contrast Orders - past 72 hours (72h ago, onward)    Start     Dose/Rate Route Frequency Stop    22 1030  iopamidol (ISOVUE-370) solution 74 mL         74 mL Intravenous ONCE 22 1058    22 1000  fluorodeoxyglucose F-18 (FDG) radioisotope injection 7-18 mCi         7-18 mCi Intravenous ONCE 22 0952          Aminoglycoside Levels - past 2 days  No results found for requested labs within last 48 hours.    Aminoglycosides IV Administrations (past 72 hours)      No aminoglycosides orders with administrations in past 72 hours.                    Plan:  1.  Start Gentamicin 60 mg (1 mg/kg) IV q24h.   2.  Target goals based on synergy dosing  3.  Goal peak level: 3-5 mg/L  4.  Goal trough level: <1 mg/L  5.  Pharmacy will continue to follow and check levels as appropriate in 1-3  Days      Antonia Santana, [facility]

## 2022-09-13 NOTE — PLAN OF CARE
Goal Outcome Evaluation:      Pt's HR and Rhythm change noted and physician notified at 1325. Pt in Afib with RVR. Orders for medications received and given. Placed pt on continuous BP monitoring, Pt becoming increasingly Hypotensive. 1350 98/78 (83), 1335 101/68 (75) .   O2 sats 100%, Pt is asymptomatic of chest pain or SOB.  1500 BP was 84/55 (64). Physician in contact, Orders received for Bolus.  IVF's given, Pt is laying supine in bed. Continuing to monitor.

## 2022-09-13 NOTE — CONSULTS
CARDIOLOGY CONSULT NOTE         Assessment:   1.  Atrial fibrillation- paroxysmal/persistent.  Not valvular and apparently asymptomatic although patient extremely drowsy.  At this point time would work on rate control with some digoxin and maybe some low-dose diltiazem.  On heparin right now but long-term would like some anticoagulation.  New since 8 September.  Since she is tolerated it would not necessarily pursue urgent cardioversion but will discuss with her further and keep her n.p.o.  2.  Status post TAVR- September 20 2123 mm ISABELLA 3 valve placed via left subclavian approach.  Echo shows 2.3 m/s peak velocity with 9 mmHg mean gradient.  3.  Gram-positive bacteremia-methicillin-resistant staph aureus growing in blood culture.  PET scan suggest possible association with a TAVR.  Will need to connect with infectious disease, no neurological sequela, consider EMILY.  Jimenes criteria meets major with positive blood culture, minor based on fever, predisposition, and positive blood cultures.  4.  Anasarca-given increased BNP question heart failure in the setting of preserved ejection fraction of 60 to 65%.     Plan:   1.  Anticoagulation.  2.  Slow down with digoxin and low-dose diltiazem.  3.  We will discuss with infectious disease, keep n.p.o. for possible EMILY.     Current History:   [facility] has been requested by Dr. Cheko Morales to evaluate Maren Fofana  who is a 72 year old year old white female for atrial fibrillation.  Patient lives alone, she had a fall at home a little less than a week ago, on 8 September, was feverish and short of breath and lightheaded with low back pain.  Her daughter called the ambulance to bring her to the emergency department.  She was noted to have an increased lactic acid level, possible empyema with gram-positive bacteremia, and started on antibiotics as well as chest tube.  Today, approximately 6 days later she is noted go in atrial fibrillation and cardiology  consultation is requested.  She is drowsy, states she feels little short of breath but otherwise denies any fevers, chills, night sweats, chest pains, palpitations, PND, orthopnea, headaches, or numbness tingling weakness of 1 side of the body or the other.    Past Medical History:     Past Medical History:   Diagnosis Date     Aortic stenosis-status post TAVR      Collapsed lung      Fibromyalgia      GERD (gastroesophageal reflux disease)      Hypothyroid      Migraine      Peripheral vascular disease (H)      Reactive airway disease, possible COPD/asthma      Vertigo       Past history is negative for cancer, tuberculosis, diabetes mellitus, myocardial infarction,  rheumatic fever, hypertension, cerebrovascular accident, chronic kidney disease, peptic ulcer disease, chronic obstructive pulmonary disease, and no lipid disorder.    Past Surgical History:     Past Surgical History:   Procedure Laterality Date      SECTION Bilateral      CV CORONARY ANGIOGRAM N/A 2021    Procedure: Coronary Angiogram;  Surgeon: Sara Randall MD;  Location: [facility] CATH LAB CV     CV TRANSCATHETER AORTIC VALVE REPLACEMENT N/A 2021    Procedure: CV TRANSCATHETER AORTIC VALVE REPLACEMENT;  Surgeon: Geovani Kulkarni MD;  Location: [facility] CARDIAC CATH LAB     HEART CATH FEMORAL CANNULIZATION WITH OPEN STANDBY REPAIR AORTIC VALVE N/A 2021    Procedure: LEFT SUBCLAVIAN ACCESS FOR  TRANSCATHETER AORTIC VALVE REPLACEMENT.  CARDIOPULMONARY BYPASS STANDBY;  Surgeon: Jorge L Baeza MD;  Location: [facility] CARDIAC CATH LAB     HYSTERECTOMY       POSTERIOR LAMINECTOMY / DECOMPRESSION LUMBAR SPINE      L2-L3     SPINAL FUSION       TONSILLECTOMY & ADENOIDECTOMY       TOTAL KNEE ARTHROPLASTY Bilateral      Family History:   Reviewed, and positive heart failure in her father otherwise negative for coronary artery disease.    Social History:   Reviewed, and she  reports that she quit smoking about 17 months ago. She  smoked 0.50 packs per day but up to 1 to 2 packs a day for about 55 years. She has never used smokeless tobacco. She reports current alcohol use. She reports that she does not use drugs.  She lives alone independently, is single, and primary physician is Nora Mccarthy.    Meds:       acetaminophen  975 mg Oral Q8H     digoxin  125 mcg Intravenous Q6H     famotidine  20 mg Oral Daily     folic acid  1 mg Oral Daily     gentamicin  1 mg/kg (Adjusted) Intravenous Q24H     [Held by provider] heparin ANTICOAGULANT  5,000 Units Subcutaneous Q12H     levothyroxine  88 mcg Oral QAM AC     multivitamin w/minerals  1 tablet Oral Daily     rifampin  600 mg Oral Daily     sodium chloride (PF)  3 mL Intracatheter Q8H     torsemide  20 mg Oral Daily     vancomycin  1,250 mg Intravenous Q24H     venlafaxine  300 mg Oral Daily     Allergies:   Bee venom, Other drug allergy (see comments), Dilaudid [hydromorphone], and Latex    Review of Systems:   A 12 point comprehensive review of systems was  as follows:   General: Positive for fatigue, negative weight loss or weight gain  Constitutional: Positive fevers, negative for anorexia, chills, malaise, night sweats   Eyes: negative for cataracts, color blindness, contacts/glasses, glaucoma, icterus, irritation, redness and visual disturbance  Ears, nose, mouth, throat, and face: negative for ear drainage, earaches, epistaxis, facial trauma, hearing loss, hoarseness, nasal congestion, snoring, sore mouth, sore throat, tinnitus and voice change  Respiratory: Positive dyspnea on exertion, negative hemoptysis, sputum production, pleurisy, stridor, wheezing, PND, orthopnea  Cardiovascular: negative for chest pain, chest pressure/discomfort, claudication, dyspnea, exertional chest pressure/discomfort, fatigue, irregular heart beat, lower extremity edema, near-syncope,  palpitations,  syncope   Gastrointestinal: negative for abdominal pain, change in bowel haibits, constipation, diarrhea,  "dyspepsia, dysphagia, jaundice, melena, nausea, odynophagia, reflux symptoms and vomiting  Genitourinary: negative for decreased stream  Hematologic/lymphatic: negative for bleeding  Musculoskeletal: negative for arthralgias, back pain, bone pain, muscle weakness, myalgias, neck pain and stiff joints  Neurological: negative for syncope, presyncope, coordination problems, dizziness, gait problems, headaches, memory problems, paresthesia, seizures, speech problems, tremors, vertigo and weakness    Objective:     Physical Exam:  BP (!) 88/56   Pulse (!) 141   Temp 98  F (36.7  C) (Oral)   Resp 20   Ht 1.549 m (5' 1\")   Wt 67.3 kg (148 lb 6.4 oz)   SpO2 100%   BMI 28.04 kg/m       Head:    Normocephalic, without obvious abnormality, atraumatic   Eyes:    PERRL, conjunctiva/corneas clear, EOM's intact,           Nose:   Nares normal, septum midline, mucosa normal, no drainage  or sinus tenderness   Throat:   Lips, mucosa, and tongue normal; teeth and gums normal   Neck:   Supple, symmetrical, trachea midline, no adenopathy;        thyroid:  No enlargement/tenderness/nodules; no carotid    bruit or JVD   Back:     Symmetric, no curvature, ROM normal, no CVA tenderness   Lungs:     Clear to auscultation bilaterally, respirations unlabored   Chest wall:    No tenderness or deformity   Heart:    Regular rate and rhythm, S1 and S2 normal, no murmur, rub   or gallop   Abdomen:     Soft, non-tender, bowel sounds active all four quadrants,     no masses, no organomegaly   Extremities:   Extremities normal, atraumatic, no cyanosis or edema   Pulses:   2+ and symmetric all extremities   Skin:   Skin color, texture, turgor normal, no rashes or lesions   Neurologic:   CNII-XII intact. Normal strength, sensation and reflexes       throughout     Cardiographics and Imaging  Radiology Results: Chest x-ray shows The cardiac silhouette is unchanged in size and contour. There is a small right-sided pneumothorax, similar to prior " exam. Increasing opacification of the right lung base is noted, likely reflecting combination of right pleural effusion and superimposed atelectasis and/or airspace disease. A small left-sided pleural effusion has developed in the interval.    EKG Results: personally reviewed atrial fibrillation, fast ventricular response, possible old septal Q wave MI type pattern, no acute changes.    Lab Review   Pertinent Labs  Lab Results: personally reviewed       Lab Results   Component Value Date    TROPONINI 0.11 09/09/2022       Lab Results   Component Value Date    BUN 27 09/13/2022     09/13/2022    CO2 26 09/13/2022       Lab Results   Component Value Date    WBC 7.1 09/13/2022    HGB 9.6 (L) 09/13/2022    HCT 28.1 (L) 09/13/2022    MCV 95 09/13/2022    PLT 31 (LL) 09/13/2022       Lab Results   Component Value Date    BNP 1,107 (H) 09/08/2022

## 2022-09-13 NOTE — CONSULTS
ORTHOPEDIC CONSULTATION    Consultation  Maren Fofana,  1950, MRN [id number]    Hydropneumothorax [J94.8]  Anasarca [R60.1]  Pleural effusion [J90]  Other ascites [R18.8]  Severe sepsis (H) [A41.9, R65.20]  Renal failure, unspecified chronicity [N19]    PCP: Nora Mccarthy, [phone]   Code status:  Full Code       Extended Emergency Contact Information  Primary Emergency Contact: Caitie Mcdonald  Address: [address] [place] [address] [id number] [place] [address]  Mobile Phone: [phone]  Relation: Daughter  Secondary Emergency Contact: Tera Fofana  Address: [address] [place], [place] [id number] [place] [address]  Home Phone: [phone]  Relation: Son         IMPRESSION:  Severe sepsis due to high grade MRSA Bacteremia, PET scan showed Heterogeneous uptake of both knees, marked on the right and moderate on the left. Low suspicion of prosthetic joint infection.     PLAN:  This patient was discussed with Dr. Adhikari, on-call surgeon for [facility] and they are in agreement with the following plan.  Patient was evaluated for possible prosthetic knee joint infection given current severe sepsis bacteremia and PET scan revealed heterogeneous uptake of both knees and small joint effusion of the right knee. Patient without complaints of knee pain. On exam, bilateral knee without erythema, warmth. No knee and hip pain with ROM and palpation bilateral therefore low suspicion of prosthetic knee joint infection. No knee aspiration from orthopedics perspective. Recommendation follows:    -No acute surgical intervention  -WBAT BLE  -No knee aspiration from orthopedics perspective  -May proceed with ultrasound guided knee aspiration if needed to rule out infection per ID and hospital team.  -Ortho will sign off. Please feel free to reach out with any further questions or concerns.       Thank you for including [facility] in the care of Maren Fofana. It has been a pleasure  participating in her care.        CHIEF COMPLAINT: <principal problem not specified>    HISTORY OF PRESENT ILLNESS:  The patient is seen in orthopedic consultation at the request of Dr. Morales.  The patient is a 72 year old female with a history of bilateral TKA.  Patient currently admitted for severe sepsis high-grade MRSA bacteremia and PET scan show heterogeneous uptake of both knees with possible concerns of infection of the knee.  Patient uncertain when she had both knee replaced.  Patient has not experience any knee pain since admission.  Patient uses a walker at baseline.  Denies fevers, chills, numbness, tingling, weakness, fall/trauma.      ALLERGIES:   Review of patient's allergies indicates   Allergies   Allergen Reactions     Bee Venom Anaphylaxis     Other Drug Allergy (See Comments) Hives     Ice Packs   Any kind of cold      Dilaudid [Hydromorphone] Other (See Comments)     Altered mental status, confusion, itching. Pt tolerates oxycodone.     Latex      hives         MEDICATIONS UPON ADMISSION:  Medications were reviewed.  They include:   Medications Prior to Admission   Medication Sig Dispense Refill Last Dose     aspirin (ASA) 81 MG EC tablet Take 1 tablet (81 mg) by mouth daily 90 tablet 0 9/7/2022 at AM     atorvastatin (LIPITOR) 80 MG tablet [ATORVASTATIN (LIPITOR) 80 MG TABLET] Take 80 mg by mouth daily.   9/7/2022 at AM     diphenhydrAMINE (BENADRYL) 25 MG tablet Take 25 mg by mouth as needed   Past Week at Unknown time     EPINEPHrine (EPIPEN/ADRENACLICK/AUVI-Q) 0.3 mg/0.3 mL injection 0.3 mg as needed    More than a month at Unknown time     fish oil-omega-3 fatty acids 1000 MG capsule Take 1 g by mouth daily   9/7/2022 at AM     ginkgo biloba 60 MG CAPS capsule Take 120 mg by mouth daily   9/7/2022 at AM     levothyroxine (SYNTHROID, LEVOTHROID) 88 MCG tablet [LEVOTHYROXINE (SYNTHROID, LEVOTHROID) 88 MCG TABLET] Take 88 mcg by mouth daily.   9/7/2022 at AM     lidocaine (ASPERCREME  LIDOCAINE) 4 % external cream Apply 1 g topically once as needed for mild pain   9/7/2022 at AM     losartan (COZAAR) 25 MG tablet Take 1 tablet (25 mg) by mouth daily 90 tablet 3 9/7/2022 at AM     Melatonin 10 MG TABS tablet Take 30 mg by mouth At Bedtime   9/7/2022 at Bedtime     omeprazole 20 MG tablet Take 20 mg by mouth daily   9/7/2022 at AM     potassium chloride ER (KLOR-CON M) 20 MEQ CR tablet Take 1 tablet (20 mEq) by mouth daily 90 tablet 3 9/7/2022 at AM     torsemide (DEMADEX) 20 MG tablet Take 1 tablet (20 mg) by mouth daily 90 tablet 3 9/7/2022 at AM     traZODone (DESYREL) 150 MG tablet Take 150 mg by mouth At Bedtime    9/7/2022 at Bedtime     venlafaxine (EFFEXOR-XR) 150 MG 24 hr capsule [VENLAFAXINE (EFFEXOR-XR) 150 MG 24 HR CAPSULE] Take 300 mg by mouth daily.   9/7/2022 at AM         SOCIAL HISTORY:   she  reports that she quit smoking about 17 months ago. She smoked 0.50 packs per day. She has never used smokeless tobacco. She reports current alcohol use. She reports that she does not use drugs.      FAMILY HISTORY:  family history includes Pancreatic Cancer in her father and mother.      REVIEW OF SYSTEMS:   Reviewed with patient. See HPI, otherwise negative       PHYSICAL EXAMINATION:  Vitals: Temp:  [97.8  F (36.6  C)-99.4  F (37.4  C)] 98.9  F (37.2  C)  Pulse:  [75-94] 76  Resp:  [18-20] 20  BP: ()/(61-69) 120/65  SpO2:  [95 %-100 %] 98 %  General: On examination, the patient is awake and alert and oriented to general circumstances   SKIN: There is no evidence of erythema, warmth, swelling, deformity, break to the skin, open wound of the knee bilaterally.  No knee effusion appreciated.  TKA incision healed bilaterally.   Pulses:  dorsalis pedis pulse is intact and equal bilaterally  Sensation: intact and equal bilaterally to the distal lower extremities.  Tenderness: NTTP of the femur, tibia, fibula, ankle, foot bilateral.  NTTP of the anterior knee, medial/lateral joint line, quad  tendon, patella tendon bilateral.  ROM: Full knee extension/flexion without pain bilateral. Full hip flexionwithout pain bilateral. Ankle DF/PFwithout pain bilateral.  Motor: TIB ANT, PF, HF 4/5 BLE        RADIOGRAPHIC EVALUATION:  Personally reviewed    Narrative & Impression   EXAM: PET ONCOLOGY WHOLE BODY, CT SOFT TISSUE NECK W CONTRAST, CT CHEST/ABDOMEN/PELVIS W CONTRAST  LOCATION: [facility]  DATE/TIME: 9/12/2022 11:30 AM     INDICATION: Other nonspecific abnormal finding of lung field. Abnormal findings on diagnostic imaging of other abdominal regions, including retroperitoneum. MRSA bacteremia. Fever of unknown origin. History of TAVR, AAA , bilateral TKAs. Assess for nidus   of infection. Initial treatment strategy.  COMPARISON: CT abdomen pelvis 09/08/2022, numerous recent chest radiographs most recent 09/12/2022 reviewed.  CONTRAST: 74 mL Isovue-370  TECHNIQUE: Serum glucose level 99 mg/dL. One hour post intravenous administration of 10.1 mCi F-18 FDG, PET imaging was performed from the skull vertex to below knees, utilizing attenuation correction with concurrent axial CT and PET/CT image fusion.   Separate diagnostic CT of the neck, chest, abdomen, and pelvis was performed. Dose reduction techniques were used.     PET/CT FINDINGS: Moderate severity patchy irregular ground glass and consolidative opacities in the right lung, mostly in the right lower lobe although also involving the right upper lobe, associated with varying degrees of heterogeneous mild to moderate   uptake, likely infectious/inflammatory. Mild left basilar atelectasis with low-level inflammatory uptake. Moderate uptake about TAVR and moderate uptake about dense mitral annulus calcification, suspicious for underlying endocarditis. Focus of moderate   uptake involves the lumbosacral junction, likely inflammatory, without other findings to suggest osteomyelitis discitis. Heterogeneous uptake about both knees, marked  on the right and moderate on the left. Small right knee joint effusion. Abdominal   aortic aneurysm status post aortobiiliac stent graft placement. No increased uptake about the stent graft to suggest associated infection. No FDG avid adenopathy. No focal organized fluid collection or abscess.     CT FINDINGS: Moderate senescent intracranial changes. Large right pleural effusion. Small right pneumothorax. Moderate size left pleural effusion. Enlarged central pulmonary arteries suggesting pulmonary arterial hypertension. Mild soft tissue emphysema   right chest wall. Diffuse heterogeneity of the liver with some surface nodularity suggesting underlying fibrosis/cirrhosis. Small volume ascites. Tiny nonobstructing bilateral intrarenal stones. Hysterectomy. Mild colonic diverticulosis. Moderate   anasarca.                                                                      IMPRESSION:  1. Patchy opacities throughout much of the right lung suspicious for infectious pneumonia, most prominently in the right lower lobe.  2. Intracardiac uptake about a TAVR and dense mitral annulus calcification suspicious for underlying endocarditis.  3. Heterogeneous uptake about both knees, more so on the right, indeterminate for underlying infection. Small right knee effusion present, amenable to aspiration.         PERTINENT LABS:  Lab Results: personally reviewed.   Lab Results   Component Value Date     09/13/2022    CO2 26 09/13/2022    BUN 27 09/13/2022     Lab Results   Component Value Date    WBC 7.1 09/13/2022    HGB 9.6 09/13/2022    HCT 28.1 09/13/2022    MCV 95 09/13/2022    PLT 31 09/13/2022         Aislinn Childers PA-C, CHARLA  Date: 9/13/2022  Time: 12:20 PM    CC1:   Cheko Morales DO    CC2:   Nora Mccarthy

## 2022-09-13 NOTE — PHARMACY-VANCOMYCIN DOSING SERVICE
"Pharmacy Vancomycin Note  Date of Service 2022  Patient's  1950   72 year old, female    Indication: severe sepsis with MSSA bacteremia  Day of Therapy: 13  Current vancomycin regimen:  750 mg IV q24h  Current vancomycin monitoring method: AUC  Current vancomycin therapeutic monitoring goal: 400-600 mg*h/L    Loading dose: N/A  Regimen: 750 mg IV every 24 hours.  Start time: 08:40 on 2022  Exposure target: AUC24 (range)400-600 mg/L.hr   AUC24,ss: 337 mg/L.hr  Probability of AUC24 > 400: 17 %  Ctrough,ss: 9.7 mg/L  Probability of Ctrough,ss > 20: 0 %  Probability of nephrotoxicity (Lodise HEATHER ): 6 %        Current estimated CrCl = Estimated Creatinine Clearance: 39.7 mL/min (based on SCr of 1.09 mg/dL).    Creatinine for last 3 days  9/10/2022:  8:32 AM Creatinine 1.37 mg/dL  2022:  4:26 AM Creatinine 1.48 mg/dL  2022:  4:31 AM Creatinine 1.15 mg/dL  2022:  4:31 AM Creatinine 1.09 mg/dL    Recent Vancomycin Levels (past 3 days)  2022:  4:26 AM Vancomycin 19.1 mg/L  2022:  4:31 AM Vancomycin 14.7 mg/L    Vancomycin IV Administrations (past 72 hours)                   vancomycin 750 mg in 0.9% NaCl 250 mL intermittent infusion 750 mg (mg) 750 mg Given 22 1821     750 mg Given 22 1905    vancomycin (VANCOCIN) 1000 mg in dextrose 5% 200 mL PREMIX (mg) 1,000 mg New Bag 09/10/22 1801                Nephrotoxins and other renal medications (From now, onward)    Start     Dose/Rate Route Frequency Ordered Stop    22 1800  vancomycin (VANCOCIN) 1,250 mg in sodium chloride 0.9 % 250 mL intermittent infusion         1,250 mg  over 90 Minutes Intravenous EVERY 24 HOURS 22 0744      09/10/22 0800  torsemide (DEMADEX) tablet 20 mg         20 mg Oral DAILY 22 1815      22 0300  piperacillin-tazobactam (ZOSYN) 3.375 g vial to attach to  mL bag        Note to Pharmacy: For [facility], [facility] and [facility]: For Zosyn-naive patients, use the \"Zosyn " "initial dose + extended infusion\" order panel.    3.375 g  over 240 Minutes Intravenous EVERY 8 HOURS 09/09/22 0238               Contrast Orders - past 72 hours (72h ago, onward)    Start     Dose/Rate Route Frequency Stop    09/12/22 1030  iopamidol (ISOVUE-370) solution 74 mL         74 mL Intravenous ONCE 09/12/22 1058    09/12/22 1000  fluorodeoxyglucose F-18 (FDG) radioisotope injection 7-18 mCi         7-18 mCi Intravenous ONCE 09/12/22 0952          Interpretation of levels and current regimen:  Vancomycin level is reflective of AUC less than 400    Has serum creatinine changed greater than 50% in last 72 hours: No    Renal Function: Stable    InsightRX Prediction of Planned New Vancomycin Regimen  Loading dose: N/A  Regimen: 1250 mg IV every 24 hours.  Start time: 08:40 on 09/13/2022  Exposure target: AUC24 (range)400-600 mg/L.hr   AUC24,ss: 533 mg/L.hr  Probability of AUC24 > 400: 98 %  Ctrough,ss: 15.4 mg/L  Probability of Ctrough,ss > 20: 14 %  Probability of nephrotoxicity (Lodise HEATHER 2009): 11 %      Plan:  1. Increase Dose to vancomycin 1250 mg IV q24h  2. Vancomycin monitoring method: AUC  3. Vancomycin therapeutic monitoring goal: 400-600 mg*h/L  4. Pharmacy will check vancomycin levels as appropriate in 1-3 Days.  5. Serum creatinine levels will be ordered daily for the first week of therapy and at least twice weekly for subsequent weeks.    Antonia Santana, [facility]  "

## 2022-09-13 NOTE — PROGRESS NOTES
A/P    72 year old female with PMH of aortic stenosis, s/p TAVR 09/21, HTN, s/p AAA repair, tobacco use disorder, diastolic CHF, HTN, known right-sided pleural effusion, alcohol use disorder, admitted on 9/8/2022.      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  TTE today no obvious vegetation.  PET CT (9/12) showed moderate uptake about TAVR and moderate uptake about dense mitral annulus calcification, suspicious for underlying endocarditis.  -Daily blood cultures  - Vancomycin. Zosyn stopped today  -Per ID: Anticipate plan for IV vancomycin for 6 weeks, p.o. rifampin, 2 weeks of gentamicin if tolerated    Transudative right pleural effusion complicated by PTX:  S/p 1.3 L thoracentesis on 9/8 followed by chest tube placement for PTX on 9/9.  Chest tube retracted to chest wall while transferring patient to her room and was pulled out on 9/10. Follow up CXR showed small pneumothorax . CXR 9/12 this morning showed small unchanged right PTX.  Pleural fluid cultures NGTD.  Persistent mild air leak.  -pulm s/o  -recheck CXR    HFpEF:  EF 60 to 65%. BNP 1,107.  TTE 9/9/2022 LVEF 60 to 65%, mild decreased RV systolic function, moderate-severe LAE, mild-moderate MR, 23 sapient 3 bioprosthetic valve with peak velocity 2.3 m/s, mean gradient 9 mmHg, dimensionless index 0.48, IVC diameter greater than 2.1 cm collapsing less than 50% with sniff suggests high RA pressure at 15 mm or greater  -Recent coronary angiogram on 8/21- no significant CAD.  -Resumed torsemide on 9/10.    Status post TAVR: Refer to above.     Essential hypertension.  -Hold losartan  - Monitor     Rhabdomyolysis: CK 2,092->916->419->121.  Suspect traumatic vs less likely  infectious   -hold statin     Alcohol use disorder.  History of withdrawal seizures/DT. EtOH level < 10  -CIWA, thiamine, folate  -stop valium, no benzo's due to pulmonary status     AAA, s/p  endovascular repair in 2016: CT abdomen/pelvis on 9/8/2022 showed endovascular aortic aneurysm repair with bifurcated endograft, aneurysm sac measures 5 x 5.6 cm (previously 4.7 x 5.4 cm), type II endoleak.  PET CT (9/12/22) showed No increased uptake about the stent graft to suggest associated infection.  - Followed at [facility]    SKYLAR: Creatinine 1.43-1.12->1.37->1.48->1.15->1.09.  - Strict intake and output and daily weights  - Labs in a.m.    Acute toxic/metabolic encephalopathy:  Noted more somnolent today. On CIWA, received valium and oxycodone resulting in respiratory suppression and worsening acute hypercarbic respiratory acidosis.  NH3 normal.   -stop valium, avoid opiates.    Hyponatremia: Sodium anika 132.  Na 137 currently.  Monitor    Cirrhosis: Suspect EtOH mediated. Per CT abdomen/pelvis imaging.  LFTs normal. Not decompensated. No significant ascites.    Normocytic anemia with acute on chronic thrombocytopenia: Multifactorial.  plt now 29K->31K.  Hgb 9.6 (9.7 yest).  PBS mild normochromic normocytic anemia which may be secondary to anemia chronic disease, renal insufficiency or blood loss.  The moderate thrombocytopenia may be secondary to bone marrow suppression by drugs or infection.  Hemolysis labs neg.  Folate nml. B12 nml.  FERNANDA positive.  -heme consult appreciated    New onset atrial fibrillation with RVR (9/13/22):  Uncertain if has had PAF given the left atrial enlargement.   -correct phos, electrolytes  -cardiology consult appreciated  -continue Digoxin as ordered, low dose diltiazem  -NPO in case of EMILY    H/O Bilateral TKA:  PET CT today showed right > left knee effusion with eterogeneous uptake about both knees, more so on the right, indeterminate for underlying infection. No e/o septic joint clinically per orthopedic surgery. They don't advise arthrocentesis at this time.    GERD-PPI.    Asymmetric bladder wall thickening: UA negative for UTI.    Hypokalemia:  -monitor and replete  per protocol    Hypomagnesemia:  -replaced    Fall at home:  CT head, CT cervical spine without traumatic injuries, stroke.  -PT/OT    MDD-Effexor, hold trazodone due to increased risk for serotonin syndrome.    Hypothyroidism-on levothyroxine.  -TSH 3.92    Chronic back pain with disc disease and spondylolisthesis, s/p lumbar laminectomy.     History of tobacco use disorder-quit smoking 6 months ago.    Constipation: CT abdomen pelvis on 9/8/2022 showed large amount of stool in the rectum.  - Senna-S, MiraLAX       Diet: NPO  DVT Prophylaxis: Pneumatic Compression Devices  Reynoso Catheter: Not present  Central Lines: None  Cardiac Monitoring: yes  Code Status:   full    Multiple day stay          S:  afebrile. Events overnight noted    O:  Temp: 98.9  F (37.2  C) Temp src: Oral BP: 120/65 Pulse: 76   Resp: 20 SpO2: 98 % O2 Device: Nasal cannula Oxygen Delivery: 4 LPM  gen nad  cv rrr  Lungs decreased r>l, non labored  abd bs+, nttp  Neuro a&o, somnolent    Lab/imaging reviewed

## 2022-09-13 NOTE — PROGRESS NOTES
[facility]    Infectious Disease Progress Note    Date of Service: 09/13/2022     Assessment & Plan   Maren Fofana is a 72 year old female who was admitted on 9/8/2022.     1. MRSA bacteremia/TAVR endocarditis, onset of symptoms around 9/6/2022--active issue.  MRSA bacteremia, high-grade, persistent positive blood cultures, 9/12/2022, consistent with endovascular infection. Endocarditis of TAVR is associated with high mortality, in this study 45% at 1 year, other studies note higher rates.   2. History of TAVR, AAA endovascular graft, bilateral TKAs, high risk of infection -- PET CT indicating TAVR endocarditis. PET also suggesting fluid at R knee may be infected also knee exam fairly benign.  3. AAA endovascular repair with graft 2016.  CT showing slight enlargement of aneurysm type II endoleak noted. Not lighting up on PET.  4. Alcohol use, imaging showing cirrhosis  5. Pleural effusion transudative, thoracentesis complicated by hydropneumothorax status post chest tube        Recommendations    1. Vancomycin  2. Zosyn stop   3. Anticipate plan for IV vancomycin for 6 weeks, p.o. rifampin, 2 weeks of gentamicin if tolerated -- discussed these with her today. She has baseline hearing loss, wears hearing aides. Rifampin can reduce levels of lipitor, lisinopril, omeprazole, synthroid.   4. Daily blood cultures until clear  5. Consider R knee aspirate  6. Consider MRI brain to look for sign of septic emboli    Josafat Recinos MD   [facility]  [phone]  ________________________________________________________________________________    Interval History   Pain all over. Remains weak. A little better. Temp in 100 range. Notes seeing black spots in vision past couple of days. Knees feel good. Hungry. Discussed results.       Physical Exam   Temp: 98.9  F (37.2  C) Temp src: Oral BP: 120/65 Pulse: 76   Resp: 20 SpO2: 98 % O2 Device: Nasal cannula Oxygen Delivery:  "4 LPM  Vitals:    09/11/22 0432 09/12/22 0500 09/13/22 0326   Weight: 67.9 kg (149 lb 9.6 oz) 67.7 kg (149 lb 4.8 oz) 67.3 kg (148 lb 6.4 oz)     Vital Signs with Ranges  Temp:  [97.8  F (36.6  C)-99.4  F (37.4  C)] 98.9  F (37.2  C)  Pulse:  [75-94] 76  Resp:  [18-20] 20  BP: ()/(61-69) 120/65  SpO2:  [95 %-100 %] 98 %    Constitutional: Awake, slow responses, oriented to place, year is close \"2002\"  Lungs: clear anterior  Cardiovascular: regular  Abdomen: not tender  Skin: Warm. No rash. +ecchymoses.   Neuro: Deconditioned, non-focal  MSK: knees good ROM without significant pain, no appreciable swelling  Peripheral IV  Pure wick    Medications       acetaminophen  975 mg Oral Q8H     famotidine  20 mg Oral Daily     folic acid  1 mg Oral Daily     [Held by provider] heparin ANTICOAGULANT  5,000 Units Subcutaneous Q12H     levothyroxine  88 mcg Oral QAM AC     multivitamin w/minerals  1 tablet Oral Daily     piperacillin-tazobactam  3.375 g Intravenous Q8H     sodium chloride (PF)  3 mL Intracatheter Q8H     torsemide  20 mg Oral Daily     vancomycin  1,250 mg Intravenous Q24H     venlafaxine  300 mg Oral Daily       Data   All microbiology laboratory data reviewed.  Recent Labs   Lab Test 09/13/22 0431 09/12/22 0431 09/11/22 0426   WBC 7.1 6.7 6.2   HGB 9.6* 9.7* 9.8*   HCT 28.1* 28.7* 29.5*   MCV 95 95 97   PLT 31* 29* 30*     Recent Labs   Lab Test 09/13/22 0431 09/12/22 0431 09/11/22 0426   CR 1.09 1.15* 1.48*       MICRO:  Collected Updated Procedure Result Status    09/10/2022 0832 09/10/2022 0846 Blood Culture Peripheral Blood [[id number]]   Peripheral Blood    In process Component Value   No component results             09/09/2022 2123 09/09/2022 2133 Blood Culture Peripheral Blood [[id number]]   Peripheral Blood    In process Component Value   No component results             09/09/2022 1343 09/10/2022 0536 Blood Culture Peripheral Blood [[id number]]   (Abnormal)   Peripheral Blood    " Preliminary result Component Value   Culture Positive on the 1st day of incubation Abnormal  P    Gram positive cocci in clusters Panic  P    1 of 2 bottles             09/08/2022 1847 09/10/2022 0715 Pleural fluid Aerobic Bacterial Culture Routine with Gram Stain [[id number]]    Pleural fluid from Pleural Cavity, Right    Preliminary result Component Value   Culture No growth after 1 day P   Gram Stain Result No organisms seen P    Gram Stain slide reviewed at the [facility]    2+ WBC seen P             09/08/2022 1847 09/08/2022 2033 Glucose fluid [[id number]]    Pleural fluid from Pleural Cavity, Right    Final result Component Value Units   Glucose Fluid Source Pleural Cavity, Right    Glucose fluid 117 mg/dL          09/08/2022 1847 09/09/2022 0351 Lactate dehydrogenase fluid [[id number]]    Pleural fluid from Pleural Cavity, Right    Final result Component Value Units   LD Fluid Source Pleural Cavity, Right    Lactate dehydrogenase fluid 100 U/L          09/08/2022 1847 09/09/2022 0351 Protein fluid [[id number]]    Pleural fluid from Pleural Cavity, Right    Final result Component Value Units   Protein Fluid Source Pleural Cavity, Right    Protein Total Fluid 3.0 g/dL          09/08/2022 1554 09/08/2022 1659 Symptomatic; Unknown Influenza A/B & SARS-CoV2 (COVID-19) Virus PCR Multiplex Nasopharyngeal [[id number]]    Swab from Nasopharyngeal    Final result Component Value   Influenza A PCR Negative   Influenza B PCR Negative   RSV PCR Negative   SARS CoV2 PCR Negative   NEGATIVE: SARS-CoV-2 (COVID-19) RNA not detected, presumed negative.          09/08/2022 1552 09/10/2022 1348 Blood Culture Peripheral Blood [[id number]]   (Abnormal)   Peripheral Blood    Preliminary result Component Value   Culture Positive on the 1st day of incubation Abnormal  P    Staphylococcus aureus Panic  P    2 of 2 bottles             09/08/2022 1552 09/10/2022 0704 Blood Culture Peripheral  Blood [[id number]]    (Abnormal)   Peripheral Blood    Edited Component Value   Culture Positive on the 1st day of incubation Abnormal     Staphylococcus aureus MRSA Panic     2 of 2 bottles         Susceptibility     Staphylococcus aureus MRSA     [place]     Clindamycin <=0.25 ug/mL Susceptible 1     Erythromycin >=8 ug/mL Resistant     Gentamicin <=0.5 ug/mL Susceptible     Linezolid 2 ug/mL Susceptible     Oxacillin >=4 ug/mL Resistant 2     Tetracycline <=1 ug/mL Susceptible     Trimethoprim/Sulfamethoxazole <=0.5/9.5 u... Susceptible     Vancomycin 1 ug/mL Susceptible              1 This isolate DOES NOT demonstrate inducible clindamycin resistance in vitro. Clindamycin is susceptible and could be used when indicated, however, erythromycin is resistant and should not be used.   2 Oxacillin susceptible isolates are susceptible to cephalosporins (example: cefazolin and cephalexin) and beta lactam combination agents. Oxacillin resistant isolates are resistant to these agents.        Susceptibility Comments    Staphylococcus aureus MRSA   MRSA requires contact precautions.         09/08/2022 1552 09/09/2022 0352 Verigene GP Panel [[id number]]    (Abnormal)   Peripheral Blood    Final result Component Value   Staphylococcus aureus Detected Abnormal    Positive for methicillin-resistant Staphylococcus aureus (MRSA) by Continuumigene multiplex nucleic acid test. Final identification and antimicrobial susceptibility testing will be verified by standard methods.   Staphylococcus epidermidis Not Detected   Staphylococcus lugdunensis Not Detected   Enterococcus faecalis Not Detected   Enterococcus faecium Not Detected   Streptococcus species Not Detected   Streptococcus agalactiae Not Detected   Streptococcus anginosus group Not Detected   Streptococcus pneumoniae Not Detected   Streptococcus pyogenes Not Detected   Listeria species Not Detected            RADIOLOGY:    XR Chest 2 Views    Result Date: 9/9/2022  EXAM: XR CHEST  2 VIEWS LOCATION: [facility] DATE/TIME: 2022 11:17 AM INDICATION: f u chest tube, PTX COMPARISON: 2022.     IMPRESSION: Right chest tube over the right lung apex in stable position. No recurrent pneumothorax. Small right pleural effusion with some infiltrates or atelectasis and some mild atelectasis left lung base all appear stable. Aortic valve prosthesis.    Echocardiogram Complete    Result Date: 2022  [id number]^CAMILLE^PAT^[place] [address]  Name: LEELEE MARIN MRN: [id number] : 1950 Study Date: 2022 02:42 PM Age: 72 yrs Gender: Female Patient Location: [facility] Reason For Study: Abnormal Heart Sound Ordering Physician: PAT OBRIEN Performed By: SANDI  BSA: 1.7 m2 Height: 61 in Weight: 148 lb HR: 81 BP: 120/66 mmHg ______________________________________________________________________________ Procedure Complete Portable Echo Adult. Compared to the prior study dated 10/26/2021, there have been no changes. ______________________________________________________________________________ Interpretation Summary  1.Left ventricular size, wall motion and function are normal. The ejection fraction is 60-65%. 2.The right ventricle is normal size. Mildly decreased right ventricular systolic function 3.The left atrium is moderate to severely dilated. 4.Thickened mitral leaflets without stenosis. There is mild to moderate (1-2+) mitral regurgitation. 5.There is a 23 ISABELLA 3 bioprosthetic valve present in aortic valve position. At heart rate of 99 bpm peak velocity is 2.3 m/s, mean gradient is 9 mmHg, dimensionless index 0.48. 6.IVC diameter >2.1 cm collapsing <50% with sniff suggests a high RA pressure estimated at 15 mmHg or greater. Compared to the prior study dated 10/26/2021, the Tricuspid regurgitation is really not sampled well, left atrium is further enlarged, no essential change for the  valve with prior peak velocity of 2.8 m/s and mean gradient of 16 mmHg. ______________________________________________________________________________ I      WMSI = 1.00     % Normal = 100  X - Cannot   0 -                      (2) - Mildly 2 -          Segments  Size Interpret    Hyperkinetic 1 - Normal  Hypokinetic  Hypokinetic  1-2     small                                                    7 -          3-5    moderate 3 - Akinetic 4 -          5 -         6 - Akinetic Dyskinetic   6-14    large              Dyskinetic   Aneurysmal  w/scar       w/scar       15-16   diffuse  Left Ventricle Left ventricular size, wall motion and function are normal. The ejection fraction is 60-65%. There is normal left ventricular wall thickness. Left ventricular diastolic function is normal. No regional wall motion abnormalities noted.  Right Ventricle The right ventricle is normal size. Mildly decreased right ventricular systolic function. TAPSE is normal, which is consistent with normal right ventricular systolic function.  Atria The left atrium is moderate to severely dilated. Right atrial size is normal. There is no color Doppler evidence of an atrial shunt.  Mitral Valve Thickened mitral valve anterior leaflet. Thickened mitral valve posterior leaflet. There is mild to moderate (1-2+) mitral regurgitation. There is no mitral valve stenosis.  Tricuspid Valve The tricuspid valve is not well visualized, but is grossly normal. Right ventricle systolic pressure estimate normal. There is trace to mild tricuspid regurgitation. There is no tricuspid stenosis.  Aortic Valve There is a ISABELLA 3 bioprosthetic valve present in aortic valve position. 23 At heart rate of 99 bpm peak velocity is 2.3 m/s, mean gradient is 9 mmHg, dimensionless index 0.48.  Pulmonic Valve The pulmonic valve is not well seen, but is grossly normal. This degree of valvular regurgitation is within normal limits. There is no pulmonic valvular stenosis.  Vessels  The aorta root is normal. Normal size ascending aorta. IVC diameter >2.1 cm collapsing <50% with sniff suggests a high RA pressure estimated at 15 mmHg or greater.  Pericardium There is no pericardial effusion.  Rhythm Sinus rhythm was noted.  ______________________________________________________________________________ MMode/2D Measurements & Calculations IVSd: 0.74 cm LVIDd: 4.7 cm LVIDs: 3.3 cm LVPWd: 0.75 cm FS: 28.2 %  LV mass(C)d: 109.3 grams LV mass(C)dI: 65.7 grams/m2 Ao root diam: 2.5 cm LA dimension: 5.8 cm LA/Ao: 2.3 LVOT diam: 1.6 cm LVOT area: 2.0 cm2 LA Volume Indexed (AL/bp): 86.6 ml/m2 RWT: 0.32  Time Measurements Aortic HR: 106.0 BPM MM HR: 91.0 BPM  Doppler Measurements & Calculations MV E max thomas: 102.3 cm/sec MV A max thomas: 108.5 cm/sec MV E/A: 0.94 MV dec slope: 322.0 cm/sec2 MV dec time: 0.32 sec Ao V2 max: 204.0 cm/sec Ao max P.0 mmHg Ao V2 mean: 165.0 cm/sec Ao mean P.0 mmHg Ao V2 VTI: 44.8 cm KP(I,D): 0.80 cm2 KP(V,D): 0.96 cm2 LV V1 max PG: 3.8 mmHg LV V1 max: 97.6 cm/sec LV V1 VTI: 17.8 cm CO(LVOT): 3.8 l/min CI(LVOT): 2.3 l/min/m2 SV(LVOT): 35.7 ml SI(LVOT): 21.5 ml/m2 TR max thomas: 244.0 cm/sec TR max P.8 mmHg AV Thomas Ratio (DI): 0.48 KP Index (cm2/m2): 0.48 E/E': 16.5 E/E' av.0 Lateral E/e': 20.0 Medial E/e': 13.9 Peak E' Thomas: 6.2 cm/sec  ______________________________________________________________________________ Report approved by: Yonatan Arroyo 2022 04:29 PM       US Thoracentesis    Result Date: 2022  EXAM: 1. RIGHT  THORACENTESIS 2. ULTRASOUND GUIDANCE LOCATION: [facility] DATE/TIME: 2022 6:46 PM INDICATION: Pleural effusion. PROCEDURE: Informed consent obtained. Time out performed. The chest was prepped and draped in sterile fashion. 5  mL of 1 % lidocaine was infused into the local soft tissues. Under direct ultrasound guidance, a 5 [place]ian catheter system was placed into the pleural effusion. 1.35  liters of clear  yellow and red  fluid were removed and sent to lab, if requested. Patient tolerated procedure well. Ultrasound imaging was obtained and placed in the patient's permanent medical record.     IMPRESSION: Status post right  ultrasound-guided thoracentesis. Reference CPT Code: 30667    XR Chest Port 1 View    Result Date: 9/10/2022  EXAM: XR CHEST PORT 1 VIEW LOCATION: [facility] DATE/TIME: 9/10/2022 2:21 PM INDICATION: follow up PTX for continued resolution COMPARISON: 09/09/2022     IMPRESSION: The heart is normal in size. Improved right pleural effusion. Right lower lobe opacities, most likely representing infiltrate. No pneumothorax. Right-sided chest tube.    XR Chest Port 1 View    Result Date: 9/9/2022  EXAM: XR CHEST PORT 1 VIEW LOCATION: [facility] DATE/TIME: 9/9/2022 12:00 AM INDICATION: s p chest tube follow-up pneumothorax COMPARISON: 09/08/2022     IMPRESSION: New right chest tube terminates at the apex medially. Right pneumothorax has resolved. Persistent opacity at the right base may represent an combination of atelectasis and edema. Normal heart size. Prior endovascular aortic valve replacement.  Left lung clear. Surgical clips cluster over the left shoulder.    XR Chest Port 1 View    Result Date: 9/8/2022  EXAM: XR CHEST PORT 1 VIEW LOCATION: [facility] DATE/TIME: 9/8/2022 9:44 PM INDICATION: ptx COMPARISON: 09/08/2022     IMPRESSION: Persistent right pneumothorax measuring 8 mm at the apex (stable) and 1.7 cm at the lateral base (previously 1.4 cm). Small right pleural effusion. Increased airspace infiltrate in the right lower lobe. Surgical clips in the left axilla. Calcified aortic arch. Prior aortic valve repair.    XR Chest Port 1 View    Result Date: 9/8/2022  EXAM: XR CHEST PORT 1 VIEW LOCATION: [facility] DATE/TIME: 9/8/2022 8:18 PM INDICATION: s p thoracentesis, ? ptx COMPARISON: Same date  abdominal CT and chest radiograph.     IMPRESSION: Stable cardiomediastinal silhouette with prior aortic valve replacement. Small right hydropneumothorax, similar volume to same day CT given differences in modalities, without evidence of tension. Possible reexpansion edema in the right lower lobe. Left lung is clear. No acute bony abnormality.    XR Chest Port 1 View    Result Date: 9/8/2022  EXAM: XR CHEST PORT 1 VIEW LOCATION: [facility] DATE/TIME: 9/8/2022 4:30 PM INDICATION: fever, cough COMPARISON: 12/3/2021     IMPRESSION: Small to moderate right effusion, slightly increased. Right lower lobe infiltrate or atelectasis.  No pneumothorax.  The left lung is clear.  The heart is normal in size.    CT Abdomen Pelvis w Contrast    Result Date: 9/8/2022  EXAM: CT ABDOMEN PELVIS W CONTRAST LOCATION: [facility] DATE/TIME: 9/8/2022 7:39 PM INDICATION: Right upper abdominal pain and fever. COMPARISON: CTA 8/21/2021 TECHNIQUE: CT scan of the abdomen and pelvis was performed following injection of IV contrast. Multiplanar reformats were obtained. Dose reduction techniques were used. CONTRAST: 75ml isovue 370 FINDINGS: LOWER CHEST: Small right hydropneumothorax post right thoracentesis earlier today. Small residual right pleural effusion. Heavy mitral annular calcifications. Small sliding-type hiatal hernia. HEPATOBILIARY: Cirrhotic appearing liver redemonstrated without focal suspicious mass. Normal gallbladder and biliary system. PANCREAS: Normal. SPLEEN: Normal. ADRENAL GLANDS: Normal. KIDNEYS/BLADDER: Normal kidneys and ureters. Nondistended bladder with focal asymmetric enhancing bladder wall thickening measuring up to 8 mm in the right bladder base image 173 of series 3.. BOWEL: Diverticulosis of the colon. No acute inflammatory change. No obstruction. Large amount of stool in the rectum. Mild ascites. LYMPH NODES: No lymphadenopathy. VASCULATURE: Previous endovascular  aortic aneurysm repair with bifurcated endograft. Aneurysm sac measures 5.0 x 5.6 cm, previously 4.7 x 5.4 cm. Type II endoleak is noted. PELVIC ORGANS: Hysterectomy. No adnexal mass. MUSCULOSKELETAL: Previous lower lumbar fusion. Degenerative changes are present within the spine and hips. Generalized anasarca.     IMPRESSION: 1.  Small to moderate right hydropneumothorax, status post large volume right thoracentesis earlier today. Patient may have a trapped right lung. Consider two-view chest radiograph. 2.  Cirrhotic appearing liver with mild ascites. No splenomegaly. 3.  Previous endovascular repair of infrarenal abdominal aortic aneurysm. Type II endoleak with interval enlargement of the aneurysm sac, compatible with endotension. Previous repair at [facility]. Consider nonemergent Interventional Radiology consult. 4.  Generalized anasarca. Report called to Dr. Roberson at 2000 hours    CT Cervical Spine w/o Contrast    Result Date: 9/8/2022  EXAM: CT CERVICAL SPINE W/O CONTRAST LOCATION: [facility] DATE/TIME: 9/8/2022 7:38 PM INDICATION: fall COMPARISON: None. TECHNIQUE: Routine CT Cervical Spine without IV contrast. Multiplanar reformats. Dose reduction techniques were used. FINDINGS: No evidence of acute fracture or subluxation of the cervical spine by CT imaging. Straightening of the normal cervical lordosis. The vertebral bodies of the cervical spine otherwise have normal stature and alignment. Anterolisthesis of C3 on C4 measuring  2 mm. Anterior marginal osteophytes at C4/C5-C7/T1. Multilevel degenerative disc disease of the cervical spine with disc height loss, most pronounced at C4/C5-C7/T1. The partially imaged intracranial contents are unremarkable. No prevertebral soft tissue swelling. The partially imaged lung apices are unremarkable. Craniovertebral junction and C1-C2: The odontoid process is well approximated with the anterior body of C1 and well aligned between the  lateral masses of C1. C2-C3: No posterior disc bulge or spinal canal narrowing. No neural foraminal narrowing. C3-C4: No posterior disc bulge or spinal canal narrowing. No neural foraminal narrowing. C4-C5: Broad bar disc osteophyte complex with moderate spinal canal narrowing. Uncovertebral joint disease and facet arthropathy with severe bilateral neural foraminal narrowing. C5-C6: No posterior disc bulge or spinal canal narrowing. Uncovertebral joint disease and facet arthropathy with severe right and moderate left neural foraminal narrowing. C6-C7: No posterior disc bulge or spinal canal narrowing. Uncovertebral joint disease and facet arthropathy with severe bilateral neural foraminal narrowing. C7-T1: No posterior disc bulge or spinal canal narrowing. Uncovertebral joint disease and facet arthropathy with moderate bilateral neural foraminal narrowing.     IMPRESSION: 1.  No evidence of acute fracture or subluxation of the cervical spine by CT imaging. 2.  Degenerative cervical spondylosis as described above.    CT Head w/o Contrast    Addendum Date: 9/8/2022    Addendum/ impression: INDICATION: Dizziness    Result Date: 9/8/2022  EXAM: CT HEAD W/O CONTRAST LOCATION: [facility] DATE/TIME: 9/8/2022 7:34 PM INDICATION: fall COMPARISON: None. TECHNIQUE: Routine CT Head without IV contrast. Multiplanar reformats. Dose reduction techniques were used. FINDINGS: INTRACRANIAL CONTENTS: No evidence of acute intracranial hemorrhage or mass effect. Scattered foci of decreased attenuation within the cerebral hemispheric white matter which are nonspecific, though most commonly ascribed to chronic small vessel ischemic  disease. The ventricles and sulci are prominent consistent with mild brain parenchymal volume loss. Gray-white matter differentiation is maintained. The basilar cisterns are patent. VISUALIZED ORBITS/SINUSES/MASTOIDS: Bilateral cataract surgery. The partially imaged globes are  otherwise unremarkable. The partially imaged paranasal sinuses, mastoid air cells and middle ear cavities are unremarkable. BONES/SOFT TISSUES: The visualized skull base and calvarium are unremarkable.     IMPRESSION:  1.  No evidence of acute intracranial hemorrhage or mass effect. 2.  Mild nonspecific white matter changes. 3.  Mild brain parenchymal volume loss.    CT Lumbar Spine w/o Contrast    Result Date: 9/8/2022  EXAM: CT LUMBAR SPINE W/O CONTRAST LOCATION: [facility] DATE/TIME: 9/8/2022 7:39 PM INDICATION: Back pain COMPARISON: None. TECHNIQUE: Routine CT Lumbar Spine without IV contrast. Multiplanar reformats. Dose reduction techniques were used. FINDINGS: No evidence of acute fracture or subluxation of the lumbar spine by CT imaging. Postsurgical changes posterior fusion at L3-L5 and interbody fusion at L3/L4 and L4/L5. No hardware complication. No evidence of acute fracture or subluxation of the lumbar spine by CT imaging. Retrolisthesis of T12 on L1 measuring 2 mm. Anterior marginal osteophytes at T12/L1 and L1/L2 and L2/L3. Multilevel degenerative disc disease of the lumbar spine with disc height loss, most pronounced at T12/L1 and L2/L3. Aortobiiliac endograft. The partially imaged intra-abdominal contents are otherwise unremarkable. T12/L1:  Symmetric disc bulge without spinal canal narrowing. Mild bilateral neural foraminal narrowing. L1/L2:  No posterior disc bulge or spinal canal narrowing. Mild bilateral neural foraminal narrowing. L2/L3:  Broad bar disc osteophyte complex with mild spinal canal narrowing. Severe bilateral neural foraminal narrowing. L3/L4:  Spondylotic ridging without spinal canal narrowing. Moderate bilateral facet arthropathy. No neural foraminal narrowing.  L4/L5:  No posterior disc bulge or spinal canal narrowing. Mild bilateral facet arthropathy. No neural foraminal narrowing. L5/S1: Symmetric disc bulge and moderate facet arthropathy without spinal  canal narrowing. Severe bilateral neural foraminal narrowing.     IMPRESSION:  1.  No evidence of acute fracture or subluxation of the lumbar spine by CT imaging. 2.  Postsurgical changes posterior fusion at L3-L5 and interbody fusion at L3/L4 and L4/L5. No hardware complication. 3.  Degenerative lumbar spondylosis as described above.    Total Time Spent 35 minutes with >50% of the time spent in chart review, evaluation, counseling, education and care coordination.

## 2022-09-14 NOTE — PRE-PROCEDURE
GENERAL PRE-PROCEDURE:   Procedure:  Transesophageal echocardiogram  Date/Time:  9/14/2022 11:41 AM    Written consent obtained?: Yes    Risks and benefits: Risks, benefits and alternatives were discussed    Consent given by:  Patient  Patient states understanding of procedure being performed: Yes    Patient's understanding of procedure matches consent: Yes    Procedure consent matches procedure scheduled: Yes    Expected level of sedation:  Moderate  Appropriately NPO:  Yes  Mallampati  :  Grade 1- soft palate, uvula, tonsillar pillars, and posterior pharyngeal wall visible  Lungs:  Lungs clear with good breath sounds bilaterally  Heart:  A-fib  History & Physical reviewed:  History and physical reviewed and no updates needed  Statement of review:  I have reviewed the lab findings, diagnostic data, medications, and the plan for sedation

## 2022-09-14 NOTE — PROGRESS NOTES
Introduced self and spiritual care to pt's sister sitting in room recliner. Pt was gone for tests or a procedure.  will follow up later in week.    Spiritual Care is available as requested or needed.    DINESH Drake.

## 2022-09-14 NOTE — PROCEDURES
Consent for procedure confirmed by telephone with daughter as patient guardian.  Patches applied anterior and posterior.  Anesthesia provides full sedation.    Single cardioversion attempt performed at 300J with restoration of NSR.  Patient maintains NSR during recovery.  No complications.

## 2022-09-14 NOTE — TREATMENT PLAN
Patient currently laying in bed with Na+ Phosphate currently infusing to L FA. So farm has been tolerating well./ She denies any chestpain/ tightness or discomfort at this time. Last BP 85/57  no complaints at this time.

## 2022-09-14 NOTE — CONSULTS
Cardiac and Thoracic Surgery Consultation      Maren Fofana MRN# [id number]   YOB: 1950 Age: 72 year old   Date of Admission: 9/8/2022     Reason for consult: I was asked by Dr. Maxwell Russ to evaluate this patient for prosthetic valvular endocarditis with root abscess and native mitral valvular endocarditis.           Assessment and Plan:   Ms. 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 She has moderate mitral regurgitation with vegetations on the mitral valve and no prosthetic aortic valve dysfunction. Unfortunately, she also has several concerning comorbidities including alcoholism, cirrhosis, and severe thrombocytopenia.  I discussed the option of open aortic and mitral valve replacements with likely aortic root replacement and potentially with reconstruction of the aorto-mitral curtain (Federico commando operation). I described the technical details, as well as the expected postoperative course and recovery to the patient. I also discussed the risks and benefits of the procedure. The risks include but are not limited to bleeding, infection, stroke, heart failure, dysrhythmia, respiratory failure, kidney or liver injury, bowel or limb ischemia, and death. The estimated risk of mortality is at least 50%, and the estimated risk of major morbidity or mortality is 100%.     The patient is not able to have a coherent discussion about all of this due to her somnolence and I discussed all of this in person with her daughter, Caitie. Caitie has a substantial understanding of these issues and believes that her mom would not want to go through surgery with such little chance of meaningful recovery. I am in agreement with a focus on palliative care as long as the family of the patient is in agreement with this. The chances of on-table death with such a large operation and such profound thrombocytopenia are not  insignificant and even if she were to survive the initial operation, there is almost no hope for meaningful recovery with acceptable quality of life despite what would certainly be a prolonged hospital course.                Chief Complaint:   Ms. 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    History is obtained from the patient         History of Present Illness:   This patient is a 72 year old female who presents with acute mental status change in the days preceding admission. The patient has a history of aortic stenosis and underwent transcatheter aortic valve replacement with a 23 mm Bates Ryan valve in September 2021. The patient did well after that and has been active in her over 55 community living arrangement. For instance, she had a full size tricycle that she would ride around the neighborhood with her friends. She does have alcoholism but she would have fairly long periods of sobriety in which she had good quality of life. However, she recently had an ear infection about 2-3 weeks ago and then this past week she presented to the ER after a fall with dyspnea and acute mental status change. She was found to have a right hydropneumothorax and was septic. She was treated with intravenous antibiotics and blood cultures grew MRSA from 9/8-9/13/2022. Today she underwent a EMILY for cardioversion and was found to have vegetations on the mitral valve and in the left ventricular outflow tract near the Bates TAVR valve. PET-CT shows FDG avidity around the mitral annulus and the prosthetic aortic valve.                Past Medical History:     Past Medical History:   Diagnosis Date     Aortic stenosis      Collapsed lung      Fibromyalgia      GERD (gastroesophageal reflux disease)      Hypothyroid      Migraine      Peripheral vascular disease (H)      Reactive airway disease      Vertigo              Past  Surgical History:     Past Surgical History:   Procedure Laterality Date      SECTION Bilateral      CV CORONARY ANGIOGRAM N/A 2021    Procedure: Coronary Angiogram;  Surgeon: Sara Randall MD;  Location: [facility] CATH LAB CV     CV TRANSCATHETER AORTIC VALVE REPLACEMENT N/A 2021    Procedure: CV TRANSCATHETER AORTIC VALVE REPLACEMENT;  Surgeon: Geovani Kulkarni MD;  Location: [facility] CARDIAC CATH LAB     HEART CATH FEMORAL CANNULIZATION WITH OPEN STANDBY REPAIR AORTIC VALVE N/A 2021    Procedure: LEFT SUBCLAVIAN ACCESS FOR  TRANSCATHETER AORTIC VALVE REPLACEMENT.  CARDIOPULMONARY BYPASS STANDBY;  Surgeon: Jorge L Baeza MD;  Location: [facility] CARDIAC CATH LAB     HYSTERECTOMY       POSTERIOR LAMINECTOMY / DECOMPRESSION LUMBAR SPINE      L2-L3     SPINAL FUSION       TONSILLECTOMY & ADENOIDECTOMY       TOTAL KNEE ARTHROPLASTY Bilateral                Social History:     Social History     Tobacco Use     Smoking status: Former Smoker     Packs/day: 0.50     Quit date: 2021     Years since quittin.4     Smokeless tobacco: Never Used   Substance Use Topics     Alcohol use: Yes             Family History:     Family History   Problem Relation Age of Onset     Pancreatic Cancer Mother      Pancreatic Cancer Father              Immunizations:     Immunization History   Administered Date(s) Administered     COVID-19,PF,Moderna 2021, 2021     Influenza, Quad, High Dose, Pf, 65yr+ (Fluzone HD) 2021             Allergies:     Allergies   Allergen Reactions     Bee Venom Anaphylaxis     Other Drug Allergy (See Comments) Hives     Ice Packs   Any kind of cold      Dilaudid [Hydromorphone] Other (See Comments)     Altered mental status, confusion, itching. Pt tolerates oxycodone.     Latex      hives             Medications:     Current Facility-Administered Medications Ordered in Epic   Medication Dose Route Frequency Last Rate Last Admin     acetaminophen (TYLENOL)  tablet 975 mg  975 mg Oral Q8H   975 mg at 09/14/22 0317     amiodarone (NEXTERONE) 360 mg in D5W 200 mL 1.8 mg/mL infusion  0.5 mg/min Intravenous Continuous   Held at 09/14/22 0512     apixaban ANTICOAGULANT (ELIQUIS) tablet 5 mg  5 mg Oral BID   5 mg at 09/14/22 1035     benzocaine 20% (HURRICAINE/TOPEX) 20 % spray   Mouth/Throat Once PRN   11 spray at 09/14/22 1150     digoxin (LANOXIN) injection 125 mcg  125 mcg Intravenous TID   125 mcg at 09/14/22 1034     famotidine (PEPCID) tablet 20 mg  20 mg Oral Daily   20 mg at 09/14/22 1036     fentaNYL (PF) (SUBLIMAZE) injection   Intravenous Once PRN   50 mcg at 09/14/22 1151     flumazenil (ROMAZICON) injection 0.2 mg  0.2 mg Intravenous q1 min prn         folic acid (FOLVITE) tablet 1 mg  1 mg Oral Daily   1 mg at 09/13/22 1004     gentamicin (GARAMYCIN) 60 mg in sodium chloride 0.9 % 50 mL intermittent infusion  1 mg/kg (Adjusted) Intravenous Q24H   60 mg at 09/13/22 1531     OLANZapine zydis (zyPREXA) ODT tab 5-10 mg  5-10 mg Oral Q6H PRN   5 mg at 09/11/22 0442    Or     haloperidol lactate (HALDOL) injection 2.5-5 mg  2.5-5 mg Intravenous Q6H PRN         levothyroxine (SYNTHROID/LEVOTHROID) tablet 88 mcg  88 mcg Oral QAM AC   88 mcg at 09/14/22 0818     lidocaine (LMX4) cream   Topical Q1H PRN         lidocaine (viscous) (XYLOCAINE) 2 % solution   Mouth/Throat Once PRN   15 mL at 09/14/22 1149     lidocaine 1 % 0.1-1 mL  0.1-1 mL Other Q1H PRN         magnesium sulfate 4 g in 50 mL sterile water (premade)  4 g Intravenous Once         melatonin tablet 5 mg  5 mg Oral QPM PRN         midazolam (VERSED) injection   Intravenous Once PRN   0.5 mg at 09/14/22 1151     multivitamin w/minerals (THERA-VIT-M) tablet 1 tablet  1 tablet Oral Daily   1 tablet at 09/13/22 1003     naloxone (NARCAN) injection 0.2 mg  0.2 mg Intravenous Q2 Min PRN        Or     naloxone (NARCAN) injection 0.4 mg  0.4 mg Intravenous Q2 Min PRN        Or     naloxone (NARCAN) injection 0.2 mg   0.2 mg Intramuscular Q2 Min PRN        Or     naloxone (NARCAN) injection 0.4 mg  0.4 mg Intramuscular Q2 Min PRN         ondansetron (ZOFRAN ODT) ODT tab 4 mg  4 mg Oral Q6H PRN        Or     ondansetron (ZOFRAN) injection 4 mg  4 mg Intravenous Q6H PRN   4 mg at 09/11/22 2114     oxyCODONE (ROXICODONE) tablet 5 mg  5 mg Oral Q4H PRN   5 mg at 09/10/22 1709     potassium chloride 10 mEq in 100 mL sterile water intermittent infusion (premix)  10 mEq Intravenous Once         prochlorperazine (COMPAZINE) injection 5 mg  5 mg Intravenous Q6H PRN        Or     prochlorperazine (COMPAZINE) tablet 5 mg  5 mg Oral Q6H PRN        Or     prochlorperazine (COMPAZINE) suppository 12.5 mg  12.5 mg Rectal Q12H PRN         rifampin (RIFADIN) capsule 600 mg  600 mg Oral Daily   600 mg at 09/13/22 1532     senna-docusate (SENOKOT-S/PERICOLACE) 8.6-50 MG per tablet 1 tablet  1 tablet Oral BID PRN        Or     senna-docusate (SENOKOT-S/PERICOLACE) 8.6-50 MG per tablet 2 tablet  2 tablet Oral BID PRN         sodium chloride (PF) 0.9% PF flush 3 mL  3 mL Intracatheter Q8H   3 mL at 09/14/22 0056     sodium chloride (PF) 0.9% PF flush 3 mL  3 mL Intracatheter q1 min prn         sodium chloride (PF) 0.9% PF flush 3 mL  3 mL Intracatheter q1 min prn         sodium chloride 0.9% infusion   Intravenous Continuous PRN 30 mL/hr at 09/14/22 1132 30 mL/hr at 09/14/22 1132     [Held by provider] torsemide (DEMADEX) tablet 20 mg  20 mg Oral Daily   20 mg at 09/13/22 1004     vancomycin (VANCOCIN) 1,250 mg in sodium chloride 0.9 % 250 mL intermittent infusion  1,250 mg Intravenous Q24H 167 mL/hr at 09/13/22 1710 1,250 mg at 09/13/22 1710     venlafaxine (EFFEXOR XR) 24 hr capsule 300 mg  300 mg Oral Daily   300 mg at 09/13/22 1005     No current [facility]-ordered outpatient medications on file.             Review of Systems:     The 10 point Review of Systems is negative other than noted in the HPI            Physical Exam:   Vitals were  reviewed  Temp: 98.7  F (37.1  C) Temp src: Oral BP: 128/81 Pulse: (!) 134   Resp: 15 SpO2: 100 % O2 Device: Nasal cannula Oxygen Delivery: 5 LPM  Constitutional:   fatigued, somnolent, cooperative, no apparent distress and appears older than stated age     Eyes:   lids and lashes normal and pupils equal, round and reactive to light     ENT:   normocepalic, without obvious abnormality     Neck:   supple, symmetrical, trachea midline     Lungs:   no increased work of breathing, good air exchange and no retractions     Cardiovascular:   irregularly irregular rhythm     Abdomen:   non-distended     Musculoskeletal:   no lower extremity pitting edema present  there is no redness, warmth, or swelling of the joints  full range of motion noted  motor strength is 5 out of 5 all extremities bilaterally     Neurologic:   Mental Status Exam:  Level of Alertness:   lethargic  Orientation:   person, place  Cranial Nerves:  cranial nerves II-XII are grossly intact  Motor Exam:  moves all extremities well and symmetrically     Neuropsychiatric:   General: restless and poor eye contact  Level of consciousness: drowsy  Affect: flat     Skin:   no bruising or bleeding and no redness, warmth, or swelling          Data:   All laboratory data reviewed  All cardiac studies reviewed by me.  All imaging studies reviewed by me.    CARDIAC CATHETERIZATION:  Left main mild disease  LAD with mild disease  Left circumflex has mild disease  RCA is dominant with mild atherosclerosis    TRANSESOPHAGEAL ECHOCARDIOGRAPHY:  1. Normal left ventricular size and systolic performance with a visually  estimated ejection fraction of 60-65%.  2. There is a bio-prosthetic aortic valve (documented 23 mm Bates Ryan 3  tissue valve).  Â  There is reasonable cusp separation on two-dimensional imaging.  Â  No aortic insufficiency is detected.  3. There is a 0.7x0.3 mobile vegetation associated with the aortic valve  prosthesis best visualized in the left  ventricular outflow tract. There is  suspected abscess associated with the aortic valve in the 2-3 o'clock in short  axis.  4. There is moderate mitral insufficiency.  5. There is a 1.3x0.8 mobile vegetation associated with the posterior mitral  valve leaflet (atrial aspect of valve). There is a superimposed 1.3 cm mobile  linear strand associated with the vegetation. In addition there is a 1.1 x 0.5  cm vegetation associated with the anterior mitral valve leaflet.  6. There is moderate to severe left atrial enlargement. There is mild right  atrial enlargement.  7. No thrombus is detected within the left atrial appendage.  8. Echo contrast examination was performed using agitated NS as contrast  agent. The right heart opacity was good and the left heart was well  visualized. There was no evidence of right to left shunting during spontaneous  respiration or following release of Valsalva.    TRANSTHORACIC ECHOCARDIOGRAPHY:  1.Left ventricular size, wall motion and function are normal. The ejection  fraction is 60-65%.  2.The right ventricle is normal size. Mildly decreased right ventricular  systolic function  3.The left atrium is moderate to severely dilated.  4.Thickened mitral leaflets without stenosis. There is mild to moderate (1-2+)  mitral regurgitation.  5.There is a 23 ISABELLA 3 bioprosthetic valve present in aortic valve position.  At heart rate of 99 bpm peak velocity is 2.3 m/s, mean gradient is 9 mmHg,  dimensionless index 0.48.  6.IVC diameter >2.1 cm collapsing <50% with sniff suggests a high RA pressure  estimated at 15 mmHg or greater.  Compared to the prior study dated 10/26/2021, the Tricuspid regurgitation is  really not sampled well, left atrium is further enlarged, no essential change  for the valve with prior peak velocity of 2.8 m/s and mean gradient of 16  mmHg.      EKG:  Atrial fibrillation with rapid ventricular response   Cannot rule out Anterior infarct (cited on or before 29-SEP-2021)    Abnormal ECG   When compared with ECG of 08-SEP-2022 15:41,   Atrial fibrillation has replaced Sinus rhythm   Nonspecific T wave abnormality no longer evident in Anterior leads    PET/CT:  Moderate severity patchy irregular ground glass and consolidative opacities in the right lung, mostly in the right lower lobe although also involving the right upper lobe, associated with varying degrees of heterogeneous mild to moderate   uptake, likely infectious/inflammatory. Mild left basilar atelectasis with low-level inflammatory uptake. Moderate uptake about TAVR and moderate uptake about dense mitral annulus calcification, suspicious for underlying endocarditis. Focus of moderate   uptake involves the lumbosacral junction, likely inflammatory, without other findings to suggest osteomyelitis discitis. Heterogeneous uptake about both knees, marked on the right and moderate on the left. Small right knee joint effusion. Abdominal   aortic aneurysm status post aortobiiliac stent graft placement. No increased uptake about the stent graft to suggest associated infection. No FDG avid adenopathy. No focal organized fluid collection or abscess.

## 2022-09-14 NOTE — ANESTHESIA POSTPROCEDURE EVALUATION
Patient: Maren Fofana    Procedure: * No procedures listed *       Anesthesia Type:  General    Note:  Disposition: Inpatient   Postop Pain Control: Uneventful            Sign Out: Well controlled pain   PONV: No   Neuro/Psych: Uneventful            Sign Out: Acceptable/Baseline neuro status   Airway/Respiratory: Uneventful            Sign Out: Acceptable/Baseline resp. status   CV/Hemodynamics: Uneventful            Sign Out: Acceptable CV status; No obvious hypovolemia; No obvious fluid overload   Other NRE: NONE   DID A NON-ROUTINE EVENT OCCUR? No           Last vitals:  Vitals:    09/14/22 1345 09/14/22 1400 09/14/22 1415   BP: 113/64 106/61 102/67   Pulse: 91 82 86   Resp: 28 17 16   Temp:      SpO2: 96% 100% 98%       Electronically Signed By: Katina Hall MD  September 14, 2022  3:15 PM

## 2022-09-14 NOTE — PROGRESS NOTES
Patient given Digoxin 125mcg via IV over 5 minutes. She tolerated medication well. Current Vitals 99/66 P : 116-130s. She has murali complaints at this time. Denies chest pain/ tightness or SOB at this time. Will continue to monitor

## 2022-09-14 NOTE — ANESTHESIA CARE TRANSFER NOTE
Patient: Maren Fofana    Procedure: * No procedures listed *       Diagnosis: * No pre-op diagnosis entered *  Diagnosis Additional Information: No value filed.    Anesthesia Type:   General     Note:    Oropharynx: oropharynx clear of all foreign objects and spontaneously breathing  Level of Consciousness: drowsy  Oxygen Supplementation: nasal cannula  Level of Supplemental Oxygen (L/min / FiO2): 3  Independent Airway: airway patency satisfactory and stable  Dentition: dentition unchanged  Vital Signs Stable: post-procedure vital signs reviewed and stable  Report to RN Given: handoff report given  Patient transferred to: [facility]          Vitals:  Vitals Value Taken Time   /52 09/14/22 1317   Temp 36.7  C (98  F) 09/14/22 1317   Pulse 79 09/14/22 1317   Resp 20 09/14/22 1317   SpO2 100 % 09/14/22 1317       Electronically Signed By: DENA Diaz CRNA  September 14, 2022  1:18 PM

## 2022-09-14 NOTE — PROGRESS NOTES
EMILY completed by Dr Arellano.  0.5mg Versed and 50 mcgs Fentanyl given during EMILY.  Pt tolerated well.  See flowsheet for vitals.  Pt remains NPO for cardioversion.  No hot foods or liquids until after 6pm tonight.  Report given to [facility] RN.  Pt transported to [facility] 14 via bed.  Magda Mchugh RN

## 2022-09-14 NOTE — PROGRESS NOTES
[facility]    Infectious Disease Progress Note    Date of Service: 09/14/2022     Assessment & Plan   Maren Fofana is a 72 year old female who was admitted on 9/8/2022.     1. MRSA bacteremia/TAVR and mitral valve endocarditis, onset of symptoms around 9/6/2022--active issue.  MRSA bacteremia, high-grade, persistent positive blood cultures, consistent with endovascular infection. Endocarditis of TAVR is associated with high mortality, in this study 45% at 1 year, other studies note higher rates. EMILY 9/14 shows 0.7 x 0.3 cm vegetation on aortic valve, 1.3 x 0.8 cm and 1.1 x 0.5 cm vegetations on mitral valve. At risk for septic emboli including stroke. Potentially treatable with antibiotic course, however with large vegetations this will be difficult. She is high risk for complications if she undergoes open valve surgery, noting that previous valve surgery was TAVR.   2. History of TAVR, AAA endovascular graft, bilateral TKAs, high risk of infection -- PET CT indicating TAVR endocarditis. PET also suggesting fluid at R knee may be infected also knee exam fairly benign.  3. AAA endovascular repair with graft 2016.  CT showing slight enlargement of aneurysm type II endoleak noted. Not lighting up on PET.  4. Alcohol use, imaging showing cirrhosis  5. Pleural effusion transudative, thoracentesis complicated by hydropneumothorax status post chest tube, now removed.   6. A fib 9/13 -- cardioverted 9/14 now in sinus rhythm.         Recommendations    1. Medical treatment for prosthetic valve endocarditis is IV vancomycin for 6 weeks, p.o. rifampin 6 weeks, 2 weeks of gentamicin if tolerated. Risk for renal toxicity, ototoxicity. She has baseline hearing loss, wears hearing aides. Rifampin can reduce levels of lipitor, lisinopril, omeprazole, synthroid.   2. Daily blood cultures until clear  3. Consider R knee aspirate  4. Plan MRI brain to look for sign of septic emboli  5. Hold on PICC unless we  "run out of peripheral IV options, due to ongoing bacteremia.     Josafat Recinos MD   [facility]  [phone]  ________________________________________________________________________________    Interval History   Back from EMILY. Reviewed results with her. She still feels terrible, pain all over, not one area standing out. Temps better. Difficult IV access.       Physical Exam   Temp: 98.8  F (37.1  C) Temp src: Axillary BP: 108/51 Pulse: 88   Resp: 16 SpO2: 100 % O2 Device: Nasal cannula Oxygen Delivery: 5 LPM  Vitals:    09/12/22 0500 09/13/22 0326 09/14/22 0311   Weight: 67.7 kg (149 lb 4.8 oz) 67.3 kg (148 lb 6.4 oz) 68.2 kg (150 lb 4.8 oz)     Vital Signs with Ranges  Temp:  [97.5  F (36.4  C)-99.4  F (37.4  C)] 98.8  F (37.1  C)  Pulse:  [] 88  Resp:  [13-28] 16  BP: ()/(51-91) 108/51  FiO2 (%):  [2 %] 2 %  SpO2:  [87 %-100 %] 100 %    Constitutional: Awake, slow responses, oriented to place, year is close \"2002\"  Lungs: clear anterior  Cardiovascular: regular  Abdomen: not tender  Skin: Warm. No rash. +ecchymoses.   Neuro: Deconditioned, non-focal  MSK: knees good ROM without significant pain, no appreciable swelling  Peripheral IV  Pure wick    Medications     amiodarone Stopped (09/14/22 0512)       acetaminophen  975 mg Oral Q8H     apixaban ANTICOAGULANT  5 mg Oral BID     digoxin  125 mcg Intravenous TID     famotidine  20 mg Oral Daily     folic acid  1 mg Oral Daily     gentamicin  1 mg/kg (Adjusted) Intravenous Q24H     levothyroxine  88 mcg Oral QAM AC     magnesium sulfate  4 g Intravenous Once     multivitamin w/minerals  1 tablet Oral Daily     potassium chloride  10 mEq Intravenous Once     rifampin  600 mg Oral Daily     sodium chloride (PF)  3 mL Intracatheter Q8H     [Held by provider] torsemide  20 mg Oral Daily     vancomycin  1,250 mg Intravenous Q24H     venlafaxine  300 mg Oral Daily       Data   All microbiology laboratory data reviewed.  Recent " Labs   Lab Test 09/13/22  0431 09/12/22  0431 09/11/22  0426   WBC 7.1 6.7 6.2   HGB 9.6* 9.7* 9.8*   HCT 28.1* 28.7* 29.5*   MCV 95 95 97   PLT 31* 29* 30*     Recent Labs   Lab Test 09/14/22  0307 09/13/22  0431 09/12/22  0431   CR 0.89 1.09 1.15*       MICRO:  Collected Updated Procedure Result Status    09/10/2022 0832 09/10/2022 0846 Blood Culture Peripheral Blood [[id number]]   Peripheral Blood    In process Component Value   No component results             09/09/2022 2123 09/09/2022 2133 Blood Culture Peripheral Blood [[id number]]   Peripheral Blood    In process Component Value   No component results             09/09/2022 1343 09/10/2022 0536 Blood Culture Peripheral Blood [[id number]]   (Abnormal)   Peripheral Blood    Preliminary result Component Value   Culture Positive on the 1st day of incubation Abnormal  P    Gram positive cocci in clusters Panic  P    1 of 2 bottles             09/08/2022 1847 09/10/2022 0715 Pleural fluid Aerobic Bacterial Culture Routine with Gram Stain [[id number]]    Pleural fluid from Pleural Cavity, Right    Preliminary result Component Value   Culture No growth after 1 day P   Gram Stain Result No organisms seen P    Gram Stain slide reviewed at the [facility]    2+ WBC seen P             09/08/2022 1847 09/08/2022 2033 Glucose fluid [[id number]]    Pleural fluid from Pleural Cavity, Right    Final result Component Value Units   Glucose Fluid Source Pleural Cavity, Right    Glucose fluid 117 mg/dL          09/08/2022 1847 09/09/2022 0351 Lactate dehydrogenase fluid [[id number]]    Pleural fluid from Pleural Cavity, Right    Final result Component Value Units   LD Fluid Source Pleural Cavity, Right    Lactate dehydrogenase fluid 100 U/L          09/08/2022 1847 09/09/2022 0351 Protein fluid [[id number]]    Pleural fluid from Pleural Cavity, Right    Final result Component Value Units   Protein Fluid Source Pleural Cavity, Right     Protein Total Fluid 3.0 g/dL          09/08/2022 1554 09/08/2022 1659 Symptomatic; Unknown Influenza A/B & SARS-CoV2 (COVID-19) Virus PCR Multiplex Nasopharyngeal [[id number]]    Swab from Nasopharyngeal    Final result Component Value   Influenza A PCR Negative   Influenza B PCR Negative   RSV PCR Negative   SARS CoV2 PCR Negative   NEGATIVE: SARS-CoV-2 (COVID-19) RNA not detected, presumed negative.          09/08/2022 1552 09/10/2022 1348 Blood Culture Peripheral Blood [[id number]]   (Abnormal)   Peripheral Blood    Preliminary result Component Value   Culture Positive on the 1st day of incubation Abnormal  P    Staphylococcus aureus Panic  P    2 of 2 bottles             09/08/2022 1552 09/10/2022 0704 Blood Culture Peripheral Blood [[id number]]    (Abnormal)   Peripheral Blood    Edited Component Value   Culture Positive on the 1st day of incubation Abnormal     Staphylococcus aureus MRSA Panic     2 of 2 bottles         Susceptibility     Staphylococcus aureus MRSA     [place]     Clindamycin <=0.25 ug/mL Susceptible 1     Erythromycin >=8 ug/mL Resistant     Gentamicin <=0.5 ug/mL Susceptible     Linezolid 2 ug/mL Susceptible     Oxacillin >=4 ug/mL Resistant 2     Tetracycline <=1 ug/mL Susceptible     Trimethoprim/Sulfamethoxazole <=0.5/9.5 u... Susceptible     Vancomycin 1 ug/mL Susceptible              1 This isolate DOES NOT demonstrate inducible clindamycin resistance in vitro. Clindamycin is susceptible and could be used when indicated, however, erythromycin is resistant and should not be used.   2 Oxacillin susceptible isolates are susceptible to cephalosporins (example: cefazolin and cephalexin) and beta lactam combination agents. Oxacillin resistant isolates are resistant to these agents.        Susceptibility Comments    Staphylococcus aureus MRSA   MRSA requires contact precautions.         09/08/2022 1552 09/09/2022 0352 Verigene GP Panel [[id number]]    (Abnormal)   Peripheral Blood     Final result Component Value   Staphylococcus aureus Detected Abnormal    Positive for methicillin-resistant Staphylococcus aureus (MRSA) by Verigene multiplex nucleic acid test. Final identification and antimicrobial susceptibility testing will be verified by standard methods.   Staphylococcus epidermidis Not Detected   Staphylococcus lugdunensis Not Detected   Enterococcus faecalis Not Detected   Enterococcus faecium Not Detected   Streptococcus species Not Detected   Streptococcus agalactiae Not Detected   Streptococcus anginosus group Not Detected   Streptococcus pneumoniae Not Detected   Streptococcus pyogenes Not Detected   Listeria species Not Detected            RADIOLOGY:    XR Chest 2 Views    Result Date: 2022  EXAM: XR CHEST 2 VIEWS LOCATION: [facility] DATE/TIME: 2022 11:17 AM INDICATION: f u chest tube, PTX COMPARISON: 2022.     IMPRESSION: Right chest tube over the right lung apex in stable position. No recurrent pneumothorax. Small right pleural effusion with some infiltrates or atelectasis and some mild atelectasis left lung base all appear stable. Aortic valve prosthesis.    Echocardiogram Complete    Result Date: 2022  [id number]^CAMILLE^PAT^[place] [address]  Name: LEELEE MARIN MRN: [id number] : 1950 Study Date: 2022 02:42 PM Age: 72 yrs Gender: Female Patient Location: [facility] Reason For Study: Abnormal Heart Sound Ordering Physician: PAT OBRIEN Performed By: SANDI  BSA: 1.7 m2 Height: 61 in Weight: 148 lb HR: 81 BP: 120/66 mmHg ______________________________________________________________________________ Procedure Complete Portable Echo Adult. Compared to the prior study dated 10/26/2021, there have been no changes. ______________________________________________________________________________ Interpretation Summary  1.Left ventricular size, wall motion and  function are normal. The ejection fraction is 60-65%. 2.The right ventricle is normal size. Mildly decreased right ventricular systolic function 3.The left atrium is moderate to severely dilated. 4.Thickened mitral leaflets without stenosis. There is mild to moderate (1-2+) mitral regurgitation. 5.There is a 23 ISABELLA 3 bioprosthetic valve present in aortic valve position. At heart rate of 99 bpm peak velocity is 2.3 m/s, mean gradient is 9 mmHg, dimensionless index 0.48. 6.IVC diameter >2.1 cm collapsing <50% with sniff suggests a high RA pressure estimated at 15 mmHg or greater. Compared to the prior study dated 10/26/2021, the Tricuspid regurgitation is really not sampled well, left atrium is further enlarged, no essential change for the valve with prior peak velocity of 2.8 m/s and mean gradient of 16 mmHg. ______________________________________________________________________________ I      WMSI = 1.00     % Normal = 100  X - Cannot   0 -                      (2) - Mildly 2 -          Segments  Size Interpret    Hyperkinetic 1 - Normal  Hypokinetic  Hypokinetic  1-2     small                                                    7 -          3-5    moderate 3 - Akinetic 4 -          5 -         6 - Akinetic Dyskinetic   6-14    large              Dyskinetic   Aneurysmal  w/scar       w/scar       15-16   diffuse  Left Ventricle Left ventricular size, wall motion and function are normal. The ejection fraction is 60-65%. There is normal left ventricular wall thickness. Left ventricular diastolic function is normal. No regional wall motion abnormalities noted.  Right Ventricle The right ventricle is normal size. Mildly decreased right ventricular systolic function. TAPSE is normal, which is consistent with normal right ventricular systolic function.  Atria The left atrium is moderate to severely dilated. Right atrial size is normal. There is no color Doppler evidence of an atrial shunt.  Mitral Valve Thickened mitral  valve anterior leaflet. Thickened mitral valve posterior leaflet. There is mild to moderate (1-2+) mitral regurgitation. There is no mitral valve stenosis.  Tricuspid Valve The tricuspid valve is not well visualized, but is grossly normal. Right ventricle systolic pressure estimate normal. There is trace to mild tricuspid regurgitation. There is no tricuspid stenosis.  Aortic Valve There is a ISABELLA 3 bioprosthetic valve present in aortic valve position. 23 At heart rate of 99 bpm peak velocity is 2.3 m/s, mean gradient is 9 mmHg, dimensionless index 0.48.  Pulmonic Valve The pulmonic valve is not well seen, but is grossly normal. This degree of valvular regurgitation is within normal limits. There is no pulmonic valvular stenosis.  Vessels The aorta root is normal. Normal size ascending aorta. IVC diameter >2.1 cm collapsing <50% with sniff suggests a high RA pressure estimated at 15 mmHg or greater.  Pericardium There is no pericardial effusion.  Rhythm Sinus rhythm was noted.  ______________________________________________________________________________ MMode/2D Measurements & Calculations IVSd: 0.74 cm LVIDd: 4.7 cm LVIDs: 3.3 cm LVPWd: 0.75 cm FS: 28.2 %  LV mass(C)d: 109.3 grams LV mass(C)dI: 65.7 grams/m2 Ao root diam: 2.5 cm LA dimension: 5.8 cm LA/Ao: 2.3 LVOT diam: 1.6 cm LVOT area: 2.0 cm2 LA Volume Indexed (AL/bp): 86.6 ml/m2 RWT: 0.32  Time Measurements Aortic HR: 106.0 BPM MM HR: 91.0 BPM  Doppler Measurements & Calculations MV E max thomas: 102.3 cm/sec MV A max thomas: 108.5 cm/sec MV E/A: 0.94 MV dec slope: 322.0 cm/sec2 MV dec time: 0.32 sec Ao V2 max: 204.0 cm/sec Ao max P.0 mmHg Ao V2 mean: 165.0 cm/sec Ao mean P.0 mmHg Ao V2 VTI: 44.8 cm KP(I,D): 0.80 cm2 KP(V,D): 0.96 cm2 LV V1 max PG: 3.8 mmHg LV V1 max: 97.6 cm/sec LV V1 VTI: 17.8 cm CO(LVOT): 3.8 l/min CI(LVOT): 2.3 l/min/m2 SV(LVOT): 35.7 ml SI(LVOT): 21.5 ml/m2 TR max thomas: 244.0 cm/sec TR max P.8 mmHg AV Thomas Ratio (DI): 0.48  KP Index (cm2/m2): 0.48 E/E': 16.5 E/E' av.0 Lateral E/e': 20.0 Medial E/e': 13.9 Peak E' Thomas: 6.2 cm/sec  ______________________________________________________________________________ Report approved by: Yonatan Arroyo 2022 04:29 PM       US Thoracentesis    Result Date: 2022  EXAM: 1. RIGHT  THORACENTESIS 2. ULTRASOUND GUIDANCE LOCATION: [facility] DATE/TIME: 2022 6:46 PM INDICATION: Pleural effusion. PROCEDURE: Informed consent obtained. Time out performed. The chest was prepped and draped in sterile fashion. 5  mL of 1 % lidocaine was infused into the local soft tissues. Under direct ultrasound guidance, a 5 Pashto catheter system was placed into the pleural effusion. 1.35  liters of clear yellow and red  fluid were removed and sent to lab, if requested. Patient tolerated procedure well. Ultrasound imaging was obtained and placed in the patient's permanent medical record.     IMPRESSION: Status post right  ultrasound-guided thoracentesis. Reference CPT Code: 80796    XR Chest Port 1 View    Result Date: 9/10/2022  EXAM: XR CHEST PORT 1 VIEW LOCATION: [facility] DATE/TIME: 9/10/2022 2:21 PM INDICATION: follow up PTX for continued resolution COMPARISON: 2022     IMPRESSION: The heart is normal in size. Improved right pleural effusion. Right lower lobe opacities, most likely representing infiltrate. No pneumothorax. Right-sided chest tube.    XR Chest Port 1 View    Result Date: 2022  EXAM: XR CHEST PORT 1 VIEW LOCATION: [facility] DATE/TIME: 2022 12:00 AM INDICATION: s p chest tube follow-up pneumothorax COMPARISON: 2022     IMPRESSION: New right chest tube terminates at the apex medially. Right pneumothorax has resolved. Persistent opacity at the right base may represent an combination of atelectasis and edema. Normal heart size. Prior endovascular aortic valve replacement.  Left lung clear.  Surgical clips cluster over the left shoulder.    XR Chest Port 1 View    Result Date: 9/8/2022  EXAM: XR CHEST PORT 1 VIEW LOCATION: [facility] DATE/TIME: 9/8/2022 9:44 PM INDICATION: ptx COMPARISON: 09/08/2022     IMPRESSION: Persistent right pneumothorax measuring 8 mm at the apex (stable) and 1.7 cm at the lateral base (previously 1.4 cm). Small right pleural effusion. Increased airspace infiltrate in the right lower lobe. Surgical clips in the left axilla. Calcified aortic arch. Prior aortic valve repair.    XR Chest Port 1 View    Result Date: 9/8/2022  EXAM: XR CHEST PORT 1 VIEW LOCATION: [facility] DATE/TIME: 9/8/2022 8:18 PM INDICATION: s p thoracentesis, ? ptx COMPARISON: Same date abdominal CT and chest radiograph.     IMPRESSION: Stable cardiomediastinal silhouette with prior aortic valve replacement. Small right hydropneumothorax, similar volume to same day CT given differences in modalities, without evidence of tension. Possible reexpansion edema in the right lower lobe. Left lung is clear. No acute bony abnormality.    XR Chest Port 1 View    Result Date: 9/8/2022  EXAM: XR CHEST PORT 1 VIEW LOCATION: [facility] DATE/TIME: 9/8/2022 4:30 PM INDICATION: fever, cough COMPARISON: 12/3/2021     IMPRESSION: Small to moderate right effusion, slightly increased. Right lower lobe infiltrate or atelectasis.  No pneumothorax.  The left lung is clear.  The heart is normal in size.    CT Abdomen Pelvis w Contrast    Result Date: 9/8/2022  EXAM: CT ABDOMEN PELVIS W CONTRAST LOCATION: [facility] DATE/TIME: 9/8/2022 7:39 PM INDICATION: Right upper abdominal pain and fever. COMPARISON: CTA 8/21/2021 TECHNIQUE: CT scan of the abdomen and pelvis was performed following injection of IV contrast. Multiplanar reformats were obtained. Dose reduction techniques were used. CONTRAST: 75ml isovue 370 FINDINGS: LOWER CHEST: Small  right hydropneumothorax post right thoracentesis earlier today. Small residual right pleural effusion. Heavy mitral annular calcifications. Small sliding-type hiatal hernia. HEPATOBILIARY: Cirrhotic appearing liver redemonstrated without focal suspicious mass. Normal gallbladder and biliary system. PANCREAS: Normal. SPLEEN: Normal. ADRENAL GLANDS: Normal. KIDNEYS/BLADDER: Normal kidneys and ureters. Nondistended bladder with focal asymmetric enhancing bladder wall thickening measuring up to 8 mm in the right bladder base image 173 of series 3.. BOWEL: Diverticulosis of the colon. No acute inflammatory change. No obstruction. Large amount of stool in the rectum. Mild ascites. LYMPH NODES: No lymphadenopathy. VASCULATURE: Previous endovascular aortic aneurysm repair with bifurcated endograft. Aneurysm sac measures 5.0 x 5.6 cm, previously 4.7 x 5.4 cm. Type II endoleak is noted. PELVIC ORGANS: Hysterectomy. No adnexal mass. MUSCULOSKELETAL: Previous lower lumbar fusion. Degenerative changes are present within the spine and hips. Generalized anasarca.     IMPRESSION: 1.  Small to moderate right hydropneumothorax, status post large volume right thoracentesis earlier today. Patient may have a trapped right lung. Consider two-view chest radiograph. 2.  Cirrhotic appearing liver with mild ascites. No splenomegaly. 3.  Previous endovascular repair of infrarenal abdominal aortic aneurysm. Type II endoleak with interval enlargement of the aneurysm sac, compatible with endotension. Previous repair at [facility]. Consider nonemergent Interventional Radiology consult. 4.  Generalized anasarca. Report called to Dr. Roberson at 2000 hours    CT Cervical Spine w/o Contrast    Result Date: 9/8/2022  EXAM: CT CERVICAL SPINE W/O CONTRAST LOCATION: [facility] DATE/TIME: 9/8/2022 7:38 PM INDICATION: fall COMPARISON: None. TECHNIQUE: Routine CT Cervical Spine without IV contrast. Multiplanar reformats. Dose  reduction techniques were used. FINDINGS: No evidence of acute fracture or subluxation of the cervical spine by CT imaging. Straightening of the normal cervical lordosis. The vertebral bodies of the cervical spine otherwise have normal stature and alignment. Anterolisthesis of C3 on C4 measuring  2 mm. Anterior marginal osteophytes at C4/C5-C7/T1. Multilevel degenerative disc disease of the cervical spine with disc height loss, most pronounced at C4/C5-C7/T1. The partially imaged intracranial contents are unremarkable. No prevertebral soft tissue swelling. The partially imaged lung apices are unremarkable. Craniovertebral junction and C1-C2: The odontoid process is well approximated with the anterior body of C1 and well aligned between the lateral masses of C1. C2-C3: No posterior disc bulge or spinal canal narrowing. No neural foraminal narrowing. C3-C4: No posterior disc bulge or spinal canal narrowing. No neural foraminal narrowing. C4-C5: Broad bar disc osteophyte complex with moderate spinal canal narrowing. Uncovertebral joint disease and facet arthropathy with severe bilateral neural foraminal narrowing. C5-C6: No posterior disc bulge or spinal canal narrowing. Uncovertebral joint disease and facet arthropathy with severe right and moderate left neural foraminal narrowing. C6-C7: No posterior disc bulge or spinal canal narrowing. Uncovertebral joint disease and facet arthropathy with severe bilateral neural foraminal narrowing. C7-T1: No posterior disc bulge or spinal canal narrowing. Uncovertebral joint disease and facet arthropathy with moderate bilateral neural foraminal narrowing.     IMPRESSION: 1.  No evidence of acute fracture or subluxation of the cervical spine by CT imaging. 2.  Degenerative cervical spondylosis as described above.    CT Head w/o Contrast    Addendum Date: 9/8/2022    Addendum/ impression: INDICATION: Dizziness    Result Date: 9/8/2022  EXAM: CT HEAD W/O CONTRAST LOCATION: [facility]  [facility] DATE/TIME: 9/8/2022 7:34 PM INDICATION: fall COMPARISON: None. TECHNIQUE: Routine CT Head without IV contrast. Multiplanar reformats. Dose reduction techniques were used. FINDINGS: INTRACRANIAL CONTENTS: No evidence of acute intracranial hemorrhage or mass effect. Scattered foci of decreased attenuation within the cerebral hemispheric white matter which are nonspecific, though most commonly ascribed to chronic small vessel ischemic  disease. The ventricles and sulci are prominent consistent with mild brain parenchymal volume loss. Gray-white matter differentiation is maintained. The basilar cisterns are patent. VISUALIZED ORBITS/SINUSES/MASTOIDS: Bilateral cataract surgery. The partially imaged globes are otherwise unremarkable. The partially imaged paranasal sinuses, mastoid air cells and middle ear cavities are unremarkable. BONES/SOFT TISSUES: The visualized skull base and calvarium are unremarkable.     IMPRESSION:  1.  No evidence of acute intracranial hemorrhage or mass effect. 2.  Mild nonspecific white matter changes. 3.  Mild brain parenchymal volume loss.    CT Lumbar Spine w/o Contrast    Result Date: 9/8/2022  EXAM: CT LUMBAR SPINE W/O CONTRAST LOCATION: [facility] DATE/TIME: 9/8/2022 7:39 PM INDICATION: Back pain COMPARISON: None. TECHNIQUE: Routine CT Lumbar Spine without IV contrast. Multiplanar reformats. Dose reduction techniques were used. FINDINGS: No evidence of acute fracture or subluxation of the lumbar spine by CT imaging. Postsurgical changes posterior fusion at L3-L5 and interbody fusion at L3/L4 and L4/L5. No hardware complication. No evidence of acute fracture or subluxation of the lumbar spine by CT imaging. Retrolisthesis of T12 on L1 measuring 2 mm. Anterior marginal osteophytes at T12/L1 and L1/L2 and L2/L3. Multilevel degenerative disc disease of the lumbar spine with disc height loss, most pronounced at T12/L1 and L2/L3.  Aortobiiliac endograft. The partially imaged intra-abdominal contents are otherwise unremarkable. T12/L1:  Symmetric disc bulge without spinal canal narrowing. Mild bilateral neural foraminal narrowing. L1/L2:  No posterior disc bulge or spinal canal narrowing. Mild bilateral neural foraminal narrowing. L2/L3:  Broad bar disc osteophyte complex with mild spinal canal narrowing. Severe bilateral neural foraminal narrowing. L3/L4:  Spondylotic ridging without spinal canal narrowing. Moderate bilateral facet arthropathy. No neural foraminal narrowing.  L4/L5:  No posterior disc bulge or spinal canal narrowing. Mild bilateral facet arthropathy. No neural foraminal narrowing. L5/S1: Symmetric disc bulge and moderate facet arthropathy without spinal canal narrowing. Severe bilateral neural foraminal narrowing.     IMPRESSION:  1.  No evidence of acute fracture or subluxation of the lumbar spine by CT imaging. 2.  Postsurgical changes posterior fusion at L3-L5 and interbody fusion at L3/L4 and L4/L5. No hardware complication. 3.  Degenerative lumbar spondylosis as described above.    Total Time Spent 35 minutes with >50% of the time spent in chart review, evaluation, counseling, education and care coordination.

## 2022-09-14 NOTE — PROGRESS NOTES
Report given to Evelia JACOBSEN on P3. Nurse aware of pt's Npo status until 1800 and acknowledged. Pt stable. No distress noted. Transferred to P3 in bed.

## 2022-09-14 NOTE — PROGRESS NOTES
New orders received from [place] to start Amiodarone bolus and continuous drip 1mg/min for 6 hrs then 0.5mg/min after.. BP prior to drip was 102/58 P: 130s. Patient is currently tolerating medication well. No complaints of chest pain./tightness. Current  to low 110s

## 2022-09-14 NOTE — PROGRESS NOTES
Report called to Evelia JACOBSEN all questions answered. Educated that pt is to remain NPO until 1800 tonight s/p EMILY she verbalized understanding.

## 2022-09-14 NOTE — CONSULTS
"[facility]  Palliative Care Consultation Note    Patient: Maren Fofana  Date of Admission:  9/8/2022    Requesting Clinician / Team: Cheko Morales DO  Reason for consult: Goals of care    Impression & Recommendations:  Goals of Care:    Patient unable to participate well today due to somnolence surrounding procedure.  However this has been ongoing thought r/t benzodiazepine/opiate induced respiratory depression and acute on chronic hypercarbic respiratory acidosis.      Discussed with Dtr Caitie who is Health Care Agent.  Due to work Caitie has been visiting in evenings only and has kept up with patients condition via [employer].  She and her Brother Tera, who lives out of town have multiple questions, specifically wondering what can they expect in the future.    I did share noted prognosis in ID note as Caitie was likely to see it in MyChart.      Plan for care conference on 9/15 at 2PM for information in support of family as current decision makers, patient involved as able.    Code Status:  Caitie and Tera have been considering DNR.  They will speak about it together tonight and we can revisit tomorrow.          Thank you for the opportunity to participate in the care of this patient and family. Our team: will continue to follow.     During regular M-F work hours -- if you are not sure who specifically to contact -- please contact us by calling us directly at the Palliative Care Main Line [phone]    After regular work hours and on weekends/holidays, you can leave a message at [phone]       Assessments:  Maren Fofana is a 72 year old female with PMH of aortic stenosis, s/p TAVR 09/21, HTN, s/p AAA repair, tobacco use disorder, diastolic CHF, HTN, known right-sided pleural effusion, alcohol use disorder, admitted on 9/8/2022 after a fall.     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  Per ID, \"Endocarditis of TAVR is " "associated with high mortality, in this study 45% at 1 year, other studies note higher rates.\"  She has a transudative right sided pleural effion complicated by pneumothorax, now resolved.  Sm B effusions remain. ID following on IV abx.  She was cardioverted today for persistent paroximal Afib.      Today, the patient was seen for:  Goals of Care    Prognosis, Goals, & Planning:      Functional Status just prior to hospitalization: 0 (Fully active, able to carry on all activities without restriction)   \"Very active, social and rides bike with friends.\"      Prognosis, Goals, and/or Advance Care Planning were addressed today: Yes        Summary/Comments: Discussed serious nature of illness and high mortality rate of current infection.  We discussed best case scenario as improving with long recovery but not regaining same level of function and independence as prior.  I think a most likely scenario would be helpful.  Caitie asked if Katherine would be discharged to hospice. I stated, it was too soon to tell.  If Katherine was ill and preparing for end-of- life  prior to admission comfort care  may make sense right now, but I think time will tell us if this is the appropriate plan down the road.        Patient's decision making preferences: shared with support from loved ones          Patient has decision-making capacity today for complex decisions: No            I have concerns about the patient/family's health literacy today: No   Caitie is a  at [employer].             Patient has a completed Health Care Directive: Yes, and on file.      Code status: Full Code    Coping, Meaning, & Spirituality:   Mood, coping, and/or meaning in the context of serious illness were addressed today: Yes  Summary/Comments: Caitie notes family has been through a lot this year, her father  of Pancreatic Cancer in February.  She had similar conversations with medical team when he became very ill.    Social:     Living situation: " home independantly    Luz family / caregivers: daughter lives local and supportive    Substance use history: history of alcohal use and withrawal in hospital.    History of Present Illness:  History gathered today from: patient, family/loved ones, medical chart    Patient has just returned from cardioversion and unable to participate in conversation.  I spoke with her daughter Caitie who is her HCA.    Key Palliative Symptom Data:  We are not managing pain in this patient  We are not managing dyspnea in this patient  We are not managing nausea in this patient  We are not managing anxiety in this patient        ROS:  Comprehensive ROS is reviewed and is negative except as here & per HPI: unable due to somnolence. Did deny pain and state she was tired.     Past Medical History:  Past Medical History:   Diagnosis Date     Aortic stenosis      Collapsed lung      Fibromyalgia      GERD (gastroesophageal reflux disease)      Hypothyroid      Migraine      Peripheral vascular disease (H)      Reactive airway disease      Vertigo         Past Surgical History:  Past Surgical History:   Procedure Laterality Date      SECTION Bilateral      CV CORONARY ANGIOGRAM N/A 2021    Procedure: Coronary Angiogram;  Surgeon: Sara Randall MD;  Location: [facility] CATH LAB CV     CV TRANSCATHETER AORTIC VALVE REPLACEMENT N/A 2021    Procedure: CV TRANSCATHETER AORTIC VALVE REPLACEMENT;  Surgeon: Geovani Kulkarni MD;  Location:  HEART CARDIAC CATH LAB     HEART CATH FEMORAL CANNULIZATION WITH OPEN STANDBY REPAIR AORTIC VALVE N/A 2021    Procedure: LEFT SUBCLAVIAN ACCESS FOR  TRANSCATHETER AORTIC VALVE REPLACEMENT.  CARDIOPULMONARY BYPASS STANDBY;  Surgeon: Jorge L Baeza MD;  Location:  HEART CARDIAC CATH LAB     HYSTERECTOMY       POSTERIOR LAMINECTOMY / DECOMPRESSION LUMBAR SPINE      L2-L3     SPINAL FUSION       TONSILLECTOMY & ADENOIDECTOMY       TOTAL KNEE ARTHROPLASTY Bilateral          Family  "History:  Family History   Problem Relation Age of Onset     Pancreatic Cancer Mother      Pancreatic Cancer Father         Allergies:  Allergies   Allergen Reactions     Bee Venom Anaphylaxis     Other Drug Allergy (See Comments) Hives     Ice Packs   Any kind of cold      Dilaudid [Hydromorphone] Other (See Comments)     Altered mental status, confusion, itching. Pt tolerates oxycodone.     Latex      hives        Medications:  I have reviewed this patient's medication profile and medications from this hospitalization.   Noted:  Fentanyl / Versed prior to cardioversion  Otherwise no other opioids this admission and last benzodiazepine 3 days ago.      PERTINENT PHYSICAL EXAMINATION:  Vital Signs: Blood pressure 113/59, pulse 87, temperature 98.8  F (37.1  C), resp. rate 16, height 1.549 m (5' 1\"), weight 68.2 kg (150 lb 4.8 oz), SpO2 99 %.   GENERAL:Somnolent, calm in no apparent distress.  Awakens for some yes/no questions   SKIN: Warm and dry   HEENT: Normocephalic, moist mucous membranes  LUNGS: Clear to auscultation anterolaterally; non-labored   CARDIAC: RRR  ABDOMINAL: BS (+), soft, non distended, non tender  MUSKL: no gross joint deformities   EXTREMITIES: No edema or cyanosis, pulses 2+ and symmetrical      Data reviewed:  Recent imaging reviewed, my comments on pertinents:   EXAM: PET ONCOLOGY WHOLE BODY, CT SOFT TISSUE NECK W CONTRAST, CT CHEST/ABDOMEN/PELVIS W CONTRAST  LOCATION: [facility]  DATE/TIME: 9/12/2022 11:30 AM     INDICATION: Other nonspecific abnormal finding of lung field. Abnormal findings on diagnostic imaging of other abdominal regions, including retroperitoneum. MRSA bacteremia. Fever of unknown origin. History of TAVR, AAA , bilateral TKAs. Assess for nidus   of infection. Initial treatment strategy.  COMPARISON: CT abdomen pelvis 09/08/2022, numerous recent chest radiographs most recent 09/12/2022 reviewed.  CONTRAST: 74 mL Isovue-370  TECHNIQUE: Serum glucose " level 99 mg/dL. One hour post intravenous administration of 10.1 mCi F-18 FDG, PET imaging was performed from the skull vertex to below knees, utilizing attenuation correction with concurrent axial CT and PET/CT image fusion.   Separate diagnostic CT of the neck, chest, abdomen, and pelvis was performed. Dose reduction techniques were used.     PET/CT FINDINGS: Moderate severity patchy irregular ground glass and consolidative opacities in the right lung, mostly in the right lower lobe although also involving the right upper lobe, associated with varying degrees of heterogeneous mild to moderate   uptake, likely infectious/inflammatory. Mild left basilar atelectasis with low-level inflammatory uptake. Moderate uptake about TAVR and moderate uptake about dense mitral annulus calcification, suspicious for underlying endocarditis. Focus of moderate   uptake involves the lumbosacral junction, likely inflammatory, without other findings to suggest osteomyelitis discitis. Heterogeneous uptake about both knees, marked on the right and moderate on the left. Small right knee joint effusion. Abdominal   aortic aneurysm status post aortobiiliac stent graft placement. No increased uptake about the stent graft to suggest associated infection. No FDG avid adenopathy. No focal organized fluid collection or abscess.     CT FINDINGS: Moderate senescent intracranial changes. Large right pleural effusion. Small right pneumothorax. Moderate size left pleural effusion. Enlarged central pulmonary arteries suggesting pulmonary arterial hypertension. Mild soft tissue emphysema   right chest wall. Diffuse heterogeneity of the liver with some surface nodularity suggesting underlying fibrosis/cirrhosis. Small volume ascites. Tiny nonobstructing bilateral intrarenal stones. Hysterectomy. Mild colonic diverticulosis. Moderate   anasarca.                                                                      IMPRESSION:  1. Patchy opacities  throughout much of the right lung suspicious for infectious pneumonia, most prominently in the right lower lobe.  2. Intracardiac uptake about a TAVR and dense mitral annulus calcification suspicious for underlying endocarditis.  3. Heterogeneous uptake about both knees, more so on the right, indeterminate for underlying infection. Small right knee effusion present, amenable to aspiration.    Recent lab data reviewed, my comments on pertinents:   Lab Results   Component Value Date    WBC 7.1 09/13/2022     Lab Results   Component Value Date    RBC 2.96 09/13/2022     Lab Results   Component Value Date    HGB 9.6 09/13/2022     Lab Results   Component Value Date    HCT 28.1 09/13/2022     Lab Results   Component Value Date    MCV 95 09/13/2022     Lab Results   Component Value Date    MCH 32.4 09/13/2022     Lab Results   Component Value Date    MCHC 34.2 09/13/2022     Lab Results   Component Value Date    RDW 15.0 09/13/2022     Lab Results   Component Value Date    PLT 31 09/13/2022     Last Comprehensive Metabolic Panel:  Sodium   Date Value Ref Range Status   09/13/2022 136 136 - 145 mmol/L Final     Potassium   Date Value Ref Range Status   09/14/2022 4.0 3.5 - 5.0 mmol/L Final     Chloride   Date Value Ref Range Status   09/13/2022 102 98 - 107 mmol/L Final     Carbon Dioxide (CO2)   Date Value Ref Range Status   09/13/2022 26 22 - 31 mmol/L Final     Anion Gap   Date Value Ref Range Status   09/13/2022 8 5 - 18 mmol/L Final     Glucose   Date Value Ref Range Status   09/13/2022 126 (H) 70 - 125 mg/dL Final     Urea Nitrogen   Date Value Ref Range Status   09/13/2022 27 8 - 28 mg/dL Final     Creatinine   Date Value Ref Range Status   09/14/2022 0.89 0.60 - 1.10 mg/dL Final     GFR Estimate   Date Value Ref Range Status   09/14/2022 68 >60 mL/min/1.73m2 Final     Comment:     Effective December 21, 2021 eGFRcr in adults is calculated using the 2021 CKD-EPI creatinine equation which includes age and gender  (Haydee aguero al., [place], DOI: 10.1056/ESOSug5456290)   01/02/2021 >60 >60 mL/min/1.73m2 Final     Calcium   Date Value Ref Range Status   09/13/2022 8.4 (L) 8.5 - 10.5 mg/dL Final     TTS: I have personally spent a total of 95 minutes today reviewing patient's medical record, consultation with the medical providers, assessing patient and symptoms and providing emotional support with more than 50% of this time spent in counseling, coordination of care  re: goals of care.    Vivi Hughes APRN, CNS, CNP  [facility], Palliative Care  [phone]

## 2022-09-14 NOTE — PROGRESS NOTES
Started on Amiodarone drip at 1mg/min or 33.3ml/hr. BP prior to start was 91/53 HR low 100s.   Patient has no complaints at this time. Rhythm still Afib

## 2022-09-14 NOTE — PROGRESS NOTES
CARDIOLOGY PROGRESS NOTE      Assessment/Plan:  1.  Atrial fibrillation- paroxysmal/persistent.  Now looks to be atrial flutter 2-1. Not valvular and apparently asymptomatic although patient extremely drowsy.  Pressure is borderline, At this point time would work on rate control with some digoxin and maybe some low-dose diltiazem.  Rate difficult to control, given questional hemodynamic status would elect cardioversion.  I am concerned about potential clots/vegetation and will get EMILY guided cardioversion. On heparin right now but will give some Eliquis.  Did discuss this with her daughter Caitie.  We will also need long-term anticoagulation.  Atrial arrhythmia New since .    TSH normal and will supplement potassium above 4.0.  2.  Status post TAVR- 2021 23 mm ISABELLA 3 valve placed via left subclavian approach.  Ech shows 2.3 m/s peak velocity with 9 mmHg mean gradient.  Concerned with endocarditis given PET scan and will get the EMILY although would not necessarily be looking at a redo aortic valve replacement.  3.  Gram-positive bacteremia-methicillin-resistant staph aureus growing in blood culture.  PET scan suggest possible association with a TAVR.  No neurological sequela.  Jimenes criteria meets major criteria with positive blood culture, minor criteria based on fever, predisposition, and positive blood cultures.  4.  Anasarca-given increased BNP question heart failure in the setting of preserved ejection fraction of 60 to 65%.  Did receive some IV diuretics.  5.  Hypertension- possibly secondary to above and fluid resuscitation.  6.  Mild metabolic encephalopathy-stable.  7.  Hypothyroidism- good TSH at 3.92.  8.  Hypokalemia- potassium 3.3, will try to increase this to above 4 to help get sinus rhythm restoration.    Plan:  1.  Supplement potassium above 4.0.  2.  EMILY guided cardioversion today.    Discharge Plannin.  Barrier to discharge is resolution of heart rate.  2.  Antibiotics  "for 6 weeks.  3.  Can follow with Dr. Geovani Daley.     LOS: 6 days     Subjective:  Interval History:    72-year-old white female being seen on seventh day of hospitalization.  Spoke with daughter over phone.  Patient is drowsy but alert and oriented x3.  She feels drowsy, no significant shortness of breath, PND, orthopnea, chest pains, palpitations, syncope, dizziness, peripheral edema.    Medications    sodium chloride 0.9%  250 mL Intravenous Once     acetaminophen  975 mg Oral Q8H     digoxin  125 mcg Intravenous TID     famotidine  20 mg Oral Daily     folic acid  1 mg Oral Daily     gentamicin  1 mg/kg (Adjusted) Intravenous Q24H     [Held by provider] heparin ANTICOAGULANT  5,000 Units Subcutaneous Q12H     levothyroxine  88 mcg Oral QAM AC     multivitamin w/minerals  1 tablet Oral Daily     potassium chloride  10 mEq Intravenous Q1H     rifampin  600 mg Oral Daily     sodium chloride (PF)  3 mL Intracatheter Q8H     sodium phosphate  15 mmol Intravenous Once     torsemide  20 mg Oral Daily     vancomycin  1,250 mg Intravenous Q24H     venlafaxine  300 mg Oral Daily     Objective:   Vital signs in last 24 hours:  Temp:  [97.5  F (36.4  C)-99.4  F (37.4  C)] 97.5  F (36.4  C)  Pulse:  [] 104  Resp:  [16-20] 20  BP: ()/(51-80) 100/65  SpO2:  [97 %-100 %] 100 %    Physical Exam:  /65   Pulse 104   Temp 97.5  F (36.4  C) (Axillary)   Resp 20   Ht 1.549 m (5' 1\")   Wt 68.2 kg (150 lb 4.8 oz)   SpO2 100%   BMI 28.40 kg/m      General Appearance:    Alert, cooperative, no distress, appears stated age   Head:    Normocephalic, without obvious abnormality, atraumatic   Throat:   Lips, mucosa, and tongue normal; teeth and gums normal   Neck:   Supple, symmetrical, trachea midline, no adenopathy;        thyroid:  No enlargement/tenderness/nodules; no carotid    bruit or JVD   Back:     Symmetric, no curvature, ROM normal, no CVA tenderness   Lungs:     Clear to auscultation bilaterally, " respirations unlabored   Chest wall:    No tenderness or deformity   Heart:    Tachycardia  and regular rhythm, S1 and S2 normal, 1/6 systolic ejection murmur,no  rub   or gallop   Abdomen:     Soft, non-tender, bowel sounds active all four quadrants,     no masses, no organomegaly   Extremities:   Normal, atraumatic, no cyanosis or edema   Pulses:   2+ and symmetric all extremities   Skin:   Skin color, texture, turgor normal, no rashes or lesions     Cardiographics:      ECG: Personally reviewed by myself shows atrial flutter, possible old anteroseptal Q MI type pattern.    Echocardiogram:   1.Left ventricular size, wall motion and function are normal. The ejection fraction is 60-65%.  2.The right ventricle is normal size. Mildly decreased right ventricular systolic function  3.The left atrium is moderate to severely dilated.  4.Thickened mitral leaflets without stenosis. There is mild to moderate (1-2+) mitral regurgitation.  5.There is a 23 ISABELLA 3 bioprosthetic valve present in aortic valve position.At heart rate of 99 bpm peak velocity is 2.3 m/s, mean gradient is 9 mmHg,dimensionless index 0.48.  6.IVC diameter >2.1 cm collapsing <50% with sniff suggests a high RA pressure estimated at 15 mmHg or greater.  Compared to the prior study dated 10/26/2021, the Tricuspid regurgitation is really not sampled well, left atrium is further enlarged, no essential change for the valve with prior peak velocity of 2.8 m/s and mean gradient of 16  mmHg.      Lab Results:   Recent Labs   Lab 09/13/22 0431   WBC 7.1   HGB 9.6*   HCT 28.1*   PLT 31*     Recent Labs   Lab 09/13/22 0431      CO2 26   BUN 27   .       Lab Results   Component Value Date    TROPONINI 0.11 09/09/2022

## 2022-09-14 NOTE — PROGRESS NOTES
Amiodarone held per protocol. BP 86/54 P: 90- low 100 still; in Afib. Denies any chest pain/ tightness at this tme

## 2022-09-14 NOTE — PROGRESS NOTES
Spoke with Dr. Kris Walsh, he was made aware of Pt BP and heart rate and that Amiodarone was placed on hold. He stated to continue to hold medication and will reassess patient in the morning.

## 2022-09-14 NOTE — CARE PLAN
Unable to complete all electrolyte infusions due to lack of adequate IV access. Picc nurse requested to attempt another line, Picc nurse unable to obtain access, put on hold per pt request. Currently 2 lines  Available with potassium infusing at slower rate due to c/o pain from pt from IV site. Pt is currently off floor to EP procedure.

## 2022-09-14 NOTE — ANESTHESIA PREPROCEDURE EVALUATION
Anesthesia Pre-Procedure Evaluation    Patient: Maren Fofana   MRN: [id number] : 1950        Procedure :           Past Medical History:   Diagnosis Date     Aortic stenosis      Collapsed lung      Fibromyalgia      GERD (gastroesophageal reflux disease)      Hypothyroid      Migraine      Peripheral vascular disease (H)      Reactive airway disease      Vertigo       Past Surgical History:   Procedure Laterality Date      SECTION Bilateral      CV CORONARY ANGIOGRAM N/A 2021    Procedure: Coronary Angiogram;  Surgeon: Sara Randall MD;  Location: [facility] CATH LAB CV     CV TRANSCATHETER AORTIC VALVE REPLACEMENT N/A 2021    Procedure: CV TRANSCATHETER AORTIC VALVE REPLACEMENT;  Surgeon: Geovani Kulkarni MD;  Location: [facility] CARDIAC CATH LAB     HEART CATH FEMORAL CANNULIZATION WITH OPEN STANDBY REPAIR AORTIC VALVE N/A 2021    Procedure: LEFT SUBCLAVIAN ACCESS FOR  TRANSCATHETER AORTIC VALVE REPLACEMENT.  CARDIOPULMONARY BYPASS STANDBY;  Surgeon: Jorge L Baeza MD;  Location: [facility] CARDIAC CATH LAB     HYSTERECTOMY       POSTERIOR LAMINECTOMY / DECOMPRESSION LUMBAR SPINE      L2-L3     SPINAL FUSION       TONSILLECTOMY & ADENOIDECTOMY       TOTAL KNEE ARTHROPLASTY Bilateral       Allergies   Allergen Reactions     Bee Venom Anaphylaxis     Other Drug Allergy (See Comments) Hives     Ice Packs   Any kind of cold      Dilaudid [Hydromorphone] Other (See Comments)     Altered mental status, confusion, itching. Pt tolerates oxycodone.     Latex      hives      Social History     Tobacco Use     Smoking status: Former Smoker     Packs/day: 0.50     Quit date: 2021     Years since quittin.4     Smokeless tobacco: Never Used   Substance Use Topics     Alcohol use: Yes      Wt Readings from Last 1 Encounters:   22 68.2 kg (150 lb 4.8 oz)        Anesthesia Evaluation   Pt has had prior anesthetic.     No history of anesthetic complications       ROS/MED  HX  ENT/Pulmonary:  - neg pulmonary ROS     Neurologic:  - neg neurologic ROS     Cardiovascular:     (+) -Peripheral Vascular Disease-- Non Symptomatic. ---CHF valvular problems/murmurs type: AS s/p TAVR.     METS/Exercise Tolerance: >4 METS    Hematologic:  - neg hematologic  ROS     Musculoskeletal:  - neg musculoskeletal ROS     GI/Hepatic:     (+) GERD,     Renal/Genitourinary:     (+) renal disease, type: CRI,     Endo:  - neg endo ROS   (+) thyroid problem,     Psychiatric/Substance Use:  - neg psychiatric ROS     Infectious Disease:     (+) Recent Fever,     Malignancy:  - neg malignancy ROS     Other:  - neg other ROS          Physical Exam    Airway  airway exam normal      Mallampati: III   TM distance: > 3 FB   Neck ROM: full   Mouth opening: > 3 cm    Respiratory Devices and Support         Dental           Cardiovascular          Rhythm and rate: irregular and tachycardia     Pulmonary   pulmonary exam normal                OUTSIDE LABS:  CBC:   Lab Results   Component Value Date    WBC 7.1 09/13/2022    WBC 6.7 09/12/2022    HGB 9.6 (L) 09/13/2022    HGB 9.7 (L) 09/12/2022    HCT 28.1 (L) 09/13/2022    HCT 28.7 (L) 09/12/2022    PLT 31 (LL) 09/13/2022    PLT 29 (LL) 09/12/2022     BMP:   Lab Results   Component Value Date     09/13/2022     (L) 09/12/2022    POTASSIUM 3.3 (L) 09/14/2022    POTASSIUM 3.7 09/13/2022    CHLORIDE 102 09/13/2022    CHLORIDE 101 09/12/2022    CO2 26 09/13/2022    CO2 25 09/12/2022    BUN 27 09/13/2022    BUN 31 (H) 09/12/2022    CR 0.89 09/14/2022    CR 1.09 09/13/2022     (H) 09/13/2022    GLC 77 09/12/2022     COAGS:   Lab Results   Component Value Date    PTT 35 09/10/2022    INR 1.33 (H) 09/10/2022    FIBR 375 09/10/2022     POC: No results found for: BGM, HCG, HCGS  HEPATIC:   Lab Results   Component Value Date    ALBUMIN 2.6 (L) 09/13/2022    PROTTOTAL 5.9 (L) 09/13/2022    ALT 19 09/13/2022    AST 53 (H) 09/13/2022    ALKPHOS 151 (H) 09/13/2022     BILITOTAL 1.2 (H) 09/13/2022    NESSA 14 09/12/2022     OTHER:   Lab Results   Component Value Date    PH 7.34 (L) 09/12/2022    LACT 1.6 09/09/2022    YENI 8.4 (L) 09/13/2022    PHOS 1.8 (L) 09/14/2022    MAG 1.5 (L) 09/14/2022    TSH 3.92 09/09/2022    CRP 24.6 (H) 09/13/2022       Anesthesia Plan    ASA Status:  4   NPO Status:  NPO Appropriate    Anesthesia Type: General.     - Airway: Mask Only   Induction: Intravenous.           Consents    Anesthesia Plan(s) and associated risks, benefits, and realistic alternatives discussed. Questions answered and patient/representative(s) expressed understanding.    - Discussed:     - Discussed with:  Patient      - Extended Intubation/Ventilatory Support Discussed: No.      - Patient is DNR/DNI Status: No    Use of blood products discussed: No .     Postoperative Care            Comments:                Rodriguez Greer MD

## 2022-09-14 NOTE — PROGRESS NOTES
A/P    72 year old female with PMH of aortic stenosis, s/p TAVR 09/21, HTN, s/p AAA repair, tobacco use disorder, diastolic CHF, HTN, known right-sided pleural effusion, alcohol use disorder, admitted on 9/8/2022.      Severe sepsis due to High grade MRSA Bacteremia and Prosthetic aortic valve/mitral valve endocarditis:  BC x2 on 9/8-9/13 remain positive for MRSA bacteremia.  TTE no obvious vegetation.  PET CT (9/12) showed moderate uptake about TAVR and moderate uptake about dense mitral annulus calcification, suspicious for underlying endocarditis. EMILY (9/14/22)showed 0.7x0.3 mobile vegetation associated with the aortic valve  prosthesis best visualized in the left ventricular outflow tract. There is  suspected abscess associated with the aortic valve in the 2-3 o'clock in short  Axis, 1.3x0.8 mobile vegetation associated with the posterior mitral  valve leaflet (atrial aspect of valve). There is a superimposed 1.3 cm mobile  linear strand associated with the vegetation. In addition there is a 1.1 x 0.5  cm vegetation associated with the anterior mitral valve leaflet.  -Daily blood cultures  - IV vancomycin for 6 weeks, p.o. rifampin, 2 weeks of gentamicin if tolerated.  -Risk for renal toxicity, ototoxicity. She has baseline hearing loss, wears hearing aides. Rifampin can reduce levels of lipitor, lisinopril, omeprazole, synthroid.   -Has 2 functioning PIV's.  Hold on PICC unless we run out of peripheral IV options, due to ongoing bacteremia. If lose all IV access and unable to place PIV then as last resort PICC will need to be placed.    Acute on chronic hypoxic/hypercarbic respiratory failure: Transudative right pleural effusion complicated by PTX:  S/p 1.3 L thoracentesis on 9/8 followed by chest tube placement for PTX on 9/9.  Chest tube retracted to chest wall while transferring patient to her room and was pulled out on 9/10. Follow up CXR showed small pneumothorax .  Chest x-ray on 9/13 showed resolved  right pneumothorax.  Bilateral pleural effusions.  Pleural fluid cultures NGTD.   PET/CT on 9/12 showed moderate severity patchy irregular ground glass and consolidative opacities in the right lung, mostly in the right lower lobe although also involving the right upper lobe, associated with varying degrees of heterogeneous mild to moderate uptake, likely infectious/inflammatory  -pulm s/o  -Goal SPO2 88 to 92%.  Avoid hyperoxia.  -Bronchial hygiene.  -On IV antibiotics directed by ID    HFpEF:  EF 60 to 65%. BNP 1,107.  TTE 9/9/2022 LVEF 60 to 65%, mild decreased RV systolic function, moderate-severe LAE, mild-moderate MR, 23 sapient 3 bioprosthetic valve with peak velocity 2.3 m/s, mean gradient 9 mmHg, dimensionless index 0.48, IVC diameter greater than 2.1 cm collapsing less than 50% with sniff suggests high RA pressure at 15 mm or greater  -Recent coronary angiogram on 8/21- no significant CAD.  -Hold torsemide     Essential hypertension.  -Hold losartan  - Monitor     Rhabdomyolysis: CK 2,092->916->419->121.  Suspect traumatic vs less likely  infectious   -hold statin     Alcohol use disorder.  History of withdrawal seizures/DT. EtOH level < 10  -CIWA, thiamine, folate  -stopped valium, no benzo's due to pulmonary status     AAA, s/p endovascular repair in 2016: CT abdomen/pelvis on 9/8/2022 showed endovascular aortic aneurysm repair with bifurcated endograft, aneurysm sac measures 5 x 5.6 cm (previously 4.7 x 5.4 cm), type II endoleak.  PET CT (9/12/22) showed No increased uptake about the stent graft to suggest associated infection.  - Followed at [facility]    SKYLAR: Creatinine 1.43-1.12->1.37->1.48->1.15->1.09.  - Strict intake and output and daily weights  - Labs in a.m.    Acute toxic/metabolic encephalopathy: Due to benzodiazepine/opiate induced respiratory depression and acute on chronic hypercarbic respiratory acidosis.  NH3 normal.   -Check MRI brain  -stop valium, avoid opiates.    Normocytic  anemia with acute on chronic thrombocytopenia: Multifactorial.  plt now 29K->31K->31K.  Hgb 9.6 stable. PBS mild normochromic normocytic anemia which may be secondary to anemia chronic disease, renal insufficiency or blood loss.  The moderate thrombocytopenia may be secondary to bone marrow suppression by drugs or infection.  Hemolysis labs neg.  Folate nml. B12 nml.  FERNANDA positive.  -heme consult appreciated    New onset atrial fibrillation with RVR (9/13/22): s/p EMILY directed cardioversion to NSR on 9/14. No thrombus is detected within the left atrial appendage on EMILY.  -monitor and  correct phos, electrolytes  -cardiology consult following  -started on Apixaban by cardiology  -off rate control meds post DCCV  -Tele    H/O Bilateral TKA:  PET CT today showed right > left knee effusion with eterogeneous uptake about both knees, more so on the right, indeterminate for underlying infection. No e/o septic joint clinically per orthopedic surgery. They don't advise arthrocentesis at this time.    Hyponatremia: Resolved.  Sodium anika 132.  Na 137 currently.  Monitor    Cirrhosis: Suspect EtOH mediated. Per CT abdomen/pelvis imaging.  LFTs normal. Not decompensated. No significant ascites.    GERD-PPI.    Asymmetric bladder wall thickening: UA negative for UTI.    Hypokalemia:  -monitor and replete per protocol    Hypomagnesemia:  -replaced    Fall at home:  CT head, CT cervical spine without traumatic injuries, stroke.  -PT/OT    MDD-Effexor, hold trazodone due to increased risk for serotonin syndrome.    Hypothyroidism-on levothyroxine.  -TSH 3.92    Hypophosphatemia:  -monitor and replete per protocol    Chronic back pain with disc disease and spondylolisthesis, s/p lumbar laminectomy.     History of tobacco use disorder-quit smoking 6 months ago.    Constipation: CT abdomen pelvis on 9/8/2022 showed large amount of stool in the rectum.  - Senna-S, MiraLAX     Moderate protein calorie malnutrition: RD  following    Goals of care: palliative care consulted       Diet: NPO post EMILY cardioversion. If mental status stable and safe to swallow will resume diet  DVT Prophylaxis: Pneumatic Compression Devices  Reynoso Catheter: Not present  Central Lines: None  Cardiac Monitoring: yes  Code Status:   full    Multiple day stay          S:  afebrile. Events overnight noted    O:  Temp: 98.8  F (37.1  C) Temp src: Axillary BP: 108/51 Pulse: 88   Resp: 16 SpO2: 100 % O2 Device: Nasal cannula Oxygen Delivery: 5 LPM  gen nad  cv rrr  Lungs decreased r>l, non labored  abd bs+, nttp  Neuro a&o, somnolent    Lab/imaging reviewed

## 2022-09-15 NOTE — PLAN OF CARE
"  Problem: Plan of Care - These are the overarching goals to be used throughout the patient stay.    Goal: Patient-Specific Goal (Individualized)  Description: You can add care plan individualizations to a care plan. Examples of Individualization might be:  \"Parent requests to be called daily at 9am for status\", \"I have a hard time hearing out of my right ear\", or \"Do not touch me to wake me up as it startles me\".  Outcome: Ongoing, Progressing     Problem: Plan of Care - These are the overarching goals to be used throughout the patient stay.    Goal: Optimal Comfort and Wellbeing  Outcome: Ongoing, Progressing   Goal Outcome Evaluation:      VSS. Patient medicated for back pain, Oxycodone helpful. Patient resting most of the shift, family at bed side. Continue to monitor                "

## 2022-09-15 NOTE — PROGRESS NOTES
Speech-Language Pathology: Clinical Swallow Evaluation     09/15/22 1000   General Information   Onset of Illness/Injury or Date of Surgery 09/08/22   Pertinent History of Current Problem Chest XR on 9/13/22 showing: Right pneumothorax no longer definitely visualized. Decreased subcutaneous gas along the right lateral chest wall. Similar opacification of the right lung base, likely right pleural effusion and superimposed atelectasis and/or airspace   disease. A small left-sided pleural effusion is slightly decreased.   per H&P: 72 year old female with PMH of aortic stenosis, s/p TAVR 09/21, HTN, s/p AAA repair, tobacco use disorder, diastolic CHF, HTN, known right-sided pleural effusion, alcohol use disorder, admitted on 9/8/2022.   General Observations alert and cooperative, daughter at bedside   Past History of Dysphagia none   Type of Evaluation   Type of Evaluation Swallow Evaluation   Oral Motor   Oral Musculature generally intact   Dentition (Oral Motor)   Comment, Dentition (Oral Motor) does not use lower   Dentition (Oral Motor) dental appliance/dentures   Dental Appliance/Denture (Oral Motor) upper   Facial Symmetry (Oral Motor)   Facial Symmetry (Oral Motor) WNL   Lip Function (Oral Motor)   Lip Range of Motion (Oral Motor) WNL   Tongue Function (Oral Motor)   Tongue ROM (Oral Motor) WNL   Jaw Function (Oral Motor)   Jaw Function (Oral Motor) WNL   Cough/Swallow/Gag Reflex (Oral Motor)   Volitional Throat Clear/Cough (Oral Motor) WNL   Volitional Swallow (Oral Motor) WNL   Vocal Quality/Secretion Management (Oral Motor)   Vocal Quality (Oral Motor) WNL   General Swallowing Observations   Respiratory Support (General Swallowing Observations) nasal cannula   Current Diet/Method of Nutritional Intake (General Swallowing Observations, NIS) easy to chew (level 7);thin liquids (level 0)   Swallowing Evaluation Clinical swallow evaluation   Clinical Swallow Evaluation   Feeding Assistance set up only required    Clinical Swallow Evaluation Textures Trialed thin liquids;solid foods   Clinical Swallow Eval: Thin Liquid Texture Trial   Mode of Presentation, Thin Liquids self-fed;straw   Volume of Liquid or Food Presented 3 oz   Oral Phase of Swallow WFL   Pharyngeal Phase of Swallow intact   Diagnostic Statement No oral dysphagia or overt sx's aspiration observed   Clinical Swallow Evaluation: Solid Food Texture Trial   Mode of Presentation self-fed   Volume Presented jw crackers   Diagnostic Statement Slow but functional mastication and AP transfer. No overt sx's aspiration   Swallowing Recommendations   Diet Consistency Recommendations thin liquids (level 0);regular diet   Medication Administration Recommendations, Swallowing (SLP) per pt preference   Comment, Swallowing Recommendations Recommend regular diet with thin liquids   Clinical Impression   Criteria for Skilled Therapeutic Interventions Met (SLP Eval) No problems identified which require skilled intervention   Clinical Impression Comments No oral dysphagia or overt sx's aspiration observed with thin and regular textured jw cracker. Slow but functional mastication and AP transfer. Pt and daughter report no swallowing issues percieved at home or while here at hospital.   SLP Discharge Planning   SLP Discharge Recommendation home with assist   SLP Rationale for DC Rec pt appears baseline for swallow   SLP Brief overview of current status  no further ST warranted. Reg/thin diet recommended at dc    Total Evaluation Time   Total Evaluation Time (Minutes) 20

## 2022-09-15 NOTE — PROGRESS NOTES
CARDIOLOGY PROGRESS NOTE      Assessment/Plan:  1.  Atrial fibrillation- paroxysmal/persistent, reverted to atrial flutter 2-1. Not valvular and apparently asymptomatic although patient extremely drowsy.  Pressure borderline.  Did not respond to IV amiodarone or IV Cardizem and oral digoxin, underwent cardioversion EMILY guided and now in sinus rhythm.  We will use amiodarone 200 mg twice a day.   Atrial arrhythmia New since 8 September.    TSH normal and keep potassium above 4.0.  On Eliquis 5 mg twice a day.  2.  Status post TAVR- September 2021 23 mm ISABELLA 3 valve placed via left subclavian approach.    Transthoracic echo shows 2.3 m/s peak velocity with 9 mmHg mean gradient.  Precardioversion EMILY did show vegetation on valve, probable perivalvular abscess.    3.  Gram-positive bacteremia-methicillin-resistant staph aureus growing in blood culture.  PET scan suggest possible association with a TAVR as well as mitral valve.  No neurological sequela.  Jimenes criteria meets major criteria with positive blood culture and obvious vegetation seen on mitral and aortic valves.  Antibiotics per infectious disease, however ideally would benefit from double valve procedure and possible aortic root replacement, but risk is increased given other comorbidities.  Defer to cardiothoracic surgery for the short-term.  4.  Anasarca-given increased BNP question heart failure in the setting of preserved ejection fraction of 60 to 65%.  Did receive some IV diuretics.  No significant signs or symptoms currently.  5.  Hypotension- possibly secondary to above and fluid resuscitation and resolved now than in sinus rhythm.  6.  Mild metabolic encephalopathy-stable.  7.  Hypothyroidism- good TSH at 3.92.  8.  Hypokalemia- potassium 3.3, will try to increase this to above 4 to help get sinus rhythm restoration.  9.  Cirrhosis-increased AST with normal ALT.  Surgical risk increased.  10.  Thrombocytopenia-could be reactive.    Plan:  1.   "Supplement potassium above 4.0.  2.  Continue other current meds.    Discharge Plannin.  Barrier to discharge is strengthening.  2.  Antibiotics for 6 weeks.  3.  Can follow with Dr. Geovani Daley.     LOS: 7 days     Subjective:  Interval History:    72-year-old white female being seen on eighth day of hospitalization.  Family friend is present.  Patient is drowsy but alert and oriented x3.  She feels drowsy, complains of back discomfort.  No significant shortness of breath, PND, orthopnea, chest pains, palpitations, syncope, dizziness, peripheral edema.    Medications    acetaminophen  975 mg Oral Q8H     amiodarone  200 mg Oral BID     apixaban ANTICOAGULANT  5 mg Oral BID     famotidine  20 mg Oral Daily     folic acid  1 mg Oral Daily     gentamicin  1 mg/kg (Adjusted) Intravenous Q24H     levothyroxine  88 mcg Oral QAM AC     multivitamin w/minerals  1 tablet Oral Daily     potassium chloride  10 mEq Intravenous Once     rifampin  600 mg Oral Daily     sodium chloride (PF)  3 mL Intracatheter Q8H     [Held by provider] torsemide  20 mg Oral Daily     vancomycin  1,250 mg Intravenous Q24H     venlafaxine  300 mg Oral Daily     Objective:   Vital signs in last 24 hours:  Temp:  [97.5  F (36.4  C)-98.8  F (37.1  C)] 97.6  F (36.4  C)  Pulse:  [] 81  Resp:  [14-28] 18  BP: (102-128)/(51-85) 117/67  FiO2 (%):  [2 %] 2 %  SpO2:  [94 %-100 %] 99 %    Physical Exam:  /67 (BP Location: Left arm)   Pulse 81   Temp 97.6  F (36.4  C) (Oral)   Resp 18   Ht 1.549 m (5' 1\")   Wt 68.4 kg (150 lb 14.4 oz)   SpO2 99%   BMI 28.51 kg/m      General Appearance:    Alert, cooperative, no distress, appears stated age   Head:    Normocephalic, without obvious abnormality, atraumatic   Throat:   Lips, mucosa, and tongue normal; teeth and gums normal   Neck:   Supple, symmetrical, trachea midline, no adenopathy;        thyroid:  No enlargement/tenderness/nodules; no carotid    bruit or JVD   Back:     " Symmetric, no curvature, ROM normal, no CVA tenderness   Lungs:     Clear to auscultation bilaterally, respirations unlabored   Chest wall:    No tenderness or deformity   Heart:    Regular rate and regular rhythm, S1 and S2 normal, 1/6 systolic ejection murmur,no  rub   or gallop   Abdomen:     Soft, non-tender, bowel sounds active all four quadrants,     no masses, no organomegaly   Extremities:   Normal, atraumatic, no cyanosis or edema   Pulses:   2+ and symmetric all extremities   Skin:   Skin color, texture, turgor normal, no rashes or lesions     Cardiographics:      ECG: Personally reviewed by myself shows sinus rhythm, possible old anteroseptal Q MI type pattern.    Echocardiogram:   1.Left ventricular size, wall motion and function are normal. The ejection fraction is 60-65%.  2.The right ventricle is normal size. Mildly decreased right ventricular systolic function  3.The left atrium is moderate to severely dilated.  4.Thickened mitral leaflets without stenosis. There is mild to moderate (1-2+) mitral regurgitation.  5.There is a 23 ISABELLA 3 bioprosthetic valve present in aortic valve position.At heart rate of 99 bpm peak velocity is 2.3 m/s, mean gradient is 9 mmHg,dimensionless index 0.48.  6.IVC diameter >2.1 cm collapsing <50% with sniff suggests a high RA pressure estimated at 15 mmHg or greater.  Compared to the prior study dated 10/26/2021, the Tricuspid regurgitation is really not sampled well, left atrium is further enlarged, no essential change for the valve with prior peak velocity of 2.8 m/s and mean gradient of 16  mmHg.      Lab Results:   Recent Labs   Lab 09/15/22  0452 09/13/22  0431   WBC  --  7.1   HGB 9.5* 9.6*   HCT  --  28.1*   PLT 51* 31*     Recent Labs   Lab 09/15/22  0452 09/13/22  0431   * 136   CO2  --  26   BUN  --  27   .       Lab Results   Component Value Date    TROPONINI 0.11 09/09/2022

## 2022-09-15 NOTE — PROGRESS NOTES
"[facility]   Social Work Note    Visit summary  Met with Katherine, daughter Caitie, son Tera (phone), dtr in law Rios (phone) and colleague Heather Hernández PA-C. See Heather's note re medical update given. Katherine shared that she wants to do anything she can to live \"even if just for 6 months.\" She hopes to regain strength so she can walk. She participated with PT today.     Family is requesting the surgeon to talk with Katherine and her dtr Caitie since Katherine is more alert today. It will be helpful information as she makes decisions about her care. Katherine shared that she has gone through many medical procedures in the past and has \"come through with flying colors.\"     Acknowledged it was a lot to discuss today. Katherine will talk more with her family. For now the goal is TCU with antibiotics.  Katherine's daughter, Caitie hopes to talk with care management soon to start exploring facility options. At end of conversation, Katherine and family felt well informed and supported.     PCSW will continue to follow.     Clinical Interventions  Build trust/rapport  Empathetic presence and emotional support  Active listening  Validation of feelings     Plan  PC SW will continue to follow for psychosocial/emotional support.      Silva Myers, [facility]  Palliative Care   Office # [phone]  "

## 2022-09-15 NOTE — PROGRESS NOTES
"CLINICAL NUTRITION SERVICES - REASSESSMENT NOTE     Nutrition Prescription    RECOMMENDATIONS FOR MDs/PROVIDERS TO ORDER:    Malnutrition Status:    Moderate in chronic illness    Recommendations already ordered by Registered Dietitian (RD):  Continue magic cup 2 x per day  When able to have caffeine send chocolate magic cups    Future/Additional Recommendations:  Follow po intake, weight, diet changes, labs, poc     EVALUATION OF THE PROGRESS TOWARD GOALS   Diet: low saturate fat, Na < 2400 mg, no caffeine, thin liquids (level0)  Nutrition Supplements Magic cup (chocolate) twice per day with lunch and dinner meals (pt will only eat the chocolate flavored magic cups but now she's on a no caffeine diet so can't send chocolate.? How long does she have to be on no caffeine-RN states she'll check with cardiology)  Intake: pt states poor intake due to food not tasting right.  She will only take the chocolate flavored -but now she's no a no caffeine diet. She's been consuming 0-75% of meals.  Pt's daughter states she hand fed her last evening and pt did take a little more than what she does on her own.      NEW FINDINGS   Barrier to discharge is strengthening     ANTHROPOMETRICS  Height: 154.9 cm (5' 1\")  Most Recent Weight: 68.4 kg (150 lb 14.4 oz)    Weight History:   Wt Readings from Last 10 Encounters:   09/15/22 68.4 kg (150 lb 14.4 oz)   08/22/22 70.8 kg (156 lb)   01/13/22 69.9 kg (154 lb)   11/11/21 72.1 kg (159 lb)   10/27/21 74.4 kg (164 lb)   10/11/21 75.6 kg (166 lb 9.6 oz)   09/28/21 74.3 kg (163 lb 12.8 oz)   09/23/21 73.5 kg (162 lb)   09/03/21 72.1 kg (159 lb)   08/30/21 74.8 kg (165 lb)   Weight down 6 lb in 3 weeks     GI CONCERNS  WDL per nurse  Last BM 9/13/2022 per nurse    LABS  Reviewed: Na 133, K 3.6, Mg 1.8, phos 2.5 (9/14)    MEDICATIONS  Reviewed: pepcid, folic acid, levothyroxine, multi vitamin with minerals, potassium chloride, rifampin, vancocin    Malnutrition Diagnosis: Moderate " malnutrition  In Context of:  Chronic illness or disease    CURRENT NUTRITION DIAGNOSIS  Malnutrition related to chronic illness as evidenced by decreased intake and loss of lean body mass      INTERVENTIONS  Implementation  Continue with magic cups-when/if pt can have caffeine, send chocolate  Encouraged po intake of 75% or more of meals    Goals  Patient to consume % of nutritionally adequate meals three times per day, or the equivalent with supplements/snacks.-not met  Maintain weight: not met    Monitoring/Evaluation  Progress toward goals will be monitored and evaluated per protocol.

## 2022-09-15 NOTE — PROGRESS NOTES
A/P    72 year old female with PMH of aortic stenosis, s/p TAVR 09/21, HTN, s/p AAA repair, tobacco use disorder, diastolic CHF, HTN, known right-sided pleural effusion, alcohol use disorder, admitted on 9/8/2022.      Severe sepsis due to High grade MRSA Bacteremia and Prosthetic aortic valve/mitral valve endocarditis:  BC x2 on 9/8-9/13 remain positive for MRSA bacteremia.  TTE no obvious vegetation.  PET CT (9/12) showed moderate uptake about TAVR and moderate uptake about dense mitral annulus calcification, suspicious for underlying endocarditis. EMILY (9/14/22)showed 0.7x0.3 mobile vegetation associated with the aortic valve prosthesis best visualized in the left ventricular outflow tract. There is  suspected abscess associated with the aortic valve in the 2-3 o'clock in short  Axis, 1.3x0.8 mobile vegetation associated with the posterior mitral  valve leaflet (atrial aspect of valve). There is a superimposed 1.3 cm mobile  linear strand associated with the vegetation. In addition there is a 1.1 x 0.5  cm vegetation associated with the anterior mitral valve leaflet.  -Daily blood cultures  - IV vancomycin for 6 weeks, p.o. rifampin, 2 weeks of gentamicin if tolerated.  -Risk for renal toxicity, ototoxicity. She has baseline hearing loss, wears hearing aides. Rifampin can reduce levels of lipitor, lisinopril, omeprazole, synthroid.   -Has 2 functioning PIV's.  Hold on PICC unless we run out of peripheral IV options, due to ongoing bacteremia. If lose all IV access and unable to place PIV then as last resort PICC will need to be placed.  -CV surgery consulted and very high operative mortality to repair and in agreement with focus on more palliative approach.  Family requested Dr. Rollins return to re-discuss options now that patient starting to be more clear. He will revisit 9/16.  -MRI brain to r/o septic emboli ordered    Acute on chronic hypoxic/hypercarbic respiratory failure: Transudative right pleural  effusion complicated by PTX:  S/p 1.3 L thoracentesis on 9/8 followed by chest tube placement for PTX on 9/9.  Chest tube retracted to chest wall while transferring patient to her room and was pulled out on 9/10. Follow up CXR showed small pneumothorax .  Chest x-ray on 9/13 showed resolved right pneumothorax.  Bilateral pleural effusions.  Pleural fluid cultures NGTD.   PET/CT on 9/12 showed moderate severity patchy irregular ground glass and consolidative opacities in the right lung, mostly in the right lower lobe although also involving the right upper lobe, associated with varying degrees of heterogeneous mild to moderate uptake, likely infectious/inflammatory  -pulm s/o  -Goal SPO2 88 to 92%.  Avoid hyperoxia.  -Bronchial hygiene.  -On IV antibiotics directed by ID    HFpEF:  EF 60 to 65%. BNP 1,107.  TTE 9/9/2022 LVEF 60 to 65%, mild decreased RV systolic function, moderate-severe LAE, mild-moderate MR, 23 sapient 3 bioprosthetic valve with peak velocity 2.3 m/s, mean gradient 9 mmHg, dimensionless index 0.48, IVC diameter greater than 2.1 cm collapsing less than 50% with sniff suggests high RA pressure at 15 mm or greater  -Recent coronary angiogram on 8/21- no significant CAD.  -Hold torsemide     Essential hypertension.  -Hold losartan  - Monitor     Rhabdomyolysis: CK 2,092->916->419->121.  Suspect traumatic vs less likely  infectious   -holding statin for now     Alcohol use disorder.  History of withdrawal seizures/DT. EtOH level < 10  -CIWA, thiamine, folate  -stopped valium, no benzo's due to pulmonary status     AAA, s/p endovascular repair in 2016: CT abdomen/pelvis on 9/8/2022 showed endovascular aortic aneurysm repair with bifurcated endograft, aneurysm sac measures 5 x 5.6 cm (previously 4.7 x 5.4 cm), type II endoleak.  PET CT (9/12/22) showed No increased uptake about the stent graft to suggest associated infection.  - Followed at [facility]    SKYLAR: resolved  - Strict intake and output and  daily weights  -monitor    Acute toxic/metabolic encephalopathy: Due to benzodiazepine/opiate induced respiratory depression and acute on chronic hypercarbic respiratory acidosis.  NH3 normal.   -MRI brain   -stop valium, avoid opiates.    Normocytic anemia with acute on chronic thrombocytopenia: Multifactorial.  plt now 29K->31K->31K->51K.  Hgb 9.5 stable. PBS mild normochromic normocytic anemia which may be secondary to anemia chronic disease, renal insufficiency or blood loss.  The moderate thrombocytopenia may be secondary to bone marrow suppression by drugs or infection.  Hemolysis labs neg.  Folate nml. B12 nml.  FERNANDA positive.  -heme consult appreciated    New onset atrial fibrillation with RVR (9/13/22): s/p EMILY directed cardioversion to NSR on 9/14. No thrombus is detected within the left atrial appendage on EMILY.  -monitor and  correct phos, electrolytes  -cardiology consult following  -started on Apixaban by cardiology  -started amiodarone 200mg BID  -Tele    H/O Bilateral TKA:  PET CT today showed right > left knee effusion with eterogeneous uptake about both knees, more so on the right, indeterminate for underlying infection. No e/o septic joint clinically per orthopedic surgery. They don't advise arthrocentesis at this time.    Hyponatremia: Sodium  133-137, stable    Cirrhosis: Suspect EtOH mediated. Per CT abdomen/pelvis imaging.  LFTs normal. Not decompensated. No significant ascites.    GERD-PPI.    Asymmetric bladder wall thickening: UA negative for UTI.    Hypokalemia:  -monitor and replete per protocol. Goal keep >4    Hypomagnesemia:  -replaced    Fall at home:  CT head, CT cervical spine without traumatic injuries, stroke.  -PT/OT    MDD-Effexor, hold trazodone due to increased risk for serotonin syndrome.    Hypothyroidism-on levothyroxine.  -TSH 3.92    Hypophosphatemia:  -monitor and replete per protocol    Chronic back pain with disc disease and spondylolisthesis, s/p lumbar  laminectomy.     History of tobacco use disorder-quit smoking 6 months ago.    Constipation: CT abdomen pelvis on 9/8/2022 showed large amount of stool in the rectum.  - Senna-S, MiraLAX     Moderate protein calorie malnutrition: RD following    Goals of care: palliative care following.  Refer to their note from today for details.       Diet: NPO post EMILY cardioversion. If mental status stable and safe to swallow will resume diet  DVT Prophylaxis: Pneumatic Compression Devices  Reynoso Catheter: Not present  Central Lines: None  Cardiac Monitoring: yes  Code Status:   full    Multiple day stay          S:  afebrile. Events overnight noted    O:  Temp: 98  F (36.7  C) Temp src: Oral BP: 109/58 Pulse: 79   Resp: 18 SpO2: 100 % O2 Device: Nasal cannula Oxygen Delivery: 2 LPM  gen nad  cv rrr  Lungs decreased r>l, non labored  abd bs+, nttp  Neuro a&o, less somnolent    Lab/imaging reviewed

## 2022-09-15 NOTE — PROGRESS NOTES
[facility]    Infectious Disease Progress Note    Date of Service: 09/15/2022     Assessment & Plan   Maren Fofana is a 72 year old female who was admitted on 9/8/2022.     1. MRSA bacteremia/TAVR and mitral valve endocarditis, onset of symptoms around 9/6/2022--active issue.  MRSA bacteremia, high-grade, persistent positive blood cultures, consistent with endovascular infection. Endocarditis of TAVR is associated with high mortality, in this study 45% at 1 year, other studies note higher rates. EMILY 9/14 shows 0.7 x 0.3 cm vegetation on aortic valve as well as luz-valvular abscess, 1.3 x 0.8 cm and 1.1 x 0.5 cm vegetations on mitral valve. At risk for septic emboli including stroke. Potentially treatable with antibiotic course, however with large vegetations this will be difficult and finding of luz-valvular abscess chance of cure with antibiotics alone is less. This study of TAVR endocarditis showed 1 year mortality of 62.5% in those with perivalvular extension, 70% mortality at 2 years. Blood culture from 9/14 negative so far, but may still turn positive -- at least it looks like positivity is slowing.   2. History of TAVR, AAA endovascular graft, bilateral TKAs, high risk of infection -- PET CT indicating TAVR endocarditis. PET also suggesting fluid at R knee may be infected also knee exam fairly benign.  3. AAA endovascular repair with graft 2016.  CT showing slight enlargement of aneurysm type II endoleak noted. Not lighting up on PET.  4. Alcohol use, imaging showing cirrhosis  5. Pleural effusion transudative, thoracentesis complicated by hydropneumothorax status post chest tube, now removed.   6. A fib 9/13 -- cardioverted 9/14 now in sinus rhythm.         Recommendations    1. Medical treatment for prosthetic valve endocarditis is IV vancomycin for 6 weeks, p.o. rifampin 6 weeks, 2 weeks of gentamicin if tolerated. Risk for renal toxicity, ototoxicity. She has baseline hearing  loss, wears hearing aides. Rifampin can reduce levels of lipitor, lisinopril, omeprazole, synthroid. There is risk for complications (worsening infection) even with medical treatment, risk of side effects from IV antibiotics.   2. Daily blood cultures until clear  3. Plan MRI brain to look for sign of septic emboli  4. Hold on PICC unless we run out of peripheral IV options, due to ongoing bacteremia.   5. Ongoing discussion of goals of care.     Josafat Recinos MD   [facility]  [phone]  ________________________________________________________________________________    Interval History   Still feels terrible, pain all over. Notes vision floaters for several days. Sister and friend here. Discussed results, difficulty in treating infection.       Physical Exam   Temp: 98.5  F (36.9  C) Temp src: Oral BP: 109/58 Pulse: 81   Resp: 18 SpO2: 94 % O2 Device: Nasal cannula Oxygen Delivery: 2 LPM  Vitals:    09/13/22 0326 09/14/22 0311 09/15/22 0341   Weight: 67.3 kg (148 lb 6.4 oz) 68.2 kg (150 lb 4.8 oz) 68.4 kg (150 lb 14.4 oz)     Vital Signs with Ranges  Temp:  [97.5  F (36.4  C)-98.8  F (37.1  C)] 98.5  F (36.9  C)  Pulse:  [] 81  Resp:  [13-28] 18  BP: (102-131)/(51-91) 109/58  FiO2 (%):  [2 %] 2 %  SpO2:  [87 %-100 %] 94 %    Constitutional: more awake today  Lungs: clear anterior  Cardiovascular: regular  Abdomen: mildly tender  Skin: Warm. No rash. +ecchymoses.   Neuro: Deconditioned, non-focal  MSK: knees good ROM without significant pain, no appreciable swelling  Peripheral IV  Pure wick    Medications       acetaminophen  975 mg Oral Q8H     amiodarone  200 mg Oral BID     apixaban ANTICOAGULANT  5 mg Oral BID     famotidine  20 mg Oral Daily     folic acid  1 mg Oral Daily     gentamicin  1 mg/kg (Adjusted) Intravenous Q24H     levothyroxine  88 mcg Oral QAM AC     multivitamin w/minerals  1 tablet Oral Daily     potassium chloride  10 mEq Intravenous Once     rifampin   600 mg Oral Daily     sodium chloride (PF)  3 mL Intracatheter Q8H     [Held by provider] torsemide  20 mg Oral Daily     vancomycin  1,250 mg Intravenous Q24H     venlafaxine  300 mg Oral Daily       Data   All microbiology laboratory data reviewed.  Recent Labs   Lab Test 09/15/22  0452 09/13/22  0431 09/12/22  0431 09/11/22  0426   WBC  --  7.1 6.7 6.2   HGB 9.5* 9.6* 9.7* 9.8*   HCT  --  28.1* 28.7* 29.5*   MCV  --  95 95 97   PLT 51* 31* 29* 30*     Recent Labs   Lab Test 09/15/22  0452 09/14/22  0307 09/13/22  0431   CR 0.80 0.89 1.09       MICRO:  Collected Updated Procedure Result Status    09/10/2022 0832 09/10/2022 0846 Blood Culture Peripheral Blood [[id number]]   Peripheral Blood    In process Component Value   No component results             09/09/2022 2123 09/09/2022 2133 Blood Culture Peripheral Blood [[id number]]   Peripheral Blood    In process Component Value   No component results             09/09/2022 1343 09/10/2022 0536 Blood Culture Peripheral Blood [[id number]]   (Abnormal)   Peripheral Blood    Preliminary result Component Value   Culture Positive on the 1st day of incubation Abnormal  P    Gram positive cocci in clusters Panic  P    1 of 2 bottles             09/08/2022 1847 09/10/2022 0715 Pleural fluid Aerobic Bacterial Culture Routine with Gram Stain [[id number]]    Pleural fluid from Pleural Cavity, Right    Preliminary result Component Value   Culture No growth after 1 day P   Gram Stain Result No organisms seen P    Gram Stain slide reviewed at the [facility]    2+ WBC seen P             09/08/2022 1847 09/08/2022 2033 Glucose fluid [[id number]]    Pleural fluid from Pleural Cavity, Right    Final result Component Value Units   Glucose Fluid Source Pleural Cavity, Right    Glucose fluid 117 mg/dL          09/08/2022 1847 09/09/2022 0351 Lactate dehydrogenase fluid [[id number]]    Pleural fluid from Pleural Cavity, Right    Final result  Component Value Units   LD Fluid Source Pleural Cavity, Right    Lactate dehydrogenase fluid 100 U/L          09/08/2022 1847 09/09/2022 0351 Protein fluid [[id number]]    Pleural fluid from Pleural Cavity, Right    Final result Component Value Units   Protein Fluid Source Pleural Cavity, Right    Protein Total Fluid 3.0 g/dL          09/08/2022 1554 09/08/2022 1659 Symptomatic; Unknown Influenza A/B & SARS-CoV2 (COVID-19) Virus PCR Multiplex Nasopharyngeal [[id number]]    Swab from Nasopharyngeal    Final result Component Value   Influenza A PCR Negative   Influenza B PCR Negative   RSV PCR Negative   SARS CoV2 PCR Negative   NEGATIVE: SARS-CoV-2 (COVID-19) RNA not detected, presumed negative.          09/08/2022 1552 09/10/2022 1348 Blood Culture Peripheral Blood [[id number]]   (Abnormal)   Peripheral Blood    Preliminary result Component Value   Culture Positive on the 1st day of incubation Abnormal  P    Staphylococcus aureus Panic  P    2 of 2 bottles             09/08/2022 1552 09/10/2022 0704 Blood Culture Peripheral Blood [[id number]]    (Abnormal)   Peripheral Blood    Edited Component Value   Culture Positive on the 1st day of incubation Abnormal     Staphylococcus aureus MRSA Panic     2 of 2 bottles         Susceptibility     Staphylococcus aureus MRSA     [place]     Clindamycin <=0.25 ug/mL Susceptible 1     Erythromycin >=8 ug/mL Resistant     Gentamicin <=0.5 ug/mL Susceptible     Linezolid 2 ug/mL Susceptible     Oxacillin >=4 ug/mL Resistant 2     Tetracycline <=1 ug/mL Susceptible     Trimethoprim/Sulfamethoxazole <=0.5/9.5 u... Susceptible     Vancomycin 1 ug/mL Susceptible              1 This isolate DOES NOT demonstrate inducible clindamycin resistance in vitro. Clindamycin is susceptible and could be used when indicated, however, erythromycin is resistant and should not be used.   2 Oxacillin susceptible isolates are susceptible to cephalosporins (example: cefazolin and cephalexin) and  beta lactam combination agents. Oxacillin resistant isolates are resistant to these agents.        Susceptibility Comments    Staphylococcus aureus MRSA   MRSA requires contact precautions.         2022 1552 2022 0352 Verigene GP Panel [[id number]]    (Abnormal)   Peripheral Blood    Final result Component Value   Staphylococcus aureus Detected Abnormal    Positive for methicillin-resistant Staphylococcus aureus (MRSA) by Verigene multiplex nucleic acid test. Final identification and antimicrobial susceptibility testing will be verified by standard methods.   Staphylococcus epidermidis Not Detected   Staphylococcus lugdunensis Not Detected   Enterococcus faecalis Not Detected   Enterococcus faecium Not Detected   Streptococcus species Not Detected   Streptococcus agalactiae Not Detected   Streptococcus anginosus group Not Detected   Streptococcus pneumoniae Not Detected   Streptococcus pyogenes Not Detected   Listeria species Not Detected            RADIOLOGY:    XR Chest 2 Views    Result Date: 2022  EXAM: XR CHEST 2 VIEWS LOCATION: [facility] DATE/TIME: 2022 11:17 AM INDICATION: f u chest tube, PTX COMPARISON: 2022.     IMPRESSION: Right chest tube over the right lung apex in stable position. No recurrent pneumothorax. Small right pleural effusion with some infiltrates or atelectasis and some mild atelectasis left lung base all appear stable. Aortic valve prosthesis.    Echocardiogram Complete    Result Date: 2022  [id number]^CAMILLE^PAT^[place] [address]  Name: LEELEE MARIN MRN: [id number] : 1950 Study Date: 2022 02:42 PM Age: 72 yrs Gender: Female Patient Location: [facility] Reason For Study: Abnormal Heart Sound Ordering Physician: PAT OBRIEN Performed By: SANDI  BSA: 1.7 m2 Height: 61 in Weight: 148 lb HR: 81 BP: 120/66 mmHg  ______________________________________________________________________________ Procedure Complete Portable Echo Adult. Compared to the prior study dated 10/26/2021, there have been no changes. ______________________________________________________________________________ Interpretation Summary  1.Left ventricular size, wall motion and function are normal. The ejection fraction is 60-65%. 2.The right ventricle is normal size. Mildly decreased right ventricular systolic function 3.The left atrium is moderate to severely dilated. 4.Thickened mitral leaflets without stenosis. There is mild to moderate (1-2+) mitral regurgitation. 5.There is a 23 ISABELLA 3 bioprosthetic valve present in aortic valve position. At heart rate of 99 bpm peak velocity is 2.3 m/s, mean gradient is 9 mmHg, dimensionless index 0.48. 6.IVC diameter >2.1 cm collapsing <50% with sniff suggests a high RA pressure estimated at 15 mmHg or greater. Compared to the prior study dated 10/26/2021, the Tricuspid regurgitation is really not sampled well, left atrium is further enlarged, no essential change for the valve with prior peak velocity of 2.8 m/s and mean gradient of 16 mmHg. ______________________________________________________________________________ I      WMSI = 1.00     % Normal = 100  X - Cannot   0 -                      (2) - Mildly 2 -          Segments  Size Interpret    Hyperkinetic 1 - Normal  Hypokinetic  Hypokinetic  1-2     small                                                    7 -          3-5    moderate 3 - Akinetic 4 -          5 -         6 - Akinetic Dyskinetic   6-14    large              Dyskinetic   Aneurysmal  w/scar       w/scar       15-16   diffuse  Left Ventricle Left ventricular size, wall motion and function are normal. The ejection fraction is 60-65%. There is normal left ventricular wall thickness. Left ventricular diastolic function is normal. No regional wall motion abnormalities noted.  Right Ventricle The right  ventricle is normal size. Mildly decreased right ventricular systolic function. TAPSE is normal, which is consistent with normal right ventricular systolic function.  Atria The left atrium is moderate to severely dilated. Right atrial size is normal. There is no color Doppler evidence of an atrial shunt.  Mitral Valve Thickened mitral valve anterior leaflet. Thickened mitral valve posterior leaflet. There is mild to moderate (1-2+) mitral regurgitation. There is no mitral valve stenosis.  Tricuspid Valve The tricuspid valve is not well visualized, but is grossly normal. Right ventricle systolic pressure estimate normal. There is trace to mild tricuspid regurgitation. There is no tricuspid stenosis.  Aortic Valve There is a ISABELLA 3 bioprosthetic valve present in aortic valve position. 23 At heart rate of 99 bpm peak velocity is 2.3 m/s, mean gradient is 9 mmHg, dimensionless index 0.48.  Pulmonic Valve The pulmonic valve is not well seen, but is grossly normal. This degree of valvular regurgitation is within normal limits. There is no pulmonic valvular stenosis.  Vessels The aorta root is normal. Normal size ascending aorta. IVC diameter >2.1 cm collapsing <50% with sniff suggests a high RA pressure estimated at 15 mmHg or greater.  Pericardium There is no pericardial effusion.  Rhythm Sinus rhythm was noted.  ______________________________________________________________________________ MMode/2D Measurements & Calculations IVSd: 0.74 cm LVIDd: 4.7 cm LVIDs: 3.3 cm LVPWd: 0.75 cm FS: 28.2 %  LV mass(C)d: 109.3 grams LV mass(C)dI: 65.7 grams/m2 Ao root diam: 2.5 cm LA dimension: 5.8 cm LA/Ao: 2.3 LVOT diam: 1.6 cm LVOT area: 2.0 cm2 LA Volume Indexed (AL/bp): 86.6 ml/m2 RWT: 0.32  Time Measurements Aortic HR: 106.0 BPM MM HR: 91.0 BPM  Doppler Measurements & Calculations MV E max beena: 102.3 cm/sec MV A max beena: 108.5 cm/sec MV E/A: 0.94 MV dec slope: 322.0 cm/sec2 MV dec time: 0.32 sec Ao V2 max: 204.0 cm/sec Ao max  P.0 mmHg Ao V2 mean: 165.0 cm/sec Ao mean P.0 mmHg Ao V2 VTI: 44.8 cm KP(I,D): 0.80 cm2 KP(V,D): 0.96 cm2 LV V1 max PG: 3.8 mmHg LV V1 max: 97.6 cm/sec LV V1 VTI: 17.8 cm CO(LVOT): 3.8 l/min CI(LVOT): 2.3 l/min/m2 SV(LVOT): 35.7 ml SI(LVOT): 21.5 ml/m2 TR max thomas: 244.0 cm/sec TR max P.8 mmHg AV Thomas Ratio (DI): 0.48 KP Index (cm2/m2): 0.48 E/E': 16.5 E/E' av.0 Lateral E/e': 20.0 Medial E/e': 13.9 Peak E' Thomas: 6.2 cm/sec  ______________________________________________________________________________ Report approved by: Yonatan Arroyo 2022 04:29 PM       US Thoracentesis    Result Date: 2022  EXAM: 1. RIGHT  THORACENTESIS 2. ULTRASOUND GUIDANCE LOCATION: [facility] DATE/TIME: 2022 6:46 PM INDICATION: Pleural effusion. PROCEDURE: Informed consent obtained. Time out performed. The chest was prepped and draped in sterile fashion. 5  mL of 1 % lidocaine was infused into the local soft tissues. Under direct ultrasound guidance, a 5 [place] catheter system was placed into the pleural effusion. 1.35  liters of clear yellow and red  fluid were removed and sent to lab, if requested. Patient tolerated procedure well. Ultrasound imaging was obtained and placed in the patient's permanent medical record.     IMPRESSION: Status post right  ultrasound-guided thoracentesis. Reference CPT Code: 14641    XR Chest Port 1 View    Result Date: 9/10/2022  EXAM: XR CHEST PORT 1 VIEW LOCATION: [facility] DATE/TIME: 9/10/2022 2:21 PM INDICATION: follow up PTX for continued resolution COMPARISON: 2022     IMPRESSION: The heart is normal in size. Improved right pleural effusion. Right lower lobe opacities, most likely representing infiltrate. No pneumothorax. Right-sided chest tube.    XR Chest Port 1 View    Result Date: 2022  EXAM: XR CHEST PORT 1 VIEW LOCATION: [facility] DATE/TIME: 2022 12:00 AM INDICATION: s p  chest tube follow-up pneumothorax COMPARISON: 09/08/2022     IMPRESSION: New right chest tube terminates at the apex medially. Right pneumothorax has resolved. Persistent opacity at the right base may represent an combination of atelectasis and edema. Normal heart size. Prior endovascular aortic valve replacement.  Left lung clear. Surgical clips cluster over the left shoulder.    XR Chest Port 1 View    Result Date: 9/8/2022  EXAM: XR CHEST PORT 1 VIEW LOCATION: [facility] DATE/TIME: 9/8/2022 9:44 PM INDICATION: ptx COMPARISON: 09/08/2022     IMPRESSION: Persistent right pneumothorax measuring 8 mm at the apex (stable) and 1.7 cm at the lateral base (previously 1.4 cm). Small right pleural effusion. Increased airspace infiltrate in the right lower lobe. Surgical clips in the left axilla. Calcified aortic arch. Prior aortic valve repair.    XR Chest Port 1 View    Result Date: 9/8/2022  EXAM: XR CHEST PORT 1 VIEW LOCATION: [facility] DATE/TIME: 9/8/2022 8:18 PM INDICATION: s p thoracentesis, ? ptx COMPARISON: Same date abdominal CT and chest radiograph.     IMPRESSION: Stable cardiomediastinal silhouette with prior aortic valve replacement. Small right hydropneumothorax, similar volume to same day CT given differences in modalities, without evidence of tension. Possible reexpansion edema in the right lower lobe. Left lung is clear. No acute bony abnormality.    XR Chest Port 1 View    Result Date: 9/8/2022  EXAM: XR CHEST PORT 1 VIEW LOCATION: [facility] DATE/TIME: 9/8/2022 4:30 PM INDICATION: fever, cough COMPARISON: 12/3/2021     IMPRESSION: Small to moderate right effusion, slightly increased. Right lower lobe infiltrate or atelectasis.  No pneumothorax.  The left lung is clear.  The heart is normal in size.    CT Abdomen Pelvis w Contrast    Result Date: 9/8/2022  EXAM: CT ABDOMEN PELVIS W CONTRAST LOCATION: [facility]  HOSPITAL DATE/TIME: 9/8/2022 7:39 PM INDICATION: Right upper abdominal pain and fever. COMPARISON: CTA 8/21/2021 TECHNIQUE: CT scan of the abdomen and pelvis was performed following injection of IV contrast. Multiplanar reformats were obtained. Dose reduction techniques were used. CONTRAST: 75ml isovue 370 FINDINGS: LOWER CHEST: Small right hydropneumothorax post right thoracentesis earlier today. Small residual right pleural effusion. Heavy mitral annular calcifications. Small sliding-type hiatal hernia. HEPATOBILIARY: Cirrhotic appearing liver redemonstrated without focal suspicious mass. Normal gallbladder and biliary system. PANCREAS: Normal. SPLEEN: Normal. ADRENAL GLANDS: Normal. KIDNEYS/BLADDER: Normal kidneys and ureters. Nondistended bladder with focal asymmetric enhancing bladder wall thickening measuring up to 8 mm in the right bladder base image 173 of series 3.. BOWEL: Diverticulosis of the colon. No acute inflammatory change. No obstruction. Large amount of stool in the rectum. Mild ascites. LYMPH NODES: No lymphadenopathy. VASCULATURE: Previous endovascular aortic aneurysm repair with bifurcated endograft. Aneurysm sac measures 5.0 x 5.6 cm, previously 4.7 x 5.4 cm. Type II endoleak is noted. PELVIC ORGANS: Hysterectomy. No adnexal mass. MUSCULOSKELETAL: Previous lower lumbar fusion. Degenerative changes are present within the spine and hips. Generalized anasarca.     IMPRESSION: 1.  Small to moderate right hydropneumothorax, status post large volume right thoracentesis earlier today. Patient may have a trapped right lung. Consider two-view chest radiograph. 2.  Cirrhotic appearing liver with mild ascites. No splenomegaly. 3.  Previous endovascular repair of infrarenal abdominal aortic aneurysm. Type II endoleak with interval enlargement of the aneurysm sac, compatible with endotension. Previous repair at [facility]. Consider nonemergent Interventional Radiology consult. 4.  Generalized  johnathon. Report called to Dr. Roberson at 2000 hours    CT Cervical Spine w/o Contrast    Result Date: 9/8/2022  EXAM: CT CERVICAL SPINE W/O CONTRAST LOCATION: [facility] DATE/TIME: 9/8/2022 7:38 PM INDICATION: fall COMPARISON: None. TECHNIQUE: Routine CT Cervical Spine without IV contrast. Multiplanar reformats. Dose reduction techniques were used. FINDINGS: No evidence of acute fracture or subluxation of the cervical spine by CT imaging. Straightening of the normal cervical lordosis. The vertebral bodies of the cervical spine otherwise have normal stature and alignment. Anterolisthesis of C3 on C4 measuring  2 mm. Anterior marginal osteophytes at C4/C5-C7/T1. Multilevel degenerative disc disease of the cervical spine with disc height loss, most pronounced at C4/C5-C7/T1. The partially imaged intracranial contents are unremarkable. No prevertebral soft tissue swelling. The partially imaged lung apices are unremarkable. Craniovertebral junction and C1-C2: The odontoid process is well approximated with the anterior body of C1 and well aligned between the lateral masses of C1. C2-C3: No posterior disc bulge or spinal canal narrowing. No neural foraminal narrowing. C3-C4: No posterior disc bulge or spinal canal narrowing. No neural foraminal narrowing. C4-C5: Broad bar disc osteophyte complex with moderate spinal canal narrowing. Uncovertebral joint disease and facet arthropathy with severe bilateral neural foraminal narrowing. C5-C6: No posterior disc bulge or spinal canal narrowing. Uncovertebral joint disease and facet arthropathy with severe right and moderate left neural foraminal narrowing. C6-C7: No posterior disc bulge or spinal canal narrowing. Uncovertebral joint disease and facet arthropathy with severe bilateral neural foraminal narrowing. C7-T1: No posterior disc bulge or spinal canal narrowing. Uncovertebral joint disease and facet arthropathy with moderate bilateral neural foraminal  narrowing.     IMPRESSION: 1.  No evidence of acute fracture or subluxation of the cervical spine by CT imaging. 2.  Degenerative cervical spondylosis as described above.    CT Head w/o Contrast    Addendum Date: 9/8/2022    Addendum/ impression: INDICATION: Dizziness    Result Date: 9/8/2022  EXAM: CT HEAD W/O CONTRAST LOCATION: [facility] DATE/TIME: 9/8/2022 7:34 PM INDICATION: fall COMPARISON: None. TECHNIQUE: Routine CT Head without IV contrast. Multiplanar reformats. Dose reduction techniques were used. FINDINGS: INTRACRANIAL CONTENTS: No evidence of acute intracranial hemorrhage or mass effect. Scattered foci of decreased attenuation within the cerebral hemispheric white matter which are nonspecific, though most commonly ascribed to chronic small vessel ischemic  disease. The ventricles and sulci are prominent consistent with mild brain parenchymal volume loss. Gray-white matter differentiation is maintained. The basilar cisterns are patent. VISUALIZED ORBITS/SINUSES/MASTOIDS: Bilateral cataract surgery. The partially imaged globes are otherwise unremarkable. The partially imaged paranasal sinuses, mastoid air cells and middle ear cavities are unremarkable. BONES/SOFT TISSUES: The visualized skull base and calvarium are unremarkable.     IMPRESSION:  1.  No evidence of acute intracranial hemorrhage or mass effect. 2.  Mild nonspecific white matter changes. 3.  Mild brain parenchymal volume loss.    CT Lumbar Spine w/o Contrast    Result Date: 9/8/2022  EXAM: CT LUMBAR SPINE W/O CONTRAST LOCATION: [facility] DATE/TIME: 9/8/2022 7:39 PM INDICATION: Back pain COMPARISON: None. TECHNIQUE: Routine CT Lumbar Spine without IV contrast. Multiplanar reformats. Dose reduction techniques were used. FINDINGS: No evidence of acute fracture or subluxation of the lumbar spine by CT imaging. Postsurgical changes posterior fusion at L3-L5 and interbody fusion at L3/L4 and  L4/L5. No hardware complication. No evidence of acute fracture or subluxation of the lumbar spine by CT imaging. Retrolisthesis of T12 on L1 measuring 2 mm. Anterior marginal osteophytes at T12/L1 and L1/L2 and L2/L3. Multilevel degenerative disc disease of the lumbar spine with disc height loss, most pronounced at T12/L1 and L2/L3. Aortobiiliac endograft. The partially imaged intra-abdominal contents are otherwise unremarkable. T12/L1:  Symmetric disc bulge without spinal canal narrowing. Mild bilateral neural foraminal narrowing. L1/L2:  No posterior disc bulge or spinal canal narrowing. Mild bilateral neural foraminal narrowing. L2/L3:  Broad bar disc osteophyte complex with mild spinal canal narrowing. Severe bilateral neural foraminal narrowing. L3/L4:  Spondylotic ridging without spinal canal narrowing. Moderate bilateral facet arthropathy. No neural foraminal narrowing.  L4/L5:  No posterior disc bulge or spinal canal narrowing. Mild bilateral facet arthropathy. No neural foraminal narrowing. L5/S1: Symmetric disc bulge and moderate facet arthropathy without spinal canal narrowing. Severe bilateral neural foraminal narrowing.     IMPRESSION:  1.  No evidence of acute fracture or subluxation of the lumbar spine by CT imaging. 2.  Postsurgical changes posterior fusion at L3-L5 and interbody fusion at L3/L4 and L4/L5. No hardware complication. 3.  Degenerative lumbar spondylosis as described above.    Total Time Spent 35 minutes with >50% of the time spent in chart review, evaluation, counseling, education and care coordination.

## 2022-09-15 NOTE — PROGRESS NOTES
"[facility]  Palliative Care Daily Progress & Family Meeting Note       Recommendations & Counseling     Family meeting held today 9/15. Luz takeaways:  - Katherine prefers to receive information about prognosis directly. She and her family appreciate prognostic information provided by specialists and reviewed during our meeting today  - Katherine's priority is living as long as possible. She was willing to consider CT surgery despite high risk, but would not want to undergo a prolonged ICU stay knowing her time may be short.   - Family requested Dr. Rollins return to speak with Katherine again now that she is more lucid, he will see her tomorrow  - Katherine is willing to undergo prolonged course of IV antibiotics and rehab at TCU if it will allow her more time  - She has partial decision-making capacity at this time, mentation is clearing but still needs support from family  - Family has requested to speak with SW/CM about TCU options    Goals of Care: life-prolonging    Advanced Care Planning:  Advance directive: Yes, on file  POLST: No  Code status: Full  Health care agent: Caitie (daughter) supported by Tera (son)    Symptom Management:  #Pain, \"head to toe\" but low back is worst  - Continue scheduled tylenol  - Continue oxycodone 5mg q4h prn  - Started lidocaine patches to back and neck    Psychosocial & Spiritual:   Appreciate Palliative SW support      Assessments          Maren is a 72 year old female with PMH of aortic stenosis s/p TAVR 09/21, HTN, s/p AAA repair, HFpEF, HTN, known right-sided pleural effusion, and alcohol use disorder, admitted on 9/8/2022 after a fall. 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 Also with acute on chronic respiratory failure due to R pleural effusion, s/p thoracentesis and chest tube (removed 9/10), complicated by R pneumothorax (now resolved). Course also complicated by new " "Afib with RVR, now s/p EMILY-directed cardioversion 9/14.    Today, the patient was seen for:  Goals of care, family meeting, symptom management    Prognosis, Goals, or Advance Care Planning was addressed today with: Yes.  Mood, coping, and/or meaning in the context of serious illness were addressed today: Yes.              Interval History:     Chart review/discussion with unit or clinical team members:   - Per CT surgery note, discussed open aortic and mitral valve replacement - very high risk of major morbidity or mortality. Family did not believe patient would want to go through with such a procedure, patient was not able to participate in conversation at that time.  - Discussed treatment options and prognosis with ID    Per patient or family/caregivers today:  Katherine is awake and engaged during my visit today. She is accompanied by her sister and a close friend. I introduced myself and explained the role of palliative care. When I asked how Katherine was feeling today, she said \"depressed\" because \"I'm dying.\" Acknowledged that Katherine has been through a lot this admission and is appropriately processing her sudden decline. Katherine notes that prior to admission, she was able to walk laps around her apartment building, ride her tricycle, drive and visit with friends. After her TAVR last fall, she thought she \"would be fine.\"    Katherine complains of pain \"from head to toe\" which is worst at the low back. She uses voltaren gel at home which she finds very helpful. Denies any dyspnea or nausea. Having bowel movements.    [facility] Palliative Care Family Meeting Note    Date/time:  9/15/2022, 14:00 to 14:45    Location:  Bedside    Patient present:  Yes    Reason for Meeting:  Goals of care    People Present:  Caitie (daughter), Tera (son - over the phone), Starr (DIL - over the phone), Jennifer Myers (Palliative SW), Heather Hernández (Palliative PA)    Meeting Led by:  Palliative    Meeting Summary:    Katherine is much " "more lucid today per her family. She is alert and able to engage in our conversation. I asked Katherine to tell me what she understood from the doctors she spoke with earlier this morning (ID and CT surgery), she replied \"I'm going downhill.\" I asked what was the main problem she was being treated for, she said \"cirrhosis.\" With permission, reviewed her current medical problems and proposed treatment options. I explained that Katherine has a bacterial infection in her blood and in her heart - specifically on 2 of her heart valves (prosthetic aortic valve and mitral valve). We are treating this with IV antibiotics, but without source control the infection may persist and continue to affect her heart function. CT surgery met with Caitie yesterday and Katherine again this morning. They discussed the possibility of open heart surgery to replace both valves. This surgery would carry a high risk of mortality and morbidity, recovery would involved prolonged stay in the ICU (months).    Katherine and her family understand that even in the best case scenario, her time is limited. Katherine prefers to receive information about prognosis directly. Caitie shared that the CT surgeon thought she had weeks to months, I shared information from ID about 1 and 2 year survival rates (60% and 70% respectively) for patients with TAVR endocarditis. Acknowledged difficulty of hearing this information, provided emotional support.    Katherine was clear that her priority is living as long as possible (\"might as well go for broke\"). She was willing to consider CT surgery despite high risk, but would not want to undergo a prolonged ICU stay knowing her time may be short. Acceptable quality of life for Katherine includes being able to walk and spend time with friends and family. Explored option of continuing with IV antibiotics to provide Katherine with as much time as possible while also allowing her to work with PT/OT and visit with loved ones. Katherine is willing to " undergo prolonged course of IV antibiotics and rehab at TCU, family is supportive. We also discussed the possibility of stopping antibiotic treatment and focusing on comfort-focused care, which is always an option if treatment becomes too burdensome for Katherine.    Decision making capacity:  Partial - more lucid today per family, asks appropriate questions, needs continued support from family for decision making    Estimated length of life:  Weeks to months    Symptoms:  Pain, see above    Goals of Care:  Life-prolonging    Understanding/Coping:  Appreciate support from Palliative SW    Meeting Action Items & Recommendations:   1.) Requested Dr. Rollins (CT surgery) return to speak with patient now that she is more lucid per family's request  2.)  Discussed case with Dr. Morales  3.)  Placed SW consult per family request    Key Palliative Symptoms:  # Pain severity the last 12 hours: moderate  # Dyspnea severity the last 12 hours: low  # Nausea severity the last 12 hours: none  # Anxiety severity the last 12 hours: none    Patient is on opioids: assessed and bowels ok/no needed changes to plan of care today.           Review of Systems:     Besides above, an additional 3 system ROS was reviewed and is unremarkable          Medications:     I have reviewed this patient's medication profile and medications during this hospitalization.    Noted meds:    Current Facility-Administered Medications   Medication     acetaminophen (TYLENOL) tablet 975 mg     amiodarone (PACERONE) tablet 200 mg     apixaban ANTICOAGULANT (ELIQUIS) tablet 5 mg     famotidine (PEPCID) tablet 20 mg     flumazenil (ROMAZICON) injection 0.2 mg     folic acid (FOLVITE) tablet 1 mg     gentamicin (GARAMYCIN) 60 mg in sodium chloride 0.9 % 50 mL intermittent infusion     OLANZapine zydis (zyPREXA) ODT tab 5-10 mg    Or     haloperidol lactate (HALDOL) injection 2.5-5 mg     levothyroxine (SYNTHROID/LEVOTHROID) tablet 88 mcg     lidocaine (LMX4) cream  "    lidocaine 1 % 0.1-1 mL     melatonin tablet 5 mg     multivitamin w/minerals (THERA-VIT-M) tablet 1 tablet     naloxone (NARCAN) injection 0.2 mg    Or     naloxone (NARCAN) injection 0.4 mg    Or     naloxone (NARCAN) injection 0.2 mg    Or     naloxone (NARCAN) injection 0.4 mg     ondansetron (ZOFRAN ODT) ODT tab 4 mg    Or     ondansetron (ZOFRAN) injection 4 mg     oxyCODONE (ROXICODONE) tablet 5 mg     potassium chloride 10 mEq in 100 mL sterile water intermittent infusion (premix)     prochlorperazine (COMPAZINE) injection 5 mg    Or     prochlorperazine (COMPAZINE) tablet 5 mg    Or     prochlorperazine (COMPAZINE) suppository 12.5 mg     rifampin (RIFADIN) capsule 600 mg     senna-docusate (SENOKOT-S/PERICOLACE) 8.6-50 MG per tablet 1 tablet    Or     senna-docusate (SENOKOT-S/PERICOLACE) 8.6-50 MG per tablet 2 tablet     sodium chloride (PF) 0.9% PF flush 3 mL     sodium chloride (PF) 0.9% PF flush 3 mL     sodium chloride (PF) 0.9% PF flush 3 mL     [Held by provider] torsemide (DEMADEX) tablet 20 mg     vancomycin (VANCOCIN) 1,250 mg in sodium chloride 0.9 % 250 mL intermittent infusion     venlafaxine (EFFEXOR XR) 24 hr capsule 300 mg                Physical Exam:   Vital Signs: Blood pressure 109/58, pulse 81, temperature 98.5  F (36.9  C), temperature source Oral, resp. rate 18, height 1.549 m (5' 1\"), weight 68.4 kg (150 lb 14.4 oz), SpO2 94 %.   GENERAL: [place], sitting up in bed, awake and alert, speaking in full sentences  SKIN: Warm and dry   HEENT: Normocephalic, anicteric sclera, moist mucous membranes  LUNGS: diminished breath sounds, non-labored  CARDIAC: rrr, +systolic murmur  ABDOMINAL: BS (+), soft, non distended, non tender  MUSKL: no gross joint deformities   EXTREMITIES: No edema or cyanosis  NEUROLOGIC: alert and oriented  PSYCH: appropriate affect             Data Reviewed:     Reviewed recent pertinent imaging:   No new imaging    Reviewed recent labs:   CMP  Recent Labs   Lab " 09/15/22  0452 09/14/22  1600 09/14/22  0307 09/13/22  0431 09/12/22  1840 09/12/22  1410 09/12/22  1358 09/12/22  0947 09/12/22  0431 09/11/22  0426 09/10/22  0832 09/09/22  0737 09/09/22  0053   *  --   --  136  --   --   --   --  135* 132* 132*   < >  --    POTASSIUM 3.6 4.0 3.3* 3.7   < >  --    < >  --  3.1* 3.5 4.1   < >  --    CHLORIDE  --   --   --  102  --   --   --   --  101 99 100   < >  --    CO2  --   --   --  26  --   --   --   --  25 25 24   < >  --    ANIONGAP  --   --   --  8  --   --   --   --  9 8 8   < >  --    GLC  --   --   --  126*  --  77  --  99 98 118 127*   < >  --    BUN  --   --   --  27  --   --   --   --  31* 38* 34*   < >  --    CR 0.80  --  0.89 1.09  --   --   --   --  1.15* 1.48* 1.37*   < >  --    GFRESTIMATED 77  --  68 53*  --   --   --   --  50* 37* 40*   < >  --    YENI  --   --   --  8.4*  --   --   --   --  8.3* 8.3* 8.4*   < >  --    MAG 1.8  --  1.5* 2.0  --   --   --   --  1.8  --  2.4   < >  --    PHOS  --  2.5 1.8* 1.2*  --   --   --   --   --   --   --   --  3.4   PROTTOTAL  --   --   --  5.9*  --   --   --   --  6.3 6.3 6.2   < >  --    ALBUMIN  --   --   --  2.6*  --   --   --   --  2.8* 3.0* 2.9*   < >  --    BILITOTAL  --   --   --  1.2*  --   --   --   --  1.2* 0.8 0.7   < >  --    ALKPHOS  --   --   --  151*  --   --   --   --  113 69 61   < >  --    AST  --   --   --  53*  --   --   --   --  50* 59* 60*   < >  --    ALT  --   --   --  19  --   --   --   --  16 15 14   < >  --     < > = values in this interval not displayed.     CBC  Recent Labs   Lab 09/15/22  0452 09/13/22  0431 09/12/22  0431 09/11/22  0426 09/10/22  0832   WBC  --  7.1 6.7 6.2 6.6   RBC  --  2.96* 3.01* 3.05* 3.41*   HGB 9.5* 9.6* 9.7* 9.8* 11.0*   HCT  --  28.1* 28.7* 29.5* 33.0*   MCV  --  95 95 97 97   MCH  --  32.4 32.2 32.1 32.3   MCHC  --  34.2 33.8 33.2 33.3   RDW  --  15.0 14.6 14.6 14.6   PLT 51* 31* 29* 30* 40*           Total Visit Time: 120 min    Total Face-to-Face Prolonged  Service Time: 65 minutes    Content of the Prolonged Time: family meeting, patient assessment, goals of care counseling    Heather Hernández PA-C  [facility], Palliative Care  Dept phone: [phone]  Securely message with the Vocera Web Console

## 2022-09-16 NOTE — PLAN OF CARE
Problem: Malnutrition  Goal: Improved Nutritional Intake  Outcome: Ongoing, Not Progressing   Goal Outcome Evaluation:    /58 & 97/54.  C/O back pain 6/10 gave oxycodone 5mg with good effect.  Pt c/o seeing bugs and wanted zyprexa 10mg at 1910.  Did not eat anything.  Oral intake 240cc.  Urine dark brick color 400cc.  Alert to self and place not time.  2L NC 96%.

## 2022-09-16 NOTE — PROGRESS NOTES
CARDIOLOGY PROGRESS NOTE      Assessment/Plan:  1.  Atrial fibrillation- paroxysmal/persistent, reverted to atrial flutter 2-1. Not valvular and apparently asymptomatic.  Pressure borderline.  Did not respond to IV amiodarone or IV Cardizem and oral digoxin, underwent cardioversion EMILY guided and now in sinus rhythm.  We will use amiodarone 200 mg twice a day.   Atrial arrhythmia New since 8 September.    TSH normal and keep potassium above 4.0.  On Eliquis 5 mg twice a day.  2.  Status post TAVR- September 2021 23 mm ISABELLA 3 valve placed via left subclavian approach.    Transthoracic echo shows 2.3 m/s peak velocity with 9 mmHg mean gradient.  Precardioversion EMILY did show vegetation on valve, perivalvular abscess.    3.  Gram-positive bacteremia-methicillin-resistant staph aureus growing in blood culture.  PET scan association with TAVR as well as mitral valve.  No neurological sequela.  Jimenes criteria meets major criteria with positive blood culture and obvious vegetation seen on mitral and aortic valves.  Antibiotics per infectious disease, however ideally would benefit from double valve procedure and possible aortic root replacement, but risk is increased given other comorbidities.  Defer to cardiothoracic surgery for the short-term.  4.  Anasarca-given increased BNP question heart failure in the setting of preserved ejection fraction of 60 to 65%.  Did receive some IV diuretics.  No significant signs or symptoms currently.  5.  Hypotension- resolved with restoration of sinus rhythm.  6.  Mild metabolic encephalopathy-stable.  MRI of brain today, that might help determine need for valve surgery.  7.  Hypothyroidism- good TSH at 3.92.  8.  Hypokalemia- potassium 3.8, will try to increase this to above 4 to help get sinus rhythm restoration.  9.  Cirrhosis-increased AST with normal ALT.  Surgical risk increased.  10.  Thrombocytopenia-could be reactive.    Plan:  1.  Supplement potassium above 4.0.  2.   "Continue other current meds.  3.  Await head MRI.    Discharge Plannin.  Barrier to discharge is strengthening.  2.  Antibiotics for 6 weeks.  3.  Can follow with Dr. Geovani Daley.     LOS: 8 days     Subjective:  Interval History:    72-year-old white female being seen on 9th day of hospitalization.  Oldest sister is present.  Patient is getting ready to go to MRI.  She feels drowsy, complains of back discomfort.  No significant shortness of breath, PND, orthopnea, chest pains, palpitations, syncope, dizziness, peripheral edema.    Medications    acetaminophen  975 mg Oral Q8H     amiodarone  200 mg Oral BID     apixaban ANTICOAGULANT  5 mg Oral BID     famotidine  20 mg Oral Daily     folic acid  1 mg Oral Daily     gentamicin  1 mg/kg (Adjusted) Intravenous Q24H     levothyroxine  88 mcg Oral QAM AC     lidocaine  2 patch Transdermal Q24H     lidocaine   Transdermal Q8H ISIDRO     multivitamin w/minerals  1 tablet Oral Daily     potassium & sodium phosphates  1 packet Oral or Feeding Tube Q4H     potassium chloride  10 mEq Intravenous Once     rifampin  600 mg Oral Daily     sodium chloride (PF)  3 mL Intracatheter Q8H     [Held by provider] torsemide  20 mg Oral Daily     vancomycin  1,250 mg Intravenous Q24H     venlafaxine  300 mg Oral Daily     Objective:   Vital signs in last 24 hours:  Temp:  [97.1  F (36.2  C)-99.7  F (37.6  C)] 98.2  F (36.8  C)  Pulse:  [79-87] 87  Resp:  [18-22] 20  BP: ()/(54-67) 111/57  SpO2:  [91 %-100 %] 97 %    Physical Exam:  /57 (BP Location: Left arm)   Pulse 87   Temp 98.2  F (36.8  C) (Oral)   Resp 20   Ht 1.549 m (5' 1\")   Wt 67.5 kg (148 lb 14.4 oz)   SpO2 97%   BMI 28.13 kg/m      General Appearance:    Alert, cooperative, no distress, appears stated age   Head:    Normocephalic, without obvious abnormality, atraumatic   Throat:   Lips, mucosa, and tongue normal; teeth and gums normal   Neck:   Supple, symmetrical, trachea midline, no adenopathy;    "     thyroid:  No enlargement/tenderness/nodules; no carotid    bruit or JVD   Back:     Symmetric, no curvature, ROM normal, no CVA tenderness   Lungs:     Clear to auscultation bilaterally, respirations unlabored   Chest wall:    No tenderness or deformity   Heart:    Regular rate and regular rhythm, S1 and S2 normal, 1/6 systolic ejection murmur,no  rub   or gallop   Abdomen:     Soft, non-tender, bowel sounds active all four quadrants,     no masses, no organomegaly   Extremities:   Normal, atraumatic, no cyanosis or edema   Pulses:   2+ and symmetric all extremities   Skin:   Skin color, texture, turgor normal, no rashes or lesions     Cardiographics:      ECG: Personally reviewed by myself shows sinus rhythm, possible old anteroseptal Q MI type pattern.    Echocardiogram:   1.Left ventricular size, wall motion and function are normal. The ejection fraction is 60-65%.  2.The right ventricle is normal size. Mildly decreased right ventricular systolic function  3.The left atrium is moderate to severely dilated.  4.Thickened mitral leaflets without stenosis. There is mild to moderate (1-2+) mitral regurgitation.  5.There is a 23 ISABELLA 3 bioprosthetic valve present in aortic valve position.At heart rate of 99 bpm peak velocity is 2.3 m/s, mean gradient is 9 mmHg,dimensionless index 0.48.  6.IVC diameter >2.1 cm collapsing <50% with sniff suggests a high RA pressure estimated at 15 mmHg or greater.  Compared to the prior study dated 10/26/2021, the Tricuspid regurgitation is really not sampled well, left atrium is further enlarged, no essential change for the valve with prior peak velocity of 2.8 m/s and mean gradient of 16  mmHg.      Lab Results:   Recent Labs   Lab 09/16/22  0442 09/15/22  0452 09/13/22  0431   WBC  --   --  7.1   HGB 9.7* 9.5* 9.6*   HCT  --   --  28.1*   PLT  --  51* 31*     Recent Labs   Lab 09/15/22  0452 09/13/22  0431   * 136   CO2  --  26   BUN  --  27   .       Lab Results    Component Value Date    TROPONINI 0.11 09/09/2022

## 2022-09-16 NOTE — PLAN OF CARE
Problem: Malnutrition  Goal: Improved Nutritional Intake  Outcome: Ongoing, Progressing     Problem: Respiratory Compromise (Pneumothorax)  Goal: Optimal Oxygenation and Ventilation  Outcome: Ongoing, Progressing       Goal Outcome Evaluation:  Patient is aox3. Patient is forgetful of situation. Patient continues to be in NSR. VSS. BP improved to 119/57. Patient's getting 2L oxygen via NC. SpO2 at 95%. Back pain controlled with repositioning, scheduled tylenol, and prn PO oxycodone. Patient's blood culture collected on 9/15 gram + for cocci in clusters. Blood cultures also positive for staph aureus MRSA. Continue to monitor.

## 2022-09-16 NOTE — PROGRESS NOTES
Pt was lying down and awake. Dtr Caitie at bedside. Pt is Baptism. Fr. Sherman anointed her yesterday. Both Katherine and Caitie appreciated this. Katherine had recently returned from an MRI, according to Caitie, and was still groggy.    Writer introduced the role of spiritual care alongside medical care. Katherine and Caitie were in agreement.  shared prayer and light conversation to further support healing.    Spiritual Care is available as requested or needed.    DINESH Connors.

## 2022-09-16 NOTE — PLAN OF CARE
"  Problem: Plan of Care - These are the overarching goals to be used throughout the patient stay.    Goal: Plan of Care Review/Shift Note  Description: The Plan of Care Review/Shift note should be completed every shift.  The Outcome Evaluation is a brief statement about your assessment that the patient is improving, declining, or no change.  This information will be displayed automatically on your shift note.  Outcome: Ongoing, Progressing  Flowsheets (Taken 9/16/2022 1116)  Plan of Care Reviewed With: patient  Goal: Patient-Specific Goal (Individualized)  Description: You can add care plan individualizations to a care plan. Examples of Individualization might be:  \"Parent requests to be called daily at 9am for status\", \"I have a hard time hearing out of my right ear\", or \"Do not touch me to wake me up as it startles me\".  Outcome: Ongoing, Progressing  Goal: Absence of Hospital-Acquired Illness or Injury  Outcome: Ongoing, Progressing  Intervention: Identify and Manage Fall Risk  Recent Flowsheet Documentation  Taken 9/16/2022 0800 by Marnie Huber RN  Safety Promotion/Fall Prevention:   activity supervised   bed alarm on  Intervention: Prevent Skin Injury  Recent Flowsheet Documentation  Taken 9/16/2022 0800 by Marnie Huber RN  Body Position: position changed independently  Intervention: Prevent and Manage VTE (Venous Thromboembolism) Risk  Recent Flowsheet Documentation  Taken 9/16/2022 0800 by Marnie Huber RN  Activity Management: activity adjusted per tolerance  Goal: Optimal Comfort and Wellbeing  Outcome: Ongoing, Progressing  Goal: Readiness for Transition of Care  Outcome: Ongoing, Progressing     Problem: Risk for Delirium  Goal: Optimal Coping  Outcome: Ongoing, Progressing  Goal: Improved Behavioral Control  Outcome: Ongoing, Progressing  Goal: Improved Attention and Thought Clarity  Outcome: Ongoing, Progressing  Goal: Improved Sleep  Outcome: Ongoing, Progressing     Problem: Alcohol " Withdrawal  Goal: Alcohol Withdrawal Symptom Control  Outcome: Ongoing, Progressing     Problem: Acute Neurologic Deterioration (Alcohol Withdrawal)  Goal: Optimal Neurologic Function  Outcome: Ongoing, Progressing     Problem: Adjustment to Illness (Sepsis/Septic Shock)  Goal: Optimal Coping  Outcome: Ongoing, Progressing     Problem: Infection Progression (Sepsis/Septic Shock)  Goal: Absence of Infection Signs and Symptoms  Outcome: Ongoing, Progressing  Intervention: Promote Recovery  Recent Flowsheet Documentation  Taken 9/16/2022 0800 by Marnie Huber RN  Activity Management: activity adjusted per tolerance     Problem: Nutrition Impaired (Sepsis/Septic Shock)  Goal: Optimal Nutrition Intake  Outcome: Ongoing, Progressing     Problem: Respiratory Compromise (Pneumothorax)  Goal: Optimal Oxygenation and Ventilation  Outcome: Ongoing, Progressing     Problem: Malnutrition  Goal: Improved Nutritional Intake  Outcome: Ongoing, Progressing   Goal Outcome Evaluation:    Plan of Care Reviewed With: patient           Pt is alert and oriented x3. Pt is forgetful. Pt denies pain. Pt's v/s is stable. Pt is med complaint. Pt has poor appetite. Pt is up on the chair this morning. Continue to monitor pt.

## 2022-09-16 NOTE — PROGRESS NOTES
[facility]    Infectious Disease Progress Note    Date of Service: 09/16/2022     Assessment & Plan   Maren Fofana is a 72 year old female who was admitted on 9/8/2022.     1. MRSA bacteremia/TAVR and mitral valve endocarditis, onset of symptoms around 9/6/2022--active issue.  MRSA bacteremia, high-grade, persistent positive blood cultures, consistent with endovascular infection. Endocarditis of TAVR is associated with high mortality, in this study 45% at 1 year, other studies note higher rates. EMILY 9/14 shows 0.7 x 0.3 cm vegetation on aortic valve as well as luz-valvular abscess, 1.3 x 0.8 cm and 1.1 x 0.5 cm vegetations on mitral valve. At risk for septic emboli including stroke. Potentially treatable with antibiotic course, however with large vegetations this will be difficult and finding of luz-valvular abscess chance of cure with antibiotics alone is less. This study of TAVR endocarditis showed 1 year mortality of 62.5% in those with perivalvular extension, 70% mortality at 2 years. Blood culture from 9/14 negative, however 9/15 is positive -- positivity may be slowing. No plans for valve surgery as this is high risk for her.   2. History of TAVR, AAA endovascular graft, bilateral TKAs, high risk of infection -- PET CT indicating TAVR endocarditis. PET also suggesting fluid at R knee may be infected also knee exam fairly benign.  3. AAA endovascular repair with graft 2016.  CT showing slight enlargement of aneurysm type II endoleak noted. Not lighting up on PET.  4. Alcohol use, imaging showing cirrhosis. This will make recovery difficult.   5. Pleural effusion transudative, thoracentesis complicated by hydropneumothorax status post chest tube, now removed.   6. A fib 9/13 -- cardioverted 9/14 now in sinus rhythm.         Recommendations    1. Medical treatment for prosthetic valve endocarditis is IV vancomycin for 6 weeks, p.o. rifampin 6 weeks, 2 weeks of gentamicin if tolerated.  Risk for renal toxicity, ototoxicity. She has baseline hearing loss, wears hearing aides. Rifampin can reduce levels of lipitor, lisinopril, omeprazole, synthroid. There is risk for complications (worsening infection) even with medical treatment, risk of side effects from IV antibiotics. Plan is give antibiotics a try and see how she progresses.   2. Daily blood cultures until clear  3. Plan MRI brain to look for sign of septic emboli  4. Hold on PICC unless we run out of peripheral IV options, due to ongoing bacteremia. Once blood cultures negative for 2-3 days, then can place single lumen PICC.      Josafat Recinos MD   [facility]  [phone]  ________________________________________________________________________________    Interval History   Family meeting yesterday. Still feeling a lot of pain, very weak. Temps 99 range. No side effects from antibiotics that she can tell.       Physical Exam   Temp: 97.1  F (36.2  C) Temp src: Axillary BP: 107/56 Pulse: 85   Resp: 20 SpO2: 95 % O2 Device: None (Room air) Oxygen Delivery: 2 LPM  Vitals:    09/14/22 0311 09/15/22 0341 09/16/22 0324   Weight: 68.2 kg (150 lb 4.8 oz) 68.4 kg (150 lb 14.4 oz) 67.5 kg (148 lb 14.4 oz)     Vital Signs with Ranges  Temp:  [97.1  F (36.2  C)-99.7  F (37.6  C)] 97.1  F (36.2  C)  Pulse:  [79-85] 85  Resp:  [18-22] 20  BP: ()/(54-67) 107/56  SpO2:  [91 %-100 %] 95 %    Constitutional: looks tired  Lungs: clear anterior  Cardiovascular: regular  Abdomen: mildly tender  Skin: Warm. No rash. +ecchymoses.   Neuro: Deconditioned, non-focal  MSK: knees good ROM without significant pain, no appreciable swelling  Peripheral IV  Pure wick    Medications       acetaminophen  975 mg Oral Q8H     amiodarone  200 mg Oral BID     apixaban ANTICOAGULANT  5 mg Oral BID     famotidine  20 mg Oral Daily     folic acid  1 mg Oral Daily     gentamicin  1 mg/kg (Adjusted) Intravenous Q24H     levothyroxine  88 mcg Oral  QAM AC     lidocaine  2 patch Transdermal Q24H     lidocaine   Transdermal Q8H ISIDRO     multivitamin w/minerals  1 tablet Oral Daily     potassium & sodium phosphates  1 packet Oral or Feeding Tube Q4H     potassium chloride  10 mEq Intravenous Once     rifampin  600 mg Oral Daily     sodium chloride (PF)  3 mL Intracatheter Q8H     [Held by provider] torsemide  20 mg Oral Daily     vancomycin  1,250 mg Intravenous Q24H     venlafaxine  300 mg Oral Daily       Data   All microbiology laboratory data reviewed.  Recent Labs   Lab Test 09/16/22  0442 09/15/22  0452 09/13/22  0431 09/12/22  0431 09/11/22  0426   WBC  --   --  7.1 6.7 6.2   HGB 9.7* 9.5* 9.6* 9.7* 9.8*   HCT  --   --  28.1* 28.7* 29.5*   MCV  --   --  95 95 97   PLT  --  51* 31* 29* 30*     Recent Labs   Lab Test 09/16/22  0442 09/15/22  0452 09/14/22  0307   CR 0.76 0.80 0.89       MICRO:  Collected Updated Procedure Result Status    09/10/2022 0832 09/10/2022 0846 Blood Culture Peripheral Blood [[id number]]   Peripheral Blood    In process Component Value   No component results             09/09/2022 2123 09/09/2022 2133 Blood Culture Peripheral Blood [[id number]]   Peripheral Blood    In process Component Value   No component results             09/09/2022 1343 09/10/2022 0536 Blood Culture Peripheral Blood [[id number]]   (Abnormal)   Peripheral Blood    Preliminary result Component Value   Culture Positive on the 1st day of incubation Abnormal  P    Gram positive cocci in clusters Panic  P    1 of 2 bottles             09/08/2022 1847 09/10/2022 0715 Pleural fluid Aerobic Bacterial Culture Routine with Gram Stain [[id number]]    Pleural fluid from Pleural Cavity, Right    Preliminary result Component Value   Culture No growth after 1 day P   Gram Stain Result No organisms seen P    Gram Stain slide reviewed at the [facility]    2+ WBC seen P             09/08/2022 1847 09/08/2022 2033 Glucose fluid [[id number]]     Pleural fluid from Pleural Cavity, Right    Final result Component Value Units   Glucose Fluid Source Pleural Cavity, Right    Glucose fluid 117 mg/dL          09/08/2022 1847 09/09/2022 0351 Lactate dehydrogenase fluid [[id number]]    Pleural fluid from Pleural Cavity, Right    Final result Component Value Units   LD Fluid Source Pleural Cavity, Right    Lactate dehydrogenase fluid 100 U/L          09/08/2022 1847 09/09/2022 0351 Protein fluid [[id number]]    Pleural fluid from Pleural Cavity, Right    Final result Component Value Units   Protein Fluid Source Pleural Cavity, Right    Protein Total Fluid 3.0 g/dL          09/08/2022 1554 09/08/2022 1659 Symptomatic; Unknown Influenza A/B & SARS-CoV2 (COVID-19) Virus PCR Multiplex Nasopharyngeal [[id number]]    Swab from Nasopharyngeal    Final result Component Value   Influenza A PCR Negative   Influenza B PCR Negative   RSV PCR Negative   SARS CoV2 PCR Negative   NEGATIVE: SARS-CoV-2 (COVID-19) RNA not detected, presumed negative.          09/08/2022 1552 09/10/2022 1348 Blood Culture Peripheral Blood [[id number]]   (Abnormal)   Peripheral Blood    Preliminary result Component Value   Culture Positive on the 1st day of incubation Abnormal  P    Staphylococcus aureus Panic  P    2 of 2 bottles             09/08/2022 1552 09/10/2022 0704 Blood Culture Peripheral Blood [[id number]]    (Abnormal)   Peripheral Blood    Edited Component Value   Culture Positive on the 1st day of incubation Abnormal     Staphylococcus aureus MRSA Panic     2 of 2 bottles         Susceptibility     Staphylococcus aureus MRSA     [place]     Clindamycin <=0.25 ug/mL Susceptible 1     Erythromycin >=8 ug/mL Resistant     Gentamicin <=0.5 ug/mL Susceptible     Linezolid 2 ug/mL Susceptible     Oxacillin >=4 ug/mL Resistant 2     Tetracycline <=1 ug/mL Susceptible     Trimethoprim/Sulfamethoxazole <=0.5/9.5 u... Susceptible     Vancomycin 1 ug/mL Susceptible              1 This  isolate DOES NOT demonstrate inducible clindamycin resistance in vitro. Clindamycin is susceptible and could be used when indicated, however, erythromycin is resistant and should not be used.   2 Oxacillin susceptible isolates are susceptible to cephalosporins (example: cefazolin and cephalexin) and beta lactam combination agents. Oxacillin resistant isolates are resistant to these agents.        Susceptibility Comments    Staphylococcus aureus MRSA   MRSA requires contact precautions.         09/08/2022 1552 09/09/2022 0352 Verigene GP Panel [[id number]]    (Abnormal)   Peripheral Blood    Final result Component Value   Staphylococcus aureus Detected Abnormal    Positive for methicillin-resistant Staphylococcus aureus (MRSA) by PERORAigene multiplex nucleic acid test. Final identification and antimicrobial susceptibility testing will be verified by standard methods.   Staphylococcus epidermidis Not Detected   Staphylococcus lugdunensis Not Detected   Enterococcus faecalis Not Detected   Enterococcus faecium Not Detected   Streptococcus species Not Detected   Streptococcus agalactiae Not Detected   Streptococcus anginosus group Not Detected   Streptococcus pneumoniae Not Detected   Streptococcus pyogenes Not Detected   Listeria species Not Detected            RADIOLOGY:    XR Chest 2 Views    Result Date: 9/9/2022  EXAM: XR CHEST 2 VIEWS LOCATION: [facility] DATE/TIME: 9/9/2022 11:17 AM INDICATION: f u chest tube, PTX COMPARISON: 09/08/2022.     IMPRESSION: Right chest tube over the right lung apex in stable position. No recurrent pneumothorax. Small right pleural effusion with some infiltrates or atelectasis and some mild atelectasis left lung base all appear stable. Aortic valve prosthesis.    Echocardiogram Complete    Result Date: 9/9/2022  [id number]^CAMILLE^PAT^[place] [address]  Name: LEELEE MARIN MRN: [id number]  : 1950 Study Date: 2022 02:42 PM Age: 72 yrs Gender: Female Patient Location: [facility] [place] Reason For Study: Abnormal Heart Sound Ordering Physician: PAT OBRIEN Performed By: SANDI  BSA: 1.7 m2 Height: 61 in Weight: 148 lb HR: 81 BP: 120/66 mmHg ______________________________________________________________________________ Procedure Complete Portable Echo Adult. Compared to the prior study dated 10/26/2021, there have been no changes. ______________________________________________________________________________ Interpretation Summary  1.Left ventricular size, wall motion and function are normal. The ejection fraction is 60-65%. 2.The right ventricle is normal size. Mildly decreased right ventricular systolic function 3.The left atrium is moderate to severely dilated. 4.Thickened mitral leaflets without stenosis. There is mild to moderate (1-2+) mitral regurgitation. 5.There is a 23 ISABELLA 3 bioprosthetic valve present in aortic valve position. At heart rate of 99 bpm peak velocity is 2.3 m/s, mean gradient is 9 mmHg, dimensionless index 0.48. 6.IVC diameter >2.1 cm collapsing <50% with sniff suggests a high RA pressure estimated at 15 mmHg or greater. Compared to the prior study dated 10/26/2021, the Tricuspid regurgitation is really not sampled well, left atrium is further enlarged, no essential change for the valve with prior peak velocity of 2.8 m/s and mean gradient of 16 mmHg. ______________________________________________________________________________ I      WMSI = 1.00     % Normal = 100  X - Cannot   0 -                      (2) - Mildly 2 -          Segments  Size Interpret    Hyperkinetic 1 - Normal  Hypokinetic  Hypokinetic  1-2     small                                                    7 -          3-5    moderate 3 - Akinetic 4 -          5 -         6 - Akinetic Dyskinetic   6-14    large              Dyskinetic   Aneurysmal  w/scar       w/scar       15-16   diffuse  Left Ventricle  Left ventricular size, wall motion and function are normal. The ejection fraction is 60-65%. There is normal left ventricular wall thickness. Left ventricular diastolic function is normal. No regional wall motion abnormalities noted.  Right Ventricle The right ventricle is normal size. Mildly decreased right ventricular systolic function. TAPSE is normal, which is consistent with normal right ventricular systolic function.  Atria The left atrium is moderate to severely dilated. Right atrial size is normal. There is no color Doppler evidence of an atrial shunt.  Mitral Valve Thickened mitral valve anterior leaflet. Thickened mitral valve posterior leaflet. There is mild to moderate (1-2+) mitral regurgitation. There is no mitral valve stenosis.  Tricuspid Valve The tricuspid valve is not well visualized, but is grossly normal. Right ventricle systolic pressure estimate normal. There is trace to mild tricuspid regurgitation. There is no tricuspid stenosis.  Aortic Valve There is a ISABELLA 3 bioprosthetic valve present in aortic valve position. 23 At heart rate of 99 bpm peak velocity is 2.3 m/s, mean gradient is 9 mmHg, dimensionless index 0.48.  Pulmonic Valve The pulmonic valve is not well seen, but is grossly normal. This degree of valvular regurgitation is within normal limits. There is no pulmonic valvular stenosis.  Vessels The aorta root is normal. Normal size ascending aorta. IVC diameter >2.1 cm collapsing <50% with sniff suggests a high RA pressure estimated at 15 mmHg or greater.  Pericardium There is no pericardial effusion.  Rhythm Sinus rhythm was noted.  ______________________________________________________________________________ MMode/2D Measurements & Calculations IVSd: 0.74 cm LVIDd: 4.7 cm LVIDs: 3.3 cm LVPWd: 0.75 cm FS: 28.2 %  LV mass(C)d: 109.3 grams LV mass(C)dI: 65.7 grams/m2 Ao root diam: 2.5 cm LA dimension: 5.8 cm LA/Ao: 2.3 LVOT diam: 1.6 cm LVOT area: 2.0 cm2 LA Volume Indexed (AL/bp):  86.6 ml/m2 RWT: 0.32  Time Measurements Aortic HR: 106.0 BPM MM HR: 91.0 BPM  Doppler Measurements & Calculations MV E max thomas: 102.3 cm/sec MV A max thomas: 108.5 cm/sec MV E/A: 0.94 MV dec slope: 322.0 cm/sec2 MV dec time: 0.32 sec Ao V2 max: 204.0 cm/sec Ao max P.0 mmHg Ao V2 mean: 165.0 cm/sec Ao mean P.0 mmHg Ao V2 VTI: 44.8 cm KP(I,D): 0.80 cm2 KP(V,D): 0.96 cm2 LV V1 max PG: 3.8 mmHg LV V1 max: 97.6 cm/sec LV V1 VTI: 17.8 cm CO(LVOT): 3.8 l/min CI(LVOT): 2.3 l/min/m2 SV(LVOT): 35.7 ml SI(LVOT): 21.5 ml/m2 TR max thomas: 244.0 cm/sec TR max P.8 mmHg AV Thomas Ratio (DI): 0.48 KP Index (cm2/m2): 0.48 E/E': 16.5 E/E' av.0 Lateral E/e': 20.0 Medial E/e': 13.9 Peak E' Thomas: 6.2 cm/sec  ______________________________________________________________________________ Report approved by: Yonatan Arroyo 2022 04:29 PM       US Thoracentesis    Result Date: 2022  EXAM: 1. RIGHT  THORACENTESIS 2. ULTRASOUND GUIDANCE LOCATION: [facility] DATE/TIME: 2022 6:46 PM INDICATION: Pleural effusion. PROCEDURE: Informed consent obtained. Time out performed. The chest was prepped and draped in sterile fashion. 5  mL of 1 % lidocaine was infused into the local soft tissues. Under direct ultrasound guidance, a 5 English catheter system was placed into the pleural effusion. 1.35  liters of clear yellow and red  fluid were removed and sent to lab, if requested. Patient tolerated procedure well. Ultrasound imaging was obtained and placed in the patient's permanent medical record.     IMPRESSION: Status post right  ultrasound-guided thoracentesis. Reference CPT Code: 97672    XR Chest Port 1 View    Result Date: 9/10/2022  EXAM: XR CHEST PORT 1 VIEW LOCATION: [facility] DATE/TIME: 9/10/2022 2:21 PM INDICATION: follow up PTX for continued resolution COMPARISON: 2022     IMPRESSION: The heart is normal in size. Improved right pleural effusion. Right lower  lobe opacities, most likely representing infiltrate. No pneumothorax. Right-sided chest tube.    XR Chest Port 1 View    Result Date: 9/9/2022  EXAM: XR CHEST PORT 1 VIEW LOCATION: [facility] DATE/TIME: 9/9/2022 12:00 AM INDICATION: s p chest tube follow-up pneumothorax COMPARISON: 09/08/2022     IMPRESSION: New right chest tube terminates at the apex medially. Right pneumothorax has resolved. Persistent opacity at the right base may represent an combination of atelectasis and edema. Normal heart size. Prior endovascular aortic valve replacement.  Left lung clear. Surgical clips cluster over the left shoulder.    XR Chest Port 1 View    Result Date: 9/8/2022  EXAM: XR CHEST PORT 1 VIEW LOCATION: [facility] DATE/TIME: 9/8/2022 9:44 PM INDICATION: ptx COMPARISON: 09/08/2022     IMPRESSION: Persistent right pneumothorax measuring 8 mm at the apex (stable) and 1.7 cm at the lateral base (previously 1.4 cm). Small right pleural effusion. Increased airspace infiltrate in the right lower lobe. Surgical clips in the left axilla. Calcified aortic arch. Prior aortic valve repair.    XR Chest Port 1 View    Result Date: 9/8/2022  EXAM: XR CHEST PORT 1 VIEW LOCATION: [facility] DATE/TIME: 9/8/2022 8:18 PM INDICATION: s p thoracentesis, ? ptx COMPARISON: Same date abdominal CT and chest radiograph.     IMPRESSION: Stable cardiomediastinal silhouette with prior aortic valve replacement. Small right hydropneumothorax, similar volume to same day CT given differences in modalities, without evidence of tension. Possible reexpansion edema in the right lower lobe. Left lung is clear. No acute bony abnormality.    XR Chest Port 1 View    Result Date: 9/8/2022  EXAM: XR CHEST PORT 1 VIEW LOCATION: [facility] DATE/TIME: 9/8/2022 4:30 PM INDICATION: fever, cough COMPARISON: 12/3/2021     IMPRESSION: Small to moderate right effusion, slightly  increased. Right lower lobe infiltrate or atelectasis.  No pneumothorax.  The left lung is clear.  The heart is normal in size.    CT Abdomen Pelvis w Contrast    Result Date: 9/8/2022  EXAM: CT ABDOMEN PELVIS W CONTRAST LOCATION: [facility] DATE/TIME: 9/8/2022 7:39 PM INDICATION: Right upper abdominal pain and fever. COMPARISON: CTA 8/21/2021 TECHNIQUE: CT scan of the abdomen and pelvis was performed following injection of IV contrast. Multiplanar reformats were obtained. Dose reduction techniques were used. CONTRAST: 75ml isovue 370 FINDINGS: LOWER CHEST: Small right hydropneumothorax post right thoracentesis earlier today. Small residual right pleural effusion. Heavy mitral annular calcifications. Small sliding-type hiatal hernia. HEPATOBILIARY: Cirrhotic appearing liver redemonstrated without focal suspicious mass. Normal gallbladder and biliary system. PANCREAS: Normal. SPLEEN: Normal. ADRENAL GLANDS: Normal. KIDNEYS/BLADDER: Normal kidneys and ureters. Nondistended bladder with focal asymmetric enhancing bladder wall thickening measuring up to 8 mm in the right bladder base image 173 of series 3.. BOWEL: Diverticulosis of the colon. No acute inflammatory change. No obstruction. Large amount of stool in the rectum. Mild ascites. LYMPH NODES: No lymphadenopathy. VASCULATURE: Previous endovascular aortic aneurysm repair with bifurcated endograft. Aneurysm sac measures 5.0 x 5.6 cm, previously 4.7 x 5.4 cm. Type II endoleak is noted. PELVIC ORGANS: Hysterectomy. No adnexal mass. MUSCULOSKELETAL: Previous lower lumbar fusion. Degenerative changes are present within the spine and hips. Generalized anasarca.     IMPRESSION: 1.  Small to moderate right hydropneumothorax, status post large volume right thoracentesis earlier today. Patient may have a trapped right lung. Consider two-view chest radiograph. 2.  Cirrhotic appearing liver with mild ascites. No splenomegaly. 3.  Previous endovascular  repair of infrarenal abdominal aortic aneurysm. Type II endoleak with interval enlargement of the aneurysm sac, compatible with endotension. Previous repair at [facility]. Consider nonemergent Interventional Radiology consult. 4.  Generalized anasarca. Report called to Dr. Roberson at 2000 hours    CT Cervical Spine w/o Contrast    Result Date: 9/8/2022  EXAM: CT CERVICAL SPINE W/O CONTRAST LOCATION: [facility] DATE/TIME: 9/8/2022 7:38 PM INDICATION: fall COMPARISON: None. TECHNIQUE: Routine CT Cervical Spine without IV contrast. Multiplanar reformats. Dose reduction techniques were used. FINDINGS: No evidence of acute fracture or subluxation of the cervical spine by CT imaging. Straightening of the normal cervical lordosis. The vertebral bodies of the cervical spine otherwise have normal stature and alignment. Anterolisthesis of C3 on C4 measuring  2 mm. Anterior marginal osteophytes at C4/C5-C7/T1. Multilevel degenerative disc disease of the cervical spine with disc height loss, most pronounced at C4/C5-C7/T1. The partially imaged intracranial contents are unremarkable. No prevertebral soft tissue swelling. The partially imaged lung apices are unremarkable. Craniovertebral junction and C1-C2: The odontoid process is well approximated with the anterior body of C1 and well aligned between the lateral masses of C1. C2-C3: No posterior disc bulge or spinal canal narrowing. No neural foraminal narrowing. C3-C4: No posterior disc bulge or spinal canal narrowing. No neural foraminal narrowing. C4-C5: Broad bar disc osteophyte complex with moderate spinal canal narrowing. Uncovertebral joint disease and facet arthropathy with severe bilateral neural foraminal narrowing. C5-C6: No posterior disc bulge or spinal canal narrowing. Uncovertebral joint disease and facet arthropathy with severe right and moderate left neural foraminal narrowing. C6-C7: No posterior disc bulge or spinal canal narrowing.  Uncovertebral joint disease and facet arthropathy with severe bilateral neural foraminal narrowing. C7-T1: No posterior disc bulge or spinal canal narrowing. Uncovertebral joint disease and facet arthropathy with moderate bilateral neural foraminal narrowing.     IMPRESSION: 1.  No evidence of acute fracture or subluxation of the cervical spine by CT imaging. 2.  Degenerative cervical spondylosis as described above.    CT Head w/o Contrast    Addendum Date: 9/8/2022    Addendum/ impression: INDICATION: Dizziness    Result Date: 9/8/2022  EXAM: CT HEAD W/O CONTRAST LOCATION: [facility] DATE/TIME: 9/8/2022 7:34 PM INDICATION: fall COMPARISON: None. TECHNIQUE: Routine CT Head without IV contrast. Multiplanar reformats. Dose reduction techniques were used. FINDINGS: INTRACRANIAL CONTENTS: No evidence of acute intracranial hemorrhage or mass effect. Scattered foci of decreased attenuation within the cerebral hemispheric white matter which are nonspecific, though most commonly ascribed to chronic small vessel ischemic  disease. The ventricles and sulci are prominent consistent with mild brain parenchymal volume loss. Gray-white matter differentiation is maintained. The basilar cisterns are patent. VISUALIZED ORBITS/SINUSES/MASTOIDS: Bilateral cataract surgery. The partially imaged globes are otherwise unremarkable. The partially imaged paranasal sinuses, mastoid air cells and middle ear cavities are unremarkable. BONES/SOFT TISSUES: The visualized skull base and calvarium are unremarkable.     IMPRESSION:  1.  No evidence of acute intracranial hemorrhage or mass effect. 2.  Mild nonspecific white matter changes. 3.  Mild brain parenchymal volume loss.    CT Lumbar Spine w/o Contrast    Result Date: 9/8/2022  EXAM: CT LUMBAR SPINE W/O CONTRAST LOCATION: [facility] DATE/TIME: 9/8/2022 7:39 PM INDICATION: Back pain COMPARISON: None. TECHNIQUE: Routine CT Lumbar Spine without  IV contrast. Multiplanar reformats. Dose reduction techniques were used. FINDINGS: No evidence of acute fracture or subluxation of the lumbar spine by CT imaging. Postsurgical changes posterior fusion at L3-L5 and interbody fusion at L3/L4 and L4/L5. No hardware complication. No evidence of acute fracture or subluxation of the lumbar spine by CT imaging. Retrolisthesis of T12 on L1 measuring 2 mm. Anterior marginal osteophytes at T12/L1 and L1/L2 and L2/L3. Multilevel degenerative disc disease of the lumbar spine with disc height loss, most pronounced at T12/L1 and L2/L3. Aortobiiliac endograft. The partially imaged intra-abdominal contents are otherwise unremarkable. T12/L1:  Symmetric disc bulge without spinal canal narrowing. Mild bilateral neural foraminal narrowing. L1/L2:  No posterior disc bulge or spinal canal narrowing. Mild bilateral neural foraminal narrowing. L2/L3:  Broad bar disc osteophyte complex with mild spinal canal narrowing. Severe bilateral neural foraminal narrowing. L3/L4:  Spondylotic ridging without spinal canal narrowing. Moderate bilateral facet arthropathy. No neural foraminal narrowing.  L4/L5:  No posterior disc bulge or spinal canal narrowing. Mild bilateral facet arthropathy. No neural foraminal narrowing. L5/S1: Symmetric disc bulge and moderate facet arthropathy without spinal canal narrowing. Severe bilateral neural foraminal narrowing.     IMPRESSION:  1.  No evidence of acute fracture or subluxation of the lumbar spine by CT imaging. 2.  Postsurgical changes posterior fusion at L3-L5 and interbody fusion at L3/L4 and L4/L5. No hardware complication. 3.  Degenerative lumbar spondylosis as described above.    Total Time Spent 35 minutes with >50% of the time spent in chart review, evaluation, counseling, education and care coordination.

## 2022-09-16 NOTE — CONSULTS
Care Management Follow Up    Length of Stay (days): 8    Expected Discharge Date: 09/17/2022     Concerns to be Addressed: discharge planning      Patient plan of care discussed at interdisciplinary rounds: Yes    Anticipated Discharge Disposition: TBD     Anticipated Discharge Services:  TBD  Anticipated Discharge DME:  Per treatment team    Patient/family educated on Medicare website which has current facility and service quality ratings:    Education Provided on the Discharge Plan:    Patient/Family in Agreement with the Plan:      Referrals Placed by CM/SW:    Private pay costs discussed: Not applicable    Additional Information:  CONI VALLEJO met with Pt and her sister Inna at bedside to discuss TCU per family's request.  Inna stated that she would like SW to come back at 1 p.m. when Pt's daughter Caitie would be there.  SW explained that she cannot guarantee availability at 1 p.m., but could attempt to come back.  SW asked if she could proceed with general conversation about transitional care.  Pt gave permission and Inna was agreeable.      SW educated on transitional care and provided list of [facility] facilities.  Explained that IV antibiotics can typically be managed by TCU's; if facility unable to they notify care management.  Explained goal is for safe discharge.  Inna stated Caitie lives in [place] and would want Pt to go near there.  CONI stated that request is five preferences of facilities and explained limited beds available.  Inna stated Caitie makes decisions and did not want any referrals placed.  CONI stated she would not place any referrals.  SW provided name, care management availability (daily), and phone number ot access care management.  Family had no additional questions.    CONI received call from Heather Hernández PA-C with palliative; per Heather, SW does not need to go back to meet with Pt's daughter as discharge recommendation is not clear and Pt's needs at discharge are not clearly defined at this time.       Care management following Pt for progression and discharge planning needs.      Cody Wellington, LICSW

## 2022-09-16 NOTE — PHARMACY-AMINOGLYCOSIDE DOSING SERVICE
Pharmacy Aminoglycoside Follow-Up Note  Date of Service 2022  Patient's  1950   72 year old, female    Weight (Actual): 67.5 kg    Indication: Endocarditis synergy dosing  Current Gentamicin regimen:  60 mg IV q24h  Day of therapy: 4    Target goals based on synergy dosing  Goal Peak level: 3-5 mg/L  Goal Trough level: <1 mg/L    Current estimated CrCl: Estimated Creatinine Clearance: 57.1 mL/min (based on SCr of 0.76 mg/dL).    Creatinine for last 3 days  2022:  3:07 AM Creatinine 0.89 mg/dL  9/15/2022:  4:52 AM Creatinine 0.80 mg/dL  2022:  4:42 AM Creatinine 0.76 mg/dL    Nephrotoxins and other renal medications (From now, onward)    Start     Dose/Rate Route Frequency Ordered Stop    22 1300  gentamicin (GARAMYCIN) 90 mg in sodium chloride 0.9 % 50 mL intermittent infusion         90 mg  over 60 Minutes Intravenous EVERY 24 HOURS 22 1820      22 1800  vancomycin (VANCOCIN) 1,250 mg in sodium chloride 0.9 % 250 mL intermittent infusion         1,250 mg  over 90 Minutes Intravenous EVERY 24 HOURS 22 0744      22 1330  rifampin (RIFADIN) capsule 600 mg         600 mg Oral DAILY 22 1319      09/10/22 0800  [Held by provider]  torsemide (DEMADEX) tablet 20 mg        (Held by provider since 2022 at 1018 by Cheko Morales DO.Hold Reason: Other.)    20 mg Oral DAILY 22 1815            Contrast Orders - past 72 hours (72h ago, onward)    Start     Dose/Rate Route Frequency Stop    22 1230  gadobutrol (GADAVIST) injection 7 mL         7 mL Intravenous ONCE 22 1303          Aminoglycoside Levels - past 2 days  2022:  1:37 PM Gentamicin 0.5 mg/L;  4:36 PM Gentamicin 1.9 mg/L    Aminoglycosides IV Administrations (past 72 hours)                   gentamicin (GARAMYCIN) 60 mg in sodium chloride 0.9 % 50 mL intermittent infusion (mg) 60 mg New Bag 22 1337     60 mg New Bag 09/15/22 1314     60 mg New Bag 22  1521                Pharmacokinetic Analysis  Post-dose level drawn 2 hours after infusion, evaluated per PK calculations      Interpretation of levels and current regimen:  Aminoglycoside levels are below the goal range    Has serum creatinine changed greater than 50% in the last 72 hours: No    Plan  1. Increase dose to 90 mg IV every 24 hours    2.  Method of evaluation: peak and trough    3. Pharmacy will continue to follow and check levels  as appropriate in 1-3 Days    Dutch aGtesD.

## 2022-09-16 NOTE — PROGRESS NOTES
"[facility]  Palliative Care Daily Progress & Family Meeting Note       Recommendations & Counseling     Met with Caitie (HCA, daughter) and Tera (son, over the phone) today, Katherine was too lethargic too participate in our meeting.   - Appreciate Dr. Rollins (CT surgery) speaking directly with both Caitie and Tera about surgical risks and prognosis. Caitie and Tera agree that the risks of cardiac surgery outweigh the possible benefits, no plan to proceed with surgery at this time.   - Caitie and Tera understand that Katherine likely has weeks to short months to live.  - Based on Katherine's expressed wishes for life-prolonging treatment, plan is to continue antibiotics and see how she responds in the coming days.   - Both Caitie and Tera are in agreement with placement of DNR/DNI order. Also explored limits on escalation of care to ICU, which they will consider and discuss.  - Appreciate SW providing list of TCU and SNF facilities. Caitie and Tera agree with seeing how Katherine does in the next few days before determining the best plan for discharge.    Goals of Care: life-prolonging    Advanced Care Planning:  Advance directive: Yes, on file  POLST: No - complete before discharge  Code status: DNR/DNI  Health care agent: Caitie (daughter) supported by Tera (son)    Symptom Management:  #Pain, \"head to toe\" but low back is worst  - Continue scheduled tylenol  - Continue oxycodone 5mg q4h prn  - Started lidocaine patches to back and neck    Psychosocial & Spiritual:   Appreciate Palliative SW support      Assessments          Maren is a 72 year old female with PMH of aortic stenosis s/p TAVR 09/21, HTN, s/p AAA repair, HFpEF, HTN, known right-sided pleural effusion, and alcohol use disorder, admitted on 9/8/2022 after a fall. 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 Also with acute on chronic respiratory failure due to R " "pleural effusion, s/p thoracentesis and chest tube (removed 9/10), complicated by R pneumothorax (now resolved). Course also complicated by new Afib with RVR, now s/p EMILY-directed cardioversion 9/14.    Today, the patient was seen for:  Goals of care, family meeting, symptom management    Prognosis, Goals, or Advance Care Planning was addressed today with: Yes.  Mood, coping, and/or meaning in the context of serious illness were addressed today: Yes.              Interval History:     Chart review/discussion with unit or clinical team members:   - Family preferred Dr. Rollins (CT surgery) call Tera to discuss possible surgery rather than discuss with Katherine as she is more confused today, Dr. Rollins spoke with Tera earlier today  - Blood cultures from 9/15 growing GPC  - Discussed case with ID  - Awaiting bMRI results    Per patient or family/caregivers today:  Katherine was sleeping when I visited her this morning but easily arousable. She said she wasn't feeling very well today, feeling pain \"all over.\" She was able to sleep ok at night and denied any insomnia. Agreed to come back later when Caitie is here.    When I returned this afternoon, Katherine was sleeping soundly after returning from brain MRI. Caitie and friend Beatriz at the bedside. Caitie called Tera so he could join our conversation. Dr. Rollins (CT surgery) spoke with Tera over the phone today. Caitie and Tera agree that the risks of cardiac surgery outweigh the possible benefits, no plan to proceed with surgery at this time.     Caitie and Tera understand that Katherine likely has weeks to short months to live. We explored different options at this point including continuing antibiotic treatment to prolong life vs transitioning to comfort-focused care. Based on Katherine's expressed wishes for life-prolonging treatment, plan is to continue antibiotics and see how she responds in the coming days. Explained that we are getting more information each day from " her labs and imaging as well as her mental and physical functioning.    I suggested placing some limits on Katherine's care. We discussed code status and low likelihood that CPR or intubation would benefit Katherine at this point. Both Caitie and Tera are in agreement with placement of DNR/DNI order. Also explored limits on escalation of care to ICU, which they will consider and discuss.    SW provided a list of TCU and SNF facilities for patient and family to review. We talked about different possibilities for [facility] care, including TCU, SNF, and hospice services. Caitie and Tera understand that we can transition to comfort-focused care at any point. Katherine's ex- (Shaylee's father) passed away on hospice at SNF in February. Caitie and Tera agree with seeing how Katherine does in the next few days before determining the best plan for discharge.    Key Palliative Symptoms:  # Pain severity the last 12 hours: moderate  # Dyspnea severity the last 12 hours: low  # Nausea severity the last 12 hours: none  # Anxiety severity the last 12 hours: none             Review of Systems:     Besides above, an additional 3 system ROS was reviewed and is unremarkable          Medications:     I have reviewed this patient's medication profile and medications during this hospitalization.    Noted meds:    Current Facility-Administered Medications   Medication     acetaminophen (TYLENOL) tablet 975 mg     amiodarone (PACERONE) tablet 200 mg     apixaban ANTICOAGULANT (ELIQUIS) tablet 5 mg     famotidine (PEPCID) tablet 20 mg     flumazenil (ROMAZICON) injection 0.2 mg     folic acid (FOLVITE) tablet 1 mg     gentamicin (GARAMYCIN) 60 mg in sodium chloride 0.9 % 50 mL intermittent infusion     OLANZapine zydis (zyPREXA) ODT tab 5-10 mg    Or     haloperidol lactate (HALDOL) injection 2.5-5 mg     levothyroxine (SYNTHROID/LEVOTHROID) tablet 88 mcg     Lidocaine (LIDOCARE) 4 % Patch 2 patch     lidocaine (LMX4) cream     lidocaine 1  "% 0.1-1 mL     lidocaine patch in PLACE     melatonin tablet 5 mg     multivitamin w/minerals (THERA-VIT-M) tablet 1 tablet     naloxone (NARCAN) injection 0.2 mg    Or     naloxone (NARCAN) injection 0.4 mg    Or     naloxone (NARCAN) injection 0.2 mg    Or     naloxone (NARCAN) injection 0.4 mg     ondansetron (ZOFRAN ODT) ODT tab 4 mg    Or     ondansetron (ZOFRAN) injection 4 mg     oxyCODONE (ROXICODONE) tablet 5 mg     potassium & sodium phosphates (NEUTRA-PHOS) Packet 1 packet     potassium chloride 10 mEq in 100 mL sterile water intermittent infusion (premix)     prochlorperazine (COMPAZINE) injection 5 mg    Or     prochlorperazine (COMPAZINE) tablet 5 mg    Or     prochlorperazine (COMPAZINE) suppository 12.5 mg     rifampin (RIFADIN) capsule 600 mg     senna-docusate (SENOKOT-S/PERICOLACE) 8.6-50 MG per tablet 1 tablet    Or     senna-docusate (SENOKOT-S/PERICOLACE) 8.6-50 MG per tablet 2 tablet     sodium chloride (PF) 0.9% PF flush 3 mL     sodium chloride (PF) 0.9% PF flush 3 mL     sodium chloride (PF) 0.9% PF flush 3 mL     [Held by provider] torsemide (DEMADEX) tablet 20 mg     vancomycin (VANCOCIN) 1,250 mg in sodium chloride 0.9 % 250 mL intermittent infusion     venlafaxine (EFFEXOR XR) 24 hr capsule 300 mg                Physical Exam:   Vital Signs: Blood pressure 111/57, pulse 87, temperature 98.2  F (36.8  C), temperature source Oral, resp. rate 20, height 1.549 m (5' 1\"), weight 67.5 kg (148 lb 14.4 oz), SpO2 97 %.   GENERAL: sleeping, easily aroused with verbal stimulation  SKIN: Warm and dry   HEENT: Normocephalic, anicteric sclera, moist mucous membranes  LUNGS: diminished breath sounds, non-labored  CARDIAC: rrr, +systolic murmur  ABDOMINAL: BS (+), soft, non distended, non tender  EXTREMITIES: +bilateral LE edeam             Data Reviewed:     Reviewed recent pertinent imaging:   No new imaging    Reviewed recent labs:   CMP  Recent Labs   Lab 09/16/22  0442 09/15/22  0452 " 09/14/22  1600 09/14/22  0307 09/13/22 0431 09/12/22  1840 09/12/22  1410 09/12/22  1358 09/12/22  0947 09/12/22  0431 09/11/22  0426 09/10/22  0832   NA  --  133*  --   --  136  --   --   --   --  135* 132* 132*   POTASSIUM 3.8 3.6 4.0 3.3* 3.7   < >  --    < >  --  3.1* 3.5 4.1   CHLORIDE  --   --   --   --  102  --   --   --   --  101 99 100   CO2  --   --   --   --  26  --   --   --   --  25 25 24   ANIONGAP  --   --   --   --  8  --   --   --   --  9 8 8   GLC  --   --   --   --  126*  --  77  --  99 98 118 127*   BUN  --   --   --   --  27  --   --   --   --  31* 38* 34*   CR 0.76 0.80  --  0.89 1.09  --   --   --   --  1.15* 1.48* 1.37*   GFRESTIMATED 82 77  --  68 53*  --   --   --   --  50* 37* 40*   YENI  --   --   --   --  8.4*  --   --   --   --  8.3* 8.3* 8.4*   MAG 1.9 1.8  --  1.5* 2.0  --   --   --   --  1.8  --  2.4   PHOS 2.3*  --  2.5 1.8* 1.2*  --   --   --   --   --   --   --    PROTTOTAL  --   --   --   --  5.9*  --   --   --   --  6.3 6.3 6.2   ALBUMIN  --   --   --   --  2.6*  --   --   --   --  2.8* 3.0* 2.9*   BILITOTAL  --   --   --   --  1.2*  --   --   --   --  1.2* 0.8 0.7   ALKPHOS  --   --   --   --  151*  --   --   --   --  113 69 61   AST  --   --   --   --  53*  --   --   --   --  50* 59* 60*   ALT  --   --   --   --  19  --   --   --   --  16 15 14    < > = values in this interval not displayed.     CBC  Recent Labs   Lab 09/16/22  0442 09/15/22  0452 09/13/22  0431 09/12/22  0431 09/11/22  0426 09/10/22  0832   WBC  --   --  7.1 6.7 6.2 6.6   RBC  --   --  2.96* 3.01* 3.05* 3.41*   HGB 9.7* 9.5* 9.6* 9.7* 9.8* 11.0*   HCT  --   --  28.1* 28.7* 29.5* 33.0*   MCV  --   --  95 95 97 97   MCH  --   --  32.4 32.2 32.1 32.3   MCHC  --   --  34.2 33.8 33.2 33.3   RDW  --   --  15.0 14.6 14.6 14.6   PLT  --  51* 31* 29* 30* 40*        [facility]  Dept phone: [phone]  Securely message with the Vocera Web Console

## 2022-09-16 NOTE — PROGRESS NOTES
Text paged Dr. Vega of MRI result at 1553  -no response    1710- Text paged Dr. Vega hospitalist with MRI result, and also asking for PRN tylenol for pain.  Patient is drowsy, and I am hesitant to give Oxy    1745- Dr. Vega returned the call.  She was informed of MRI results, and that patient was still lethargic but easily arroused, generalized weakness, and occasionally seeing dark spots.  Otherwise patient's neuros appeared to be intact.  Dr. Vega had no new orders in relation to MRI result.  Dr. Vega did order a 1x PRN tylenol if needed for pain.  As patient being lethargic, we didn't want to give her oxycodone.

## 2022-09-17 NOTE — PLAN OF CARE
Problem: Plan of Care - These are the overarching goals to be used throughout the patient stay.    Goal: Absence of Hospital-Acquired Illness or Injury  Outcome: Met  Intervention: Identify and Manage Fall Risk  Recent Flowsheet Documentation  Taken 9/17/2022 0011 by Ricky Gilmore RN  Safety Promotion/Fall Prevention:   activity supervised   bed alarm on  Taken 9/16/2022 2030 by Ricky Gilmore RN  Safety Promotion/Fall Prevention:   activity supervised   bed alarm on  Intervention: Prevent Skin Injury  Recent Flowsheet Documentation  Taken 9/17/2022 0300 by Ricky Gilmore RN  Body Position:   log-rolled   turned   left  Intervention: Prevent and Manage VTE (Venous Thromboembolism) Risk  Recent Flowsheet Documentation  Taken 9/17/2022 0300 by Ricky Gilmore RN  Activity Management:   activity adjusted per tolerance   bedrest  Taken 9/17/2022 0011 by Ricky Gilmore RN  Activity Management:   activity adjusted per tolerance   bedrest  Taken 9/16/2022 2030 by Ricky Gilmore RN  Activity Management:   activity adjusted per tolerance   bedrest  Goal: Optimal Comfort and Wellbeing  Outcome: Met     Problem: Alcohol Withdrawal  Goal: Alcohol Withdrawal Symptom Control  Outcome: Met   Goal Outcome Evaluation:        Pt sleepy but easily arousable. Oriented X3. Continues on telemetry NSR. O2 at 2L NC. Scheduled Tylenol for back right rib pain. IV ABX per orders. Vital signs stable. Will continue to monitor.    Ricky Gilmore RN

## 2022-09-17 NOTE — PLAN OF CARE
Problem: Alcohol Withdrawal  Goal: Alcohol Withdrawal Symptom Control  Outcome: Ongoing, Progressing    Problem: Nutrition Impaired (Sepsis/Septic Shock)  Goal: Optimal Nutrition Intake  Outcome: Ongoing, Not Progressing     Problem: Malnutrition  Goal: Improved Nutritional Intake  Outcome: Ongoing, Not Progressing      Problem: Plan of Care - These are the overarching goals to be used throughout the patient stay.    Goal: Optimal Comfort and Wellbeing  Outcome: Ongoing, Progressing         Goal Outcome Evaluation:      CIWA score = 0. No Symptoms. Pleasant and cooperative. Extremely poor food intake. 2 oz apple sauce. 4 oz Cola. Would not order breakfast. Ordered only cake for lunch but a full meal was sent. A only a couple bites with families encouragement.    At 1145 had 3 Tylenol and 5 mg oxycodone for back and abdominal pain. Stated that got no relief at all. Still a 8/10. Dr Mando Colin text paged regarding poor pain relief.  No new orders.

## 2022-09-17 NOTE — PROGRESS NOTES
[facility]    Infectious Disease Progress Note    Date of Service: 09/17/2022     Assessment & Plan   Maren Fofana is a 72 year old female who was admitted on 9/8/2022.     1. MRSA bacteremia/TAVR and mitral valve endocarditis, onset of symptoms around 9/6/2022--active issue.  MRSA bacteremia, high-grade, persistent positive blood cultures, consistent with endovascular infection. Endocarditis of TAVR is associated with high mortality, in this study 45% at 1 year, other studies note higher rates. EMILY 9/14 shows 0.7 x 0.3 cm vegetation on aortic valve as well as luz-valvular abscess, 1.3 x 0.8 cm and 1.1 x 0.5 cm vegetations on mitral valve. At risk for septic emboli including stroke. Potentially treatable with antibiotic course, however with large vegetations this will be difficult and finding of luz-valvular abscess chance of cure with antibiotics alone is less. This study of TAVR endocarditis showed 1 year mortality of 62.5% in those with perivalvular extension, 70% mortality at 2 years. Blood culture from 9/14 negative, however 9/15 is positive -- positivity may be slowing. No plans for valve surgery as this is high risk for her.   2. History of TAVR, AAA endovascular graft, bilateral TKAs, high risk of infection -- PET CT indicating TAVR endocarditis. PET also suggesting fluid at R knee may be infected also knee exam fairly benign.  3. AAA endovascular repair with graft 2016.  CT showing slight enlargement of aneurysm type II endoleak noted. Not lighting up on PET.  4. Alcohol use, imaging showing cirrhosis. This will make recovery difficult.   5. Pleural effusion transudative, thoracentesis complicated by hydropneumothorax status post chest tube, now removed.   6. A fib 9/13 -- cardioverted 9/14 now in sinus rhythm.     High-grade persistent MRSA bacteremia from 9/8 through 9/16 as of now.  Follow-up blood culture 9/17 in process.  Brain MRI with punctate foci of acute infarct in the  posterior superior right frontal lobe, the right frontoparietal white matter, and the posterior superior left temporal lobe.  This is most likely septic emboli.  Also complaining of a lot of low back pain.  Admission CT scan with no evidence of discitis.  With prolonged bacteremia, repeat imaging especially MRI of the lumbar spine is indicated if goal is continued aggressive care.    Recommendations    1. Medical treatment for prosthetic valve endocarditis is IV vancomycin for 6 weeks, p.o. rifampin 6 weeks, 2 weeks of gentamicin if tolerated. Risk for renal toxicity, ototoxicity. She has baseline hearing loss, wears hearing aides. Rifampin can reduce levels of lipitor, lisinopril, omeprazole, synthroid. There is risk for complications (worsening infection) even with medical treatment, risk of side effects from IV antibiotics. Plan is give antibiotics a try and see how she progresses.   2. Daily blood cultures until clear  3. MRI lumbar spine is indicated.  4. Hold on PICC unless we run out of peripheral IV options, due to ongoing bacteremia. Once blood cultures negative for 2-3 days, then can place single lumen PICC.    Discussed with the patient, nursing staff.    ID will follow.    35 minutes of total care with greater than 50% coordinating care.    Nohemi Metz MD  [facility]  [phone]  ________________________________________________________________________________    Interval History   Continues to be bacteremic.  MRI of the brain reviewed.  Complains of a lot of back pain despite pain patches.    Physical Exam   Temp: 98.8  F (37.1  C) Temp src: Oral BP: 118/62 Pulse: 85   Resp: 18 SpO2: 98 % O2 Device: Nasal cannula with humidification Oxygen Delivery: 2.5 LPM  Vitals:    09/15/22 0341 09/16/22 0324 09/17/22 0526   Weight: 68.4 kg (150 lb 14.4 oz) 67.5 kg (148 lb 14.4 oz) 67.6 kg (149 lb)     Vital Signs with Ranges  Temp:  [98.2  F (36.8  C)-100.1  F (37.8  C)] 98.8   F (37.1  C)  Pulse:  [71-86] 85  Resp:  [18-20] 18  BP: (115-138)/(52-65) 118/62  SpO2:  [98 %-100 %] 98 %    Constitutional: looks tired  Lungs: clear anterior  Cardiovascular: regular  Abdomen: mildly tender  Skin: Warm. No rash. +ecchymoses.   Neuro: Deconditioned, non-focal  MSK: knees good ROM without significant pain, no appreciable swelling  Peripheral IV  Pure wick    Medications       acetaminophen  975 mg Oral Q8H     amiodarone  200 mg Oral BID     apixaban ANTICOAGULANT  5 mg Oral BID     famotidine  20 mg Oral Daily     folic acid  1 mg Oral Daily     gentamicin  90 mg Intravenous Q24H     levothyroxine  88 mcg Oral QAM AC     lidocaine  2 patch Transdermal Q24H     lidocaine   Transdermal Q8H ISIDRO     multivitamin w/minerals  1 tablet Oral Daily     potassium chloride  10 mEq Intravenous Once     rifampin  600 mg Oral Daily     sodium chloride (PF)  3 mL Intracatheter Q8H     [Held by provider] torsemide  20 mg Oral Daily     vancomycin  750 mg Intravenous Q12H     venlafaxine  300 mg Oral Daily       Data   All microbiology laboratory data reviewed.  Recent Labs   Lab Test 09/16/22  0442 09/15/22  0452 09/13/22  0431 09/12/22  0431 09/11/22  0426   WBC  --   --  7.1 6.7 6.2   HGB 9.7* 9.5* 9.6* 9.7* 9.8*   HCT  --   --  28.1* 28.7* 29.5*   MCV  --   --  95 95 97   PLT  --  51* 31* 29* 30*     Recent Labs   Lab Test 09/17/22  0451 09/16/22  0442 09/15/22  0452   CR 0.71 0.76 0.80       MICRO:  Collected Updated Procedure Result Status    09/10/2022 0832 09/10/2022 0846 Blood Culture Peripheral Blood [[id number]]   Peripheral Blood    In process Component Value   No component results             09/09/2022 2123 09/09/2022 2133 Blood Culture Peripheral Blood [[id number]]   Peripheral Blood    In process Component Value   No component results             09/09/2022 1343 09/10/2022 0536 Blood Culture Peripheral Blood [[id number]]   (Abnormal)   Peripheral Blood    Preliminary result Component Value    Culture Positive on the 1st day of incubation Abnormal  P    Gram positive cocci in clusters Panic  P    1 of 2 bottles             09/08/2022 1847 09/10/2022 0715 Pleural fluid Aerobic Bacterial Culture Routine with Gram Stain [[id number]]    Pleural fluid from Pleural Cavity, Right    Preliminary result Component Value   Culture No growth after 1 day P   Gram Stain Result No organisms seen P    Gram Stain slide reviewed at the [facility]    2+ WBC seen P             09/08/2022 1847 09/08/2022 2033 Glucose fluid [[id number]]    Pleural fluid from Pleural Cavity, Right    Final result Component Value Units   Glucose Fluid Source Pleural Cavity, Right    Glucose fluid 117 mg/dL          09/08/2022 1847 09/09/2022 0351 Lactate dehydrogenase fluid [[id number]]    Pleural fluid from Pleural Cavity, Right    Final result Component Value Units   LD Fluid Source Pleural Cavity, Right    Lactate dehydrogenase fluid 100 U/L          09/08/2022 1847 09/09/2022 0351 Protein fluid [[id number]]    Pleural fluid from Pleural Cavity, Right    Final result Component Value Units   Protein Fluid Source Pleural Cavity, Right    Protein Total Fluid 3.0 g/dL          09/08/2022 1554 09/08/2022 1659 Symptomatic; Unknown Influenza A/B & SARS-CoV2 (COVID-19) Virus PCR Multiplex Nasopharyngeal [[id number]]    Swab from Nasopharyngeal    Final result Component Value   Influenza A PCR Negative   Influenza B PCR Negative   RSV PCR Negative   SARS CoV2 PCR Negative   NEGATIVE: SARS-CoV-2 (COVID-19) RNA not detected, presumed negative.          09/08/2022 1552 09/10/2022 1348 Blood Culture Peripheral Blood [[id number]]   (Abnormal)   Peripheral Blood    Preliminary result Component Value   Culture Positive on the 1st day of incubation Abnormal  P    Staphylococcus aureus Panic  P    2 of 2 bottles             09/08/2022 1552 09/10/2022 0704 Blood Culture Peripheral Blood [[id number]]    (Abnormal)    Peripheral Blood    Edited Component Value   Culture Positive on the 1st day of incubation Abnormal     Staphylococcus aureus MRSA Panic     2 of 2 bottles         Susceptibility     Staphylococcus aureus MRSA     [place]     Clindamycin <=0.25 ug/mL Susceptible 1     Erythromycin >=8 ug/mL Resistant     Gentamicin <=0.5 ug/mL Susceptible     Linezolid 2 ug/mL Susceptible     Oxacillin >=4 ug/mL Resistant 2     Tetracycline <=1 ug/mL Susceptible     Trimethoprim/Sulfamethoxazole <=0.5/9.5 u... Susceptible     Vancomycin 1 ug/mL Susceptible              1 This isolate DOES NOT demonstrate inducible clindamycin resistance in vitro. Clindamycin is susceptible and could be used when indicated, however, erythromycin is resistant and should not be used.   2 Oxacillin susceptible isolates are susceptible to cephalosporins (example: cefazolin and cephalexin) and beta lactam combination agents. Oxacillin resistant isolates are resistant to these agents.        Susceptibility Comments    Staphylococcus aureus MRSA   MRSA requires contact precautions.         09/08/2022 1552 09/09/2022 0352 [employer] GP Panel [[id number]]    (Abnormal)   Peripheral Blood    Final result Component Value   Staphylococcus aureus Detected Abnormal    Positive for methicillin-resistant Staphylococcus aureus (MRSA) by [employer] multiplex nucleic acid test. Final identification and antimicrobial susceptibility testing will be verified by standard methods.   Staphylococcus epidermidis Not Detected   Staphylococcus lugdunensis Not Detected   Enterococcus faecalis Not Detected   Enterococcus faecium Not Detected   Streptococcus species Not Detected   Streptococcus agalactiae Not Detected   Streptococcus anginosus group Not Detected   Streptococcus pneumoniae Not Detected   Streptococcus pyogenes Not Detected   Listeria species Not Detected            RADIOLOGY:    XR Chest 2 Views    Result Date: 9/9/2022  EXAM: XR CHEST 2 VIEWS LOCATION: [facility] DATE/TIME: 2022 11:17 AM INDICATION: f u chest tube, PTX COMPARISON: 2022.     IMPRESSION: Right chest tube over the right lung apex in stable position. No recurrent pneumothorax. Small right pleural effusion with some infiltrates or atelectasis and some mild atelectasis left lung base all appear stable. Aortic valve prosthesis.    Echocardiogram Complete    Result Date: 2022  [id number]^CAMILLE^PAT^[place] [address]  Name: LEELEE MARIN MRN: [id number] : 1950 Study Date: 2022 02:42 PM Age: 72 yrs Gender: Female Patient Location: [facility] Reason For Study: Abnormal Heart Sound Ordering Physician: PAT OBRIEN Performed By: SANDI  BSA: 1.7 m2 Height: 61 in Weight: 148 lb HR: 81 BP: 120/66 mmHg ______________________________________________________________________________ Procedure Complete Portable Echo Adult. Compared to the prior study dated 10/26/2021, there have been no changes. ______________________________________________________________________________ Interpretation Summary  1.Left ventricular size, wall motion and function are normal. The ejection fraction is 60-65%. 2.The right ventricle is normal size. Mildly decreased right ventricular systolic function 3.The left atrium is moderate to severely dilated. 4.Thickened mitral leaflets without stenosis. There is mild to moderate (1-2+) mitral regurgitation. 5.There is a 23 ISABELLA 3 bioprosthetic valve present in aortic valve position. At heart rate of 99 bpm peak velocity is 2.3 m/s, mean gradient is 9 mmHg, dimensionless index 0.48. 6.IVC diameter >2.1 cm collapsing <50% with sniff suggests a high RA pressure estimated at 15 mmHg or greater. Compared to the prior study dated 10/26/2021, the Tricuspid regurgitation is really not sampled well, left atrium is further enlarged, no essential change for the valve with prior peak velocity  of 2.8 m/s and mean gradient of 16 mmHg. ______________________________________________________________________________ I      WMSI = 1.00     % Normal = 100  X - Cannot   0 -                      (2) - Mildly 2 -          Segments  Size Interpret    Hyperkinetic 1 - Normal  Hypokinetic  Hypokinetic  1-2     small                                                    7 -          3-5    moderate 3 - Akinetic 4 -          5 -         6 - Akinetic Dyskinetic   6-14    large              Dyskinetic   Aneurysmal  w/scar       w/scar       15-16   diffuse  Left Ventricle Left ventricular size, wall motion and function are normal. The ejection fraction is 60-65%. There is normal left ventricular wall thickness. Left ventricular diastolic function is normal. No regional wall motion abnormalities noted.  Right Ventricle The right ventricle is normal size. Mildly decreased right ventricular systolic function. TAPSE is normal, which is consistent with normal right ventricular systolic function.  Atria The left atrium is moderate to severely dilated. Right atrial size is normal. There is no color Doppler evidence of an atrial shunt.  Mitral Valve Thickened mitral valve anterior leaflet. Thickened mitral valve posterior leaflet. There is mild to moderate (1-2+) mitral regurgitation. There is no mitral valve stenosis.  Tricuspid Valve The tricuspid valve is not well visualized, but is grossly normal. Right ventricle systolic pressure estimate normal. There is trace to mild tricuspid regurgitation. There is no tricuspid stenosis.  Aortic Valve There is a ISABELLA 3 bioprosthetic valve present in aortic valve position. 23 At heart rate of 99 bpm peak velocity is 2.3 m/s, mean gradient is 9 mmHg, dimensionless index 0.48.  Pulmonic Valve The pulmonic valve is not well seen, but is grossly normal. This degree of valvular regurgitation is within normal limits. There is no pulmonic valvular stenosis.  Vessels The aorta root is normal.  Normal size ascending aorta. IVC diameter >2.1 cm collapsing <50% with sniff suggests a high RA pressure estimated at 15 mmHg or greater.  Pericardium There is no pericardial effusion.  Rhythm Sinus rhythm was noted.  ______________________________________________________________________________ MMode/2D Measurements & Calculations IVSd: 0.74 cm LVIDd: 4.7 cm LVIDs: 3.3 cm LVPWd: 0.75 cm FS: 28.2 %  LV mass(C)d: 109.3 grams LV mass(C)dI: 65.7 grams/m2 Ao root diam: 2.5 cm LA dimension: 5.8 cm LA/Ao: 2.3 LVOT diam: 1.6 cm LVOT area: 2.0 cm2 LA Volume Indexed (AL/bp): 86.6 ml/m2 RWT: 0.32  Time Measurements Aortic HR: 106.0 BPM MM HR: 91.0 BPM  Doppler Measurements & Calculations MV E max thomas: 102.3 cm/sec MV A max thomas: 108.5 cm/sec MV E/A: 0.94 MV dec slope: 322.0 cm/sec2 MV dec time: 0.32 sec Ao V2 max: 204.0 cm/sec Ao max P.0 mmHg Ao V2 mean: 165.0 cm/sec Ao mean P.0 mmHg Ao V2 VTI: 44.8 cm KP(I,D): 0.80 cm2 KP(V,D): 0.96 cm2 LV V1 max PG: 3.8 mmHg LV V1 max: 97.6 cm/sec LV V1 VTI: 17.8 cm CO(LVOT): 3.8 l/min CI(LVOT): 2.3 l/min/m2 SV(LVOT): 35.7 ml SI(LVOT): 21.5 ml/m2 TR max thomas: 244.0 cm/sec TR max P.8 mmHg AV Thomas Ratio (DI): 0.48 KP Index (cm2/m2): 0.48 E/E': 16.5 E/E' av.0 Lateral E/e': 20.0 Medial E/e': 13.9 Peak E' Thomas: 6.2 cm/sec  ______________________________________________________________________________ Report approved by: Yonatan Arroyo 2022 04:29 PM       US Thoracentesis    Result Date: 2022  EXAM: 1. RIGHT  THORACENTESIS 2. ULTRASOUND GUIDANCE LOCATION: [facility] DATE/TIME: 2022 6:46 PM INDICATION: Pleural effusion. PROCEDURE: Informed consent obtained. Time out performed. The chest was prepped and draped in sterile fashion. 5  mL of 1 % lidocaine was infused into the local soft tissues. Under direct ultrasound guidance, a 5 Indonesian catheter system was placed into the pleural effusion. 1.35  liters of clear yellow and red  fluid  were removed and sent to lab, if requested. Patient tolerated procedure well. Ultrasound imaging was obtained and placed in the patient's permanent medical record.     IMPRESSION: Status post right  ultrasound-guided thoracentesis. Reference CPT Code: 48382    XR Chest Port 1 View    Result Date: 9/10/2022  EXAM: XR CHEST PORT 1 VIEW LOCATION: [facility] DATE/TIME: 9/10/2022 2:21 PM INDICATION: follow up PTX for continued resolution COMPARISON: 09/09/2022     IMPRESSION: The heart is normal in size. Improved right pleural effusion. Right lower lobe opacities, most likely representing infiltrate. No pneumothorax. Right-sided chest tube.    XR Chest Port 1 View    Result Date: 9/9/2022  EXAM: XR CHEST PORT 1 VIEW LOCATION: [facility] DATE/TIME: 9/9/2022 12:00 AM INDICATION: s p chest tube follow-up pneumothorax COMPARISON: 09/08/2022     IMPRESSION: New right chest tube terminates at the apex medially. Right pneumothorax has resolved. Persistent opacity at the right base may represent an combination of atelectasis and edema. Normal heart size. Prior endovascular aortic valve replacement.  Left lung clear. Surgical clips cluster over the left shoulder.    XR Chest Port 1 View    Result Date: 9/8/2022  EXAM: XR CHEST PORT 1 VIEW LOCATION: [facility] DATE/TIME: 9/8/2022 9:44 PM INDICATION: ptx COMPARISON: 09/08/2022     IMPRESSION: Persistent right pneumothorax measuring 8 mm at the apex (stable) and 1.7 cm at the lateral base (previously 1.4 cm). Small right pleural effusion. Increased airspace infiltrate in the right lower lobe. Surgical clips in the left axilla. Calcified aortic arch. Prior aortic valve repair.    XR Chest Port 1 View    Result Date: 9/8/2022  EXAM: XR CHEST PORT 1 VIEW LOCATION: [facility] DATE/TIME: 9/8/2022 8:18 PM INDICATION: s p thoracentesis, ? ptx COMPARISON: Same date abdominal CT and chest  radiograph.     IMPRESSION: Stable cardiomediastinal silhouette with prior aortic valve replacement. Small right hydropneumothorax, similar volume to same day CT given differences in modalities, without evidence of tension. Possible reexpansion edema in the right lower lobe. Left lung is clear. No acute bony abnormality.    XR Chest Port 1 View    Result Date: 9/8/2022  EXAM: XR CHEST PORT 1 VIEW LOCATION: [facility] DATE/TIME: 9/8/2022 4:30 PM INDICATION: fever, cough COMPARISON: 12/3/2021     IMPRESSION: Small to moderate right effusion, slightly increased. Right lower lobe infiltrate or atelectasis.  No pneumothorax.  The left lung is clear.  The heart is normal in size.    CT Abdomen Pelvis w Contrast    Result Date: 9/8/2022  EXAM: CT ABDOMEN PELVIS W CONTRAST LOCATION: [facility] DATE/TIME: 9/8/2022 7:39 PM INDICATION: Right upper abdominal pain and fever. COMPARISON: CTA 8/21/2021 TECHNIQUE: CT scan of the abdomen and pelvis was performed following injection of IV contrast. Multiplanar reformats were obtained. Dose reduction techniques were used. CONTRAST: 75ml isovue 370 FINDINGS: LOWER CHEST: Small right hydropneumothorax post right thoracentesis earlier today. Small residual right pleural effusion. Heavy mitral annular calcifications. Small sliding-type hiatal hernia. HEPATOBILIARY: Cirrhotic appearing liver redemonstrated without focal suspicious mass. Normal gallbladder and biliary system. PANCREAS: Normal. SPLEEN: Normal. ADRENAL GLANDS: Normal. KIDNEYS/BLADDER: Normal kidneys and ureters. Nondistended bladder with focal asymmetric enhancing bladder wall thickening measuring up to 8 mm in the right bladder base image 173 of series 3.. BOWEL: Diverticulosis of the colon. No acute inflammatory change. No obstruction. Large amount of stool in the rectum. Mild ascites. LYMPH NODES: No lymphadenopathy. VASCULATURE: Previous endovascular aortic aneurysm repair  with bifurcated endograft. Aneurysm sac measures 5.0 x 5.6 cm, previously 4.7 x 5.4 cm. Type II endoleak is noted. PELVIC ORGANS: Hysterectomy. No adnexal mass. MUSCULOSKELETAL: Previous lower lumbar fusion. Degenerative changes are present within the spine and hips. Generalized anasarca.     IMPRESSION: 1.  Small to moderate right hydropneumothorax, status post large volume right thoracentesis earlier today. Patient may have a trapped right lung. Consider two-view chest radiograph. 2.  Cirrhotic appearing liver with mild ascites. No splenomegaly. 3.  Previous endovascular repair of infrarenal abdominal aortic aneurysm. Type II endoleak with interval enlargement of the aneurysm sac, compatible with endotension. Previous repair at [facility]. Consider nonemergent Interventional Radiology consult. 4.  Generalized anasarca. Report called to Dr. Roberson at 2000 hours    CT Cervical Spine w/o Contrast    Result Date: 9/8/2022  EXAM: CT CERVICAL SPINE W/O CONTRAST LOCATION: [facility] DATE/TIME: 9/8/2022 7:38 PM INDICATION: fall COMPARISON: None. TECHNIQUE: Routine CT Cervical Spine without IV contrast. Multiplanar reformats. Dose reduction techniques were used. FINDINGS: No evidence of acute fracture or subluxation of the cervical spine by CT imaging. Straightening of the normal cervical lordosis. The vertebral bodies of the cervical spine otherwise have normal stature and alignment. Anterolisthesis of C3 on C4 measuring  2 mm. Anterior marginal osteophytes at C4/C5-C7/T1. Multilevel degenerative disc disease of the cervical spine with disc height loss, most pronounced at C4/C5-C7/T1. The partially imaged intracranial contents are unremarkable. No prevertebral soft tissue swelling. The partially imaged lung apices are unremarkable. Craniovertebral junction and C1-C2: The odontoid process is well approximated with the anterior body of C1 and well aligned between the lateral masses of C1. C2-C3:  No posterior disc bulge or spinal canal narrowing. No neural foraminal narrowing. C3-C4: No posterior disc bulge or spinal canal narrowing. No neural foraminal narrowing. C4-C5: Broad bar disc osteophyte complex with moderate spinal canal narrowing. Uncovertebral joint disease and facet arthropathy with severe bilateral neural foraminal narrowing. C5-C6: No posterior disc bulge or spinal canal narrowing. Uncovertebral joint disease and facet arthropathy with severe right and moderate left neural foraminal narrowing. C6-C7: No posterior disc bulge or spinal canal narrowing. Uncovertebral joint disease and facet arthropathy with severe bilateral neural foraminal narrowing. C7-T1: No posterior disc bulge or spinal canal narrowing. Uncovertebral joint disease and facet arthropathy with moderate bilateral neural foraminal narrowing.     IMPRESSION: 1.  No evidence of acute fracture or subluxation of the cervical spine by CT imaging. 2.  Degenerative cervical spondylosis as described above.    CT Head w/o Contrast    Addendum Date: 9/8/2022    Addendum/ impression: INDICATION: Dizziness    Result Date: 9/8/2022  EXAM: CT HEAD W/O CONTRAST LOCATION: [facility] DATE/TIME: 9/8/2022 7:34 PM INDICATION: fall COMPARISON: None. TECHNIQUE: Routine CT Head without IV contrast. Multiplanar reformats. Dose reduction techniques were used. FINDINGS: INTRACRANIAL CONTENTS: No evidence of acute intracranial hemorrhage or mass effect. Scattered foci of decreased attenuation within the cerebral hemispheric white matter which are nonspecific, though most commonly ascribed to chronic small vessel ischemic  disease. The ventricles and sulci are prominent consistent with mild brain parenchymal volume loss. Gray-white matter differentiation is maintained. The basilar cisterns are patent. VISUALIZED ORBITS/SINUSES/MASTOIDS: Bilateral cataract surgery. The partially imaged globes are otherwise unremarkable. The partially  imaged paranasal sinuses, mastoid air cells and middle ear cavities are unremarkable. BONES/SOFT TISSUES: The visualized skull base and calvarium are unremarkable.     IMPRESSION:  1.  No evidence of acute intracranial hemorrhage or mass effect. 2.  Mild nonspecific white matter changes. 3.  Mild brain parenchymal volume loss.    CT Lumbar Spine w/o Contrast    Result Date: 9/8/2022  EXAM: CT LUMBAR SPINE W/O CONTRAST LOCATION: [facility] DATE/TIME: 9/8/2022 7:39 PM INDICATION: Back pain COMPARISON: None. TECHNIQUE: Routine CT Lumbar Spine without IV contrast. Multiplanar reformats. Dose reduction techniques were used. FINDINGS: No evidence of acute fracture or subluxation of the lumbar spine by CT imaging. Postsurgical changes posterior fusion at L3-L5 and interbody fusion at L3/L4 and L4/L5. No hardware complication. No evidence of acute fracture or subluxation of the lumbar spine by CT imaging. Retrolisthesis of T12 on L1 measuring 2 mm. Anterior marginal osteophytes at T12/L1 and L1/L2 and L2/L3. Multilevel degenerative disc disease of the lumbar spine with disc height loss, most pronounced at T12/L1 and L2/L3. Aortobiiliac endograft. The partially imaged intra-abdominal contents are otherwise unremarkable. T12/L1:  Symmetric disc bulge without spinal canal narrowing. Mild bilateral neural foraminal narrowing. L1/L2:  No posterior disc bulge or spinal canal narrowing. Mild bilateral neural foraminal narrowing. L2/L3:  Broad bar disc osteophyte complex with mild spinal canal narrowing. Severe bilateral neural foraminal narrowing. L3/L4:  Spondylotic ridging without spinal canal narrowing. Moderate bilateral facet arthropathy. No neural foraminal narrowing.  L4/L5:  No posterior disc bulge or spinal canal narrowing. Mild bilateral facet arthropathy. No neural foraminal narrowing. L5/S1: Symmetric disc bulge and moderate facet arthropathy without spinal canal narrowing. Severe bilateral  neural foraminal narrowing.     IMPRESSION:  1.  No evidence of acute fracture or subluxation of the lumbar spine by CT imaging. 2.  Postsurgical changes posterior fusion at L3-L5 and interbody fusion at L3/L4 and L4/L5. No hardware complication. 3.  Degenerative lumbar spondylosis as described above.    Total Time Spent 35 minutes with >50% of the time spent in chart review, evaluation, counseling, education and care coordination.

## 2022-09-17 NOTE — PHARMACY-VANCOMYCIN DOSING SERVICE
Pharmacy Vancomycin Note  Date of Service 2022  Patient's  1950   72 year old, female    Indication: Bacteremia, Endocarditis and Sepsis  Day of Therapy: 7  Current vancomycin regimen:  1250 mg IV q24h  Current vancomycin monitoring method: AUC  Current vancomycin therapeutic monitoring goal: 400-600 mg*h/L    [employer] Prediction of Current Vancomycin Regimen    Regimen: 1250 mg IV every 24 hours.  Start time: 09:29 on 2022  Exposure target: AUC24 (range)400-600 mg/L.hr   AUC24,ss: 397 mg/L.hr  Probability of AUC24 > 400: 48 %  Ctrough,ss: 10.4 mg/L  Probability of Ctrough,ss > 20: 0 %  Probability of nephrotoxicity (Lodise HEATHER ): 6 %    Current estimated CrCl = Estimated Creatinine Clearance: 61.1 mL/min (based on SCr of 0.71 mg/dL).    Creatinine for last 3 days  9/15/2022:  4:52 AM Creatinine 0.80 mg/dL  2022:  4:42 AM Creatinine 0.76 mg/dL  2022:  4:51 AM Creatinine 0.71 mg/dL    Recent Vancomycin Levels (past 3 days)  2022:  4:51 AM Vancomycin 18.1 mg/L    Vancomycin IV Administrations (past 72 hours)                   vancomycin (VANCOCIN) 1,250 mg in sodium chloride 0.9 % 250 mL intermittent infusion (mg) 1,250 mg New Bag 22 1741     1,250 mg New Bag 09/15/22 1636     1,250 mg New Bag 22 1706                Nephrotoxins and other renal medications (From now, onward)    Start     Dose/Rate Route Frequency Ordered Stop    22 1300  gentamicin (GARAMYCIN) 90 mg in sodium chloride 0.9 % 50 mL intermittent infusion         90 mg  over 60 Minutes Intravenous EVERY 24 HOURS 22 1820      22 0930  vancomycin 750 mg in 0.9% NaCl 250 mL intermittent infusion 750 mg         750 mg  over 60 Minutes Intravenous EVERY 12 HOURS 22 0834      22 1330  rifampin (RIFADIN) capsule 600 mg         600 mg Oral DAILY 22 1319      09/10/22 0800  [Held by provider]  torsemide (DEMADEX) tablet 20 mg        (Held by provider since 2022  at 1018 by Cheko Morales DO.Hold Reason: Other.)    20 mg Oral DAILY 09/09/22 1815               Contrast Orders - past 72 hours (72h ago, onward)    Start     Dose/Rate Route Frequency Stop    09/16/22 1230  gadobutrol (GADAVIST) injection 7 mL         7 mL Intravenous ONCE 09/16/22 1303          Interpretation of levels and current regimen:  Vancomycin level is reflective of AUC less than 400    Has serum creatinine changed greater than 50% in last 72 hours: No    Urine output:  good urine output    Renal Function: Stable    [employer] Prediction of Planned New Vancomycin Regimen    Regimen: 750 mg IV every 12 hours.  Start time: 09:29 on 09/17/2022  Exposure target: AUC24 (range)400-600 mg/L.hr   AUC24,ss: 466 mg/L.hr  Probability of AUC24 > 400: 86 %  Ctrough,ss: 15.0 mg/L  Probability of Ctrough,ss > 20: 5 %  Probability of nephrotoxicity (Lodise HEATHER 2009): 10 %      Plan:  1. Increase Dose to 750mg IV q12h  2. Vancomycin monitoring method: AUC  3. Vancomycin therapeutic monitoring goal: 400-600 mg*h/L  4. Pharmacy will check vancomycin levels as appropriate in 1-3 Days.  5. Serum creatinine levels will be ordered a minimum of twice weekly.    Sabrina Apodaca, [facility]

## 2022-09-17 NOTE — PROGRESS NOTES
[facility] Daily Progress Note    Assessment/Plan:    72 year old female with PMH of aortic stenosis, s/p TAVR 09/21, HTN, s/p AAA repair, tobacco use disorder, diastolic CHF, HTN, known right-sided pleural effusion, alcohol use disorder, admitted on 9/8/2022.     Severe sepsis due to High grade MRSA Bacteremia and Prosthetic aortic valve/mitral valve endocarditis:  BC x2 on 9/8-9/13 remain positive for MRSA bacteremia.  TTE no obvious vegetation.  PET CT (9/12) showed moderate uptake about TAVR and moderate uptake about dense mitral annulus calcification, suspicious for underlying endocarditis. EMILY (9/14/22)showed 0.7x0.3 mobile vegetation associated with the aortic valve prosthesis best visualized in the left ventricular outflow tract. There is  suspected abscess associated with the aortic valve in the 2-3 o'clock in short  Axis, 1.3x0.8 mobile vegetation associated with the posterior mitral  valve leaflet (atrial aspect of valve). There is a superimposed 1.3 cm mobile  linear strand associated with the vegetation. In addition there is a 1.1 x 0.5  cm vegetation associated with the anterior mitral valve leaflet.  -Daily blood cultures  - IV vancomycin for 6 weeks, p.o. rifampin, 2 weeks of gentamicin if tolerated.  -Risk for renal toxicity, ototoxicity. She has baseline hearing loss, wears hearing aides. Rifampin can reduce levels of lipitor, lisinopril, omeprazole, synthroid.   -Has 2 functioning PIV's.  Hold on PICC unless we run out of peripheral IV options, due to ongoing bacteremia. If lose all IV access and unable to place PIV then as last resort PICC will need to be placed.  -CV surgery consulted and very high operative mortality to repair and in agreement with focus on more palliative approach.  Family requested Dr. Rollins return to re-discuss options now that patient starting to be more clear. He will revisit 9/16.  -MRI brain to r/o septic emboli ordered and showed signs of septic emboli  -Patient is  DNR/DNI.  -ID is following  -Cardiology is following    Acute toxic/metabolic encephalopathy: Due to benzodiazepine/opiate induced respiratory depression and acute on chronic hypercarbic respiratory acidosis.  NH3 normal.   -MRI brain   -stop valium, avoid opiates.  -Tylenol daily PRN and Tylenol TID    Cirrhosis: Suspect EtOH mediated. Per CT abdomen/pelvis imaging.  LFTs normal. Not decompensated. No significant ascites.    Diet: Combination Diet Low Saturated Fat Diet, Low Saturated Fat Na <2400mg Diet, No Caffeine Diet; Thin Liquids (level 0)  Snacks/Supplements Adult: Magic Cup; With Meals  DVT Prophylaxis:  Pneumatic Compression Devices  Code Status: No CPR- Do NOT Intubate    Active Problems:    Hydropneumothorax    Anasarca    Pleural effusion    Other ascites    Severe sepsis (H)    Renal failure, unspecified chronicity    Gram-positive bacteremia     LOS: 8 days     Barriers to discharge:  MRSA bacteremia    Subjective:    Patient is drowsy and reports dark spots in her eyes for the past 4 days.    Sister is at bedside.     acetaminophen  975 mg Oral Q8H     amiodarone  200 mg Oral BID     apixaban ANTICOAGULANT  5 mg Oral BID     famotidine  20 mg Oral Daily     folic acid  1 mg Oral Daily     [START ON 9/17/2022] gentamicin  90 mg Intravenous Q24H     levothyroxine  88 mcg Oral QAM AC     lidocaine  2 patch Transdermal Q24H     lidocaine   Transdermal Q8H ISIDRO     multivitamin w/minerals  1 tablet Oral Daily     potassium chloride  10 mEq Intravenous Once     rifampin  600 mg Oral Daily     sodium chloride (PF)  3 mL Intracatheter Q8H     [Held by provider] torsemide  20 mg Oral Daily     vancomycin  1,250 mg Intravenous Q24H     venlafaxine  300 mg Oral Daily       Objective:  Vital signs in last 24 hours:  Temp:  [97.1  F (36.2  C)-100.1  F (37.8  C)] 100.1  F (37.8  C)  Pulse:  [82-87] 86  Resp:  [20-22] 20  BP: (103-138)/(56-65) 138/65  SpO2:  [94 %-98 %] 98 %  Weight:   Weight:    @THISENCWEIGHTS(1)@  Weight change: -0.907 kg (-2 lb)  Body mass index is 28.13 kg/m .    Intake/Output last 3 shifts:  I/O last 3 completed shifts:  In: 560 [P.O.:560]  Out: 950 [Urine:950]  Intake/Output this shift:  I/O this shift:  In: 220 [P.O.:220]  Out: -     Review of Systems:   As per subjective, all others negative.    Physical Exam:    GENERAL:  Alert, appears comfortable, in no acute distress, appears stated age   HEAD:  Normocephalic, without obvious abnormality, atraumatic   EYES:  PERRL, conjunctiva/corneas clear, no scleral icterus, EOM's intact   NOSE: Nares normal, septum midline, mucosa normal, no drainage   THROAT: Lips, mucosa, and tongue normal; teeth and gums normal, mouth moist   NECK: Supple, symmetrical, trachea midline   BACK:   Symmetric, no curvature, ROM normal   LUNGS:   Clear to auscultation bilaterally, no rales, rhonchi, or wheezing, symmetric chest rise on inhalation, respirations unlabored   CHEST WALL:  No tenderness or deformity   HEART:  Regular rate and rhythm, S1 and S2 normal, no murmur, rub, or gallop    ABDOMEN:   Soft, non-tender, bowel sounds active all four quadrants, no masses, no organomegaly, no rebound or guarding   EXTREMITIES: Extremities normal, atraumatic, no cyanosis or edema    SKIN: Dry to touch, no exanthems in the visualized areas   NEURO: Alert, oriented x3, moves all four extremities freely, non-focal   PSYCH: Cooperative, behavior is appropriate      Cardiographics:   I personally reviewed.      Imaging:  Personally Reviewed.  Results for orders placed or performed during the hospital encounter of 09/08/22   CT Head w/o Contrast    Impression    IMPRESSION:    1.  No evidence of acute intracranial hemorrhage or mass effect.  2.  Mild nonspecific white matter changes.  3.  Mild brain parenchymal volume loss.   CT Cervical Spine w/o Contrast    Impression    IMPRESSION:  1.  No evidence of acute fracture or subluxation of the cervical spine by CT  imaging.  2.  Degenerative cervical spondylosis as described above.   CT Lumbar Spine w/o Contrast    Impression    IMPRESSION:    1.  No evidence of acute fracture or subluxation of the lumbar spine by CT imaging.  2.  Postsurgical changes posterior fusion at L3-L5 and interbody fusion at L3/L4 and L4/L5. No hardware complication.  3.  Degenerative lumbar spondylosis as described above.   CT Abdomen Pelvis w Contrast    Impression    IMPRESSION:   1.  Small to moderate right hydropneumothorax, status post large volume right thoracentesis earlier today. Patient may have a trapped right lung. Consider two-view chest radiograph.  2.  Cirrhotic appearing liver with mild ascites. No splenomegaly.  3.  Previous endovascular repair of infrarenal abdominal aortic aneurysm. Type II endoleak with interval enlargement of the aneurysm sac, compatible with endotension. Previous repair at [facility]. Consider nonemergent Interventional Radiology   consult.  4.  Generalized anasarca.    Report called to Dr. Roberson at 2000 hours   XR Chest Port 1 View    Impression    IMPRESSION: Small to moderate right effusion, slightly increased. Right lower lobe infiltrate or atelectasis.  No pneumothorax.  The left lung is clear.  The heart is normal in size.   US Thoracentesis    Impression    IMPRESSION:  Status post right  ultrasound-guided thoracentesis.    Reference CPT Code: 17136   XR Chest Port 1 View    Impression    IMPRESSION: Stable cardiomediastinal silhouette with prior aortic valve replacement. Small right hydropneumothorax, similar volume to same day CT given differences in modalities, without evidence of tension. Possible reexpansion edema in the right lower   lobe. Left lung is clear. No acute bony abnormality.   XR Chest Port 1 View    Impression    IMPRESSION: Persistent right pneumothorax measuring 8 mm at the apex (stable) and 1.7 cm at the lateral base (previously 1.4 cm). Small right pleural effusion. Increased  airspace infiltrate in the right lower lobe. Surgical clips in the left axilla.   Calcified aortic arch. Prior aortic valve repair.   XR Chest Port 1 View    Impression    IMPRESSION: New right chest tube terminates at the apex medially. Right pneumothorax has resolved. Persistent opacity at the right base may represent an combination of atelectasis and edema. Normal heart size. Prior endovascular aortic valve replacement.   Left lung clear. Surgical clips cluster over the left shoulder.   XR Chest 2 Views    Impression    IMPRESSION: Right chest tube over the right lung apex in stable position. No recurrent pneumothorax. Small right pleural effusion with some infiltrates or atelectasis and some mild atelectasis left lung base all appear stable. Aortic valve prosthesis.   XR Chest Port 1 View    Impression    IMPRESSION: The heart is normal in size. Improved right pleural effusion. Right lower lobe opacities, most likely representing infiltrate. No pneumothorax. Right-sided chest tube.   XR Chest 1 View    Impression    IMPRESSION: The right pleural catheter has been retracted from the apex and now resides with tip overlying the anterior second rib. There is a trace pneumothorax measuring less than 1 mm. Increased subcutaneous air in the right chest wall. Basilar   predominant interstitial and airspace opacities right greater than left are similar. Small right pleural effusion slightly increased. Heart size is normal. TAVR. Atherosclerotic change of the aorta. Surgical clips in the left axilla. Degenerative change   in the right shoulder.   XR Chest 1 View    Impression    IMPRESSION: Interval removal of right pleural catheter. There has been development of a right pneumothorax measuring 1.2 cm laterally and 1.5 cm at the right apex. Small right pleural effusion. Increasing bibasilar atelectasis. Heart size normal. TAVR.   Atherosclerotic change of the aorta. Surgical clips in the left axilla. Degenerative change in  the spine and both shoulders.    Report was discussed with the patient's nurse Narda 9/10/2022 2130 hours.   XR Chest Port 1 View    Impression    IMPRESSION: The cardiac mediastinal silhouette is unchanged in size and contour. The aortic valve prosthesis is again noted. There is mild central pulmonary venous congestion patchy airspace opacity seen in the right lung base, similar to prior exam.   There is a small right-sided pneumothorax, slightly decreased in size from prior study. Subcutaneous emphysema seen overlying the right chest wall, unchanged from prior exam.   XR Chest 1 View    Impression    IMPRESSION: Stable right pneumothorax remains. Remainder of the exam is unchanged.   PET Oncology Whole Body    Impression    IMPRESSION:  1. Patchy opacities throughout much of the right lung suspicious for infectious pneumonia, most prominently in the right lower lobe.  2. Intracardiac uptake about a TAVR and dense mitral annulus calcification suspicious for underlying endocarditis.  3. Heterogeneous uptake about both knees, more so on the right, indeterminate for underlying infection. Small right knee effusion present, amenable to aspiration.   XR Chest 1 View    Impression    IMPRESSION: The cardiac silhouette is unchanged in size and contour. There is a small right-sided pneumothorax, similar to prior exam. Increasing opacification of the right lung base is noted, likely reflecting combination of right pleural effusion and   superimposed atelectasis and/or airspace disease. A small left-sided pleural effusion has developed in the interval.   CT Soft Tissue Neck w Contrast    Impression    IMPRESSION:  1. Patchy opacities throughout much of the right lung suspicious for infectious pneumonia, most prominently in the right lower lobe.  2. Intracardiac uptake about a TAVR and dense mitral annulus calcification suspicious for underlying endocarditis.  3. Heterogeneous uptake about both knees, more so on the right,  indeterminate for underlying infection. Small right knee effusion present, amenable to aspiration.   CT Chest/Abdomen/Pelvis w Contrast    Impression    IMPRESSION:  1. Patchy opacities throughout much of the right lung suspicious for infectious pneumonia, most prominently in the right lower lobe.  2. Intracardiac uptake about a TAVR and dense mitral annulus calcification suspicious for underlying endocarditis.  3. Heterogeneous uptake about both knees, more so on the right, indeterminate for underlying infection. Small right knee effusion present, amenable to aspiration.   XR Chest Port 1 View    Impression    IMPRESSION: Right pneumothorax no longer definitely visualized. Decreased subcutaneous gas along the right lateral chest wall. Similar opacification of the right lung base, likely right pleural effusion and superimposed atelectasis and/or airspace   disease. A small left-sided pleural effusion is slightly decreased.     MR Brain w/o & w Contrast    Impression    IMPRESSION:  1.  Punctate foci of acute infarct in the posterior superior right frontal lobe, the right frontal parietal white matter, and the posterior superior left temporal lobe. As these are scattered about multiple vascular distributions, punctate embolic   infarcts are favored. This may simply reflect bland emboli. While there is no accompanying abnormal enhancement or adjacent FLAIR hyperintensity and no findings to suggest meningitis or encephalitis, the possibility of septic emboli is not completely   excluded given the background clinical context.    2.  Age-related changes include moderate to severe diffuse parenchymal volume loss with a mild to moderate burden scattered chronic small vessel ischemic change.   Echocardiogram Complete   Result Value Ref Range    LVEF  60-65%        Lab Results:  Personally Reviewed.   Recent Labs   Lab 09/16/22  0442 09/15/22  0452 09/13/22  0431 09/12/22  0431 09/11/22  0426   WBC  --   --  7.1 6.7 6.2    HGB 9.7* 9.5* 9.6* 9.7* 9.8*   HCT  --   --  28.1* 28.7* 29.5*   PLT  --  51* 31* 29* 30*     Recent Labs   Lab 09/15/22  0452 09/13/22 0431 09/12/22 0431 09/11/22  0426   * 136 135* 132*   CO2  --  26 25 25   BUN  --  27 31* 38*   ALBUMIN  --  2.6* 2.8* 3.0*   ALKPHOS  --  151* 113 69   ALT  --  19 16 15   AST  --  53* 50* 59*     Recent Labs   Lab 09/10/22  1348   INR 1.33*       I reviewed all labs and imaging studies as of this date and I reviewed all current inpatient medications and updated them    Janice Vega MD

## 2022-09-17 NOTE — PLAN OF CARE
Dr Colin contacted regarding Dr Metz's question. Should we do MRI or is patient moving toward comfort care. Dr Colin confirmed moving toward comfort care and would not do further imaging. Dr Metz was text paged/called back and given this message.  Increased pain medications ordered.

## 2022-09-17 NOTE — PROGRESS NOTES
[facility] Progress Note - Hospitalist Service    Date of Admission:  9/8/2022    Assessment & Plan          72 year old female with PMH of aortic stenosis, s/p TAVR 09/21, HTN, s/p AAA repair, tobacco use disorder, diastolic CHF, HTN, known right-sided pleural effusion, alcohol use disorder, admitted on 9/8/2022.     #Severe sepsis due to High grade MRSA Bacteremia and Prosthetic aortic valve/mitral valve endocarditis  -- BC x2 on 9/8-9/13 remain positive for MRSA bacteremia.  TTE no obvious vegetation.  PET CT (9/12) showed moderate uptake about TAVR and moderate uptake about dense mitral annulus calcification, suspicious for underlying endocarditis. EMILY (9/14/22)showed 0.7x0.3 mobile vegetation associated with the aortic valve prosthesis best visualized in the left ventricular outflow tract. There is  suspected abscess associated with the aortic valve in the 2-3 o'clock in short  Axis, 1.3x0.8 mobile vegetation associated with the posterior mitral  valve leaflet (atrial aspect of valve). There is a superimposed 1.3 cm mobile  linear strand associated with the vegetation. In addition there is a 1.1 x 0.5  cm vegetation associated with the anterior mitral valve leaflet.  -- Daily blood cultures  -- IV vancomycin for 6 weeks, p.o. rifampin, 2 weeks of gentamicin if tolerated.  -- Risk for renal toxicity, ototoxicity. She has baseline hearing loss, wears hearing aides. Rifampin can reduce levels of lipitor, lisinopril, omeprazole, synthroid.   -- Has 2 functioning PIV's.  Hold on PICC unless we run out of peripheral IV options, due to ongoing bacteremia. If lose all IV access and unable to place PIV then as last resort PICC will need to be placed.  -- CV surgery consulted and very high operative mortality to repair and in agreement with focus on more palliative approach.  Family requested Dr. Rollins return to re-discuss options now that patient starting to be more clear. He  will revisit 9/16.  -- MRI brain to r/o septic emboli ordered and showed signs of septic emboli  -- Patient is DNR/DNI.  -- ID is following  -- Cardiology is following  -- Prognosis remains poor     #Acute toxic/metabolic encephalopathy (improved)  -- Due to benzodiazepine/opiate induced respiratory depression and acute on chronic hypercarbic respiratory acidosis.  NH3 normal.   -- MRI brain   -- Stop valium, avoid opiates.  -- Tylenol daily PRN and Tylenol TID     #Cirrhosis  -- Suspect EtOH mediated. Per CT abdomen/pelvis imaging.  -- LFTs normal. Not decompensated. No significant ascites.       Diet: Combination Diet Low Saturated Fat Diet, Low Saturated Fat Na <2400mg Diet, No Caffeine Diet; Thin Liquids (level 0)  Snacks/Supplements Adult: Magic Cup; With Meals    DVT Prophylaxis: DOAC  Reynoso Catheter: Not present  Central Lines: None  Cardiac Monitoring: ACTIVE order. Indication: Infective endocarditis (48 hours) - additional guidance recommending until clinically stable  Code Status: No CPR- Do NOT Intubate      Disposition Plan      Expected Discharge Date: 09/19/2022    Discharge Delays: IV Medication - consider oral or Home Infusion  Procedure Pending (enter procedure & time in comments)    Discharge Comments: infection work up in process  Family conference today        The patient's care was discussed with the Bedside Nurse, Patient and Patient's Family.    Mando Colin DO  Hospitalist Service  [facility]  Securely message with the Vocera Web Console (learn more here)  Text page via [employer] Paging/Directory         Clinically Significant Risk Factors Present on Admission                      ______________________________________________________________________    Interval History   Patient's family reports her mentation is improved.  Patient denies chest pain, SOB.  Patient denies abdominal pain, nausea, vomiting.  Patient reports gum irritation associated with dentures.    Data  reviewed today: I reviewed all medications, new labs and imaging results over the last 24 hours. I personally reviewed no images or EKG's today.    Physical Exam   Vital Signs: Temp: 98.8  F (37.1  C) Temp src: Oral BP: 116/65 Pulse: 79   Resp: 18 SpO2: 100 % O2 Device: Nasal cannula with humidification Oxygen Delivery: 2.5 LPM  Weight: 149 lbs 0 oz     GENERAL: Alert and oriented x 3; no acute distress; well-nourished.  HEENT: Normocephalic; atraumatic; PERRLA; MMM.  CV: Distant heart sounds; significant systolic ejection murmur.  RESP: Lung fields clear to aucultation B/L; no wheezing or crepitations.  GI: Abdomen is soft, nontender, nondistended; no organomegaly; normal bowel sounds.  : Deferred genital examination.   MSK: No clubbing, cyanosis, or edema.  DERM: Skin is intact; no rash, lesions, or skin breakdown.  NEURO: No focal deficits appreciated; strength & sensorium are grossly intact.  PSYCH: No active hallucinations; affect, insight appear within normal limits.    Data   Recent Labs   Lab 09/17/22  0451 09/16/22  0442 09/15/22  0452 09/14/22  0307 09/13/22  0431 09/12/22  1840 09/12/22  1410 09/12/22  1358 09/12/22  0947 09/12/22  0431 09/11/22  0426 09/11/22  0426 09/10/22  1348   WBC  --   --   --   --  7.1  --   --   --   --  6.7  --  6.2  --    HGB  --  9.7* 9.5*  --  9.6*  --   --   --   --  9.7*   < > 9.8*  --    MCV  --   --   --   --  95  --   --   --   --  95  --  97  --    PLT  --   --  51*  --  31*  --   --   --   --  29*   < > 30*  --    INR  --   --   --   --   --   --   --   --   --   --   --   --  1.33*   NA  --   --  133*  --  136  --   --   --   --  135*   < > 132*  --    POTASSIUM 3.9 3.8 3.6   < > 3.7   < >  --    < >  --  3.1*   < > 3.5  --    CHLORIDE  --   --   --   --  102  --   --   --   --  101  --  99  --    CO2  --   --   --   --  26  --   --   --   --  25  --  25  --    BUN  --   --   --   --  27  --   --   --   --  31*  --  38*  --    CR 0.71 0.76 0.80   < > 1.09  --   --    --   --  1.15*   < > 1.48*  --    ANIONGAP  --   --   --   --  8  --   --   --   --  9  --  8  --    YENI  --   --   --   --  8.4*  --   --   --   --  8.3*  --  8.3*  --    GLC  --   --   --   --  126*  --  77  --  99 98   < > 118  --    ALBUMIN  --   --   --   --  2.6*  --   --   --   --  2.8*   < > 3.0*  --    PROTTOTAL  --   --   --   --  5.9*  --   --   --   --  6.3   < > 6.3  --    BILITOTAL  --   --   --   --  1.2*  --   --   --   --  1.2*   < > 0.8  --    ALKPHOS  --   --   --   --  151*  --   --   --   --  113   < > 69  --    ALT  --   --   --   --  19  --   --   --   --  16   < > 15  --    AST  --   --   --   --  53*  --   --   --   --  50*   < > 59*  --     < > = values in this interval not displayed.     Recent Results (from the past 24 hour(s))   MR Brain w/o & w Contrast    Narrative    EXAM: MR BRAIN W/O and W CONTRAST  LOCATION: [facility]  DATE/TIME: 9/16/2022 1:00 PM    INDICATION: High grade bacteremia, TAVR, endocarditis, concern for intracranial septic emboli.  COMPARISON: None.  CONTRAST: 7 mL Gadavist.  TECHNIQUE: Routine multiplanar multisequence head MRI without and with intravenous contrast.    FINDINGS:  INTRACRANIAL CONTENTS: Punctate focus of cortically-based diffusion restriction in the superior right frontoparietal region with an additional tiny focus of diffusion restriction in the right frontoparietal white matter. Tiny focus of diffusion   restriction in the posterior superior left temporal lobe. These demonstrate no associated abnormal enhancement and no accompanying FLAIR signal abnormality. No intracranial hemorrhage or extra-axial collection. Mild to moderate burden scattered chronic   small vessel ischemic change. Moderate to severe diffuse parenchymal volume loss. Ventricular size and configuration is in keeping with this volume loss. Major intracranial vascular flow voids are preserved. 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  Cerebellar tonsils are normally positioned.    SELLA: No abnormality accounting for technique.    OSSEOUS STRUCTURES/SOFT TISSUES: Normal marrow signal.    ORBITS: Prior bilateral cataract surgery. Visualized portions of the orbits are otherwise unremarkable.     SINUSES/MASTOIDS: Mild mucosal thickening scattered about the paranasal sinuses. Small amount of fluid in the right mastoid air cells.       Impression    IMPRESSION:  1.  Punctate foci of acute infarct in the posterior superior right frontal lobe, the right frontal parietal white matter, and the posterior superior left temporal lobe. As these are scattered about multiple vascular distributions, punctate embolic   infarcts are favored. This may simply reflect bland emboli. While there is no accompanying abnormal enhancement or adjacent FLAIR hyperintensity and no findings to suggest meningitis or encephalitis, the possibility of septic emboli is not completely   excluded given the background clinical context.    2.  Age-related changes include moderate to severe diffuse parenchymal volume loss with a mild to moderate burden scattered chronic small vessel ischemic change.     Medications       acetaminophen  975 mg Oral Q8H     amiodarone  200 mg Oral BID     apixaban ANTICOAGULANT  5 mg Oral BID     famotidine  20 mg Oral Daily     folic acid  1 mg Oral Daily     gentamicin  90 mg Intravenous Q24H     levothyroxine  88 mcg Oral QAM AC     lidocaine  2 patch Transdermal Q24H     lidocaine   Transdermal Q8H ISIDRO     multivitamin w/minerals  1 tablet Oral Daily     potassium chloride  10 mEq Intravenous Once     rifampin  600 mg Oral Daily     sodium chloride (PF)  3 mL Intracatheter Q8H     [Held by provider] torsemide  20 mg Oral Daily     vancomycin  750 mg Intravenous Q12H     venlafaxine  300 mg Oral Daily

## 2022-09-17 NOTE — PROGRESS NOTES
CARDIOLOGY PROGRESS NOTE      Assessment/Plan:  1.  Atrial fibrillation- paroxysmal/persistent, reverted to atrial flutter 2-1. Not valvular and apparently asymptomatic.  Pressure borderline.  Did not respond to IV amiodarone or IV Cardizem and oral digoxin, underwent cardioversion EMILY guided and now in sinus rhythm.  Amiodarone 200 mg twice a day.   Atrial arrhythmia new since .    TSH normal and keep potassium above 4.0.  On Eliquis 5 mg twice a day.  2.  Status post TAVR- 2021 23 mm ISABELLA 3 valve placed via left subclavian approach.    Transthoracic echo shows 2.3 m/s peak velocity with 9 mmHg mean gradient.  Precardioversion EMILY did show vegetation on valve and perivalvular abscess.    3.  Gram-positive bacteremia-methicillin-resistant staph aureus growing in blood culture.  PET scan association with TAVR and mitral valve.  Head MRI yesterday does show CNS embolic lesions. Jimenes criteria meets major criteria with positive blood culture and obvious vegetation seen on mitral and aortic valves.  Antibiotics per infectious disease, however ideally would benefit from double valve procedure and possible aortic root replacement, but risk is increased given other comorbidities.  Defer to cardiothoracic surgery for the short-term.  4.  Anasarca-given increased BNP question heart failure in the setting of preserved ejection fraction of 60 to 65%.  Did receive some IV diuretics.  No significant signs or symptoms currently.  5.  Hypotension- resolved with restoration of sinus rhythm.  6.  Mild metabolic encephalopathy-stable.    7.  Hypothyroidism- good TSH at 3.92.  8.  Hypokalemia- potassium 3.8, will try to increase this to above 4 to help get sinus rhythm restoration.  9.  Cirrhosis-increased AST with normal ALT.  Surgical risk increased.  10.  Thrombocytopenia-could be reactive.    Plan:  1.  Not much more for cardiology to add.  2.  Continue other current meds.    Discharge Plannin.   "Barrier to discharge is strengthening or determination of disposition.  2.  Antibiotics for 6 weeks.  3.  Can follow with Dr. Geovani Daley.     LOS: 9 days     Subjective:  Interval History:    72-year-old white female being seen on 10th day of hospitalization. She feels drowsy, complains of back discomfort.  Also complains of right upper quadrant discomfort.  No significant shortness of breath, PND, orthopnea, chest pains, palpitations, syncope, dizziness, peripheral edema.    Medications    acetaminophen  975 mg Oral Q8H     amiodarone  200 mg Oral BID     apixaban ANTICOAGULANT  5 mg Oral BID     famotidine  20 mg Oral Daily     folic acid  1 mg Oral Daily     gentamicin  90 mg Intravenous Q24H     levothyroxine  88 mcg Oral QAM AC     lidocaine  2 patch Transdermal Q24H     lidocaine   Transdermal Q8H ISIDRO     multivitamin w/minerals  1 tablet Oral Daily     potassium chloride  10 mEq Intravenous Once     rifampin  600 mg Oral Daily     sodium chloride (PF)  3 mL Intracatheter Q8H     [Held by provider] torsemide  20 mg Oral Daily     vancomycin  750 mg Intravenous Q12H     venlafaxine  300 mg Oral Daily     Objective:   Vital signs in last 24 hours:  Temp:  [98.2  F (36.8  C)-100.1  F (37.8  C)] 98.8  F (37.1  C)  Pulse:  [71-87] 79  Resp:  [18-20] 18  BP: (111-138)/(52-65) 116/65  SpO2:  [97 %-100 %] 100 %    Physical Exam:  /65 (BP Location: Left arm)   Pulse 79   Temp 98.8  F (37.1  C) (Oral)   Resp 18   Ht 1.549 m (5' 1\")   Wt 67.6 kg (149 lb)   SpO2 100%   BMI 28.15 kg/m      General Appearance:    Alert, cooperative, no distress, appears stated age   Head:    Normocephalic, without obvious abnormality, atraumatic   Throat:   Lips, mucosa, and tongue normal; teeth and gums normal   Neck:   Supple, symmetrical, trachea midline, no adenopathy;        thyroid:  No enlargement/tenderness/nodules; no carotid    bruit or JVD   Back:     Symmetric, no curvature, ROM normal, no CVA tenderness "   Lungs:     Clear to auscultation bilaterally, respirations unlabored   Chest wall:    No tenderness or deformity   Heart:    Regular rate and regular rhythm, S1 and S2 normal, 1/6 systolic ejection murmur,no  rub   or gallop   Abdomen:     Soft, non-tender, bowel sounds active all four quadrants,     no masses, no organomegaly   Extremities:   Normal, atraumatic, no cyanosis or edema   Pulses:   2+ and symmetric all extremities   Skin:   Skin color, texture, turgor normal, no rashes or lesions     Cardiographics:      ECG: Personally reviewed by myself shows sinus rhythm, possible old anteroseptal Q MI type pattern.    Echocardiogram:   1.Left ventricular size, wall motion and function are normal. The ejection fraction is 60-65%.  2.The right ventricle is normal size. Mildly decreased right ventricular systolic function  3.The left atrium is moderate to severely dilated.  4.Thickened mitral leaflets without stenosis. There is mild to moderate (1-2+) mitral regurgitation.  5.There is a 23 ISABELLA 3 bioprosthetic valve present in aortic valve position.At heart rate of 99 bpm peak velocity is 2.3 m/s, mean gradient is 9 mmHg,dimensionless index 0.48.  6.IVC diameter >2.1 cm collapsing <50% with sniff suggests a high RA pressure estimated at 15 mmHg or greater.  Compared to the prior study dated 10/26/2021, the Tricuspid regurgitation is really not sampled well, left atrium is further enlarged, no essential change for the valve with prior peak velocity of 2.8 m/s and mean gradient of 16  mmHg.      Lab Results:   Recent Labs   Lab 09/16/22 0442 09/15/22  0452 09/13/22  0431   WBC  --   --  7.1   HGB 9.7* 9.5* 9.6*   HCT  --   --  28.1*   PLT  --  51* 31*     Recent Labs   Lab 09/15/22  0452 09/13/22  0431   * 136   CO2  --  26   BUN  --  27   .       Lab Results   Component Value Date    TROPONINI 0.11 09/09/2022

## 2022-09-18 NOTE — PLAN OF CARE
Problem: Acute Neurologic Deterioration (Alcohol Withdrawal)  Goal: Optimal Neurologic Function  Outcome: Ongoing, Progressing     Problem: Respiratory Compromise (Pneumothorax)  Goal: Optimal Oxygenation and Ventilation  Outcome: Ongoing, Progressing   Goal Outcome Evaluation:    Pt pleasant but intermittently confused.  Some fine crackles noted in bilateral lung bases, O2 Sats > 92% on 2L NC.  Pt urinating orange/red urine, purewick in place but has had leaking.  PRN Zyprexia given x1 for visual hallucinations.  Pt endorses resolution of hallucinations after PRN medication.

## 2022-09-18 NOTE — PLAN OF CARE
Problem: Plan of Care - These are the overarching goals to be used throughout the patient stay.    Goal: Plan of Care Review/Shift Note  Description: The Plan of Care Review/Shift note should be completed every shift.  The Outcome Evaluation is a brief statement about your assessment that the patient is improving, declining, or no change.  This information will be displayed automatically on your shift note.  Outcome: Ongoing, Progressing     Problem: Plan of Care - These are the overarching goals to be used throughout the patient stay.    Goal: Absence of Hospital-Acquired Illness or Injury  Intervention: Prevent Skin Injury  Recent Flowsheet Documentation  Taken 9/17/2022 2359 by Lashawn Emery, RN  Body Position:   turned   right     Problem: Plan of Care - These are the overarching goals to be used throughout the patient stay.    Goal: Absence of Hospital-Acquired Illness or Injury  Intervention: Identify and Manage Fall Risk  Recent Flowsheet Documentation  Taken 9/17/2022 2359 by Lashawn Emery, RN  Safety Promotion/Fall Prevention:   bed alarm on   patient and family education   nonskid shoes/slippers when out of bed   fall prevention program maintained   clutter free environment maintained     Problem: Risk for Delirium  Goal: Improved Attention and Thought Clarity  Outcome: Ongoing, Progressing     Problem: Alcohol Withdrawal  Goal: Alcohol Withdrawal Symptom Control  Outcome: Ongoing, Progressing     Problem: Infection Progression (Sepsis/Septic Shock)  Goal: Absence of Infection Signs and Symptoms  Outcome: Ongoing, Progressing  Intervention: Promote Recovery  Recent Flowsheet Documentation  Taken 9/17/2022 2359 by Lashawn Emery, RN  Activity Management: activity adjusted per tolerance     Problem: Malnutrition  Goal: Improved Nutritional Intake  Outcome: Ongoing, Progressing   Goal Outcome Evaluation:      Patient is very lethargic, denying pain at this time. Posterior L&R lower lobe fine  "crackles noted with 02 sat's 95% on 2 liters per nasal cannula. Patient turned with pillows between bony prominence's. Patient woke up a little confused saying \" I need to get back to my room\". Patient reoriented to time and place. Rhythm=NSR, purwik in place with orange color urine. Will cont to monitor and treat.                 "

## 2022-09-18 NOTE — PROGRESS NOTES
[facility] Progress Note - Hospitalist Service    Date of Admission:  9/8/2022    Assessment & Plan          72 year old female with PMH of aortic stenosis, s/p TAVR 09/21, HTN, s/p AAA repair, tobacco use disorder, diastolic CHF, HTN, known right-sided pleural effusion, alcohol use disorder, admitted on 9/8/2022.     #Severe sepsis due to high grade MRSA bacteremia and prosthetic aortic valve/mitral valve endocarditis  -- BC x2 on 9/8-9/13 remain positive for MRSA bacteremia.  TTE no obvious vegetation.  PET CT (9/12) showed moderate uptake about TAVR and moderate uptake about dense mitral annulus calcification, suspicious for underlying endocarditis. EMILY (9/14/22)showed 0.7x0.3 mobile vegetation associated with the aortic valve prosthesis best visualized in the left ventricular outflow tract. There is  suspected abscess associated with the aortic valve in the 2-3 o'clock in short  Axis, 1.3x0.8 mobile vegetation associated with the posterior mitral  valve leaflet (atrial aspect of valve). There is a superimposed 1.3 cm mobile  linear strand associated with the vegetation. In addition there is a 1.1 x 0.5  cm vegetation associated with the anterior mitral valve leaflet.  -- Daily blood cultures remain positibe  -- IV vancomycin for 6 weeks, p.o. rifampin, 2 weeks of gentamicin if tolerated.  -- Risk for renal toxicity, ototoxicity. She has baseline hearing loss, wears hearing aides. Rifampin can reduce levels of lipitor, lisinopril, omeprazole, synthroid.   -- Has 2 functioning PIV's.  Hold on PICC unless we run out of peripheral IV options, due to ongoing bacteremia. If lose all IV access and unable to place PIV then as last resort PICC will need to be placed.  -- CV surgery consulted and very high operative mortality to repair and in agreement with focus on more palliative approach.  Family requested Dr. Rollins return to re-discuss options now that patient starting to be  more clear. He will revisit 9/16.  -- MRI brain to r/o septic emboli ordered and showed signs of septic emboli  -- Patient is DNR/DNI.  -- ID is following  -- Cardiology is following  -- Prognosis remains poor; further discussed palliative care with patient with family     #Acute toxic/metabolic encephalopathy (improved)  -- Due to benzodiazepine/opiate induced respiratory depression and acute on chronic hypercarbic respiratory acidosis.  NH3 normal.   -- MRI brain   -- Stop valium, avoid opiates.  -- Tylenol daily PRN and Tylenol TID     #Cirrhosis  -- Suspect EtOH mediated. Per CT abdomen/pelvis imaging.  -- LFTs normal. Not decompensated. No significant ascites.       Diet: Combination Diet Low Saturated Fat Diet, Low Saturated Fat Na <2400mg Diet, No Caffeine Diet; Thin Liquids (level 0)  Snacks/Supplements Adult: Magic Cup; With Meals    DVT Prophylaxis: DOAC  Reynoso Catheter: Not present  Central Lines: None  Cardiac Monitoring: ACTIVE order. Indication: Infective endocarditis (48 hours) - additional guidance recommending until clinically stable  Code Status: No CPR- Do NOT Intubate      Disposition Plan     Expected Discharge Date: 09/19/2022    Discharge Delays: IV Medication - consider oral or Home Infusion  Procedure Pending (enter procedure & time in comments)    Discharge Comments: infection work up in process  Family conference today        The patient's care was discussed with the Bedside Nurse, Patient and Patient's Family.    Mando Colin DO  Hospitalist Service  [facility]  Securely message with the Vocera Web Console (learn more here)  Text page via [employer] Paging/Directory         Clinically Significant Risk Factors Present on Admission                      ______________________________________________________________________    Interval History   Patient is in good spirits today.  Patient's son is present at bedside.  Patient reports significant back pain (hx back  surgx).  Patient is eating ice cream comfortably.  Discussed with patient and family at length that infection is failing to clear.  Further discussed palliative options.    Data reviewed today: I reviewed all medications, new labs and imaging results over the last 24 hours. I personally reviewed no images or EKG's today.    Physical Exam   Vital Signs: Temp: 98.9  F (37.2  C) Temp src: Oral BP: 129/68 Pulse: 81   Resp: 20 SpO2: 98 % O2 Device: Nasal cannula Oxygen Delivery: 2 LPM  Weight: 150 lbs 0 oz     GENERAL: Alert and oriented x 3; no acute distress; well-nourished.  HEENT: Normocephalic; atraumatic; PERRLA; MMM.  CV: RRR; normal S1, S2; systolic ejection murmur.  RESP: Lung fields clear to aucultation B/L; no wheezing or crepitations.  GI: Abdomen is soft, nontender, nondistended; no organomegaly; normal bowel sounds.  : Deferred genital examination.   MSK: No clubbing, cyanosis, or edema.  DERM: Skin is intact; no rash, lesions, or skin breakdown.  NEURO: No focal deficits appreciated; strength & sensorium are grossly intact.  PSYCH: No active hallucinations; affect, insight appear within normal limits.    Data   Recent Labs   Lab 09/18/22  0550 09/17/22  0451 09/16/22  0442 09/15/22  0452 09/14/22  0307 09/13/22  0431 09/12/22  1840 09/12/22  1410 09/12/22  0947 09/12/22  0431   WBC 12.5*  --   --   --   --  7.1  --   --   --  6.7   HGB 9.4*  --  9.7* 9.5*  --  9.6*  --   --   --  9.7*   MCV 94  --   --   --   --  95  --   --   --  95   *  --   --  51*  --  31*  --   --   --  29*   *  --   --  133*  --  136  --   --   --  135*   POTASSIUM 3.9 3.9 3.8 3.6   < > 3.7   < >  --    < > 3.1*   CHLORIDE 97*  --   --   --   --  102  --   --   --  101   CO2 25  --   --   --   --  26  --   --   --  25   BUN 15  --   --   --   --  27  --   --   --  31*   CR 0.74 0.71 0.76 0.80   < > 1.09  --   --   --  1.15*   ANIONGAP 8  --   --   --   --  8  --   --   --  9   YENI 7.9*  --   --   --   --  8.4*  --   --    --  8.3*     --   --   --   --  126*  --  77   < > 98   ALBUMIN 2.0*  --   --   --   --  2.6*  --   --   --  2.8*   PROTTOTAL 6.0  --   --   --   --  5.9*  --   --   --  6.3   BILITOTAL 3.6*  --   --   --   --  1.2*  --   --   --  1.2*   ALKPHOS 131*  --   --   --   --  151*  --   --   --  113   ALT 11  --   --   --   --  19  --   --   --  16   AST 24  --   --   --   --  53*  --   --   --  50*    < > = values in this interval not displayed.     No results found for this or any previous visit (from the past 24 hour(s)).  Medications       acetaminophen  975 mg Oral Q8H     [START ON 9/19/2022] amiodarone  200 mg Oral Daily     apixaban ANTICOAGULANT  5 mg Oral BID     famotidine  20 mg Oral Daily     folic acid  1 mg Oral Daily     gentamicin  90 mg Intravenous Q24H     levothyroxine  88 mcg Oral QAM AC     lidocaine  2 patch Transdermal Q24H     lidocaine   Transdermal Q8H ISIDRO     multivitamin w/minerals  1 tablet Oral Daily     potassium chloride  10 mEq Intravenous Once     rifampin  600 mg Oral Daily     sodium chloride (PF)  3 mL Intracatheter Q8H     [Held by provider] torsemide  20 mg Oral Daily     vancomycin  750 mg Intravenous Q12H     venlafaxine  300 mg Oral Daily

## 2022-09-18 NOTE — PROGRESS NOTES
CARDIOLOGY PROGRESS NOTE      Assessment/Plan:  1.  Atrial fibrillation- paroxysmal/persistent, reverted to atrial flutter 2-1. Not valvular and apparently asymptomatic.  Pressure borderline.  Did not respond to IV amiodarone or IV Cardizem and oral digoxin, underwent cardioversion EMILY guided and now in sinus rhythm.  Amiodarone 200 mg twice a day.   Will lowered to 200 mg once a day.  Atrial arrhythmia new since .    TSH normal and keep potassium above 4.0.  On Eliquis 5 mg twice a day.  2.  Status post TAVR- 2021 23 mm ISABELLA 3 valve placed via left subclavian approach.    Transthoracic echo shows 2.3 m/s peak velocity with 9 mmHg mean gradient.  Precardioversion EMILY did show vegetation on valve and perivalvular abscess.    3.  Gram-positive bacteremia-methicillin-resistant staph aureus growing in blood culture.  PET scan association with TAVR and mitral valve.  Head MRI does show CNS embolic lesions. Jimenes criteria meets major criteria with positive blood culture and obvious vegetation seen on mitral and aortic valves.  Antibiotics per infectious disease, however ideally would benefit from double valve procedure and possible aortic root replacement, but risk is increased given other comorbidities.  At this point in time medical therapy.    4.  Anasarca-given increased BNP question heart failure in the setting of preserved ejection fraction of 60 to 65%.  Did receive some IV diuretics.  Resolved.  5.  Hypotension- resolved with restoration of sinus rhythm.  6.  Mild metabolic encephalopathy-stable.    7.  Hypothyroidism- good TSH at 3.92.  8.  Hypokalemia- potassium 3.8, will try to increase this to above 4 to help keep sinus rhythm.  9.  Cirrhosis-increased AST with normal ALT.  Surgical risk increased.  10.  Thrombocytopenia-could be reactive.    Plan:  1.  Not much more for cardiology to add.  2.  Continue antibiotics.  3.  Change amiodarone to daily.    Discharge Plannin.  Barrier  "to discharge is strengthening or determination of disposition.  2.  Antibiotics for 6 weeks.  3.  Can follow with Dr. Geovani Daley.     LOS: 10 days     Subjective:  Interval History:    72-year-old white female being seen on 11th day of hospitalization.  Son Tera is present.  She feels drowsy, complains of back discomfort. No significant shortness of breath, PND, orthopnea, chest pains, palpitations, syncope, dizziness, peripheral edema.    Medications    acetaminophen  975 mg Oral Q8H     amiodarone  200 mg Oral BID     apixaban ANTICOAGULANT  5 mg Oral BID     famotidine  20 mg Oral Daily     folic acid  1 mg Oral Daily     gentamicin  90 mg Intravenous Q24H     levothyroxine  88 mcg Oral QAM AC     lidocaine  2 patch Transdermal Q24H     lidocaine   Transdermal Q8H ISIDRO     multivitamin w/minerals  1 tablet Oral Daily     potassium chloride  10 mEq Intravenous Once     rifampin  600 mg Oral Daily     sodium chloride (PF)  3 mL Intracatheter Q8H     [Held by provider] torsemide  20 mg Oral Daily     vancomycin  750 mg Intravenous Q12H     venlafaxine  300 mg Oral Daily     Objective:   Vital signs in last 24 hours:  Temp:  [98.4  F (36.9  C)-100.1  F (37.8  C)] 99  F (37.2  C)  Pulse:  [81-85] 82  Resp:  [18-20] 20  BP: (116-137)/(59-65) 121/65  SpO2:  [93 %-98 %] 93 %    Physical Exam:  /65 (BP Location: Left arm)   Pulse 82   Temp 99  F (37.2  C) (Oral)   Resp 20   Ht 1.549 m (5' 1\")   Wt 68 kg (150 lb)   SpO2 93%   BMI 28.34 kg/m      General Appearance:    Alert, cooperative, no distress, appears stated age   Head:    Normocephalic, without obvious abnormality, atraumatic   Throat:   Lips, mucosa, and tongue normal; teeth and gums normal   Neck:   Supple, symmetrical, trachea midline, no adenopathy;        thyroid:  No enlargement/tenderness/nodules; no carotid    bruit or JVD   Back:     Symmetric, no curvature, ROM normal, no CVA tenderness   Lungs:     Clear to auscultation bilaterally, " respirations unlabored   Chest wall:    No tenderness or deformity   Heart:    Regular rate and regular rhythm, S1 and S2 normal, 1/6 systolic ejection murmur,no  rub   or gallop   Abdomen:     Soft, non-tender, bowel sounds active all four quadrants,     no masses, no organomegaly   Extremities:   Normal, atraumatic, no cyanosis or edema   Pulses:   2+ and symmetric all extremities   Skin:   Skin color, texture, turgor normal, no rashes or lesions     Cardiographics:      ECG: Personally reviewed by myself shows sinus rhythm, possible old anteroseptal Q MI type pattern.    Echocardiogram:   1.Left ventricular size, wall motion and function are normal. The ejection fraction is 60-65%.  2.The right ventricle is normal size. Mildly decreased right ventricular systolic function  3.The left atrium is moderate to severely dilated.  4.Thickened mitral leaflets without stenosis. There is mild to moderate (1-2+) mitral regurgitation.  5.There is a 23 ISABELLA 3 bioprosthetic valve present in aortic valve position.At heart rate of 99 bpm peak velocity is 2.3 m/s, mean gradient is 9 mmHg,dimensionless index 0.48.  6.IVC diameter >2.1 cm collapsing <50% with sniff suggests a high RA pressure estimated at 15 mmHg or greater.  Compared to the prior study dated 10/26/2021, the Tricuspid regurgitation is really not sampled well, left atrium is further enlarged, no essential change for the valve with prior peak velocity of 2.8 m/s and mean gradient of 16  mmHg.      Lab Results:   Recent Labs   Lab 09/18/22  0550   WBC 12.5*   HGB 9.4*   HCT 27.1*   *     Recent Labs   Lab 09/18/22  0550   *   CO2 25   BUN 15   .       Lab Results   Component Value Date    TROPONINI 0.11 09/09/2022

## 2022-09-18 NOTE — PROGRESS NOTES
Care Management Follow Up    Length of Stay (days): 10    Expected Discharge Date: 09/19/2022     Concerns to be Addressed:     Medical mgmt; ID following for IV abx; Palliative, Cardiology following  Patient plan of care discussed at interdisciplinary rounds: Yes    Anticipated Discharge Disposition: Transitional Care (vs. Comfort Care)     Anticipated Discharge Services:    Anticipated Discharge DME:      Referrals Placed by CM/SW:    Private pay costs discussed: Not applicable    Additional Information:    Therapy REC on 9/16 was TCU. TCU list provided to pt/family on 9/16 and CM would need preferences for referrals. Pt has been lethargic and confused this weekend. MDs report discussion about comfort care and limiting further workups. TCU vs. Comfort care.       Assess hx, per notes: Pt lives alone in an apartment. Independent with ADLs uses a walker for mobility. Goal is home at discharge. Pt may need IV ABX at discharge, but pt does not have coverage for IV ABX under her insurance plan she would need to be self pay (infusion service note 9/12). Pt/Family preference for TCU.      FERNANDO Adkins

## 2022-09-18 NOTE — PROGRESS NOTES
[facility]    Infectious Disease Progress Note    Date of Service: 09/18/2022     Assessment & Plan   Maren Fofana is a 72 year old female who was admitted on 9/8/2022.     1. MRSA bacteremia/TAVR and mitral valve endocarditis, onset of symptoms around 9/6/2022--active issue.  MRSA bacteremia, high-grade, persistent positive blood cultures, consistent with endovascular infection. Endocarditis of TAVR is associated with high mortality, in this study 45% at 1 year, other studies note higher rates. EMILY 9/14 shows 0.7 x 0.3 cm vegetation on aortic valve as well as luz-valvular abscess, 1.3 x 0.8 cm and 1.1 x 0.5 cm vegetations on mitral valve. At risk for septic emboli including stroke. Potentially treatable with antibiotic course, however with large vegetations this will be difficult and finding of luz-valvular abscess chance of cure with antibiotics alone is less. This study of TAVR endocarditis showed 1 year mortality of 62.5% in those with perivalvular extension, 70% mortality at 2 years. Blood culture from 9/14 negative, however 9/15 is positive -- positivity may be slowing. No plans for valve surgery as this is high risk for her.   2. History of TAVR, AAA endovascular graft, bilateral TKAs, high risk of infection -- PET CT indicating TAVR endocarditis. PET also suggesting fluid at R knee may be infected also knee exam fairly benign.  3. AAA endovascular repair with graft 2016.  CT showing slight enlargement of aneurysm type II endoleak noted. Not lighting up on PET.  4. Alcohol use, imaging showing cirrhosis. This will make recovery difficult.   5. Pleural effusion transudative, thoracentesis complicated by hydropneumothorax status post chest tube, now removed.   6. A fib 9/13 -- cardioverted 9/14 now in sinus rhythm.     High-grade persistent MRSA bacteremia from 9/8 through 9/16 as of now.  Follow-up blood culture 9/17 in process.  Brain MRI with punctate foci of acute infarct in the  posterior superior right frontal lobe, the right frontoparietal white matter, and the posterior superior left temporal lobe.  This is most likely septic emboli.  Also complaining of a lot of low back pain.  Admission CT scan with no evidence of discitis.  With prolonged bacteremia, repeat imaging especially MRI of the lumbar spine is indicated if goal is continued aggressive care.  Unclear what goal of care is today    Recommendations    1. Medical treatment for prosthetic valve endocarditis is IV vancomycin for 6 weeks, p.o. rifampin 6 weeks, 2 weeks of gentamicin if tolerated. Risk for renal toxicity, ototoxicity. She has baseline hearing loss, wears hearing aides. Rifampin can reduce levels of lipitor, lisinopril, omeprazole, synthroid. There is risk for complications (worsening infection) even with medical treatment, risk of side effects from IV antibiotics. Plan is give antibiotics a try and see how she progresses.   2. Daily blood cultures until clear  3. MRI lumbar spine is indicated.  Goals of care to determine neck step.  4. Hold on PICC unless we run out of peripheral IV options, due to ongoing bacteremia. Once blood cultures negative for 2-3 days, then can place single lumen PICC.    Discussed with the patient, nursing staff.    ID will follow.        Nohemi Metz MD  [facility]  [phone]  ________________________________________________________________________________    Interval History   Continues to be bacteremic with positive blood cultures on 9/17.  Discussed with Dr. Colin yesterday in the afternoon.  Family may be moving to comfort care.  Confused.  Having low back pain.    Physical Exam   Temp: 98.9  F (37.2  C) Temp src: Oral BP: 129/68 Pulse: 80   Resp: 20 SpO2: 98 % O2 Device: Nasal cannula Oxygen Delivery: 2 LPM  Vitals:    09/16/22 0324 09/17/22 0526 09/18/22 0329   Weight: 67.5 kg (148 lb 14.4 oz) 67.6 kg (149 lb) 68 kg (150 lb)     Vital Signs with  Ranges  Temp:  [98.4  F (36.9  C)-100.1  F (37.8  C)] 98.9  F (37.2  C)  Pulse:  [80-83] 80  Resp:  [20] 20  BP: (116-137)/(59-68) 129/68  SpO2:  [93 %-98 %] 98 %    Constitutional: looks tired  Lungs: clear anterior  Cardiovascular: regular  Abdomen: mildly tender  Skin: Warm. No rash. +ecchymoses.   Neuro: Deconditioned, non-focal  MSK: knees good ROM without significant pain, no appreciable swelling  Peripheral IV  Pure wick    Medications       acetaminophen  975 mg Oral Q8H     [START ON 9/19/2022] amiodarone  200 mg Oral Daily     apixaban ANTICOAGULANT  5 mg Oral BID     famotidine  20 mg Oral Daily     folic acid  1 mg Oral Daily     gentamicin  90 mg Intravenous Q24H     levothyroxine  88 mcg Oral QAM AC     lidocaine  2 patch Transdermal Q24H     lidocaine   Transdermal Q8H ISIDRO     multivitamin w/minerals  1 tablet Oral Daily     potassium chloride  10 mEq Intravenous Once     rifampin  600 mg Oral Daily     sodium chloride (PF)  3 mL Intracatheter Q8H     [Held by provider] torsemide  20 mg Oral Daily     vancomycin  750 mg Intravenous Q12H     venlafaxine  300 mg Oral Daily       Data   All microbiology laboratory data reviewed.  Recent Labs   Lab Test 09/18/22  0550 09/16/22 0442 09/15/22  0452 09/13/22  0431 09/12/22  0431   WBC 12.5*  --   --  7.1 6.7   HGB 9.4* 9.7* 9.5* 9.6* 9.7*   HCT 27.1*  --   --  28.1* 28.7*   MCV 94  --   --  95 95   *  --  51* 31* 29*     Recent Labs   Lab Test 09/18/22  0550 09/17/22  0451 09/16/22 0442   CR 0.74 0.71 0.76       MICRO:  Collected Updated Procedure Result Status    09/10/2022 0832 09/10/2022 0846 Blood Culture Peripheral Blood [[id number]]   Peripheral Blood    In process Component Value   No component results             09/09/2022 2123 09/09/2022 2133 Blood Culture Peripheral Blood [[id number]]   Peripheral Blood    In process Component Value   No component results             09/09/2022 1343 09/10/2022 0536 Blood Culture Peripheral Blood  [[id number]]   (Abnormal)   Peripheral Blood    Preliminary result Component Value   Culture Positive on the 1st day of incubation Abnormal  P    Gram positive cocci in clusters Panic  P    1 of 2 bottles             09/08/2022 1847 09/10/2022 0715 Pleural fluid Aerobic Bacterial Culture Routine with Gram Stain [[id number]]    Pleural fluid from Pleural Cavity, Right    Preliminary result Component Value   Culture No growth after 1 day P   Gram Stain Result No organisms seen P    Gram Stain slide reviewed at the [facility]    2+ WBC seen P             09/08/2022 1847 09/08/2022 2033 Glucose fluid [[id number]]    Pleural fluid from Pleural Cavity, Right    Final result Component Value Units   Glucose Fluid Source Pleural Cavity, Right    Glucose fluid 117 mg/dL          09/08/2022 1847 09/09/2022 0351 Lactate dehydrogenase fluid [[id number]]    Pleural fluid from Pleural Cavity, Right    Final result Component Value Units   LD Fluid Source Pleural Cavity, Right    Lactate dehydrogenase fluid 100 U/L          09/08/2022 1847 09/09/2022 0351 Protein fluid [[id number]]    Pleural fluid from Pleural Cavity, Right    Final result Component Value Units   Protein Fluid Source Pleural Cavity, Right    Protein Total Fluid 3.0 g/dL          09/08/2022 1554 09/08/2022 1659 Symptomatic; Unknown Influenza A/B & SARS-CoV2 (COVID-19) Virus PCR Multiplex Nasopharyngeal [[id number]]    Swab from Nasopharyngeal    Final result Component Value   Influenza A PCR Negative   Influenza B PCR Negative   RSV PCR Negative   SARS CoV2 PCR Negative   NEGATIVE: SARS-CoV-2 (COVID-19) RNA not detected, presumed negative.          09/08/2022 1552 09/10/2022 1348 Blood Culture Peripheral Blood [[id number]]   (Abnormal)   Peripheral Blood    Preliminary result Component Value   Culture Positive on the 1st day of incubation Abnormal  P    Staphylococcus aureus Panic  P    2 of 2 bottles              09/08/2022 1552 09/10/2022 0704 Blood Culture Peripheral Blood [[id number]]    (Abnormal)   Peripheral Blood    Edited Component Value   Culture Positive on the 1st day of incubation Abnormal     Staphylococcus aureus MRSA Panic     2 of 2 bottles         Susceptibility     Staphylococcus aureus MRSA     [place]     Clindamycin <=0.25 ug/mL Susceptible 1     Erythromycin >=8 ug/mL Resistant     Gentamicin <=0.5 ug/mL Susceptible     Linezolid 2 ug/mL Susceptible     Oxacillin >=4 ug/mL Resistant 2     Tetracycline <=1 ug/mL Susceptible     Trimethoprim/Sulfamethoxazole <=0.5/9.5 u... Susceptible     Vancomycin 1 ug/mL Susceptible              1 This isolate DOES NOT demonstrate inducible clindamycin resistance in vitro. Clindamycin is susceptible and could be used when indicated, however, erythromycin is resistant and should not be used.   2 Oxacillin susceptible isolates are susceptible to cephalosporins (example: cefazolin and cephalexin) and beta lactam combination agents. Oxacillin resistant isolates are resistant to these agents.        Susceptibility Comments    Staphylococcus aureus MRSA   MRSA requires contact precautions.         09/08/2022 1552 09/09/2022 0352 Verigene GP Panel [[id number]]    (Abnormal)   Peripheral Blood    Final result Component Value   Staphylococcus aureus Detected Abnormal    Positive for methicillin-resistant Staphylococcus aureus (MRSA) by [employer] multiplex nucleic acid test. Final identification and antimicrobial susceptibility testing will be verified by standard methods.   Staphylococcus epidermidis Not Detected   Staphylococcus lugdunensis Not Detected   Enterococcus faecalis Not Detected   Enterococcus faecium Not Detected   Streptococcus species Not Detected   Streptococcus agalactiae Not Detected   Streptococcus anginosus group Not Detected   Streptococcus pneumoniae Not Detected   Streptococcus pyogenes Not Detected   Listeria species Not Detected             RADIOLOGY:    XR Chest 2 Views    Result Date: 2022  EXAM: XR CHEST 2 VIEWS LOCATION: [facility] DATE/TIME: 2022 11:17 AM INDICATION: f u chest tube, PTX COMPARISON: 2022.     IMPRESSION: Right chest tube over the right lung apex in stable position. No recurrent pneumothorax. Small right pleural effusion with some infiltrates or atelectasis and some mild atelectasis left lung base all appear stable. Aortic valve prosthesis.    Echocardiogram Complete    Result Date: 2022  [id number]^CAMILLE^PAT^[place] [address]  Name: LEELEE MARIN MRN: [id number] : 1950 Study Date: 2022 02:42 PM Age: 72 yrs Gender: Female Patient Location: [facility] Reason For Study: Abnormal Heart Sound Ordering Physician: PAT OBRIEN Performed By: SANDI  BSA: 1.7 m2 Height: 61 in Weight: 148 lb HR: 81 BP: 120/66 mmHg ______________________________________________________________________________ Procedure Complete Portable Echo Adult. Compared to the prior study dated 10/26/2021, there have been no changes. ______________________________________________________________________________ Interpretation Summary  1.Left ventricular size, wall motion and function are normal. The ejection fraction is 60-65%. 2.The right ventricle is normal size. Mildly decreased right ventricular systolic function 3.The left atrium is moderate to severely dilated. 4.Thickened mitral leaflets without stenosis. There is mild to moderate (1-2+) mitral regurgitation. 5.There is a 23 ISABELLA 3 bioprosthetic valve present in aortic valve position. At heart rate of 99 bpm peak velocity is 2.3 m/s, mean gradient is 9 mmHg, dimensionless index 0.48. 6.IVC diameter >2.1 cm collapsing <50% with sniff suggests a high RA pressure estimated at 15 mmHg or greater. Compared to the prior study dated 10/26/2021, the Tricuspid regurgitation is really not  sampled well, left atrium is further enlarged, no essential change for the valve with prior peak velocity of 2.8 m/s and mean gradient of 16 mmHg. ______________________________________________________________________________ I      WMSI = 1.00     % Normal = 100  X - Cannot   0 -                      (2) - Mildly 2 -          Segments  Size Interpret    Hyperkinetic 1 - Normal  Hypokinetic  Hypokinetic  1-2     small                                                    7 -          3-5    moderate 3 - Akinetic 4 -          5 -         6 - Akinetic Dyskinetic   6-14    large              Dyskinetic   Aneurysmal  w/scar       w/scar       15-16   diffuse  Left Ventricle Left ventricular size, wall motion and function are normal. The ejection fraction is 60-65%. There is normal left ventricular wall thickness. Left ventricular diastolic function is normal. No regional wall motion abnormalities noted.  Right Ventricle The right ventricle is normal size. Mildly decreased right ventricular systolic function. TAPSE is normal, which is consistent with normal right ventricular systolic function.  Atria The left atrium is moderate to severely dilated. Right atrial size is normal. There is no color Doppler evidence of an atrial shunt.  Mitral Valve Thickened mitral valve anterior leaflet. Thickened mitral valve posterior leaflet. There is mild to moderate (1-2+) mitral regurgitation. There is no mitral valve stenosis.  Tricuspid Valve The tricuspid valve is not well visualized, but is grossly normal. Right ventricle systolic pressure estimate normal. There is trace to mild tricuspid regurgitation. There is no tricuspid stenosis.  Aortic Valve There is a ISABLELA 3 bioprosthetic valve present in aortic valve position. 23 At heart rate of 99 bpm peak velocity is 2.3 m/s, mean gradient is 9 mmHg, dimensionless index 0.48.  Pulmonic Valve The pulmonic valve is not well seen, but is grossly normal. This degree of valvular  regurgitation is within normal limits. There is no pulmonic valvular stenosis.  Vessels The aorta root is normal. Normal size ascending aorta. IVC diameter >2.1 cm collapsing <50% with sniff suggests a high RA pressure estimated at 15 mmHg or greater.  Pericardium There is no pericardial effusion.  Rhythm Sinus rhythm was noted.  ______________________________________________________________________________ MMode/2D Measurements & Calculations IVSd: 0.74 cm LVIDd: 4.7 cm LVIDs: 3.3 cm LVPWd: 0.75 cm FS: 28.2 %  LV mass(C)d: 109.3 grams LV mass(C)dI: 65.7 grams/m2 Ao root diam: 2.5 cm LA dimension: 5.8 cm LA/Ao: 2.3 LVOT diam: 1.6 cm LVOT area: 2.0 cm2 LA Volume Indexed (AL/bp): 86.6 ml/m2 RWT: 0.32  Time Measurements Aortic HR: 106.0 BPM MM HR: 91.0 BPM  Doppler Measurements & Calculations MV E max thomas: 102.3 cm/sec MV A max thomas: 108.5 cm/sec MV E/A: 0.94 MV dec slope: 322.0 cm/sec2 MV dec time: 0.32 sec Ao V2 max: 204.0 cm/sec Ao max P.0 mmHg Ao V2 mean: 165.0 cm/sec Ao mean P.0 mmHg Ao V2 VTI: 44.8 cm KP(I,D): 0.80 cm2 KP(V,D): 0.96 cm2 LV V1 max PG: 3.8 mmHg LV V1 max: 97.6 cm/sec LV V1 VTI: 17.8 cm CO(LVOT): 3.8 l/min CI(LVOT): 2.3 l/min/m2 SV(LVOT): 35.7 ml SI(LVOT): 21.5 ml/m2 TR max thomas: 244.0 cm/sec TR max P.8 mmHg AV Thomas Ratio (DI): 0.48 KP Index (cm2/m2): 0.48 E/E': 16.5 E/E' av.0 Lateral E/e': 20.0 Medial E/e': 13.9 Peak E' Thomas: 6.2 cm/sec  ______________________________________________________________________________ Report approved by: Yonatan Arroyo 2022 04:29 PM       US Thoracentesis    Result Date: 2022  EXAM: 1. RIGHT  THORACENTESIS 2. ULTRASOUND GUIDANCE LOCATION: [facility] DATE/TIME: 2022 6:46 PM INDICATION: Pleural effusion. PROCEDURE: Informed consent obtained. Time out performed. The chest was prepped and draped in sterile fashion. 5  mL of 1 % lidocaine was infused into the local soft tissues. Under direct ultrasound  guidance, a 5 [place] catheter system was placed into the pleural effusion. 1.35  liters of clear yellow and red  fluid were removed and sent to lab, if requested. Patient tolerated procedure well. Ultrasound imaging was obtained and placed in the patient's permanent medical record.     IMPRESSION: Status post right  ultrasound-guided thoracentesis. Reference CPT Code: 56902    XR Chest Port 1 View    Result Date: 9/10/2022  EXAM: XR CHEST PORT 1 VIEW LOCATION: [facility] DATE/TIME: 9/10/2022 2:21 PM INDICATION: follow up PTX for continued resolution COMPARISON: 09/09/2022     IMPRESSION: The heart is normal in size. Improved right pleural effusion. Right lower lobe opacities, most likely representing infiltrate. No pneumothorax. Right-sided chest tube.    XR Chest Port 1 View    Result Date: 9/9/2022  EXAM: XR CHEST PORT 1 VIEW LOCATION: [facility] DATE/TIME: 9/9/2022 12:00 AM INDICATION: s p chest tube follow-up pneumothorax COMPARISON: 09/08/2022     IMPRESSION: New right chest tube terminates at the apex medially. Right pneumothorax has resolved. Persistent opacity at the right base may represent an combination of atelectasis and edema. Normal heart size. Prior endovascular aortic valve replacement.  Left lung clear. Surgical clips cluster over the left shoulder.    XR Chest Port 1 View    Result Date: 9/8/2022  EXAM: XR CHEST PORT 1 VIEW LOCATION: [facility] DATE/TIME: 9/8/2022 9:44 PM INDICATION: ptx COMPARISON: 09/08/2022     IMPRESSION: Persistent right pneumothorax measuring 8 mm at the apex (stable) and 1.7 cm at the lateral base (previously 1.4 cm). Small right pleural effusion. Increased airspace infiltrate in the right lower lobe. Surgical clips in the left axilla. Calcified aortic arch. Prior aortic valve repair.    XR Chest Port 1 View    Result Date: 9/8/2022  EXAM: XR CHEST PORT 1 VIEW LOCATION: [facility]  HOSPITAL DATE/TIME: 9/8/2022 8:18 PM INDICATION: s p thoracentesis, ? ptx COMPARISON: Same date abdominal CT and chest radiograph.     IMPRESSION: Stable cardiomediastinal silhouette with prior aortic valve replacement. Small right hydropneumothorax, similar volume to same day CT given differences in modalities, without evidence of tension. Possible reexpansion edema in the right lower lobe. Left lung is clear. No acute bony abnormality.    XR Chest Port 1 View    Result Date: 9/8/2022  EXAM: XR CHEST PORT 1 VIEW LOCATION: [facility] DATE/TIME: 9/8/2022 4:30 PM INDICATION: fever, cough COMPARISON: 12/3/2021     IMPRESSION: Small to moderate right effusion, slightly increased. Right lower lobe infiltrate or atelectasis.  No pneumothorax.  The left lung is clear.  The heart is normal in size.    CT Abdomen Pelvis w Contrast    Result Date: 9/8/2022  EXAM: CT ABDOMEN PELVIS W CONTRAST LOCATION: [facility] DATE/TIME: 9/8/2022 7:39 PM INDICATION: Right upper abdominal pain and fever. COMPARISON: CTA 8/21/2021 TECHNIQUE: CT scan of the abdomen and pelvis was performed following injection of IV contrast. Multiplanar reformats were obtained. Dose reduction techniques were used. CONTRAST: 75ml isovue 370 FINDINGS: LOWER CHEST: Small right hydropneumothorax post right thoracentesis earlier today. Small residual right pleural effusion. Heavy mitral annular calcifications. Small sliding-type hiatal hernia. HEPATOBILIARY: Cirrhotic appearing liver redemonstrated without focal suspicious mass. Normal gallbladder and biliary system. PANCREAS: Normal. SPLEEN: Normal. ADRENAL GLANDS: Normal. KIDNEYS/BLADDER: Normal kidneys and ureters. Nondistended bladder with focal asymmetric enhancing bladder wall thickening measuring up to 8 mm in the right bladder base image 173 of series 3.. BOWEL: Diverticulosis of the colon. No acute inflammatory change. No obstruction. Large amount of stool  in the rectum. Mild ascites. LYMPH NODES: No lymphadenopathy. VASCULATURE: Previous endovascular aortic aneurysm repair with bifurcated endograft. Aneurysm sac measures 5.0 x 5.6 cm, previously 4.7 x 5.4 cm. Type II endoleak is noted. PELVIC ORGANS: Hysterectomy. No adnexal mass. MUSCULOSKELETAL: Previous lower lumbar fusion. Degenerative changes are present within the spine and hips. Generalized anasarca.     IMPRESSION: 1.  Small to moderate right hydropneumothorax, status post large volume right thoracentesis earlier today. Patient may have a trapped right lung. Consider two-view chest radiograph. 2.  Cirrhotic appearing liver with mild ascites. No splenomegaly. 3.  Previous endovascular repair of infrarenal abdominal aortic aneurysm. Type II endoleak with interval enlargement of the aneurysm sac, compatible with endotension. Previous repair at [facility]. Consider nonemergent Interventional Radiology consult. 4.  Generalized anasarca. Report called to Dr. Roberson at 2000 hours    CT Cervical Spine w/o Contrast    Result Date: 9/8/2022  EXAM: CT CERVICAL SPINE W/O CONTRAST LOCATION: [facility] DATE/TIME: 9/8/2022 7:38 PM INDICATION: fall COMPARISON: None. TECHNIQUE: Routine CT Cervical Spine without IV contrast. Multiplanar reformats. Dose reduction techniques were used. FINDINGS: No evidence of acute fracture or subluxation of the cervical spine by CT imaging. Straightening of the normal cervical lordosis. The vertebral bodies of the cervical spine otherwise have normal stature and alignment. Anterolisthesis of C3 on C4 measuring  2 mm. Anterior marginal osteophytes at C4/C5-C7/T1. Multilevel degenerative disc disease of the cervical spine with disc height loss, most pronounced at C4/C5-C7/T1. The partially imaged intracranial contents are unremarkable. No prevertebral soft tissue swelling. The partially imaged lung apices are unremarkable. Craniovertebral junction and C1-C2: The  odontoid process is well approximated with the anterior body of C1 and well aligned between the lateral masses of C1. C2-C3: No posterior disc bulge or spinal canal narrowing. No neural foraminal narrowing. C3-C4: No posterior disc bulge or spinal canal narrowing. No neural foraminal narrowing. C4-C5: Broad bar disc osteophyte complex with moderate spinal canal narrowing. Uncovertebral joint disease and facet arthropathy with severe bilateral neural foraminal narrowing. C5-C6: No posterior disc bulge or spinal canal narrowing. Uncovertebral joint disease and facet arthropathy with severe right and moderate left neural foraminal narrowing. C6-C7: No posterior disc bulge or spinal canal narrowing. Uncovertebral joint disease and facet arthropathy with severe bilateral neural foraminal narrowing. C7-T1: No posterior disc bulge or spinal canal narrowing. Uncovertebral joint disease and facet arthropathy with moderate bilateral neural foraminal narrowing.     IMPRESSION: 1.  No evidence of acute fracture or subluxation of the cervical spine by CT imaging. 2.  Degenerative cervical spondylosis as described above.    CT Head w/o Contrast    Addendum Date: 9/8/2022    Addendum/ impression: INDICATION: Dizziness    Result Date: 9/8/2022  EXAM: CT HEAD W/O CONTRAST LOCATION: [facility] DATE/TIME: 9/8/2022 7:34 PM INDICATION: fall COMPARISON: None. TECHNIQUE: Routine CT Head without IV contrast. Multiplanar reformats. Dose reduction techniques were used. FINDINGS: INTRACRANIAL CONTENTS: No evidence of acute intracranial hemorrhage or mass effect. Scattered foci of decreased attenuation within the cerebral hemispheric white matter which are nonspecific, though most commonly ascribed to chronic small vessel ischemic  disease. The ventricles and sulci are prominent consistent with mild brain parenchymal volume loss. Gray-white matter differentiation is maintained. The basilar cisterns are patent.  VISUALIZED ORBITS/SINUSES/MASTOIDS: Bilateral cataract surgery. The partially imaged globes are otherwise unremarkable. The partially imaged paranasal sinuses, mastoid air cells and middle ear cavities are unremarkable. BONES/SOFT TISSUES: The visualized skull base and calvarium are unremarkable.     IMPRESSION:  1.  No evidence of acute intracranial hemorrhage or mass effect. 2.  Mild nonspecific white matter changes. 3.  Mild brain parenchymal volume loss.    CT Lumbar Spine w/o Contrast    Result Date: 9/8/2022  EXAM: CT LUMBAR SPINE W/O CONTRAST LOCATION: [facility] DATE/TIME: 9/8/2022 7:39 PM INDICATION: Back pain COMPARISON: None. TECHNIQUE: Routine CT Lumbar Spine without IV contrast. Multiplanar reformats. Dose reduction techniques were used. FINDINGS: No evidence of acute fracture or subluxation of the lumbar spine by CT imaging. Postsurgical changes posterior fusion at L3-L5 and interbody fusion at L3/L4 and L4/L5. No hardware complication. No evidence of acute fracture or subluxation of the lumbar spine by CT imaging. Retrolisthesis of T12 on L1 measuring 2 mm. Anterior marginal osteophytes at T12/L1 and L1/L2 and L2/L3. Multilevel degenerative disc disease of the lumbar spine with disc height loss, most pronounced at T12/L1 and L2/L3. Aortobiiliac endograft. The partially imaged intra-abdominal contents are otherwise unremarkable. T12/L1:  Symmetric disc bulge without spinal canal narrowing. Mild bilateral neural foraminal narrowing. L1/L2:  No posterior disc bulge or spinal canal narrowing. Mild bilateral neural foraminal narrowing. L2/L3:  Broad bar disc osteophyte complex with mild spinal canal narrowing. Severe bilateral neural foraminal narrowing. L3/L4:  Spondylotic ridging without spinal canal narrowing. Moderate bilateral facet arthropathy. No neural foraminal narrowing.  L4/L5:  No posterior disc bulge or spinal canal narrowing. Mild bilateral facet arthropathy. No  neural foraminal narrowing. L5/S1: Symmetric disc bulge and moderate facet arthropathy without spinal canal narrowing. Severe bilateral neural foraminal narrowing.     IMPRESSION:  1.  No evidence of acute fracture or subluxation of the lumbar spine by CT imaging. 2.  Postsurgical changes posterior fusion at L3-L5 and interbody fusion at L3/L4 and L4/L5. No hardware complication. 3.  Degenerative lumbar spondylosis as described above.    Total Time Spent 35 minutes with >50% of the time spent in chart review, evaluation, counseling, education and care coordination.

## 2022-09-19 NOTE — PLAN OF CARE
"  Problem: Plan of Care - These are the overarching goals to be used throughout the patient stay.    Goal: Plan of Care Review/Shift Note  Description: The Plan of Care Review/Shift note should be completed every shift.  The Outcome Evaluation is a brief statement about your assessment that the patient is improving, declining, or no change.  This information will be displayed automatically on your shift note.  Outcome: Ongoing, Progressing   Goal Outcome Evaluation:    Patient's vital signs have remained stable, and patient has been NSR on telemetry.  Patient has been alert and oriented x4, but can be forgetful.  Patient also continues to state she she see's black dots climbing on wall, patient acknowledge that they're not real, and states this has been happening since admission.  Otherwise patient's neuros appear to remain intact. Patient has remained in bed all evening, but assists with turning and repositioning.  Patient states she feels tired and weak all over.  Patient continues to have right side back pain, that has been controlled with tylenol, voltaren, and prn morphine.  Patient did receive 2mg iv MS this evening, when her pain spiked to a \"9\", about 30min later her pain was down to a \"4\"; which patient states is a comfortable level.                    "

## 2022-09-19 NOTE — PROGRESS NOTES
[facility]    Infectious Disease Progress Note    Date of Service: 09/19/2022     Assessment & Plan   Maren Fofana is a 72 year old female who was admitted on 9/8/2022.     1. MRSA bacteremia/TAVR and mitral valve endocarditis, onset of symptoms around 9/6/2022--active issue.  MRSA bacteremia, high-grade, persistent positive blood cultures, consistent with endovascular infection. Endocarditis of TAVR is associated with high mortality, in this study 45% at 1 year, other studies note higher rates. EMILY 9/14 shows 0.7 x 0.3 cm vegetation on aortic valve as well as luz-valvular abscess, 1.3 x 0.8 cm and 1.1 x 0.5 cm vegetations on mitral valve. At risk for septic emboli including stroke. Potentially treatable with antibiotic course, however with large vegetations this will be difficult and finding of luz-valvular abscess chance of cure with antibiotics alone is less. This study of TAVR endocarditis showed 1 year mortality of 62.5% in those with perivalvular extension, 70% mortality at 2 years. Blood culture from 9/14 negative, however 9/15 is positive -- positivity may be slowing. No plans for valve surgery as this is high risk for her.   2. History of TAVR, AAA endovascular graft, bilateral TKAs, high risk of infection -- PET CT indicating TAVR endocarditis. PET also suggesting fluid at R knee may be infected also knee exam fairly benign.  3. AAA endovascular repair with graft 2016.  CT showing slight enlargement of aneurysm type II endoleak noted. Not lighting up on PET.  4. Alcohol use, imaging showing cirrhosis. This will make recovery difficult.   5. Pleural effusion transudative, thoracentesis complicated by hydropneumothorax status post chest tube, now removed.   6. A fib 9/13 -- cardioverted 9/14 now in sinus rhythm.     High-grade persistent MRSA bacteremia from 9/8 through 9/16 as of now.  Follow-up blood culture 9/17 in process.  Brain MRI with punctate foci of acute infarct in the  posterior superior right frontal lobe, the right frontoparietal white matter, and the posterior superior left temporal lobe.  This is most likely septic emboli.  Also complaining of a lot of low back pain.  Admission CT scan with no evidence of discitis.  With prolonged bacteremia, repeat imaging especially MRI of the lumbar spine is indicated if goal is continued aggressive care.  Unclear what goal of care is today    Recommendations    1. Discontinue rifampin due to resistance - discussed with pharmacy who has expanded susceptibility results  2. Continue vancomycin and gentamicin for now  3. Discussed with palliative care about different antibiotic options for patient. Given persistent bacteremia and likely device infection, eradication of infection is not possible. It may be reasonable to transition to po TMP/SMX if the goal is to transition to home hospice and still continue some antibiotic to prevent rapid decompensation of infection    Discussed with the patient, nursing staff.    ID will follow.    Sarkis Carballo MD  [facility]  Direct messaging: [email or web address] Paging  On-Call ID provider: [phone], option: 9  ________________________________________________________________________________    Interval History   First visit by me. Says her buttocks is uncomfortable from pressure sore. Complains of black specks in her visual field, present for some time now. No headache. Tolerating antibiotics. Pharmacy notes that MRSA is rifampin resistant on 9/15 isolate.      Physical Exam   Temp: 97.9  F (36.6  C) Temp src: Oral BP: 102/57 Pulse: 74   Resp: 18 SpO2: 95 % O2 Device: Nasal cannula Oxygen Delivery: 2 LPM  Vitals:    09/17/22 0526 09/18/22 0329 09/19/22 0333   Weight: 67.6 kg (149 lb) 68 kg (150 lb) 67.6 kg (149 lb)     Vital Signs with Ranges  Temp:  [97.9  F (36.6  C)-99.6  F (37.6  C)] 97.9  F (36.6  C)  Pulse:  [69-82] 74  Resp:  [18-20] 18  BP: (100-141)/(57-75) 102/57  SpO2:  [95  %-99 %] 95 %    Constitutional: sitting in chair, no distress  Lungs: clear anterior  Cardiovascular: regular  Abdomen: mildly tender  Skin: Warm. No rash. +ecchymoses.   Neuro: Deconditioned, non-focal  MSK: knees good ROM without significant pain, no appreciable swelling  Peripheral IV  Pure wick    Medications       acetaminophen  975 mg Oral Q8H     amiodarone  200 mg Oral Daily     apixaban ANTICOAGULANT  5 mg Oral BID     diclofenac  2 g Topical 4x Daily     famotidine  20 mg Oral BID     folic acid  1 mg Oral Daily     gentamicin  90 mg Intravenous Q24H     levothyroxine  88 mcg Oral QAM AC     lidocaine  2 patch Transdermal Q24H     lidocaine   Transdermal Q8H ISIDRO     multivitamin w/minerals  1 tablet Oral Daily     potassium chloride  10 mEq Intravenous Once     sodium chloride (PF)  3 mL Intracatheter Q8H     [Held by provider] torsemide  20 mg Oral Daily     vancomycin  750 mg Intravenous Q12H     venlafaxine  300 mg Oral Daily       Data   All microbiology laboratory data reviewed.  Recent Labs   Lab Test 09/19/22  0436 09/18/22  0550 09/16/22  0442 09/15/22  0452 09/13/22  0431   WBC 9.7 12.5*  --   --  7.1   HGB 9.2* 9.4* 9.7* 9.5* 9.6*   HCT 26.7* 27.1*  --   --  28.1*   MCV 95 94  --   --  95   * 114*  --  51* 31*     Recent Labs   Lab Test 09/19/22  0436 09/18/22  0550 09/17/22  0451   CR 0.75 0.74 0.71       MICRO:  Collected Updated Procedure Result Status    09/10/2022 0832 09/10/2022 0846 Blood Culture Peripheral Blood [[id number]]   Peripheral Blood    In process Component Value   No component results             09/09/2022 2123 09/09/2022 2133 Blood Culture Peripheral Blood [[id number]]   Peripheral Blood    In process Component Value   No component results             09/09/2022 1343 09/10/2022 0536 Blood Culture Peripheral Blood [[id number]]   (Abnormal)   Peripheral Blood    Preliminary result Component Value   Culture Positive on the 1st day of incubation Abnormal  P    Gram  positive cocci in clusters Panic  P    1 of 2 bottles             09/08/2022 1847 09/10/2022 0715 Pleural fluid Aerobic Bacterial Culture Routine with Gram Stain [[id number]]    Pleural fluid from Pleural Cavity, Right    Preliminary result Component Value   Culture No growth after 1 day P   Gram Stain Result No organisms seen P    Gram Stain slide reviewed at the [facility]    2+ WBC seen P             09/08/2022 1847 09/08/2022 2033 Glucose fluid [[id number]]    Pleural fluid from Pleural Cavity, Right    Final result Component Value Units   Glucose Fluid Source Pleural Cavity, Right    Glucose fluid 117 mg/dL          09/08/2022 1847 09/09/2022 0351 Lactate dehydrogenase fluid [[id number]]    Pleural fluid from Pleural Cavity, Right    Final result Component Value Units   LD Fluid Source Pleural Cavity, Right    Lactate dehydrogenase fluid 100 U/L          09/08/2022 1847 09/09/2022 0351 Protein fluid [[id number]]    Pleural fluid from Pleural Cavity, Right    Final result Component Value Units   Protein Fluid Source Pleural Cavity, Right    Protein Total Fluid 3.0 g/dL          09/08/2022 1554 09/08/2022 1659 Symptomatic; Unknown Influenza A/B & SARS-CoV2 (COVID-19) Virus PCR Multiplex Nasopharyngeal [[id number]]    Swab from Nasopharyngeal    Final result Component Value   Influenza A PCR Negative   Influenza B PCR Negative   RSV PCR Negative   SARS CoV2 PCR Negative   NEGATIVE: SARS-CoV-2 (COVID-19) RNA not detected, presumed negative.          09/08/2022 1552 09/10/2022 1348 Blood Culture Peripheral Blood [[id number]]   (Abnormal)   Peripheral Blood    Preliminary result Component Value   Culture Positive on the 1st day of incubation Abnormal  P    Staphylococcus aureus Panic  P    2 of 2 bottles             09/08/2022 1552 09/10/2022 0704 Blood Culture Peripheral Blood [[id number]]    (Abnormal)   Peripheral Blood    Edited Component Value   Culture Positive on  the 1st day of incubation Abnormal     Staphylococcus aureus MRSA Panic     2 of 2 bottles         Susceptibility     Staphylococcus aureus MRSA     [place]     Clindamycin <=0.25 ug/mL Susceptible 1     Erythromycin >=8 ug/mL Resistant     Gentamicin <=0.5 ug/mL Susceptible     Linezolid 2 ug/mL Susceptible     Oxacillin >=4 ug/mL Resistant 2     Tetracycline <=1 ug/mL Susceptible     Trimethoprim/Sulfamethoxazole <=0.5/9.5 u... Susceptible     Vancomycin 1 ug/mL Susceptible              1 This isolate DOES NOT demonstrate inducible clindamycin resistance in vitro. Clindamycin is susceptible and could be used when indicated, however, erythromycin is resistant and should not be used.   2 Oxacillin susceptible isolates are susceptible to cephalosporins (example: cefazolin and cephalexin) and beta lactam combination agents. Oxacillin resistant isolates are resistant to these agents.        Susceptibility Comments    Staphylococcus aureus MRSA   MRSA requires contact precautions.         09/08/2022 1552 09/09/2022 0352 [employer] GP Panel [[id number]]    (Abnormal)   Peripheral Blood    Final result Component Value   Staphylococcus aureus Detected Abnormal    Positive for methicillin-resistant Staphylococcus aureus (MRSA) by [employer] multiplex nucleic acid test. Final identification and antimicrobial susceptibility testing will be verified by standard methods.   Staphylococcus epidermidis Not Detected   Staphylococcus lugdunensis Not Detected   Enterococcus faecalis Not Detected   Enterococcus faecium Not Detected   Streptococcus species Not Detected   Streptococcus agalactiae Not Detected   Streptococcus anginosus group Not Detected   Streptococcus pneumoniae Not Detected   Streptococcus pyogenes Not Detected   Listeria species Not Detected            RADIOLOGY:    XR Chest 2 Views    Result Date: 9/9/2022  EXAM: XR CHEST 2 VIEWS LOCATION: [facility] DATE/TIME: 9/9/2022 11:17 AM INDICATION: f u  chest tube, PTX COMPARISON: 2022.     IMPRESSION: Right chest tube over the right lung apex in stable position. No recurrent pneumothorax. Small right pleural effusion with some infiltrates or atelectasis and some mild atelectasis left lung base all appear stable. Aortic valve prosthesis.    Echocardiogram Complete    Result Date: 2022  [id number]^CAMILLE^PAT^[place] [address]  Name: LEELEE MARIN MRN: [id number] : 1950 Study Date: 2022 02:42 PM Age: 72 yrs Gender: Female Patient Location: [facility] Reason For Study: Abnormal Heart Sound Ordering Physician: PAT OBRIEN Performed By: SANDI  BSA: 1.7 m2 Height: 61 in Weight: 148 lb HR: 81 BP: 120/66 mmHg ______________________________________________________________________________ Procedure Complete Portable Echo Adult. Compared to the prior study dated 10/26/2021, there have been no changes. ______________________________________________________________________________ Interpretation Summary  1.Left ventricular size, wall motion and function are normal. The ejection fraction is 60-65%. 2.The right ventricle is normal size. Mildly decreased right ventricular systolic function 3.The left atrium is moderate to severely dilated. 4.Thickened mitral leaflets without stenosis. There is mild to moderate (1-2+) mitral regurgitation. 5.There is a 23 ISABELLA 3 bioprosthetic valve present in aortic valve position. At heart rate of 99 bpm peak velocity is 2.3 m/s, mean gradient is 9 mmHg, dimensionless index 0.48. 6.IVC diameter >2.1 cm collapsing <50% with sniff suggests a high RA pressure estimated at 15 mmHg or greater. Compared to the prior study dated 10/26/2021, the Tricuspid regurgitation is really not sampled well, left atrium is further enlarged, no essential change for the valve with prior peak velocity of 2.8 m/s and mean gradient of 16 mmHg.  ______________________________________________________________________________ I      WMSI = 1.00     % Normal = 100  X - Cannot   0 -                      (2) - Mildly 2 -          Segments  Size Interpret    Hyperkinetic 1 - Normal  Hypokinetic  Hypokinetic  1-2     small                                                    7 -          3-5    moderate 3 - Akinetic 4 -          5 -         6 - Akinetic Dyskinetic   6-14    large              Dyskinetic   Aneurysmal  w/scar       w/scar       15-16   diffuse  Left Ventricle Left ventricular size, wall motion and function are normal. The ejection fraction is 60-65%. There is normal left ventricular wall thickness. Left ventricular diastolic function is normal. No regional wall motion abnormalities noted.  Right Ventricle The right ventricle is normal size. Mildly decreased right ventricular systolic function. TAPSE is normal, which is consistent with normal right ventricular systolic function.  Atria The left atrium is moderate to severely dilated. Right atrial size is normal. There is no color Doppler evidence of an atrial shunt.  Mitral Valve Thickened mitral valve anterior leaflet. Thickened mitral valve posterior leaflet. There is mild to moderate (1-2+) mitral regurgitation. There is no mitral valve stenosis.  Tricuspid Valve The tricuspid valve is not well visualized, but is grossly normal. Right ventricle systolic pressure estimate normal. There is trace to mild tricuspid regurgitation. There is no tricuspid stenosis.  Aortic Valve There is a ISAEBLLA 3 bioprosthetic valve present in aortic valve position. 23 At heart rate of 99 bpm peak velocity is 2.3 m/s, mean gradient is 9 mmHg, dimensionless index 0.48.  Pulmonic Valve The pulmonic valve is not well seen, but is grossly normal. This degree of valvular regurgitation is within normal limits. There is no pulmonic valvular stenosis.  Vessels The aorta root is normal. Normal size ascending aorta. IVC diameter  >2.1 cm collapsing <50% with sniff suggests a high RA pressure estimated at 15 mmHg or greater.  Pericardium There is no pericardial effusion.  Rhythm Sinus rhythm was noted.  ______________________________________________________________________________ MMode/2D Measurements & Calculations IVSd: 0.74 cm LVIDd: 4.7 cm LVIDs: 3.3 cm LVPWd: 0.75 cm FS: 28.2 %  LV mass(C)d: 109.3 grams LV mass(C)dI: 65.7 grams/m2 Ao root diam: 2.5 cm LA dimension: 5.8 cm LA/Ao: 2.3 LVOT diam: 1.6 cm LVOT area: 2.0 cm2 LA Volume Indexed (AL/bp): 86.6 ml/m2 RWT: 0.32  Time Measurements Aortic HR: 106.0 BPM MM HR: 91.0 BPM  Doppler Measurements & Calculations MV E max thomas: 102.3 cm/sec MV A max thomas: 108.5 cm/sec MV E/A: 0.94 MV dec slope: 322.0 cm/sec2 MV dec time: 0.32 sec Ao V2 max: 204.0 cm/sec Ao max P.0 mmHg Ao V2 mean: 165.0 cm/sec Ao mean P.0 mmHg Ao V2 VTI: 44.8 cm KP(I,D): 0.80 cm2 KP(V,D): 0.96 cm2 LV V1 max PG: 3.8 mmHg LV V1 max: 97.6 cm/sec LV V1 VTI: 17.8 cm CO(LVOT): 3.8 l/min CI(LVOT): 2.3 l/min/m2 SV(LVOT): 35.7 ml SI(LVOT): 21.5 ml/m2 TR max thomas: 244.0 cm/sec TR max P.8 mmHg AV Thomas Ratio (DI): 0.48 KP Index (cm2/m2): 0.48 E/E': 16.5 E/E' av.0 Lateral E/e': 20.0 Medial E/e': 13.9 Peak E' Thomas: 6.2 cm/sec  ______________________________________________________________________________ Report approved by: Yonatan Arroyo 2022 04:29 PM       US Thoracentesis    Result Date: 2022  EXAM: 1. RIGHT  THORACENTESIS 2. ULTRASOUND GUIDANCE LOCATION: [facility] DATE/TIME: 2022 6:46 PM INDICATION: Pleural effusion. PROCEDURE: Informed consent obtained. Time out performed. The chest was prepped and draped in sterile fashion. 5  mL of 1 % lidocaine was infused into the local soft tissues. Under direct ultrasound guidance, a 5 Lao catheter system was placed into the pleural effusion. 1.35  liters of clear yellow and red  fluid were removed and sent to lab, if requested.  Patient tolerated procedure well. Ultrasound imaging was obtained and placed in the patient's permanent medical record.     IMPRESSION: Status post right  ultrasound-guided thoracentesis. Reference CPT Code: 36000    XR Chest Port 1 View    Result Date: 9/10/2022  EXAM: XR CHEST PORT 1 VIEW LOCATION: [facility] DATE/TIME: 9/10/2022 2:21 PM INDICATION: follow up PTX for continued resolution COMPARISON: 09/09/2022     IMPRESSION: The heart is normal in size. Improved right pleural effusion. Right lower lobe opacities, most likely representing infiltrate. No pneumothorax. Right-sided chest tube.    XR Chest Port 1 View    Result Date: 9/9/2022  EXAM: XR CHEST PORT 1 VIEW LOCATION: [facility] DATE/TIME: 9/9/2022 12:00 AM INDICATION: s p chest tube follow-up pneumothorax COMPARISON: 09/08/2022     IMPRESSION: New right chest tube terminates at the apex medially. Right pneumothorax has resolved. Persistent opacity at the right base may represent an combination of atelectasis and edema. Normal heart size. Prior endovascular aortic valve replacement.  Left lung clear. Surgical clips cluster over the left shoulder.    XR Chest Port 1 View    Result Date: 9/8/2022  EXAM: XR CHEST PORT 1 VIEW LOCATION: [facility] DATE/TIME: 9/8/2022 9:44 PM INDICATION: ptx COMPARISON: 09/08/2022     IMPRESSION: Persistent right pneumothorax measuring 8 mm at the apex (stable) and 1.7 cm at the lateral base (previously 1.4 cm). Small right pleural effusion. Increased airspace infiltrate in the right lower lobe. Surgical clips in the left axilla. Calcified aortic arch. Prior aortic valve repair.    XR Chest Port 1 View    Result Date: 9/8/2022  EXAM: XR CHEST PORT 1 VIEW LOCATION: [facility] DATE/TIME: 9/8/2022 8:18 PM INDICATION: s p thoracentesis, ? ptx COMPARISON: Same date abdominal CT and chest radiograph.     IMPRESSION: Stable  cardiomediastinal silhouette with prior aortic valve replacement. Small right hydropneumothorax, similar volume to same day CT given differences in modalities, without evidence of tension. Possible reexpansion edema in the right lower lobe. Left lung is clear. No acute bony abnormality.    XR Chest Port 1 View    Result Date: 9/8/2022  EXAM: XR CHEST PORT 1 VIEW LOCATION: [facility] DATE/TIME: 9/8/2022 4:30 PM INDICATION: fever, cough COMPARISON: 12/3/2021     IMPRESSION: Small to moderate right effusion, slightly increased. Right lower lobe infiltrate or atelectasis.  No pneumothorax.  The left lung is clear.  The heart is normal in size.    CT Abdomen Pelvis w Contrast    Result Date: 9/8/2022  EXAM: CT ABDOMEN PELVIS W CONTRAST LOCATION: [facility] DATE/TIME: 9/8/2022 7:39 PM INDICATION: Right upper abdominal pain and fever. COMPARISON: CTA 8/21/2021 TECHNIQUE: CT scan of the abdomen and pelvis was performed following injection of IV contrast. Multiplanar reformats were obtained. Dose reduction techniques were used. CONTRAST: 75ml isovue 370 FINDINGS: LOWER CHEST: Small right hydropneumothorax post right thoracentesis earlier today. Small residual right pleural effusion. Heavy mitral annular calcifications. Small sliding-type hiatal hernia. HEPATOBILIARY: Cirrhotic appearing liver redemonstrated without focal suspicious mass. Normal gallbladder and biliary system. PANCREAS: Normal. SPLEEN: Normal. ADRENAL GLANDS: Normal. KIDNEYS/BLADDER: Normal kidneys and ureters. Nondistended bladder with focal asymmetric enhancing bladder wall thickening measuring up to 8 mm in the right bladder base image 173 of series 3.. BOWEL: Diverticulosis of the colon. No acute inflammatory change. No obstruction. Large amount of stool in the rectum. Mild ascites. LYMPH NODES: No lymphadenopathy. VASCULATURE: Previous endovascular aortic aneurysm repair with bifurcated endograft.  Aneurysm sac measures 5.0 x 5.6 cm, previously 4.7 x 5.4 cm. Type II endoleak is noted. PELVIC ORGANS: Hysterectomy. No adnexal mass. MUSCULOSKELETAL: Previous lower lumbar fusion. Degenerative changes are present within the spine and hips. Generalized anasarca.     IMPRESSION: 1.  Small to moderate right hydropneumothorax, status post large volume right thoracentesis earlier today. Patient may have a trapped right lung. Consider two-view chest radiograph. 2.  Cirrhotic appearing liver with mild ascites. No splenomegaly. 3.  Previous endovascular repair of infrarenal abdominal aortic aneurysm. Type II endoleak with interval enlargement of the aneurysm sac, compatible with endotension. Previous repair at [facility]. Consider nonemergent Interventional Radiology consult. 4.  Generalized anasarca. Report called to Dr. Roberson at 2000 hours    CT Cervical Spine w/o Contrast    Result Date: 9/8/2022  EXAM: CT CERVICAL SPINE W/O CONTRAST LOCATION: [facility] DATE/TIME: 9/8/2022 7:38 PM INDICATION: fall COMPARISON: None. TECHNIQUE: Routine CT Cervical Spine without IV contrast. Multiplanar reformats. Dose reduction techniques were used. FINDINGS: No evidence of acute fracture or subluxation of the cervical spine by CT imaging. Straightening of the normal cervical lordosis. The vertebral bodies of the cervical spine otherwise have normal stature and alignment. Anterolisthesis of C3 on C4 measuring  2 mm. Anterior marginal osteophytes at C4/C5-C7/T1. Multilevel degenerative disc disease of the cervical spine with disc height loss, most pronounced at C4/C5-C7/T1. The partially imaged intracranial contents are unremarkable. No prevertebral soft tissue swelling. The partially imaged lung apices are unremarkable. Craniovertebral junction and C1-C2: The odontoid process is well approximated with the anterior body of C1 and well aligned between the lateral masses of C1. C2-C3: No posterior disc bulge or  spinal canal narrowing. No neural foraminal narrowing. C3-C4: No posterior disc bulge or spinal canal narrowing. No neural foraminal narrowing. C4-C5: Broad bar disc osteophyte complex with moderate spinal canal narrowing. Uncovertebral joint disease and facet arthropathy with severe bilateral neural foraminal narrowing. C5-C6: No posterior disc bulge or spinal canal narrowing. Uncovertebral joint disease and facet arthropathy with severe right and moderate left neural foraminal narrowing. C6-C7: No posterior disc bulge or spinal canal narrowing. Uncovertebral joint disease and facet arthropathy with severe bilateral neural foraminal narrowing. C7-T1: No posterior disc bulge or spinal canal narrowing. Uncovertebral joint disease and facet arthropathy with moderate bilateral neural foraminal narrowing.     IMPRESSION: 1.  No evidence of acute fracture or subluxation of the cervical spine by CT imaging. 2.  Degenerative cervical spondylosis as described above.    CT Head w/o Contrast    Addendum Date: 9/8/2022    Addendum/ impression: INDICATION: Dizziness    Result Date: 9/8/2022  EXAM: CT HEAD W/O CONTRAST LOCATION: [facility] DATE/TIME: 9/8/2022 7:34 PM INDICATION: fall COMPARISON: None. TECHNIQUE: Routine CT Head without IV contrast. Multiplanar reformats. Dose reduction techniques were used. FINDINGS: INTRACRANIAL CONTENTS: No evidence of acute intracranial hemorrhage or mass effect. Scattered foci of decreased attenuation within the cerebral hemispheric white matter which are nonspecific, though most commonly ascribed to chronic small vessel ischemic  disease. The ventricles and sulci are prominent consistent with mild brain parenchymal volume loss. Gray-white matter differentiation is maintained. The basilar cisterns are patent. VISUALIZED ORBITS/SINUSES/MASTOIDS: Bilateral cataract surgery. The partially imaged globes are otherwise unremarkable. The partially imaged paranasal sinuses,  mastoid air cells and middle ear cavities are unremarkable. BONES/SOFT TISSUES: The visualized skull base and calvarium are unremarkable.     IMPRESSION:  1.  No evidence of acute intracranial hemorrhage or mass effect. 2.  Mild nonspecific white matter changes. 3.  Mild brain parenchymal volume loss.    CT Lumbar Spine w/o Contrast    Result Date: 9/8/2022  EXAM: CT LUMBAR SPINE W/O CONTRAST LOCATION: [facility] DATE/TIME: 9/8/2022 7:39 PM INDICATION: Back pain COMPARISON: None. TECHNIQUE: Routine CT Lumbar Spine without IV contrast. Multiplanar reformats. Dose reduction techniques were used. FINDINGS: No evidence of acute fracture or subluxation of the lumbar spine by CT imaging. Postsurgical changes posterior fusion at L3-L5 and interbody fusion at L3/L4 and L4/L5. No hardware complication. No evidence of acute fracture or subluxation of the lumbar spine by CT imaging. Retrolisthesis of T12 on L1 measuring 2 mm. Anterior marginal osteophytes at T12/L1 and L1/L2 and L2/L3. Multilevel degenerative disc disease of the lumbar spine with disc height loss, most pronounced at T12/L1 and L2/L3. Aortobiiliac endograft. The partially imaged intra-abdominal contents are otherwise unremarkable. T12/L1:  Symmetric disc bulge without spinal canal narrowing. Mild bilateral neural foraminal narrowing. L1/L2:  No posterior disc bulge or spinal canal narrowing. Mild bilateral neural foraminal narrowing. L2/L3:  Broad bar disc osteophyte complex with mild spinal canal narrowing. Severe bilateral neural foraminal narrowing. L3/L4:  Spondylotic ridging without spinal canal narrowing. Moderate bilateral facet arthropathy. No neural foraminal narrowing.  L4/L5:  No posterior disc bulge or spinal canal narrowing. Mild bilateral facet arthropathy. No neural foraminal narrowing. L5/S1: Symmetric disc bulge and moderate facet arthropathy without spinal canal narrowing. Severe bilateral neural foraminal narrowing.      IMPRESSION:  1.  No evidence of acute fracture or subluxation of the lumbar spine by CT imaging. 2.  Postsurgical changes posterior fusion at L3-L5 and interbody fusion at L3/L4 and L4/L5. No hardware complication. 3.  Degenerative lumbar spondylosis as described above.

## 2022-09-19 NOTE — PROGRESS NOTES
Care Management Follow Up    Length of Stay (days): 11    Expected Discharge Date: 09/20/2022      Concerns to be Addressed:  Hospice consult    Patient plan of care discussed at interdisciplinary rounds: Yes    Anticipated Discharge Disposition: Transitional Care     Anticipated Discharge Services:  TBD  Anticipated Discharge DME:  TBD    Education Provided on the Discharge Plan:  Per Care Team   Patient/Family in Agreement with the Plan:  Yes     Referrals Placed by CM/SW:   Per Care Team   Private pay costs discussed: Not applicable    Additional Information:  Chart reviewed.  Palliative care met with family. Family agreed upon a hospice consult, looking at 24 hours hospice potentially at the house.     Awaiting further recs from the Hospice team.    CM will continue to follow plan of care, review recommendations, and assist with any discharge needs anticipated.      Camelia Alonso RN

## 2022-09-19 NOTE — PHARMACY-VANCOMYCIN DOSING SERVICE
Pharmacy Vancomycin Note  Date of Service 2022  Patient's  1950   72 year old, female    Indication: Bacteremia, Endocarditis and Sepsis  Day of Therapy: 9  Current vancomycin regimen:  750 mg IV q12h  Current vancomycin monitoring method: AUC  Current vancomycin therapeutic monitoring goal: 400-600 mg*h/L    InsightRX Prediction of Current Vancomycin Regimen  Loading dose: N/A  Regimen: 750 mg IV every 12 hours.  Start time: 08:18 on 2022  Exposure target: AUC24 (range)400-600 mg/L.hr   AUC24,ss: 492 mg/L.hr  Probability of AUC24 > 400: 95 %  Ctrough,ss: 16.0 mg/L  Probability of Ctrough,ss > 20: 7 %  Probability of nephrotoxicity (Lodise HEATHER ): 11 %      Current estimated CrCl = Estimated Creatinine Clearance: 57.9 mL/min (based on SCr of 0.75 mg/dL).    Creatinine for last 3 days  2022:  4:51 AM Creatinine 0.71 mg/dL  2022:  5:50 AM Creatinine 0.74 mg/dL  2022:  4:36 AM Creatinine 0.75 mg/dL    Recent Vancomycin Levels (past 3 days)  2022:  4:51 AM Vancomycin 18.1 mg/L  2022:  4:36 AM Vancomycin 18.9 mg/L    Vancomycin IV Administrations (past 72 hours)                   vancomycin 750 mg in 0.9% NaCl 250 mL intermittent infusion 750 mg (mg) 750 mg Given 22     750 mg Given  0815     750 mg Given 22     750 mg Given  1036    vancomycin (VANCOCIN) 1,250 mg in sodium chloride 0.9 % 250 mL intermittent infusion (mg) 1,250 mg New Bag 22 1741                Nephrotoxins and other renal medications (From now, onward)    Start     Dose/Rate Route Frequency Ordered Stop    22 1300  gentamicin (GARAMYCIN) 90 mg in sodium chloride 0.9 % 50 mL intermittent infusion         90 mg  over 60 Minutes Intravenous EVERY 24 HOURS 22 1820      22 0930  vancomycin 750 mg in 0.9% NaCl 250 mL intermittent infusion 750 mg         750 mg  over 60 Minutes Intravenous EVERY 12 HOURS 22 0834      22 1330  rifampin (RIFADIN)  capsule 600 mg         600 mg Oral DAILY 09/13/22 1319      09/10/22 0800  [Held by provider]  torsemide (DEMADEX) tablet 20 mg        (Held by provider since Wed 9/14/2022 at 1018 by Cheko Morales DO.Hold Reason: Other.)    20 mg Oral DAILY 09/09/22 1815               Contrast Orders - past 72 hours (72h ago, onward)    Start     Dose/Rate Route Frequency Stop    09/16/22 1230  gadobutrol (GADAVIST) injection 7 mL         7 mL Intravenous ONCE 09/16/22 1303          Interpretation of levels and current regimen:  Vancomycin level is reflective of -600    Has serum creatinine changed greater than 50% in last 72 hours: No    Urine output:  unable to determine    Renal Function: Stable      Plan:  1. Continue Current Dose  2. Vancomycin monitoring method: AUC  3. Vancomycin therapeutic monitoring goal: 400-600 mg*h/L  4. Pharmacy will check vancomycin levels as appropriate in 3-5 Days.  5. Serum creatinine levels will be ordered daily for the first week of therapy and at least twice weekly for subsequent weeks.    Deanne Flanagan, [facility]

## 2022-09-19 NOTE — CONSULTS
Hospice consult received. Plan to meet with pt's son Tera around 3:30 today to talk about options for hospice.    Thanks for this consult!    Jill Schoenecker, RN  [facility] hospice  [phone] 1530- met with Oleg SIMPSON, Heather Hernández, and pt's son Tera. Talked about the hospice philosophy of no further aggressive treatments including IV antibiotics. ID was able to recommend an oral equivalent to the IV antibiotic that the patient is currently receiving. The patient would be able to discharge on oral antibiotics and start hopsice at home. Gave Tera a list of in home caregivers as the family will need to hire 24/7 pca service to provide care safely at home. Tera will look into that today and tomorrow. Family has also chosen [facility] hospice as their preferred hospice team as they have had a good experience with them in the past.

## 2022-09-19 NOTE — PLAN OF CARE
Problem: Infection Progression (Sepsis/Septic Shock)  Goal: Absence of Infection Signs and Symptoms  9/19/2022 0305 by Lashawn Emery RN  Outcome: Ongoing, Not Progressing  9/19/2022 0302 by Lashawn Emery RN  Outcome: Ongoing, Progressing  Intervention: Initiate Sepsis Management  Recent Flowsheet Documentation  Taken 9/19/2022 0010 by Lashawn Emery RN  Isolation Precautions: contact precautions maintained  Intervention: Promote Recovery  Recent Flowsheet Documentation  Taken 9/19/2022 0010 by Lashawn Emery RN  Activity Management: bedrest     Problem: Plan of Care - These are the overarching goals to be used throughout the patient stay.    Goal: Plan of Care Review/Shift Note  Description: The Plan of Care Review/Shift note should be completed every shift.  The Outcome Evaluation is a brief statement about your assessment that the patient is improving, declining, or no change.  This information will be displayed automatically on your shift note.  9/19/2022 0305 by Lashawn Emery RN  Outcome: Ongoing, Progressing  9/19/2022 0302 by Lashawn Emery RN  Outcome: Ongoing, Progressing   Goal Outcome Evaluation:      Patient received iv Morphine 2mg's at 0010 for 9/10 right lower back pain and was effective per asleep at 0040. Patient is turned/repositioned every 2-3 hours. Rhythm=NSR, 02 sat's 96% on 2 liter's nasal cannula. This writer received a call from lab with the 4th gram + cocci in cluster's. Will cont to monitor.

## 2022-09-19 NOTE — PROGRESS NOTES
[facility] Progress Note - Hospitalist Service    Date of Admission:  9/8/2022    Assessment & Plan          72 year old female with PMH of aortic stenosis, s/p TAVR 09/21, HTN, s/p AAA repair, tobacco use disorder, diastolic CHF, HTN, known right-sided pleural effusion, alcohol use disorder, admitted on 9/8/2022.     #Severe sepsis due to high grade MRSA bacteremia and prosthetic aortic valve/mitral valve endocarditis  -- BC x2 on 9/8-9/13 remain positive for MRSA bacteremia.  TTE no obvious vegetation.  PET CT (9/12) showed moderate uptake about TAVR and moderate uptake about dense mitral annulus calcification, suspicious for underlying endocarditis. EMIYL (9/14/22)showed 0.7x0.3 mobile vegetation associated with the aortic valve prosthesis best visualized in the left ventricular outflow tract. There is  suspected abscess associated with the aortic valve in the 2-3 o'clock in short  Axis, 1.3x0.8 mobile vegetation associated with the posterior mitral  valve leaflet (atrial aspect of valve). There is a superimposed 1.3 cm mobile  linear strand associated with the vegetation. In addition there is a 1.1 x 0.5  cm vegetation associated with the anterior mitral valve leaflet.  -- Patient in the process of transitioning to comfort care only.  -- IV vancomycin for 6 weeks, p.o. rifampin, 2 weeks of gentamicin if tolerated.  -- Risk for renal toxicity, ototoxicity. She has baseline hearing loss, wears hearing aides. Rifampin can reduce levels of lipitor, lisinopril, omeprazole, synthroid.   -- Has 2 functioning PIV's.  Hold on PICC unless we run out of peripheral IV options, due to ongoing bacteremia. If lose all IV access and unable to place PIV then as last resort PICC will need to be placed.  -- CV surgery consulted and very high operative mortality to repair and in agreement with focus on more palliative approach.  Family requested Dr. Rollins return to re-discuss options now that  patient starting to be more clear. He will revisit 9/16.  -- MRI brain to r/o septic emboli ordered and showed signs of septic emboli.  -- Patient is DNR/DNI.  -- ID is following  -- Cardiology is following  -- Prognosis remains poor; further discussed palliative care with patient with family     #Acute toxic/metabolic encephalopathy (improved)  -- Due to benzodiazepine/opiate induced respiratory depression and acute on chronic hypercarbic respiratory acidosis.  NH3 normal.   -- MRI brain   -- Stop valium, avoid opiates.  -- Tylenol daily PRN and Tylenol TID     #Cirrhosis  -- Suspect EtOH mediated. Per CT abdomen/pelvis imaging.  -- LFTs normal. Not decompensated. No significant ascites.       Diet: Regular Diet Adult  Snacks/Supplements Adult: Magic Cup; With Meals    DVT Prophylaxis: DOAC  Reynoso Catheter: Not present  Central Lines: None  Cardiac Monitoring: None  Code Status: No CPR- Do NOT Intubate      Disposition Plan      Expected Discharge Date: 09/20/2022    Discharge Delays: IV Medication - consider oral or Home Infusion  Procedure Pending (enter procedure & time in comments)    Discharge Comments: infection work up in process  Needs TCU placement/.        The patient's care was discussed with the Bedside Nurse and Patient.    Mando Colin DO  Hospitalist Service  [facility]  Securely message with the Vocera Web Console (learn more here)  Text page via [employer] Paging/Directory         Clinically Significant Risk Factors Present on Admission                      ______________________________________________________________________    Interval History   Patient feels well today.  Patient seems comfortable; surrounded by family.  Patient is eating fruit salad.  Discussed prognosis at length with patient's son.  Patient and family had productive conversation with palliative care.  They are requesting an additional conversation with cardiothoracic surgery.    Data reviewed today: I  reviewed all medications, new labs and imaging results over the last 24 hours. I personally reviewed no images or EKG's today.    Physical Exam   Vital Signs: Temp: 97.9  F (36.6  C) Temp src: Oral BP: 102/57 Pulse: 74   Resp: 18 SpO2: 95 % O2 Device: Nasal cannula Oxygen Delivery: 2 LPM  Weight: 149 lbs 0 oz     GENERAL: Alert and oriented x 3; no acute distress; well-nourished.  HEENT: Normocephalic; atraumatic; PERRLA; MMM.  CV: Distant heart sounds; significant murmur.  RESP: Lung fields clear to aucultation B/L; no wheezing or crepitations.  GI: Abdomen is soft, nontender, nondistended; no organomegaly; normal bowel sounds.  : Deferred genital examination.   MSK: No clubbing, cyanosis, or edema.  DERM: Patient appears jaundiced.  NEURO: No focal deficits appreciated; strength & sensorium are grossly intact.  PSYCH: No active hallucinations; affect, insight appear within normal limits.    Data   Recent Labs   Lab 09/19/22  0436 09/18/22  0550 09/17/22  0451 09/16/22  0442 09/15/22  0452 09/14/22  0307 09/13/22  0431   WBC 9.7 12.5*  --   --   --   --  7.1   HGB 9.2* 9.4*  --  9.7* 9.5*  --  9.6*   MCV 95 94  --   --   --   --  95   * 114*  --   --  51*  --  31*   * 130*  --   --  133*  --  136   POTASSIUM 3.9 3.9 3.9 3.8 3.6   < > 3.7   CHLORIDE 99 97*  --   --   --   --  102   CO2 27 25  --   --   --   --  26   BUN 14 15  --   --   --   --  27   CR 0.75 0.74 0.71 0.76 0.80   < > 1.09   ANIONGAP 7 8  --   --   --   --  8   YENI 7.8* 7.9*  --   --   --   --  8.4*    108  --   --   --   --  126*   ALBUMIN 1.9* 2.0*  --   --   --   --  2.6*   PROTTOTAL 5.6* 6.0  --   --   --   --  5.9*   BILITOTAL 2.8* 3.6*  --   --   --   --  1.2*   ALKPHOS 129* 131*  --   --   --   --  151*   ALT <9 11  --   --   --   --  19   AST 21 24  --   --   --   --  53*    < > = values in this interval not displayed.     No results found for this or any previous visit (from the past 24 hour(s)).  Medications        acetaminophen  975 mg Oral Q8H     amiodarone  200 mg Oral Daily     apixaban ANTICOAGULANT  5 mg Oral BID     diclofenac  2 g Topical 4x Daily     famotidine  20 mg Oral BID     folic acid  1 mg Oral Daily     gentamicin  90 mg Intravenous Q24H     levothyroxine  88 mcg Oral QAM AC     lidocaine  2 patch Transdermal Q24H     lidocaine   Transdermal Q8H ISIDRO     multivitamin w/minerals  1 tablet Oral Daily     potassium chloride  10 mEq Intravenous Once     rifampin  600 mg Oral Daily     sodium chloride (PF)  3 mL Intracatheter Q8H     [Held by provider] torsemide  20 mg Oral Daily     vancomycin  750 mg Intravenous Q12H     venlafaxine  300 mg Oral Daily

## 2022-09-19 NOTE — PROGRESS NOTES
"[facility]  Palliative Care Daily Progress & Family Meeting Note       Recommendations & Counseling     Family meeting today 9/19/2022    Key takeaways:  - Katherine defers further goals of care conversations to her children, Caitie and Tera. She finds repeated discussion of her prognosis traumatic, requests that providers do not continue to bring up during visits.  - Start transition to comfort-focused care. Family would like to continue current medications at this time (including antibiotics). Stop all lab draws/fingersticks  - Hospice consult placed; tentative plan for home hospice with private pay aid  - Given Katherine's expressed wishes for \"more time,\" reasonable to continue antibiotics for now. Will follow up with ID    Goals of Care: life-prolonging transitioning to comfort-focused    Advanced Care Planning:  Advance directive: Yes, on file  POLST: No - complete before discharge  Code status: DNR/DNI  Health care agent: Caitie (daughter) supported by Tera (son)    Symptom Management:  #Pain, \"head to toe\" but low back is worst  - Continue scheduled tylenol  - Continue oxycodone 5mg q4h prn with morphine for breakthrough pain  - Continue voltaren gel and lidocaine patch    Psychosocial & Spiritual:   Appreciate Palliative SW support      Assessments          Maren is a 72 year old female with PMH of aortic stenosis s/p TAVR 09/21, HTN, s/p AAA repair, HFpEF, HTN, known right-sided pleural effusion, and alcohol use disorder, admitted on 9/8/2022 after a fall. 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 Also with acute on chronic respiratory failure due to R pleural effusion, s/p thoracentesis and chest tube (removed 9/10), complicated by R pneumothorax (now resolved). Course also complicated by new Afib with RVR, now s/p EMILY-directed cardioversion 9/14.    Today, the patient was seen for:  Goals of care, family " "meeting, symptom management    Prognosis, Goals, or Advance Care Planning was addressed today with: Yes.  Mood, coping, and/or meaning in the context of serious illness were addressed today: Yes.              Interval History:     Chart review/discussion with unit or clinical team members:   - bMRI with acute punctate infarcts possibly septic emboli  - Daily blood cultures remain positive  - Increased low back pain over the weekend, morphine added for pain control    Per patient or family/caregivers today:  Katherine is sitting up in the chair and eating breakfast during my visit. She is accompanied by her son Tera who is in from out of town. Katherine says she is feeling well today, pain is minimal. She denies any dyspnea or nausea. Katherine appreciated visits from family and friends over the weekend.     Katherine says \"everyone comes in and tells me there's nothing else they can do.\" She is tired of hearing about her poor prognosis and doesn't want to talk about it anymore. I asked if she would prefer we speak with Caitie and Tera regarding medical updates and decisions, Katherine said yes. I confirmed that Katherine would like us to meet with Caitie and Tera separately (without Katherine) to discuss next steps, Katherine affirmed.    Katherine mentioned several times that she wants to be made comfortable and enjoy time with friends and family. I reflected that it sounds like she would prefer less \"poking and prodding\" and more focus on comfort, she confirmed. Katherine asked if we could arrange for her to get a ride to the [employer], which she loves.     [facility] Palliative Care Family Meeting Note    Date/time:  9/19/2022, 11:30 to 12:15    Location:  Conference room    Patient present:  No (per patient request)    Reason for Meeting:  Goals of care    People Present:  Caitie (daughter), Tera (son), Jennifer Myers (Palliative SW), Heather Hernández (Palliative)    Meeting Led by:  Palliative    Meeting Summary:    Provided medical update " including persistently positive blood cultures, signs of septic emboli on brain MRI. Also reviewed Katherine's statements this morning regarding her preference not to be involved in medical decision-making going forward, deferred to Bhaskar. Tera said Katherine has asked repeatedly over the weekend not to talk about her prognosis any further.  All in agreement with respecting Katherine's wishes to avoid further goals of care conversations, deferred to Shaylee.    Reflected that Katherine seems to have absorbed information about her prognosis and has been more focused on enjoying time with friends and family in recent days. Explored possibility of transitioning to comfort care, which Caitie and Tera are open to. Their father passed away on hospice earlier this year. Reviewed meaning of comfort-focused care and how this would be different from Katherine's current care. Also discussed different settings in which patients can receive hospice care. Tera and Caitie would like to explore home hospice with private pay aid services so that Katherine can return to her apartment. Katherine has previously told them she would want to pass at home if possible. Will place hospice consult.    Reviewed Katherine's prognosis, which is likely on the order of weeks. Explored possibility of discontinuing antibiotics and how that might affect Katherine's prognosis. Katherine was clear last week during our meeting that having more time to spend with family and friends is extremely important to her. Tera said Katherine has continued to ask for more time. Reasonable to continue antibiotics at this time, will need to discuss with specific hospice agencies to see if there is an opportunity to continue. I will also follow up with ID to get their thoughts.    Bhaskar are ok with transitioning to comfort-focused care today. Specifically discussed continuing current antibiotics and medications. Ok to discontinue daily labs.     Decision making capacity:  N/A  (defers to children)    Estimated length of life:  weeks    Goals of Care:  Life-prolonging transitioning to comfort-focused    Meeting Action Items & Recommendations:   1.)  Hospice consult placed  2.)  Discuss case with ID re: continuation of antibiotics  3.)  Discontinue labs for tomorrow    Key Palliative Symptoms:  # Pain severity the last 12 hours: moderate  # Dyspnea severity the last 12 hours: none  # Nausea severity the last 12 hours: none  # Anxiety severity the last 12 hours: none             Review of Systems:     Besides above, an additional 3 system ROS was reviewed and is unremarkable          Medications:     I have reviewed this patient's medication profile and medications during this hospitalization.    Noted meds:    Current Facility-Administered Medications   Medication     acetaminophen (TYLENOL) tablet 975 mg     acetaminophen (TYLENOL) tablet 975 mg     amiodarone (PACERONE) tablet 200 mg     apixaban ANTICOAGULANT (ELIQUIS) tablet 5 mg     diclofenac (VOLTAREN) 1 % topical gel 2 g     famotidine (PEPCID) tablet 20 mg     flumazenil (ROMAZICON) injection 0.2 mg     folic acid (FOLVITE) tablet 1 mg     gentamicin (GARAMYCIN) 90 mg in sodium chloride 0.9 % 50 mL intermittent infusion     OLANZapine zydis (zyPREXA) ODT tab 5-10 mg    Or     haloperidol lactate (HALDOL) injection 2.5-5 mg     levothyroxine (SYNTHROID/LEVOTHROID) tablet 88 mcg     Lidocaine (LIDOCARE) 4 % Patch 2 patch     lidocaine (LMX4) cream     lidocaine 1 % 0.1-1 mL     lidocaine patch in PLACE     melatonin tablet 5 mg     morphine (PF) injection 2 mg     multivitamin w/minerals (THERA-VIT-M) tablet 1 tablet     naloxone (NARCAN) injection 0.2 mg    Or     naloxone (NARCAN) injection 0.4 mg    Or     naloxone (NARCAN) injection 0.2 mg    Or     naloxone (NARCAN) injection 0.4 mg     ondansetron (ZOFRAN ODT) ODT tab 4 mg    Or     ondansetron (ZOFRAN) injection 4 mg     oxyCODONE (ROXICODONE) tablet 5 mg     potassium  "chloride 10 mEq in 100 mL sterile water intermittent infusion (premix)     prochlorperazine (COMPAZINE) injection 5 mg    Or     prochlorperazine (COMPAZINE) tablet 5 mg    Or     prochlorperazine (COMPAZINE) suppository 12.5 mg     rifampin (RIFADIN) capsule 600 mg     senna-docusate (SENOKOT-S/PERICOLACE) 8.6-50 MG per tablet 1 tablet    Or     senna-docusate (SENOKOT-S/PERICOLACE) 8.6-50 MG per tablet 2 tablet     sodium chloride (PF) 0.9% PF flush 3 mL     sodium chloride (PF) 0.9% PF flush 3 mL     sodium chloride (PF) 0.9% PF flush 3 mL     [Held by provider] torsemide (DEMADEX) tablet 20 mg     vancomycin 750 mg in 0.9% NaCl 250 mL intermittent infusion 750 mg     venlafaxine (EFFEXOR XR) 24 hr capsule 300 mg                Physical Exam:   Vital Signs: Blood pressure 100/59, pulse 69, temperature 97.9  F (36.6  C), temperature source Oral, resp. rate 18, height 1.549 m (5' 1\"), weight 67.6 kg (149 lb), SpO2 98 %.   GENERAL: alert and oriented, sitting in chair eating  SKIN: Warm and dry   HEENT: Normocephalic, anicteric sclera, moist mucous membranes  LUNGS: diminished breath sounds, non-labored  CARDIAC: rrr, +systolic murmur  ABDOMINAL: BS (+), soft, non distended, non tender  EXTREMITIES: +bilateral LE edema             Data Reviewed:     Reviewed recent pertinent imaging:   MRI brain 9/16  IMPRESSION:  1.  Punctate foci of acute infarct in the posterior superior right frontal lobe, the right frontal parietal white matter, and the posterior superior left temporal lobe. As these are scattered about multiple vascular distributions, punctate embolic   infarcts are favored. This may simply reflect bland emboli. While there is no accompanying abnormal enhancement or adjacent FLAIR hyperintensity and no findings to suggest meningitis or encephalitis, the possibility of septic emboli is not completely excluded given the background clinical context.     2.  Age-related changes include moderate to severe diffuse " parenchymal volume loss with a mild to moderate burden scattered chronic small vessel ischemic change.    Reviewed recent labs:   CMP  Recent Labs   Lab 09/19/22 0436 09/18/22  0550 09/17/22  0451 09/16/22  0442 09/15/22  0452 09/14/22  0307 09/13/22  0431 09/12/22  1840 09/12/22  1410   * 130*  --   --  133*  --  136  --   --    POTASSIUM 3.9 3.9 3.9 3.8 3.6   < > 3.7   < >  --    CHLORIDE 99 97*  --   --   --   --  102  --   --    CO2 27 25  --   --   --   --  26  --   --    ANIONGAP 7 8  --   --   --   --  8  --   --     108  --   --   --   --  126*  --  77   BUN 14 15  --   --   --   --  27  --   --    CR 0.75 0.74 0.71 0.76 0.80   < > 1.09  --   --    GFRESTIMATED 83 84 89 82 77   < > 53*  --   --    YENI 7.8* 7.9*  --   --   --   --  8.4*  --   --    MAG 1.9 1.8 1.9 1.9 1.8   < > 2.0  --   --    PHOS 3.1 2.9 2.9 2.3*  --    < > 1.2*  --   --    PROTTOTAL 5.6* 6.0  --   --   --   --  5.9*  --   --    ALBUMIN 1.9* 2.0*  --   --   --   --  2.6*  --   --    BILITOTAL 2.8* 3.6*  --   --   --   --  1.2*  --   --    ALKPHOS 129* 131*  --   --   --   --  151*  --   --    AST 21 24  --   --   --   --  53*  --   --    ALT <9 11  --   --   --   --  19  --   --     < > = values in this interval not displayed.     CBC  Recent Labs   Lab 09/19/22 0436 09/18/22 0550 09/16/22  0442 09/15/22  0452 09/13/22  0431   WBC 9.7 12.5*  --   --  7.1   RBC 2.82* 2.89*  --   --  2.96*   HGB 9.2* 9.4* 9.7* 9.5* 9.6*   HCT 26.7* 27.1*  --   --  28.1*   MCV 95 94  --   --  95   MCH 32.6 32.5  --   --  32.4   MCHC 34.5 34.7  --   --  34.2   RDW 15.3* 15.2*  --   --  15.0   * 114*  --  51* 31*          Total Visit Time: 120 min    Total Face-to-Face Prolonged Service Time: 95 min    Content of the Prolonged Time: family meeting/goals of care discussions    Heather Hernández PA-C  [facility], Palliative Care  Dept phone: [phone]  Securely message with the Vocera Web Console    Addendum:  - Spoke with ID  regarding antibiotics. If patient goes home on hospice care, would need to be transitioned to oral antibiotics. Dr. Carballo provided recommendation for bactrim on discharge, can continue IV antibiotics here.  - Met with son Tera and hospice liaison to discuss hospice services (15:40-16:20). Also provided update from ID regarding antibiotics. Family would like referral to [facility] which they used earlier this year for their father. Hospice provided PCA agencies for family to look into  - Placed SW/CM order for hospice referral

## 2022-09-19 NOTE — PROGRESS NOTES
Plans for hospice involvement noted.  Please call if we can be of further assistance.  Pravin Grace MD

## 2022-09-20 NOTE — PLAN OF CARE
Problem: Risk for Delirium  Goal: Improved Behavioral Control  Outcome: Ongoing, Progressing    Problem: Risk for Delirium  Goal: Improved Attention and Thought Clarity  Outcome: Ongoing, Progressing     Problem: Adjustment to Illness (Sepsis/Septic Shock)  Goal: Optimal Coping  Outcome: Ongoing, Progressing     Goal Outcome Evaluation: Appropriate in conversation and orientation on evening shift. Patient does state however that she has visual hallucinations at times but says she knows these things she sees are not real. Says she hallucinates seeing bugs on food and tray table. Calm, cooperative affect. Much family support; son and daughter visited this evening.

## 2022-09-20 NOTE — CONSULTS
"Care Management Follow Up    Length of Stay (days): 12    Expected Discharge Date: 09/22/2022     Concerns to be Addressed: Home hospice set up      Patient plan of care discussed at interdisciplinary rounds: Yes    Anticipated Discharge Disposition: Home with hospice and 24 hour PCA care   Anticipated Discharge Services:  PCA care, and Home Hospice  Anticipated Discharge DME:  TBD    Education Provided on the Discharge Plan:  Per Care Team   Patient/Family in Agreement with the Plan:  Yes    Referrals Placed by CM/SW:    Private pay costs discussed: Not applicable    Additional Information:  Chart reviewed.     Heather Hernández through palliative PA reached out to writer to send a referral for [facility].     Per [facility] hospice, the family's wishes is for the patient to return home with [facility]. Hospice gave the family a list of in home PCAs, to help care for the patient 24/7. The son will be looking into that today.     RNCM sent referrals to [facility] this morning.      VM left on CM phone from Mirta at [facility], to Heather stating,  \" I reached out to POA, and left a voicemail, haven't heard back yet, but ready to accept the patient, and just need to know discharge date.\"     Mirta's # [phone]     Gave Heather updated this AM.      RNCM reached out to Mirta at [facility], she is going to look into some places that may provide that 24/7 care, and get back to writer.      1:56 pm- [facility] able to start svcs for PCA on Thurs, family is agreeable.       CM will continue to follow plan of care, review recommendations, and assist with any discharge needs anticipated.        Camelia Alonso RN        "

## 2022-09-20 NOTE — PLAN OF CARE
Physical Therapy Discharge Summary    Reason for therapy discharge:    Pt moving to comfort cares , will discontinue PT order

## 2022-09-20 NOTE — PROGRESS NOTES
[facility]    Infectious Disease Progress Note    Date of Service: 09/20/2022     Assessment & Plan   Maren Fofana is a 72 year old female who was admitted on 9/8/2022.     1. MRSA bacteremia/TAVR and mitral valve endocarditis, onset of symptoms around 9/6/2022--active issue.  MRSA bacteremia, high-grade, persistent positive blood cultures, consistent with endovascular infection. Endocarditis of TAVR is associated with high mortality, in this study 45% at 1 year, other studies note higher rates. EMILY 9/14 shows 0.7 x 0.3 cm vegetation on aortic valve as well as luz-valvular abscess, 1.3 x 0.8 cm and 1.1 x 0.5 cm vegetations on mitral valve. At risk for septic emboli including stroke. Potentially treatable with antibiotic course, however with large vegetations this will be difficult and finding of luz-valvular abscess chance of cure with antibiotics alone is less. This study of TAVR endocarditis showed 1 year mortality of 62.5% in those with perivalvular extension, 70% mortality at 2 years. Blood culture from 9/14 negative, however 9/15 is positive -- positivity may be slowing. No plans for valve surgery as this is high risk for her.   2. History of TAVR, AAA endovascular graft, bilateral TKAs, high risk of infection -- PET CT indicating TAVR endocarditis. PET also suggesting fluid at R knee may be infected also knee exam fairly benign.  3. AAA endovascular repair with graft 2016.  CT showing slight enlargement of aneurysm type II endoleak noted. Not lighting up on PET.  4. Alcohol use, imaging showing cirrhosis. This will make recovery difficult.   5. Pleural effusion transudative, thoracentesis complicated by hydropneumothorax status post chest tube, now removed.   6. A fib 9/13 -- cardioverted 9/14 now in sinus rhythm.     High-grade persistent MRSA bacteremia from 9/8 through 9/16 as of now.  Follow-up blood culture 9/17 in process.  Brain MRI with punctate foci of acute infarct in the  posterior superior right frontal lobe, the right frontoparietal white matter, and the posterior superior left temporal lobe.  This is most likely septic emboli.  Also complaining of a lot of low back pain.  Admission CT scan with no evidence of discitis.  With prolonged bacteremia, repeat imaging especially MRI of the lumbar spine is indicated if goal is continued aggressive care.  Unclear what goal of care is today    Recommendations    1. Discontinue rifampin due to resistance - discussed with pharmacy who has expanded susceptibility results  2. Continue vancomycin and gentamicin for now  3. Discussed with palliative care about different antibiotic options for patient. Given persistent bacteremia and likely device infection, eradication of infection is not possible. It may be reasonable to transition to po TMP/SMX if the goal is to transition to home hospice and still continue some antibiotic to prevent rapid decompensation of infection. High dose ~10mg/kg would be 2 double-strength tabs po bid, indefinitely.    Discussed with the patient, nursing staff.    ID will follow. Call with questions    Sarkis Carballo MD  [facility]  Direct messaging: [email or web address] Paging  On-Call ID provider: [phone], option: 9  ________________________________________________________________________________    Interval History   Complaining of back pain. Says this is chronic. Blood cultures continue to be positive. No fevers.    Physical Exam   Temp: 98.8  F (37.1  C) Temp src: Oral BP: 107/68 Pulse: 79   Resp: 16 SpO2: 97 % O2 Device: Nasal cannula with humidification Oxygen Delivery: 2 LPM  Vitals:    09/18/22 0329 09/19/22 0333 09/20/22 0444   Weight: 68 kg (150 lb) 67.6 kg (149 lb) 68.1 kg (150 lb 3.2 oz)     Vital Signs with Ranges  Temp:  [98.4  F (36.9  C)-99  F (37.2  C)] 98.8  F (37.1  C)  Pulse:  [71-81] 79  Resp:  [16-20] 16  BP: ()/(54-68) 107/68  FiO2 (%):  [2 %] 2 %  SpO2:  [95 %-98 %] 97  %    Constitutional: in bed, mild distress  Lungs: clear anterior  Cardiovascular: regular  Abdomen: mildly tender  Skin: Warm. No rash. +ecchymoses.   Neuro: Deconditioned, non-focal  MSK: knees good ROM without significant pain, no appreciable swelling  Peripheral IV  Pure wick    Medications       acetaminophen  975 mg Oral Q8H     amiodarone  200 mg Oral Daily     apixaban ANTICOAGULANT  5 mg Oral BID     diclofenac  2 g Topical 4x Daily     famotidine  20 mg Oral BID     folic acid  1 mg Oral Daily     gentamicin  80 mg Intravenous Q24H     levothyroxine  88 mcg Oral QAM AC     lidocaine  2 patch Transdermal Q24H     lidocaine   Transdermal Q8H ISIDRO     multivitamin w/minerals  1 tablet Oral Daily     oxyCODONE  5 mg Oral Q6H     potassium chloride  10 mEq Intravenous Once     sennosides  1 tablet Oral BID     sodium chloride (PF)  3 mL Intracatheter Q8H     [Held by provider] torsemide  20 mg Oral Daily     vancomycin  750 mg Intravenous Q12H     venlafaxine  300 mg Oral Daily       Data   All microbiology laboratory data reviewed.  Recent Labs   Lab Test 09/19/22  0436 09/18/22  0550 09/16/22  0442 09/15/22  0452 09/13/22  0431   WBC 9.7 12.5*  --   --  7.1   HGB 9.2* 9.4* 9.7* 9.5* 9.6*   HCT 26.7* 27.1*  --   --  28.1*   MCV 95 94  --   --  95   * 114*  --  51* 31*     Recent Labs   Lab Test 09/19/22  0436 09/18/22  0550 09/17/22  0451   CR 0.75 0.74 0.71       MICRO:  Collected Updated Procedure Result Status    09/10/2022 0832 09/10/2022 0846 Blood Culture Peripheral Blood [[id number]]   Peripheral Blood    In process Component Value   No component results             09/09/2022 2123 09/09/2022 2133 Blood Culture Peripheral Blood [[id number]]   Peripheral Blood    In process Component Value   No component results             09/09/2022 1343 09/10/2022 0536 Blood Culture Peripheral Blood [[id number]]   (Abnormal)   Peripheral Blood    Preliminary result Component Value   Culture Positive on the  1st day of incubation Abnormal  P    Gram positive cocci in clusters Panic  P    1 of 2 bottles             09/08/2022 1847 09/10/2022 0715 Pleural fluid Aerobic Bacterial Culture Routine with Gram Stain [[id number]]    Pleural fluid from Pleural Cavity, Right    Preliminary result Component Value   Culture No growth after 1 day P   Gram Stain Result No organisms seen P    Gram Stain slide reviewed at the [facility]    2+ WBC seen P             09/08/2022 1847 09/08/2022 2033 Glucose fluid [[id number]]    Pleural fluid from Pleural Cavity, Right    Final result Component Value Units   Glucose Fluid Source Pleural Cavity, Right    Glucose fluid 117 mg/dL          09/08/2022 1847 09/09/2022 0351 Lactate dehydrogenase fluid [[id number]]    Pleural fluid from Pleural Cavity, Right    Final result Component Value Units   LD Fluid Source Pleural Cavity, Right    Lactate dehydrogenase fluid 100 U/L          09/08/2022 1847 09/09/2022 0351 Protein fluid [[id number]]    Pleural fluid from Pleural Cavity, Right    Final result Component Value Units   Protein Fluid Source Pleural Cavity, Right    Protein Total Fluid 3.0 g/dL          09/08/2022 1554 09/08/2022 1659 Symptomatic; Unknown Influenza A/B & SARS-CoV2 (COVID-19) Virus PCR Multiplex Nasopharyngeal [[id number]]    Swab from Nasopharyngeal    Final result Component Value   Influenza A PCR Negative   Influenza B PCR Negative   RSV PCR Negative   SARS CoV2 PCR Negative   NEGATIVE: SARS-CoV-2 (COVID-19) RNA not detected, presumed negative.          09/08/2022 1552 09/10/2022 1348 Blood Culture Peripheral Blood [[id number]]   (Abnormal)   Peripheral Blood    Preliminary result Component Value   Culture Positive on the 1st day of incubation Abnormal  P    Staphylococcus aureus Panic  P    2 of 2 bottles             09/08/2022 1552 09/10/2022 0704 Blood Culture Peripheral Blood [[id number]]    (Abnormal)   Peripheral Blood     Edited Component Value   Culture Positive on the 1st day of incubation Abnormal     Staphylococcus aureus MRSA Panic     2 of 2 bottles         Susceptibility     Staphylococcus aureus MRSA     [place]     Clindamycin <=0.25 ug/mL Susceptible 1     Erythromycin >=8 ug/mL Resistant     Gentamicin <=0.5 ug/mL Susceptible     Linezolid 2 ug/mL Susceptible     Oxacillin >=4 ug/mL Resistant 2     Tetracycline <=1 ug/mL Susceptible     Trimethoprim/Sulfamethoxazole <=0.5/9.5 u... Susceptible     Vancomycin 1 ug/mL Susceptible              1 This isolate DOES NOT demonstrate inducible clindamycin resistance in vitro. Clindamycin is susceptible and could be used when indicated, however, erythromycin is resistant and should not be used.   2 Oxacillin susceptible isolates are susceptible to cephalosporins (example: cefazolin and cephalexin) and beta lactam combination agents. Oxacillin resistant isolates are resistant to these agents.        Susceptibility Comments    Staphylococcus aureus MRSA   MRSA requires contact precautions.         09/08/2022 1552 09/09/2022 0352 [employer] GP Panel [[id number]]    (Abnormal)   Peripheral Blood    Final result Component Value   Staphylococcus aureus Detected Abnormal    Positive for methicillin-resistant Staphylococcus aureus (MRSA) by [employer] multiplex nucleic acid test. Final identification and antimicrobial susceptibility testing will be verified by standard methods.   Staphylococcus epidermidis Not Detected   Staphylococcus lugdunensis Not Detected   Enterococcus faecalis Not Detected   Enterococcus faecium Not Detected   Streptococcus species Not Detected   Streptococcus agalactiae Not Detected   Streptococcus anginosus group Not Detected   Streptococcus pneumoniae Not Detected   Streptococcus pyogenes Not Detected   Listeria species Not Detected            RADIOLOGY:    XR Chest 2 Views    Result Date: 9/9/2022  EXAM: XR CHEST 2 VIEWS LOCATION: [facility]  DATE/TIME: 2022 11:17 AM INDICATION: f u chest tube, PTX COMPARISON: 2022.     IMPRESSION: Right chest tube over the right lung apex in stable position. No recurrent pneumothorax. Small right pleural effusion with some infiltrates or atelectasis and some mild atelectasis left lung base all appear stable. Aortic valve prosthesis.    Echocardiogram Complete    Result Date: 2022  [id number]^CAMILLE^PAT^[place] [address]  Name: LEELEE MARIN MRN: [id number] : 1950 Study Date: 2022 02:42 PM Age: 72 yrs Gender: Female Patient Location: [facility] Reason For Study: Abnormal Heart Sound Ordering Physician: PAT OBRIEN Performed By: SANDI  BSA: 1.7 m2 Height: 61 in Weight: 148 lb HR: 81 BP: 120/66 mmHg ______________________________________________________________________________ Procedure Complete Portable Echo Adult. Compared to the prior study dated 10/26/2021, there have been no changes. ______________________________________________________________________________ Interpretation Summary  1.Left ventricular size, wall motion and function are normal. The ejection fraction is 60-65%. 2.The right ventricle is normal size. Mildly decreased right ventricular systolic function 3.The left atrium is moderate to severely dilated. 4.Thickened mitral leaflets without stenosis. There is mild to moderate (1-2+) mitral regurgitation. 5.There is a 23 ISABELLA 3 bioprosthetic valve present in aortic valve position. At heart rate of 99 bpm peak velocity is 2.3 m/s, mean gradient is 9 mmHg, dimensionless index 0.48. 6.IVC diameter >2.1 cm collapsing <50% with sniff suggests a high RA pressure estimated at 15 mmHg or greater. Compared to the prior study dated 10/26/2021, the Tricuspid regurgitation is really not sampled well, left atrium is further enlarged, no essential change for the valve with prior peak velocity of 2.8 m/s and mean  gradient of 16 mmHg. ______________________________________________________________________________ I      WMSI = 1.00     % Normal = 100  X - Cannot   0 -                      (2) - Mildly 2 -          Segments  Size Interpret    Hyperkinetic 1 - Normal  Hypokinetic  Hypokinetic  1-2     small                                                    7 -          3-5    moderate 3 - Akinetic 4 -          5 -         6 - Akinetic Dyskinetic   6-14    large              Dyskinetic   Aneurysmal  w/scar       w/scar       15-16   diffuse  Left Ventricle Left ventricular size, wall motion and function are normal. The ejection fraction is 60-65%. There is normal left ventricular wall thickness. Left ventricular diastolic function is normal. No regional wall motion abnormalities noted.  Right Ventricle The right ventricle is normal size. Mildly decreased right ventricular systolic function. TAPSE is normal, which is consistent with normal right ventricular systolic function.  Atria The left atrium is moderate to severely dilated. Right atrial size is normal. There is no color Doppler evidence of an atrial shunt.  Mitral Valve Thickened mitral valve anterior leaflet. Thickened mitral valve posterior leaflet. There is mild to moderate (1-2+) mitral regurgitation. There is no mitral valve stenosis.  Tricuspid Valve The tricuspid valve is not well visualized, but is grossly normal. Right ventricle systolic pressure estimate normal. There is trace to mild tricuspid regurgitation. There is no tricuspid stenosis.  Aortic Valve There is a ISABELLA 3 bioprosthetic valve present in aortic valve position. 23 At heart rate of 99 bpm peak velocity is 2.3 m/s, mean gradient is 9 mmHg, dimensionless index 0.48.  Pulmonic Valve The pulmonic valve is not well seen, but is grossly normal. This degree of valvular regurgitation is within normal limits. There is no pulmonic valvular stenosis.  Vessels The aorta root is normal. Normal size ascending  aorta. IVC diameter >2.1 cm collapsing <50% with sniff suggests a high RA pressure estimated at 15 mmHg or greater.  Pericardium There is no pericardial effusion.  Rhythm Sinus rhythm was noted.  ______________________________________________________________________________ MMode/2D Measurements & Calculations IVSd: 0.74 cm LVIDd: 4.7 cm LVIDs: 3.3 cm LVPWd: 0.75 cm FS: 28.2 %  LV mass(C)d: 109.3 grams LV mass(C)dI: 65.7 grams/m2 Ao root diam: 2.5 cm LA dimension: 5.8 cm LA/Ao: 2.3 LVOT diam: 1.6 cm LVOT area: 2.0 cm2 LA Volume Indexed (AL/bp): 86.6 ml/m2 RWT: 0.32  Time Measurements Aortic HR: 106.0 BPM MM HR: 91.0 BPM  Doppler Measurements & Calculations MV E max thomas: 102.3 cm/sec MV A max thomas: 108.5 cm/sec MV E/A: 0.94 MV dec slope: 322.0 cm/sec2 MV dec time: 0.32 sec Ao V2 max: 204.0 cm/sec Ao max P.0 mmHg Ao V2 mean: 165.0 cm/sec Ao mean P.0 mmHg Ao V2 VTI: 44.8 cm KP(I,D): 0.80 cm2 KP(V,D): 0.96 cm2 LV V1 max PG: 3.8 mmHg LV V1 max: 97.6 cm/sec LV V1 VTI: 17.8 cm CO(LVOT): 3.8 l/min CI(LVOT): 2.3 l/min/m2 SV(LVOT): 35.7 ml SI(LVOT): 21.5 ml/m2 TR max thomas: 244.0 cm/sec TR max P.8 mmHg AV Thomas Ratio (DI): 0.48 KP Index (cm2/m2): 0.48 E/E': 16.5 E/E' av.0 Lateral E/e': 20.0 Medial E/e': 13.9 Peak E' Thomas: 6.2 cm/sec  ______________________________________________________________________________ Report approved by: Yonatan Arroyo 2022 04:29 PM       US Thoracentesis    Result Date: 2022  EXAM: 1. RIGHT  THORACENTESIS 2. ULTRASOUND GUIDANCE LOCATION: [facility] DATE/TIME: 2022 6:46 PM INDICATION: Pleural effusion. PROCEDURE: Informed consent obtained. Time out performed. The chest was prepped and draped in sterile fashion. 5  mL of 1 % lidocaine was infused into the local soft tissues. Under direct ultrasound guidance, a 5 [place] catheter system was placed into the pleural effusion. 1.35  liters of clear yellow and red  fluid were removed and sent to  lab, if requested. Patient tolerated procedure well. Ultrasound imaging was obtained and placed in the patient's permanent medical record.     IMPRESSION: Status post right  ultrasound-guided thoracentesis. Reference CPT Code: 55396    XR Chest Port 1 View    Result Date: 9/10/2022  EXAM: XR CHEST PORT 1 VIEW LOCATION: [facility] DATE/TIME: 9/10/2022 2:21 PM INDICATION: follow up PTX for continued resolution COMPARISON: 09/09/2022     IMPRESSION: The heart is normal in size. Improved right pleural effusion. Right lower lobe opacities, most likely representing infiltrate. No pneumothorax. Right-sided chest tube.    XR Chest Port 1 View    Result Date: 9/9/2022  EXAM: XR CHEST PORT 1 VIEW LOCATION: [facility] DATE/TIME: 9/9/2022 12:00 AM INDICATION: s p chest tube follow-up pneumothorax COMPARISON: 09/08/2022     IMPRESSION: New right chest tube terminates at the apex medially. Right pneumothorax has resolved. Persistent opacity at the right base may represent an combination of atelectasis and edema. Normal heart size. Prior endovascular aortic valve replacement.  Left lung clear. Surgical clips cluster over the left shoulder.    XR Chest Port 1 View    Result Date: 9/8/2022  EXAM: XR CHEST PORT 1 VIEW LOCATION: [facility] DATE/TIME: 9/8/2022 9:44 PM INDICATION: ptx COMPARISON: 09/08/2022     IMPRESSION: Persistent right pneumothorax measuring 8 mm at the apex (stable) and 1.7 cm at the lateral base (previously 1.4 cm). Small right pleural effusion. Increased airspace infiltrate in the right lower lobe. Surgical clips in the left axilla. Calcified aortic arch. Prior aortic valve repair.    XR Chest Port 1 View    Result Date: 9/8/2022  EXAM: XR CHEST PORT 1 VIEW LOCATION: [facility] DATE/TIME: 9/8/2022 8:18 PM INDICATION: s p thoracentesis, ? ptx COMPARISON: Same date abdominal CT and chest radiograph.     IMPRESSION:  Stable cardiomediastinal silhouette with prior aortic valve replacement. Small right hydropneumothorax, similar volume to same day CT given differences in modalities, without evidence of tension. Possible reexpansion edema in the right lower lobe. Left lung is clear. No acute bony abnormality.    XR Chest Port 1 View    Result Date: 9/8/2022  EXAM: XR CHEST PORT 1 VIEW LOCATION: [facility] DATE/TIME: 9/8/2022 4:30 PM INDICATION: fever, cough COMPARISON: 12/3/2021     IMPRESSION: Small to moderate right effusion, slightly increased. Right lower lobe infiltrate or atelectasis.  No pneumothorax.  The left lung is clear.  The heart is normal in size.    CT Abdomen Pelvis w Contrast    Result Date: 9/8/2022  EXAM: CT ABDOMEN PELVIS W CONTRAST LOCATION: [facility] DATE/TIME: 9/8/2022 7:39 PM INDICATION: Right upper abdominal pain and fever. COMPARISON: CTA 8/21/2021 TECHNIQUE: CT scan of the abdomen and pelvis was performed following injection of IV contrast. Multiplanar reformats were obtained. Dose reduction techniques were used. CONTRAST: 75ml isovue 370 FINDINGS: LOWER CHEST: Small right hydropneumothorax post right thoracentesis earlier today. Small residual right pleural effusion. Heavy mitral annular calcifications. Small sliding-type hiatal hernia. HEPATOBILIARY: Cirrhotic appearing liver redemonstrated without focal suspicious mass. Normal gallbladder and biliary system. PANCREAS: Normal. SPLEEN: Normal. ADRENAL GLANDS: Normal. KIDNEYS/BLADDER: Normal kidneys and ureters. Nondistended bladder with focal asymmetric enhancing bladder wall thickening measuring up to 8 mm in the right bladder base image 173 of series 3.. BOWEL: Diverticulosis of the colon. No acute inflammatory change. No obstruction. Large amount of stool in the rectum. Mild ascites. LYMPH NODES: No lymphadenopathy. VASCULATURE: Previous endovascular aortic aneurysm repair with bifurcated endograft.  Aneurysm sac measures 5.0 x 5.6 cm, previously 4.7 x 5.4 cm. Type II endoleak is noted. PELVIC ORGANS: Hysterectomy. No adnexal mass. MUSCULOSKELETAL: Previous lower lumbar fusion. Degenerative changes are present within the spine and hips. Generalized anasarca.     IMPRESSION: 1.  Small to moderate right hydropneumothorax, status post large volume right thoracentesis earlier today. Patient may have a trapped right lung. Consider two-view chest radiograph. 2.  Cirrhotic appearing liver with mild ascites. No splenomegaly. 3.  Previous endovascular repair of infrarenal abdominal aortic aneurysm. Type II endoleak with interval enlargement of the aneurysm sac, compatible with endotension. Previous repair at [facility]. Consider nonemergent Interventional Radiology consult. 4.  Generalized anasarca. Report called to Dr. Roberson at 2000 hours    CT Cervical Spine w/o Contrast    Result Date: 9/8/2022  EXAM: CT CERVICAL SPINE W/O CONTRAST LOCATION: [facility] DATE/TIME: 9/8/2022 7:38 PM INDICATION: fall COMPARISON: None. TECHNIQUE: Routine CT Cervical Spine without IV contrast. Multiplanar reformats. Dose reduction techniques were used. FINDINGS: No evidence of acute fracture or subluxation of the cervical spine by CT imaging. Straightening of the normal cervical lordosis. The vertebral bodies of the cervical spine otherwise have normal stature and alignment. Anterolisthesis of C3 on C4 measuring  2 mm. Anterior marginal osteophytes at C4/C5-C7/T1. Multilevel degenerative disc disease of the cervical spine with disc height loss, most pronounced at C4/C5-C7/T1. The partially imaged intracranial contents are unremarkable. No prevertebral soft tissue swelling. The partially imaged lung apices are unremarkable. Craniovertebral junction and C1-C2: The odontoid process is well approximated with the anterior body of C1 and well aligned between the lateral masses of C1. C2-C3: No posterior disc bulge or  spinal canal narrowing. No neural foraminal narrowing. C3-C4: No posterior disc bulge or spinal canal narrowing. No neural foraminal narrowing. C4-C5: Broad bar disc osteophyte complex with moderate spinal canal narrowing. Uncovertebral joint disease and facet arthropathy with severe bilateral neural foraminal narrowing. C5-C6: No posterior disc bulge or spinal canal narrowing. Uncovertebral joint disease and facet arthropathy with severe right and moderate left neural foraminal narrowing. C6-C7: No posterior disc bulge or spinal canal narrowing. Uncovertebral joint disease and facet arthropathy with severe bilateral neural foraminal narrowing. C7-T1: No posterior disc bulge or spinal canal narrowing. Uncovertebral joint disease and facet arthropathy with moderate bilateral neural foraminal narrowing.     IMPRESSION: 1.  No evidence of acute fracture or subluxation of the cervical spine by CT imaging. 2.  Degenerative cervical spondylosis as described above.    CT Head w/o Contrast    Addendum Date: 9/8/2022    Addendum/ impression: INDICATION: Dizziness    Result Date: 9/8/2022  EXAM: CT HEAD W/O CONTRAST LOCATION: [facility] DATE/TIME: 9/8/2022 7:34 PM INDICATION: fall COMPARISON: None. TECHNIQUE: Routine CT Head without IV contrast. Multiplanar reformats. Dose reduction techniques were used. FINDINGS: INTRACRANIAL CONTENTS: No evidence of acute intracranial hemorrhage or mass effect. Scattered foci of decreased attenuation within the cerebral hemispheric white matter which are nonspecific, though most commonly ascribed to chronic small vessel ischemic  disease. The ventricles and sulci are prominent consistent with mild brain parenchymal volume loss. Gray-white matter differentiation is maintained. The basilar cisterns are patent. VISUALIZED ORBITS/SINUSES/MASTOIDS: Bilateral cataract surgery. The partially imaged globes are otherwise unremarkable. The partially imaged paranasal sinuses,  mastoid air cells and middle ear cavities are unremarkable. BONES/SOFT TISSUES: The visualized skull base and calvarium are unremarkable.     IMPRESSION:  1.  No evidence of acute intracranial hemorrhage or mass effect. 2.  Mild nonspecific white matter changes. 3.  Mild brain parenchymal volume loss.    CT Lumbar Spine w/o Contrast    Result Date: 9/8/2022  EXAM: CT LUMBAR SPINE W/O CONTRAST LOCATION: [facility] DATE/TIME: 9/8/2022 7:39 PM INDICATION: Back pain COMPARISON: None. TECHNIQUE: Routine CT Lumbar Spine without IV contrast. Multiplanar reformats. Dose reduction techniques were used. FINDINGS: No evidence of acute fracture or subluxation of the lumbar spine by CT imaging. Postsurgical changes posterior fusion at L3-L5 and interbody fusion at L3/L4 and L4/L5. No hardware complication. No evidence of acute fracture or subluxation of the lumbar spine by CT imaging. Retrolisthesis of T12 on L1 measuring 2 mm. Anterior marginal osteophytes at T12/L1 and L1/L2 and L2/L3. Multilevel degenerative disc disease of the lumbar spine with disc height loss, most pronounced at T12/L1 and L2/L3. Aortobiiliac endograft. The partially imaged intra-abdominal contents are otherwise unremarkable. T12/L1:  Symmetric disc bulge without spinal canal narrowing. Mild bilateral neural foraminal narrowing. L1/L2:  No posterior disc bulge or spinal canal narrowing. Mild bilateral neural foraminal narrowing. L2/L3:  Broad bar disc osteophyte complex with mild spinal canal narrowing. Severe bilateral neural foraminal narrowing. L3/L4:  Spondylotic ridging without spinal canal narrowing. Moderate bilateral facet arthropathy. No neural foraminal narrowing.  L4/L5:  No posterior disc bulge or spinal canal narrowing. Mild bilateral facet arthropathy. No neural foraminal narrowing. L5/S1: Symmetric disc bulge and moderate facet arthropathy without spinal canal narrowing. Severe bilateral neural foraminal narrowing.      IMPRESSION:  1.  No evidence of acute fracture or subluxation of the lumbar spine by CT imaging. 2.  Postsurgical changes posterior fusion at L3-L5 and interbody fusion at L3/L4 and L4/L5. No hardware complication. 3.  Degenerative lumbar spondylosis as described above.

## 2022-09-20 NOTE — PHARMACY-AMINOGLYCOSIDE DOSING SERVICE
Pharmacy Aminoglycoside Follow-Up Note  Date of Service 2022  Patient's  1950   72 year old, female    Weight (Adjusted): 55.9 kg    Indication: Endocarditis  Current Gentamicin regimen:  90 mg IV q24h  Day of therapy: 8    Target goals based on synergy dosing  Goal Peak level: 3-4 mg/L  Goal Trough level: <1 mg/L    Current estimated CrCl: Estimated Creatinine Clearance: 58.1 mL/min (based on SCr of 0.75 mg/dL).    Creatinine for last 3 days  2022:  5:50 AM Creatinine 0.74 mg/dL  2022:  4:36 AM Creatinine 0.75 mg/dL    Nephrotoxins and other renal medications (From now, onward)    Start     Dose/Rate Route Frequency Ordered Stop    22 1300  gentamicin (GARAMYCIN) 80 mg in sodium chloride 0.9 % 50 mL intermittent infusion         80 mg  over 60 Minutes Intravenous EVERY 24 HOURS 22 1122      22 0930  vancomycin 750 mg in 0.9% NaCl 250 mL intermittent infusion 750 mg         750 mg  over 60 Minutes Intravenous EVERY 12 HOURS 22 0834      09/10/22 0800  [Held by provider]  torsemide (DEMADEX) tablet 20 mg        (Held by provider since 2022 at 1018 by Cheko Morales DO.Hold Reason: Other.)    20 mg Oral DAILY 22 1815            Contrast Orders - past 72 hours (72h ago, onward)    None          Aminoglycoside Levels - past 2 days  2022: 12:20 PM Gentamicin 0.6 mg/L; 10:04 PM Gentamicin 2.0 mg/L    Aminoglycosides IV Administrations (past 72 hours)                   gentamicin (GARAMYCIN) 90 mg in sodium chloride 0.9 % 50 mL intermittent infusion (mg) 90 mg New Bag 22 1249     90 mg New Bag 22 1210     90 mg New Bag 22 1303                Pharmacokinetic Analysis  Calculated Peak level: 4 mg/L  Calculated Trough level: <0.5mg/L  Kd = 0.084  Half-life: 8.25 hours  Vd 24.9 Liters = 0.36 L/kg        Interpretation of levels and current regimen:  Aminoglycoside levels are within goal range - peak at top of goal range    Has  serum creatinine changed greater than 50% in the last 72 hours: No    Renal function: Stable    Plan  1. Decrease dose to 80 mg IV q24h    2.  Method of evaluation: 2 post dose levels    3. Pharmacy will continue to follow and check levels  as appropriate in 5-7 Days    Jaclyn Stanley RPH

## 2022-09-20 NOTE — PLAN OF CARE
Pain control with back pain for patient, oxycodone given as scheduled and also prn, roxicodone given, and ativan as needed for anxiety. Disoriented to time.  Family visiting with patient and much planning which is bothering the patient, therapeutic communication.  Telemetry rhythm NSR, now telemetry discontinued due to comfort for the patient.   Problem: Plan of Care - These are the overarching goals to be used throughout the patient stay.    Goal: Plan of Care Review/Shift Note  Description: The Plan of Care Review/Shift note should be completed every shift.  The Outcome Evaluation is a brief statement about your assessment that the patient is improving, declining, or no change.  This information will be displayed automatically on your shift note.  Outcome: Ongoing, Progressing     Problem: Plan of Care - These are the overarching goals to be used throughout the patient stay.    Goal: Optimal Comfort and Wellbeing  Outcome: Ongoing, Progressing  Intervention: Monitor Pain and Promote Comfort  Recent Flowsheet Documentation  Taken 9/20/2022 1205 by Diana Parekh RN  Pain Management Interventions: medication (see MAR)   Goal Outcome Evaluation:

## 2022-09-20 NOTE — PROGRESS NOTES
"[facility]  Palliative Care Daily Progress & Family Meeting Note       Recommendations & Counseling     Family meeting yesterday 9/19/2022  Key takeaways:  - Katherine defers further goals of care conversations to her children, Caitie and Tera. She finds repeated discussion of her prognosis traumatic, requests that providers do not continue to bring up during visits.  - Start transition to comfort-focused care. Family would like to continue current medications at this time (including antibiotics). Stop all lab draws/fingersticks  - Hospice consult placed; plan for home hospice with private pay aid  - Given Katherine's expressed wishes for \"more time,\" reasonable to continue antibiotics. ID provided recommendation for bactrim on discharge.    Goals of Care: life-prolonging transitioning to comfort-focused    Advanced Care Planning:  Advance directive: Yes, on file  POLST: No - complete before discharge  Code status: DNR/DNI  Health care agent: Caitie (daughter) supported by Tera (son)    Symptom Management:  #Pain, \"head to toe\" but low back is worst  - Continue scheduled tylenol  - Start scheduled oxycodone 5mg q6h  - Continue oxycodone 5mg q4h prn  - Stopped IV morphine as has not required, planning for home hospice  - Continue voltaren gel and lidocaine patch  - Start senna bid for constipation    Psychosocial & Spiritual:   Appreciate Palliative SW and Spiritual Care support      Assessments          Maren is a 72 year old female with PMH of aortic stenosis s/p TAVR 09/21, HTN, s/p AAA repair, HFpEF, HTN, known right-sided pleural effusion, and alcohol use disorder, admitted on 9/8/2022 after a fall. 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 Also with acute on chronic respiratory failure due to R pleural effusion, s/p thoracentesis and chest tube (removed 9/10), complicated by R pneumothorax (now resolved).   - " s/p EMILY-directed cardioversion 9/14 for Afib  - MR brain 9/16 with punctate embolic infarcts consistent with septic emboli  - Blood cultures remain positive  - Family meeting 9/19 with plan for transition to comfort-focused care, home hospice    Today, the patient was seen for:  Symptom management    Prognosis, Goals, or Advance Care Planning was addressed today with: Yes.  Mood, coping, and/or meaning in the context of serious illness were addressed today: Yes.              Interval History:     Chart review/discussion with unit or clinical team members:   - No labs today per family request, transitioning to comfort measures  - [facility] referral placed, family looking into private pay PCA  - No opioids given in past 24 hours    Per patient or family/caregivers today:  Katherine is feeling alright today, though notes note as well as she was feeling yesterday. She is accompanied by her son, daughter, and grandson at the bedside. She is sitting up in bed and has been able to eat some breakfast.    Katherine complains of continued low back pain and bilateral side pain. I noted she has only received tylenol recently, Katherine feels this has not adequately controlled her pain. We discussed starting scheduled oxycodone, Katherine is amenable. Also recommended a bowel regimen, which Katherine is hesitant to start given history of ?colitis with chronic diarrhea. She does not recall her last bowel movement here, at least several days ago. Katherine agreed to starting gentle bowel regimen.    Key Palliative Symptoms:  # Pain severity the last 12 hours: moderate  # Dyspnea severity the last 12 hours: none  # Nausea severity the last 12 hours: none  # Anxiety severity the last 12 hours: not assessed             Review of Systems:     Besides above, an additional 3 system ROS was reviewed and is unremarkable          Medications:     I have reviewed this patient's medication profile and medications during this hospitalization.    Noted  "meds:    Current Facility-Administered Medications   Medication     acetaminophen (TYLENOL) tablet 975 mg     acetaminophen (TYLENOL) tablet 975 mg     amiodarone (PACERONE) tablet 200 mg     apixaban ANTICOAGULANT (ELIQUIS) tablet 5 mg     diclofenac (VOLTAREN) 1 % topical gel 2 g     famotidine (PEPCID) tablet 20 mg     flumazenil (ROMAZICON) injection 0.2 mg     folic acid (FOLVITE) tablet 1 mg     gentamicin (GARAMYCIN) 90 mg in sodium chloride 0.9 % 50 mL intermittent infusion     OLANZapine zydis (zyPREXA) ODT tab 5-10 mg    Or     haloperidol lactate (HALDOL) injection 2.5-5 mg     levothyroxine (SYNTHROID/LEVOTHROID) tablet 88 mcg     Lidocaine (LIDOCARE) 4 % Patch 2 patch     lidocaine (LMX4) cream     lidocaine 1 % 0.1-1 mL     lidocaine patch in PLACE     melatonin tablet 5 mg     multivitamin w/minerals (THERA-VIT-M) tablet 1 tablet     ondansetron (ZOFRAN ODT) ODT tab 4 mg    Or     ondansetron (ZOFRAN) injection 4 mg     oxyCODONE (ROXICODONE) tablet 5-10 mg     potassium chloride 10 mEq in 100 mL sterile water intermittent infusion (premix)     prochlorperazine (COMPAZINE) injection 5 mg    Or     prochlorperazine (COMPAZINE) tablet 5 mg    Or     prochlorperazine (COMPAZINE) suppository 12.5 mg     senna-docusate (SENOKOT-S/PERICOLACE) 8.6-50 MG per tablet 1 tablet    Or     senna-docusate (SENOKOT-S/PERICOLACE) 8.6-50 MG per tablet 2 tablet     sodium chloride (PF) 0.9% PF flush 3 mL     sodium chloride (PF) 0.9% PF flush 3 mL     sodium chloride (PF) 0.9% PF flush 3 mL     [Held by provider] torsemide (DEMADEX) tablet 20 mg     vancomycin 750 mg in 0.9% NaCl 250 mL intermittent infusion 750 mg     venlafaxine (EFFEXOR XR) 24 hr capsule 300 mg   NO opioids required in past 24h             Physical Exam:   Vital Signs: Blood pressure 109/60, pulse 80, temperature 99  F (37.2  C), temperature source Oral, resp. rate 18, height 1.549 m (5' 1\"), weight 68.1 kg (150 lb 3.2 oz), SpO2 95 %.   GENERAL: " alert and oriented, sitting up in bed  SKIN: Warm and dry   HEENT: Normocephalic, anicteric sclera, moist mucous membranes  LUNGS: non-labored  ABDOMINAL: soft, non distended, non tender  EXTREMITIES: +bilateral LE edema             Data Reviewed:     Reviewed recent pertinent imaging:   No new imaging    Reviewed recent labs:   No new labs     CHARLA Warner [facility], Palliative Care  Dept phone: [phone]  Securely message with the Vocera Web Console

## 2022-09-20 NOTE — PROGRESS NOTES
"Care Management Follow Up    Length of Stay (days): 12    Expected Discharge Date: 09/20/2022     Concerns to be Addressed: Home hospice set up      Patient plan of care discussed at interdisciplinary rounds: Yes    Anticipated Discharge Disposition: Transitional Care     Anticipated Discharge Services:  Home Hospice  Anticipated Discharge DME:  TBD    Education Provided on the Discharge Plan:  Per Care Team   Patient/Family in Agreement with the Plan:  Yes    Referrals Placed by CM/SW: Hospice    Private pay costs discussed: Not applicable    Additional Information:  Chart reviewed.    Heather Hernández through palliative PA reached out to writer to send a referral for [facility].    Per [facility] hospice, the family's wishes is for the patient to return home with [facility]. Hospice gave the family a list of in home PCAs, to help care for the patient 24/7. The son will be looking into that today.    RNCM sent referrals to [facility] this morning.     VM left on CM phone from Mirta at [facility], to Heather stating,  \" I reached out to POA, and left a voicemail, haven't heard back yet, but ready to accept the patient, and just need to know discharge date.\"    Mirta's # [phone]     Gave Heather updated this AM.     RNCM reached out to Mirta at [facility], she is going to look into some places that may provide that 24/7 care, and get back to writer.     1:56 pm- [facility] able to start svcs for PCA on Thurs, family is agreeable.       CM will continue to follow plan of care, review recommendations, and assist with any discharge needs anticipated.        Camelia Alonso RN        "

## 2022-09-20 NOTE — PROGRESS NOTES
[facility] Progress Note - Hospitalist Service    Date of Admission:  9/8/2022    Assessment & Plan          72 year old female with PMH of aortic stenosis, s/p TAVR 09/21, HTN, s/p AAA repair, tobacco use disorder, diastolic CHF, HTN, known right-sided pleural effusion, alcohol use disorder, admitted on 9/8/2022.     #Severe sepsis due to high grade MRSA bacteremia and prosthetic aortic valve/mitral valve endocarditis  -- BC x2 on 9/8-9/13 remain positive for MRSA bacteremia.  TTE no obvious vegetation.  PET CT (9/12) showed moderate uptake about TAVR and moderate uptake about dense mitral annulus calcification, suspicious for underlying endocarditis. EMILY (9/14/22)showed 0.7x0.3 mobile vegetation associated with the aortic valve prosthesis best visualized in the left ventricular outflow tract. There is  suspected abscess associated with the aortic valve in the 2-3 o'clock in short  Axis, 1.3x0.8 mobile vegetation associated with the posterior mitral  valve leaflet (atrial aspect of valve). There is a superimposed 1.3 cm mobile  linear strand associated with the vegetation. In addition there is a 1.1 x 0.5  cm vegetation associated with the anterior mitral valve leaflet.  -- Patient in the process of transitioning to comfort care only.  -- IV vancomycin for 6 weeks, p.o. rifampin, 2 weeks of gentamicin if tolerated.  -- Risk for renal toxicity, ototoxicity. She has baseline hearing loss, wears hearing aides. Rifampin can reduce levels of lipitor, lisinopril, omeprazole, synthroid.   -- Has 2 functioning PIV's.  Hold on PICC unless we run out of peripheral IV options, due to ongoing bacteremia. If lose all IV access and unable to place PIV then as last resort PICC will need to be placed.  -- CV surgery consulted and very high operative mortality to repair and in agreement with focus on more palliative approach.  Family requested Dr. Rollins return to re-discuss options now that  patient starting to be more clear. He will revisit 9/16.  -- MRI brain to r/o septic emboli ordered and showed signs of septic emboli.  -- Patient is DNR/DNI.  -- ID is following  -- Cardiology is following  -- Prognosis remains poor; further discussed palliative care with patient with family     #Acute toxic/metabolic encephalopathy (improved)  -- Due to benzodiazepine/opiate induced respiratory depression and acute on chronic hypercarbic respiratory acidosis.  NH3 normal.   -- MRI brain   -- Stop valium, avoid opiates.  -- Tylenol daily PRN and Tylenol TID     #Cirrhosis  -- Suspect EtOH mediated. Per CT abdomen/pelvis imaging.  -- LFTs normal. Not decompensated. No significant ascites.       Diet: Regular Diet Adult  Snacks/Supplements Adult: Magic Cup; With Meals    DVT Prophylaxis: DOAC  Reynoso Catheter: Not present  Central Lines: None  Cardiac Monitoring: None  Code Status: No CPR- Do NOT Intubate      Disposition Plan      Expected Discharge Date: 09/20/2022    Discharge Delays: IV Medication - consider oral or Home Infusion  Procedure Pending (enter procedure & time in comments)    Discharge Comments: home w/ hospice and PCA VS TCU        The patient's care was discussed with the Bedside Nurse, Care Coordinator/, Patient and Patient's Family.    Mando Colin DO  Hospitalist Service  [facility]  Securely message with the Vocera Web Console (learn more here)  Text page via [employer] Paging/Directory         Clinically Significant Risk Factors Present on Admission                      ______________________________________________________________________    Interval History   Patient reports feeling OK today.  Patient does not report any acute pain.  Patient reports back discomfort is minimal.  Patient reports drinking a lot of fluids.  Patient reports minimal appetite.    Data reviewed today: I reviewed all medications, new labs and imaging results over the last 24 hours. I  personally reviewed no images or EKG's today.    Physical Exam   Vital Signs: Temp: 98.8  F (37.1  C) Temp src: Oral BP: 107/68 Pulse: 79   Resp: 16 SpO2: 97 % O2 Device: Nasal cannula with humidification Oxygen Delivery: 2 LPM  Weight: 150 lbs 3.2 oz     GENERAL: Alert and oriented x 3; no acute distress; well-nourished.  HEENT: Normocephalic; atraumatic; PERRLA; MMM.  CV: RRR; normal S1, S2; (+) murmur.  RESP: Lung fields clear to aucultation B/L; no wheezing or crepitations.  GI: Abdomen is soft, nontender, nondistended; no organomegaly; normal bowel sounds.  : Deferred genital examination.   MSK: No clubbing, cyanosis, or edema.  DERM: Skin is intact; no rash, lesions, or skin breakdown; jaundice.  NEURO: No focal deficits appreciated; strength & sensorium are grossly intact.  PSYCH: No active hallucinations; affect, insight appear within normal limits.    Data   Recent Labs   Lab 09/19/22  0436 09/18/22  0550 09/17/22  0451 09/16/22  0442 09/15/22  0452   WBC 9.7 12.5*  --   --   --    HGB 9.2* 9.4*  --  9.7* 9.5*   MCV 95 94  --   --   --    * 114*  --   --  51*   * 130*  --   --  133*   POTASSIUM 3.9 3.9 3.9 3.8 3.6   CHLORIDE 99 97*  --   --   --    CO2 27 25  --   --   --    BUN 14 15  --   --   --    CR 0.75 0.74 0.71 0.76 0.80   ANIONGAP 7 8  --   --   --    YENI 7.8* 7.9*  --   --   --     108  --   --   --    ALBUMIN 1.9* 2.0*  --   --   --    PROTTOTAL 5.6* 6.0  --   --   --    BILITOTAL 2.8* 3.6*  --   --   --    ALKPHOS 129* 131*  --   --   --    ALT <9 11  --   --   --    AST 21 24  --   --   --      No results found for this or any previous visit (from the past 24 hour(s)).  Medications       acetaminophen  975 mg Oral Q8H     amiodarone  200 mg Oral Daily     apixaban ANTICOAGULANT  5 mg Oral BID     diclofenac  2 g Topical 4x Daily     famotidine  20 mg Oral BID     folic acid  1 mg Oral Daily     gentamicin  80 mg Intravenous Q24H     levothyroxine  88 mcg Oral QAM AC      lidocaine  2 patch Transdermal Q24H     lidocaine   Transdermal Q8H ISDIRO     multivitamin w/minerals  1 tablet Oral Daily     oxyCODONE  5 mg Oral Q6H     potassium chloride  10 mEq Intravenous Once     sennosides  1 tablet Oral BID     sodium chloride (PF)  3 mL Intracatheter Q8H     [Held by provider] torsemide  20 mg Oral Daily     vancomycin  750 mg Intravenous Q12H     venlafaxine  300 mg Oral Daily

## 2022-09-20 NOTE — PLAN OF CARE
Problem: Plan of Care - These are the overarching goals to be used throughout the patient stay.    Goal: Optimal Comfort and Wellbeing  Outcome: Ongoing, Progressing  Intervention: Monitor Pain and Promote Comfort  Recent Flowsheet Documentation  Taken 9/20/2022 0444 by Allyson Wilburn RN  Pain Management Interventions: medication (see MAR)     Problem: Risk for Delirium  Goal: Improved Sleep  Outcome: Ongoing, Progressing     Problem: Adjustment to Illness (Sepsis/Septic Shock)  Goal: Optimal Coping  Outcome: Ongoing, Progressing   Goal Outcome Evaluation:    Pt restful over noc. Minimal pain. IV Abx per md order. Repeat positive blood culture resulted over noc, md updated. Continue IV Abx per md order. Falls precautions in place.

## 2022-09-20 NOTE — PROGRESS NOTES
CLINICAL NUTRITION SERVICES NOTE    Per chart, GOC is for pt discharge home on hospice.  Will sign off, RD available if consulted.

## 2022-09-21 NOTE — PROGRESS NOTES
"Addendum:  - Discussed switching to po antibiotics with patient and family. They would prefer to continue IV antibiotics. Katherine is willing to have labs drawn at least once more for monitoring while on IV antibiotics.    Discussed with Dr. Sophia MONTIEL [facility]  Palliative Care Daily Progress & Family Meeting Note       Recommendations & Counseling     Family now planning for hospice in a facility due to difficulty of finding 24/7 care at home, Katherine is amenable.  Katherine's pain is improved on scheduled oxycodone. Would discontinue additional sublingual morphine. If Katherine is not tolerating oxy tablets can switch regimen to sublingual morphine only.  Katherine has been declining bowel regimen, reinforced importance of bowel regimen while on opioids. Plan for suppository today.    Summary of family meeting 9/19:  - Katherine defers further goals of care conversations to her children, Caitie and Tera. She finds repeated discussion of her prognosis traumatic, requests that providers do not continue to bring up during visits.  - Start transition to comfort-focused care. Family would like to continue current medications at this time (including antibiotics). Stop all lab draws/fingersticks  - Hospice consult placed  - Given Katherine's expressed wishes for \"more time,\" reasonable to continue antibiotics. ID provided recommendation for bactrim on discharge.    Goals of Care: life-prolonging transitioning to comfort-focused    Advanced Care Planning:  Advance directive: Yes, on file  POLST: No - complete before discharge  Code status: DNR/DNI  Health care agent: Caitie (daughter) supported by Tera (son)    Symptom Management:  #Pain, \"head to toe\" but low back is worst  - Received total of 20mg oxycodone and 5mg morphine in past 24h: total 30mg OME  - Continue scheduled tylenol, voltaren gel and lidocaine patch  - Continue scheduled and prn oxycodone  - Stop prn sublingual morphine    #Constipation  - Continue " senna bid for constipation  - Reinforced importance of taking senna due to opioid regimen, patient and family expressed understanding  - Bisacodyl suppository today    #Hallucinations, anxiety  - Increase ativan to 0.5mg q4h prn anxiety or hallucinations  - If not successful, can try zyprexa 2.5-5mg  - Continue home venlafaxine    Psychosocial & Spiritual:   Appreciate Palliative SW and Spiritual Care support      Assessments          Maren is a 72 year old female with PMH of aortic stenosis s/p TAVR 09/21, HTN, s/p AAA repair, HFpEF, HTN, known right-sided pleural effusion, and alcohol use disorder, admitted on 9/8/2022 after a fall. 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 Also with acute on chronic respiratory failure due to R pleural effusion, s/p thoracentesis and chest tube (removed 9/10), complicated by R pneumothorax (now resolved).   - s/p EMILY-directed cardioversion 9/14 for Afib  - MR brain 9/16 with punctate embolic infarcts consistent with septic emboli  - Blood cultures remain positive  - Family meeting 9/19 with plan for transition to comfort-focused care, home hospice    Today, the patient was seen for:  Symptom management    Prognosis, Goals, or Advance Care Planning was addressed today with: Yes.  Mood, coping, and/or meaning in the context of serious illness were addressed today: Yes.              Interval History:     Chart review/discussion with unit or clinical team members:   - No labs today per family request, transitioning to comfort measures  - Hospitalist team started sublingual morphine 5mg q2h prn  - Patient has been declining senna, no BM documented    Per patient or family/caregivers today:    Katherine is accompanied by her son Tera at bedside today. Katherine is feeling ok today. Her pain is better controlled with standing oxycodone. She has been sleeping well.     Katherine notes ongoing anxiety about  "\"seeing spots\" or \"bugs,\" which has been going on since admission. She is aware that these are hallucinations and/or visual changes due strokes. RN administered prn ativan about 30 minutes prior to my visit, no improvement per patient.     Tera notes that family has been looking into options for 24/7 PCA at home and have been unable to find an agency with adequate staffing. They would now like to pursue hospice at a facility, which Katherine is amenable to. I updated CM who will follow up with family today.    Key Palliative Symptoms:  # Pain severity the last 12 hours: moderate  # Dyspnea severity the last 12 hours: none  # Nausea severity the last 12 hours: none  # Anxiety severity the last 12 hours: not assessed             Review of Systems:     Besides above, an additional 3 system ROS was reviewed and is unremarkable          Medications:     I have reviewed this patient's medication profile and medications during this hospitalization.    Noted meds:    Current Facility-Administered Medications   Medication     acetaminophen (TYLENOL) tablet 975 mg     acetaminophen (TYLENOL) tablet 975 mg     amiodarone (PACERONE) tablet 200 mg     apixaban ANTICOAGULANT (ELIQUIS) tablet 5 mg     diclofenac (VOLTAREN) 1 % topical gel 2 g     famotidine (PEPCID) tablet 20 mg     flumazenil (ROMAZICON) injection 0.2 mg     folic acid (FOLVITE) tablet 1 mg     gentamicin (GARAMYCIN) 80 mg in sodium chloride 0.9 % 50 mL intermittent infusion     OLANZapine zydis (zyPREXA) ODT tab 5-10 mg    Or     haloperidol lactate (HALDOL) injection 2.5-5 mg     levothyroxine (SYNTHROID/LEVOTHROID) tablet 88 mcg     Lidocaine (LIDOCARE) 4 % Patch 2 patch     lidocaine (LMX4) cream     lidocaine 1 % 0.1-1 mL     lidocaine patch in PLACE     LORazepam (ATIVAN) tablet 0.5 mg     melatonin tablet 5 mg     morphine sulfate (ROXANOL) 20 mg/mL (HIGH CONC) soln 5 mg     multivitamin w/minerals (THERA-VIT-M) tablet 1 tablet     naloxone (NARCAN) " "injection 0.2 mg    Or     naloxone (NARCAN) injection 0.4 mg    Or     naloxone (NARCAN) injection 0.2 mg    Or     naloxone (NARCAN) injection 0.4 mg     ondansetron (ZOFRAN ODT) ODT tab 4 mg    Or     ondansetron (ZOFRAN) injection 4 mg     oxyCODONE (ROXICODONE) tablet 5 mg     oxyCODONE (ROXICODONE) tablet 5 mg     prochlorperazine (COMPAZINE) injection 5 mg    Or     prochlorperazine (COMPAZINE) tablet 5 mg    Or     prochlorperazine (COMPAZINE) suppository 12.5 mg     senna-docusate (SENOKOT-S/PERICOLACE) 8.6-50 MG per tablet 1 tablet    Or     senna-docusate (SENOKOT-S/PERICOLACE) 8.6-50 MG per tablet 2 tablet     sennosides (SENOKOT) tablet 1 tablet     sodium chloride (PF) 0.9% PF flush 3 mL     sodium chloride (PF) 0.9% PF flush 3 mL     sodium chloride (PF) 0.9% PF flush 3 mL     [Held by provider] torsemide (DEMADEX) tablet 20 mg     vancomycin 750 mg in 0.9% NaCl 250 mL intermittent infusion 750 mg     venlafaxine (EFFEXOR XR) 24 hr capsule 300 mg                Physical Exam:   Vital Signs: Blood pressure 122/59, pulse 78, temperature 98.8  F (37.1  C), temperature source Oral, resp. rate 20, height 1.549 m (5' 1\"), weight 71.1 kg (156 lb 11.2 oz), SpO2 99 %.   GENERAL: alert and oriented, sitting up in bed  SKIN: Warm and dry   HEENT: Normocephalic, anicteric sclera, moist mucous membranes  LUNGS: non-labored  ABDOMINAL: soft, non distended, non tender  EXTREMITIES: +bilateral LE edema             Data Reviewed:     Reviewed recent pertinent imaging:   No new imaging    Reviewed recent labs:   No new labs     Heather Hernández PA-C  [facility], Palliative Care  Dept phone: [phone]  Securely message with the Vocera Web Console  "

## 2022-09-21 NOTE — PROGRESS NOTES
[facility] [place]      ASSESSMENT:    Active Problems:    Hydropneumothorax    Anasarca    Pleural effusion    Other ascites    Severe sepsis (H)    Renal failure, unspecified chronicity    Gram-positive bacteremia      PLAN:   72 year old female with PMH of aortic stenosis, s/p TAVR 09/21, HTN, s/p AAA repair, tobacco use disorder, diastolic CHF, HTN, known right-sided pleural effusion, alcohol use disorder, admitted on 9/8/2022 with severe sepsis.  Now transitioning to hospice cares after discharge     #Severe sepsis due to high grade MRSA bacteremia and prosthetic aortic valve/mitral valve endocarditis  -- BC x2 on 9/8-9/13 remain positive for MRSA bacteremia.  TTE no obvious vegetation.  PET CT (9/12) showed moderate uptake about TAVR and moderate uptake about dense mitral annulus calcification, suspicious for underlying endocarditis. EMILY (9/14/22)showed 0.7x0.3 mobile vegetation associated with the aortic valve prosthesis best visualized in the left ventricular outflow tract. There is  suspected abscess associated with the aortic valve in the 2-3 o'clock in short  Axis, 1.3x0.8 mobile vegetation associated with the posterior mitral  valve leaflet (atrial aspect of valve). There is a superimposed 1.3 cm mobile  linear strand associated with the vegetation. In addition there is a 1.1 x 0.5  cm vegetation associated with the anterior mitral valve leaflet.  CV surgery consulted and very high operative mortality to repair and in agreement with focus on more palliative approach.     -- MRI brain to r/o septic emboli ordered and showed signs of septic emboli.  -- Patient is DNR/DNI.  -- Prognosis remains poor; transitioning to comfort cares but will keep on IV vancomycin and gentamycin while inpatient   -- can consider indefinite bactrim after discharge, see ID note      #Acute toxic/metabolic encephalopathy (improved)  -- Due to benzodiazepine/opiate induced respiratory depression and acute on  "chronic hypercarbic respiratory acidosis.  NH3 normal.   -- palliative care managing comfort medications       #Cirrhosis  -- Suspect EtOH mediated. Per CT abdomen/pelvis imaging.  -- LFTs normal. Not decompensated. No significant ascites.            DVT PPX: on apixiban             Needs for Discharge: Long term care with hospice placement    ESTIMATED DISCHARGE: TBD      Fadi Cortes D.O.              -------------------------------------------------------------------------------------------------------------  SUBJECTIVE: NAD. Denies any nausea, vomiting, abdominal pain, chest pain, SOB, new swelling, fevers, chills, confusion or headache.   Had hallucinations earlier that have resolved    Exam:  /64 (BP Location: Left arm)   Pulse 71   Temp 98.5  F (36.9  C) (Oral)   Resp 20   Ht 1.549 m (5' 1\")   Wt 71.1 kg (156 lb 11.2 oz)   SpO2 100%   BMI 29.61 kg/m    General: NAD  RESPIRATORY: Breathing nonlabored  CARDIOVASCULAR: No le edema bilat.   ABDOMEN: soft and non-tender  NEUROLOGIC: Motor and sensory intact, speech clear       Diagnostics Reviewed:      No results found for this or any previous visit (from the past 24 hour(s)).    "

## 2022-09-21 NOTE — PLAN OF CARE
Problem: Risk for Delirium  Goal: Optimal Coping  Outcome: Ongoing, Progressing  Goal: Improved Behavioral Control  Outcome: Ongoing, Progressing  Goal: Improved Attention and Thought Clarity  Outcome: Ongoing, Progressing  Goal: Improved Sleep  Outcome: Ongoing, Progressing     Problem: Acute Neurologic Deterioration (Alcohol Withdrawal)  Goal: Optimal Neurologic Function  Outcome: Ongoing, Progressing   Goal Outcome Evaluation:      Pt reports seeing black bugs crawling in room.  Pt has been reluctant to eat stating the bugs can go in food.  Family request female aides.    Pt is hesitant to take stool softeners.  Pt was convinced to drink prune juice and take senna +.    Pt reports having back pain for which voltaren, lidocane, apap, and oxycodone were given.

## 2022-09-21 NOTE — PROGRESS NOTES
Brief ID Follow-up Note    Plans for transitioning to hospice noted in chart.    No additional recommendations.    ID will sign-off. Call with questions.    Sarkis Carballo MD  [facility]  Direct messaging: [place] [facility]  On-Call ID provider: [phone], option: 9

## 2022-09-21 NOTE — PLAN OF CARE
"  Problem: Adjustment to Illness (Sepsis/Septic Shock)  Goal: Optimal Coping  9/21/2022 0539 by Terell Esteves RN  Outcome: Ongoing, Not Progressing  9/21/2022 0538 by Terell Esteves RN  Outcome: Ongoing, Progressing     Problem: Infection Progression (Sepsis/Septic Shock)  Goal: Absence of Infection Signs and Symptoms  9/21/2022 0539 by Terell Esteves RN  Outcome: Ongoing, Not Progressing  9/21/2022 0538 by Terell Esteves RN  Outcome: Ongoing, Progressing  Intervention: Initiate Sepsis Management  Recent Flowsheet Documentation  Taken 9/21/2022 0000 by Terell Esteves RN  Isolation Precautions: contact precautions maintained  Taken 9/20/2022 2000 by Terell Esteves RN  Isolation Precautions: contact precautions maintained  Intervention: Promote Recovery  Recent Flowsheet Documentation  Taken 9/21/2022 0000 by Terell Esteves RN  Activity Management:    activity adjusted per tolerance    activity encouraged    up in chair  Taken 9/20/2022 2000 by Terell Esteves RN  Activity Management:    activity adjusted per tolerance    activity encouraged    up in chair   Goal Outcome Evaluation:    Cardiac:  No telemetry, med/surg.  Respiratory:  12-18, irregular and shallow.  Sat's: Dropping to mid-high 80's at RA.  Supplemental O2, NC at 2l initiated.  Maintaining mid 90's with 2 L NC.  LS: Diminished in the bases, bilat.  Neuro:  WNL.  GI:  Passing flatus.  BS: Present X4 quadrants.    :  Adaquate UOP (see I/O).  Purewick: urine kirsten and cloudy.  Pain:  C/o elevated pain during HS.  However, c/o seeing \"spots\" (hallucinations) and citing feelings of confusion.  Also, observed sleeping \"restfully\" during NOC.  Schedule Oxycodone and Tylenol held due to pt being asleep and concern regarding confusion/hallucination.  House ([facility]) notified via text msg.  Ambulation:  Reposition Q 2 hrs and PRN.  Labs:  AM labs pending.  Plan:  Pending discharge to " hospice care.

## 2022-09-21 NOTE — PROGRESS NOTES
Care Management Follow Up    Length of Stay (days): 13    Expected Discharge Date: 09/22/2022     Concerns to be Addressed:       Patient plan of care discussed at interdisciplinary rounds: Yes    Anticipated Discharge Disposition: Long Term Care, Hospice (Pt/family first prefences are [facility] and [facility]. Referrals sent to see about LTC availability.)     Anticipated Discharge Services:    Anticipated Discharge DME:      Referrals Placed by CM/SW:    Private pay costs discussed: Not applicable    Additional Information:    Seeking LTC placement w/ [facility] hospice to following.     1445: RACHEL spoke with son Tera to update him that [facility] and [facility] currently have no beds open. Agreeable to referral to  [facility]; Referral sent. Son to speak with his sister regarding more preferences. CM to f/u with [facility] tomorrow.       1030: RACHEL met in pt room and spoke primarily with son Trea. Pt/family preference now for LTC. Hospice house list provided. [facility] explained - Parties discussed various options and pt/family will continue to review. [place] A few primary referral preferences sent to LTC facilities to review openings available. CM following.       FERNANDO Adkins

## 2022-09-21 NOTE — PROGRESS NOTES
"[facility]   Social Work Note    Summary of visit  Met with Katherine, dtr Caitie and son Tera. Katherine was tearful and said she is \"scared.\" She is scared \"thinking about what could happen when her kids aren't with her.\" She has had \"bad dreams\" at night that she keeps thinking about. She reported feeling very anxious and wanted medication. RN aware. Offered warm blanket wash cloth and Katherine appeared to like both. She said \"thank you\" several times.    Talked with Caitie and Tera re change of discharge plan as they weren't able to find 24/7 care at home. They are looking at LTC w hospice or a hospice home. Collaborated with RACHEL Gant and Heather Hernández, Palliative Care PA-C.     Family feels supported and did not have any additional questions.     Clinical Interventions  Calm, reassuring presence  Active listening and validation of feelings   Assisted with processing of feelings   Provided emotional support    Plan  PC SW will continue to be available to provide psychosocial/emotional support to Katherine and her family.    Silva Myers, [facility]  Palliative Care   Office # [phone]  "

## 2022-09-22 NOTE — PHARMACY-VANCOMYCIN DOSING SERVICE
Pharmacy Vancomycin Note  Date of Service 2022  Patient's  1950   72 year old, female    Indication: Endocarditis  Day of Therapy: 15  Current vancomycin regimen:  750 mg IV q12h  Current vancomycin monitoring method: AUC  Current vancomycin therapeutic monitoring goal: 400-600 mg*h/L    Insight[employer] Prediction of Current Vancomycin Regimen  Regimen: 750 mg IV every 12 hours.  Start time: 10:28 on 2022  Exposure target: AUC24 (range)400-600 mg/L.hr   AUC24,ss: 742 mg/L.hr  Probability of AUC24 > 400: 100 %  Ctrough,ss: 26.5 mg/L  Probability of Ctrough,ss > 20: 98 %  Probability of nephrotoxicity (Lodise HEATHER ): 32 %    Current estimated CrCl = Estimated Creatinine Clearance: 32.7 mL/min (A) (based on SCr of 1.34 mg/dL (H)).    Creatinine for last 3 days  2022:  5:07 AM Creatinine 1.34 mg/dL    Recent Vancomycin Levels (past 3 days)  No results found for requested labs within last 72 hours.    Vancomycin IV Administrations (past 72 hours)                   vancomycin 750 mg in 0.9% NaCl 250 mL intermittent infusion 750 mg (mg) 750 mg Given 22     750 mg Given  00     750 mg Given 22     750 mg Given  0900     750 mg Given 22     750 mg Given  09                Nephrotoxins and other renal medications (From now, onward)    Start     Dose/Rate Route Frequency Ordered Stop    22  vancomycin 750 mg in 0.9% NaCl 250 mL intermittent infusion 750 mg         750 mg  over 60 Minutes Intravenous EVERY 24 HOURS 22 0848      22 0852  gentamicin (GARAMYCIN) place alvarado - receiving intermittent dosing         1 each Intravenous SEE ADMIN INSTRUCTIONS 22 0852      22 1300  [Held by provider]  gentamicin (GARAMYCIN) 80 mg in sodium chloride 0.9 % 50 mL intermittent infusion        (Held by provider since Thu 2022 at 0840 by Sarkis Carballo MD.Hold Reason: Acute Kidney Injury.)    80 mg  over 60 Minutes Intravenous EVERY  24 HOURS 09/20/22 1122               Contrast Orders - past 72 hours (72h ago, onward)    None          Interpretation of levels and current regimen:  No level today, Adjusting dose based on Scr increase, when Scr was similar around 9/10/22 a dose of 1000 mg q24h was too high, pt was changed to 750 mg q24h at that time then Scr improved.  750 mg IV q24h suggests  but unclear which direction Scr is going and with gent also on may see additional Scr increase.  Getting a level this evening before next dose to confirm level as predicted ~14 so would be ok for dosing to resume this evening.    Plan:  1. 750 mg IV q24h ordered for now but level to be checked first.  2. Vancomycin monitoring method: AUC  3. Vancomycin therapeutic monitoring goal: 400-600 mg*h/L  4. Pharmacy will check vancomycin levels as appropriate in 1-3 Days.  5. Serum creatinine levels will be ordered - as needed, comfort but continues on vanco and gent requiring Scr and vanco/gent levels.    Jaclyn Stanley, H

## 2022-09-22 NOTE — PROGRESS NOTES
Care Management Follow Up    Length of Stay (days): 14    Expected Discharge Date: 09/22/2022     Concerns to be Addressed:  LTC placement      Patient plan of care discussed at interdisciplinary rounds: Yes    Anticipated Discharge Disposition: Hospice, Long Term Care     Anticipated Discharge Services:  Hospice, LTC  Anticipated Discharge DME:  TBD    Education Provided on the Discharge Plan: Per Care team    Patient/Family in Agreement with the Plan:  Yes    Referrals Placed by CM/SW:  LTC  Private pay costs discussed: Not applicable    Additional Information:  Chart reviewed.    RNCM reached out to daughter Caitie, about LTC facilities. She lives in [place], and other sister lives in [place]. She was agreeable to try [facility], [place], and [place] [facility].    RNCM sent referrals pending.    RNCM reached out to [facility], they may have a bed open, but still looking at referrals prior to this patients. Admissions said they would get back to writer early this afternoon.      CM will continue to follow plan of care, review recommendations, and assist with any discharge needs anticipated.       Camelia Alonso RN

## 2022-09-22 NOTE — PROGRESS NOTES
As part of Palliative Care Team,  attempted to visit pt for follow up.  Katherine was sleeping soundly, chose not to disturb her.    Spiritual Care is available as requested or needed.    DINESH Drake.

## 2022-09-22 NOTE — PROGRESS NOTES
[facility]    Medicine Progress Note - Hospitalist Service    Date of Admission:  9/8/2022    Assessment & Plan          72 year old female with PMH of aortic stenosis, s/p TAVR 09/21, HTN, s/p AAA repair, tobacco use disorder, diastolic CHF, HTN, known right-sided pleural effusion, alcohol use disorder, admitted on 9/8/2022 with severe sepsis. Now transitioning to hospice cares after discharge.    #Severe sepsis due to high grade MRSA bacteremia and prosthetic aortic valve/mitral valve endocarditis  -- BC x2 on 9/8-9/13 remain positive for MRSA bacteremia.  TTE no obvious vegetation.  PET CT (9/12) showed moderate uptake about TAVR and moderate uptake about dense mitral annulus calcification, suspicious for underlying endocarditis. EMILY (9/14/22)showed 0.7x0.3 mobile vegetation associated with the aortic valve prosthesis best visualized in the left ventricular outflow tract. There is  suspected abscess associated with the aortic valve in the 2-3 o'clock in short  Axis, 1.3x0.8 mobile vegetation associated with the posterior mitral  valve leaflet (atrial aspect of valve). There is a superimposed 1.3 cm mobile  linear strand associated with the vegetation. In addition there is a 1.1 x 0.5  cm vegetation associated with the anterior mitral valve leaflet.  CV surgery consulted and very high operative mortality to repair and in agreement with focus on more palliative approach.     -- MRI brain to r/o septic emboli ordered and showed signs of septic emboli.  -- Patient is DNR/DNI.  -- Prognosis remains poor; transitioning to comfort cares but will keep on IV vancomycin and gentamycin while inpatient   -- Can consider indefinite bactrim after discharge, see ID note    #Anorexia  -- Will add Marinol 2.5 mg PO BID for appetitive stimulation  -- Patient and family are fully amenable  -- Will likely also address pain, nausea, vomiting     #Acute toxic/metabolic encephalopathy (improved)  -- Due to  benzodiazepine/opiate induced respiratory depression and acute on chronic hypercarbic respiratory acidosis.  NH3 normal.   -- Palliative care assisting greatly     #Cirrhosis  -- Suspect EtOH mediated. Per CT abdomen/pelvis imaging.  -- LFTs normal. Not decompensated. No significant ascites.       Diet: Regular Diet Adult  Snacks/Supplements Adult: Magic Cup; With Meals    DVT Prophylaxis: DOAC  Reynoso Catheter: Not present  Central Lines: None  Cardiac Monitoring: None  Code Status: No CPR- Do NOT Intubate      Disposition Plan      Expected Discharge Date: 09/23/2022    Discharge Delays: Placement - LTC    Discharge Comments: comfort cares  LTC w/ hospice  needs LTC placement        The patient's care was discussed with the Bedside Nurse, Patient, Patient's Family and Palliative Care Consultant.    Mando Colin DO  Hospitalist Service  [facility]  Securely message with the Vocera Web Console (learn more here)  Text page via [employer] Paging/Directory         Clinically Significant Risk Factors Present on Admission                      ______________________________________________________________________    Interval History   Patient reports feeling OK.  Patient denies acute pain at present.  Patient reports drinking a lot of fluids.  Patient reports only minimal appetitive for food.  Discussed synthetic THC with patient and family.  They are highly amenable.    Data reviewed today: I reviewed all medications, new labs and imaging results over the last 24 hours. I personally reviewed no images or EKG's today.    Physical Exam   Vital Signs: Temp: 98.9  F (37.2  C) Temp src: Oral BP: 131/65 Pulse: 78   Resp: 18 SpO2: 99 % O2 Device: Nasal cannula Oxygen Delivery: 2 LPM  Weight: 152 lbs 0 oz     GENERAL: Alert and oriented x 3; no acute distress; well-nourished.  HEENT: Normocephalic; atraumatic; PERRLA; MMM; scleral icterus.  CV: RRR; normal S1, S2; no rubs, murmurs, or gallops.  RESP: Lung  fields clear to aucultation B/L; no wheezing or crepitations.  GI: Abdomen is soft, nontender, nondistended; no organomegaly; normal bowel sounds.  : Deferred genital examination.   MSK: Trace B/L LE edema.  DERM: Skin is intact; no rash, lesions, or skin breakdown; jaundice.  NEURO: No focal deficits appreciated; cognition is fluctuant.  PSYCH: No active hallucinations; affect, insight appear within normal limits.    Data   Recent Labs   Lab 09/22/22  0507 09/19/22  0436 09/18/22  0550   WBC 14.1* 9.7 12.5*   HGB 8.8* 9.2* 9.4*   MCV 99 95 94    130* 114*   * 133* 130*   POTASSIUM 4.1 3.9 3.9   CHLORIDE 99 99 97*   CO2 24 27 25   BUN 16.5 14 15   CR 1.34* 0.75 0.74   ANIONGAP 10 7 8   YENI 8.0* 7.8* 7.9*   GLC 98 110 108   ALBUMIN 2.2* 1.9* 2.0*   PROTTOTAL 6.3* 5.6* 6.0   BILITOTAL 1.0 2.8* 3.6*   ALKPHOS 140* 129* 131*   ALT 12 <9 11   AST 39* 21 24     No results found for this or any previous visit (from the past 24 hour(s)).  Medications       acetaminophen  975 mg Oral Q8H     amiodarone  200 mg Oral Daily     apixaban ANTICOAGULANT  5 mg Oral BID     diclofenac  2 g Topical 4x Daily     dronabinol  2.5 mg Oral BID     famotidine  20 mg Oral BID     [Held by provider] gentamicin  80 mg Intravenous Q24H     gentamicin (GARAMYCIN) place alvarado - receiving intermittent dosing  1 each Intravenous See Admin Instructions     haloperidol  1 mg Oral Q8H     levothyroxine  88 mcg Oral QAM AC     lidocaine  2 patch Transdermal Q24H     lidocaine   Transdermal Q8H ISIDRO     oxyCODONE  5 mg Oral Q6H     polyethylene glycol  17 g Oral Daily     sennosides  1 tablet Oral BID     sodium chloride (PF)  3 mL Intracatheter Q8H     vancomycin  750 mg Intravenous Q24H     venlafaxine  300 mg Oral Daily

## 2022-09-22 NOTE — PROGRESS NOTES
"[facility]  Palliative Care Daily Progress & Family Meeting Note       Recommendations & Counseling     Katherine's pain is controlled on current regimen. Hallucinations and anxiety persist, recommend trial haldol tid today. Katherine has ongoing constipation, declined suppository yesterday and today. Agreeable to miralax today.    Summary of family meeting 9/19:  - Katherine defers further goals of care conversations to her children, Caitie and Tera. She finds repeated discussion of her prognosis traumatic, requests that providers do not continue to bring up during visits.  - Given Katherine's expressed wishes for \"more time,\" reasonable to continue antibiotics. ID provided recommendation for bactrim on discharge.    Goals of Care: life-prolonging transitioning to comfort-focused    Advanced Care Planning:  Advance directive: Yes, on file  POLST: No - complete before discharge  Code status: DNR/DNI  Health care agent: Caitie (daughter) supported by Tera (son)    Symptom Management:  #Hallucinations, anxiety  - Start haldol 1mg tid  - Stopped ativan  - Continue home venlafaxine    #Constipation  - Continue senna bid for constipation  - Added daily miralax  - Patient declined suppository yesterday    #Pain, \"head to toe\" but low back is worst  - Received total of 20mg oxycodone and 5mg morphine in past 24h: total 30mg OME  - Continue scheduled tylenol, voltaren gel and lidocaine patch  - Continue scheduled and prn oxycodone    Psychosocial & Spiritual:   Appreciate Palliative SW and Spiritual Care support      Assessments          Maren is a 72 year old female with PMH of aortic stenosis s/p TAVR 09/21, HTN, s/p AAA repair, HFpEF, HTN, known right-sided pleural effusion, and alcohol use disorder, admitted on 9/8/2022 after a fall. 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 Also with acute on chronic respiratory failure due " "to R pleural effusion, s/p thoracentesis and chest tube (removed 9/10), complicated by R pneumothorax (now resolved).   - s/p EMILY-directed cardioversion 9/14 for Afib  - MR brain 9/16 with punctate embolic infarcts consistent with septic emboli  - Blood cultures remain positive  - Family meeting 9/19 with plan for transition to comfort-focused care, home hospice    Today, the patient was seen for:  Symptom management    Prognosis, Goals, or Advance Care Planning was addressed today with: Yes.  Mood, coping, and/or meaning in the context of serious illness were addressed today: Yes.              Interval History:     Chart review/discussion with unit or clinical team members:   - Labs this AM per patient/family as they want to continue IV antibiotics while here  - New SKYLAR  - No BM documented    Per patient or family/caregivers today:  Katherine is feeling ok today. She is accompanied by her son Tera. She continues to complain of seeing bugs, \"big ones,\" which are disturbing to her. She mentions \"that man who comes in the room at night\" which also a source of anxiety for her, yesterday requested for female aids only. Katherine is amenable to trying a different medication for her halluncinations today.    Katherine's pain is controlled, no complaints of pain during my visit. She is off oxygen and denies any shortness of breath. She still has not had a bowel movement despite senna and prune juice yesterday. She declined suppository yesterday, does not want today either. Kathreine is ok with trying miralax today. No nausea, still passing gas.     Key Palliative Symptoms:  # Pain severity the last 12 hours: low  # Dyspnea severity the last 12 hours: none  # Nausea severity the last 12 hours: none  # Anxiety severity the last 12 hours: moderate             Review of Systems:     Besides above, an additional 3 system ROS was reviewed and is unremarkable          Medications:     I have reviewed this patient's medication profile and " medications during this hospitalization.    Noted meds:    Current Facility-Administered Medications   Medication     acetaminophen (TYLENOL) tablet 975 mg     acetaminophen (TYLENOL) tablet 975 mg     amiodarone (PACERONE) tablet 200 mg     apixaban ANTICOAGULANT (ELIQUIS) tablet 5 mg     bisacodyl (DULCOLAX) suppository 10 mg     diclofenac (VOLTAREN) 1 % topical gel 2 g     dronabinol (MARINOL) capsule 2.5 mg     famotidine (PEPCID) tablet 20 mg     flumazenil (ROMAZICON) injection 0.2 mg     [Held by provider] gentamicin (GARAMYCIN) 80 mg in sodium chloride 0.9 % 50 mL intermittent infusion     gentamicin (GARAMYCIN) place alvarado - receiving intermittent dosing     levothyroxine (SYNTHROID/LEVOTHROID) tablet 88 mcg     Lidocaine (LIDOCARE) 4 % Patch 2 patch     lidocaine (LMX4) cream     lidocaine 1 % 0.1-1 mL     lidocaine patch in PLACE     LORazepam (ATIVAN) tablet 0.5 mg     melatonin tablet 5 mg     naloxone (NARCAN) injection 0.2 mg    Or     naloxone (NARCAN) injection 0.4 mg    Or     naloxone (NARCAN) injection 0.2 mg    Or     naloxone (NARCAN) injection 0.4 mg     OLANZapine (zyPREXA) tablet 2.5-5 mg     ondansetron (ZOFRAN ODT) ODT tab 4 mg    Or     ondansetron (ZOFRAN) injection 4 mg     oxyCODONE (ROXICODONE) tablet 5 mg     oxyCODONE (ROXICODONE) tablet 5 mg     prochlorperazine (COMPAZINE) injection 5 mg    Or     prochlorperazine (COMPAZINE) tablet 5 mg    Or     prochlorperazine (COMPAZINE) suppository 12.5 mg     senna-docusate (SENOKOT-S/PERICOLACE) 8.6-50 MG per tablet 1 tablet    Or     senna-docusate (SENOKOT-S/PERICOLACE) 8.6-50 MG per tablet 2 tablet     sennosides (SENOKOT) tablet 1 tablet     sodium chloride (PF) 0.9% PF flush 3 mL     sodium chloride (PF) 0.9% PF flush 3 mL     sodium chloride (PF) 0.9% PF flush 3 mL     vancomycin 750 mg in 0.9% NaCl 250 mL intermittent infusion 750 mg     venlafaxine (EFFEXOR XR) 24 hr capsule 300 mg                Physical Exam:   Vital Signs:  "Blood pressure 131/65, pulse 78, temperature 98.9  F (37.2  C), temperature source Oral, resp. rate 18, height 1.549 m (5' 1\"), weight 68.9 kg (152 lb), SpO2 99 %.   GENERAL: alert and oriented, laying in bed  SKIN: Warm and dry   HEENT: Normocephalic, anicteric sclera, moist mucous membranes  LUNGS: non-labored  ABDOMINAL: soft, non distended, non tender. +BS  NEURO: A&Ox4  PSYCH: appropriate affect             Data Reviewed:     Reviewed recent pertinent imaging:   No new imaging    Reviewed recent labs:   CMPRecent Labs   Lab 09/22/22  0507 09/19/22  0436 09/18/22  0550   * 133* 130*   POTASSIUM 4.1 3.9 3.9   CHLORIDE 99 99 97*   CO2 24 27 25   ANIONGAP 10 7 8   GLC 98 110 108   BUN 16.5 14 15   CR 1.34* 0.75 0.74   GFRESTIMATED 41* 83 84   YENI 8.0* 7.8* 7.9*   MAG 1.9 1.9 1.8   PHOS  --  3.1 2.9   PROTTOTAL 6.3* 5.6* 6.0   ALBUMIN 2.2* 1.9* 2.0*   BILITOTAL 1.0 2.8* 3.6*   ALKPHOS 140* 129* 131*   AST 39* 21 24   ALT 12 <9 11     CBC  Recent Labs   Lab 09/22/22  0507 09/19/22  0436   WBC 14.1* 9.7   RBC 2.66* 2.82*   HGB 8.8* 9.2*   HCT 26.2* 26.7*   MCV 99 95   MCH 33.1* 32.6   MCHC 33.6 34.5   RDW 15.9* 15.3*    130*          Heather Hernández PA-C  [facility], Palliative Care  Dept phone: [phone]  Securely message with the Vocera Web Console  "

## 2022-09-22 NOTE — PLAN OF CARE
Problem: Plan of Care - These are the overarching goals to be used throughout the patient stay.    Goal: Absence of Hospital-Acquired Illness or Injury  Outcome: Met  Intervention: Identify and Manage Fall Risk  Recent Flowsheet Documentation  Taken 9/22/2022 0100 by Ricky Gilmore RN  Safety Promotion/Fall Prevention: activity supervised  Taken 9/21/2022 2030 by Ricky Gilmore RN  Safety Promotion/Fall Prevention: activity supervised  Intervention: Prevent Skin Injury  Recent Flowsheet Documentation  Taken 9/22/2022 0100 by Ricky Gilmore RN  Body Position: heels elevated  Taken 9/21/2022 2030 by Ricky Gilmore RN  Body Position: heels elevated  Intervention: Prevent and Manage VTE (Venous Thromboembolism) Risk  Recent Flowsheet Documentation  Taken 9/22/2022 0100 by Ricky Gilmore RN  Activity Management: activity adjusted per tolerance  Taken 9/21/2022 2030 by Ricky Gilmore RN  Activity Management: activity adjusted per tolerance  Goal: Optimal Comfort and Wellbeing  Outcome: Met  Intervention: Monitor Pain and Promote Comfort  Recent Flowsheet Documentation  Taken 9/22/2022 0036 by Ricky Gilmore RN  Pain Management Interventions:   rest   relaxation techniques promoted  Taken 9/22/2022 0008 by Ricky Gilmore RN  Pain Management Interventions: medication (see MAR)  Taken 9/21/2022 2200 by Ricky Gilmore RN  Pain Management Interventions: medication (see MAR)     Problem: Risk for Delirium  Goal: Optimal Coping  Outcome: Met   Goal Outcome Evaluation:        Pt. Alert and oriented. Reported seeing bugs in room that aren't there. MD is aware. Son was visiting HS. Vital signs stable. IV ABX given per MD orders. Scheduled meds given for chronic back pain. No other issues over the night. Slept in between cares. Will continue to monitor.    Ricky Gilmore RN

## 2022-09-22 NOTE — PLAN OF CARE
[id number]  Problem: Plan of Care - These are the overarching goals to be used throughout the patient stay.    Goal: Plan of Care Review/Shift Note  Description: The Plan of Care Review/Shift note should be completed every shift.  The Outcome Evaluation is a brief statement about your assessment that the patient is improving, declining, or no change.  This information will be displayed automatically on your shift note.  Outcome: Ongoing, Progressing   Goal Outcome Evaluation:      Pt wanted her hair washed. Pt had a bed bath, linens changed and shower cap hair wash.  Sat up in chair for a while. Pain was tolerable. Did see black bugs for a little while up in chair in a net. Pt was distracted with ordering a snack and did not mention black bugs after that.

## 2022-09-23 NOTE — PLAN OF CARE
"  Problem: Plan of Care - These are the overarching goals to be used throughout the patient stay.    Goal: Plan of Care Review/Shift Note  Outcome: Ongoing, Progressing  Flowsheets (Taken 9/23/2022 1347)  Plan of Care Reviewed With:   patient   family  Overall Patient Progress: improving   Goals of care: comfort. Palliative signed off, hospitalist managing symptoms. Discharge disposition: LTC on hospice, CM following.     Problem: Risk for Delirium  Goal: Improved Attention and Thought Clarity  Outcome: Ongoing, Progressing  Goal: Improved Sleep  Outcome: Ongoing, Progressing  Went a few hours this morning without hallucinations while family was visiting. Around noon, hallucinations came back, \"seeing black bugs, spots with webs connected to them.\" She reported that she knows they are not really there, but \"angry that no one else can see them.\" Confusion fluctuates but easily redirected. Daughter at bedside reports that usually after about 1630, she tends to sundown. Will report to PM RN.  PRN Zyprexa given x 1, sleeping now. Discussed with family the importance of sleep/wake regulation and to allow her to rest without frequent interruptions. Staff to bundle cares.     Problem: Pain Acute  Goal: Acceptable Pain Control and Functional Ability  Outcome: Ongoing, Progressing  Intervention: Prevent or Manage Pain  Recent Flowsheet Documentation  Taken 9/23/2022 0840 by Dinora Cavazos, RN  Medication Review/Management: medications reviewed  See previous note about new LLE pain. Managed with scheduled Tylenol, Lidocaine patch, Voltaren Gel, Oxycodone. PRN PO Morphine for breakthrough pain q2h.     Goal Outcome Evaluation:    Plan of Care Reviewed With: patient, family     Overall Patient Progress: improving           "

## 2022-09-23 NOTE — PLAN OF CARE
Problem: Plan of Care - These are the overarching goals to be used throughout the patient stay.    Goal: Optimal Comfort and Wellbeing  Outcome: Ongoing, Progressing  Intervention: Monitor Pain and Promote Comfort  Recent Flowsheet Documentation  Taken 9/23/2022 0045 by Allyson Wilburn RN  Pain Management Interventions:   medication (see MAR)   repositioned     Problem: Risk for Delirium  Goal: Improved Sleep  Outcome: Ongoing, Progressing   Goal Outcome Evaluation:    Pt having active, vivid hallucinations at beginning of shift. Was restless and frustrated. Prn zyprexa given and pt was able to sleep for majority of noc. Pain well controlled with current regimen. IV Abx per md order.

## 2022-09-23 NOTE — PROGRESS NOTES
[facility]  Palliative Care Chart Check Note    Palliative medicine was consulted for goals of care.    Chart reviewed and recent events noted from preceding day.     Goals of care: life-prolonging transitioning to comfort-focused. Plan for hospice on discharge, awaiting placement.     Support: provided emotional support for patient and family. Appreciate Palliative SW and  visits.    Symptoms: no longer managing    Discussed case with Dr. Colin (Hospitalist). Palliative care will sign off.    Heather Hernández PA-C  [facility], Palliative Care  Dept phone: [phone]  Securely message with the Vocera Web Console

## 2022-09-23 NOTE — PHARMACY-VANCOMYCIN DOSING SERVICE
Pharmacy Vancomycin Note  Date of Service 2022  Patient's  1950   72 year old, female    Indication: Endocarditis  Day of Therapy: 15  Current vancomycin regimen:  750 mg IV q24h  Current vancomycin monitoring method: AUC  Current vancomycin therapeutic monitoring goal: 400-600 mg*h/L    Insight[employer] Prediction of Current Vancomycin Regimen  Regimen: 750 mg IV every 24 hours.  Start time: 20:46 on 2022  Exposure target: AUC24 (range)400-600 mg/L.hr   AUC24,ss: 444 mg/L.hr  Probability of AUC24 > 400: 80 %  Ctrough,ss: 14.9 mg/L  Probability of Ctrough,ss > 20: 1 %  Probability of nephrotoxicity (Lodise HEATHER ): 10 %    Current estimated CrCl = Estimated Creatinine Clearance: 32.7 mL/min (A) (based on SCr of 1.34 mg/dL (H)).    Creatinine for last 3 days  2022:  5:07 AM Creatinine 1.34 mg/dL    Recent Vancomycin Levels (past 3 days)  2022:  6:27 PM Vancomycin 20.6 ug/mL    Vancomycin IV Administrations (past 72 hours)                   vancomycin 750 mg in 0.9% NaCl 250 mL intermittent infusion 750 mg (mg) 750 mg Given 22     750 mg Given  00     750 mg Given 22     750 mg Given  00     750 mg Given 22                Nephrotoxins and other renal medications (From now, onward)    Start     Dose/Rate Route Frequency Ordered Stop    22  vancomycin 750 mg in 0.9% NaCl 250 mL intermittent infusion 750 mg         750 mg  over 60 Minutes Intravenous EVERY 24 HOURS 22 0848      22 0852  gentamicin (GARAMYCIN) place alvarado - receiving intermittent dosing         1 each Intravenous SEE ADMIN INSTRUCTIONS 22 0852      22 1300  [Held by provider]  gentamicin (GARAMYCIN) 80 mg in sodium chloride 0.9 % 50 mL intermittent infusion        (Held by provider since Thu 2022 at 0840 by Sarkis Carballo MD.Hold Reason: Acute Kidney Injury.)    80 mg  over 60 Minutes Intravenous EVERY 24 HOURS 22 1122                Contrast Orders - past 72 hours (72h ago, onward)    None          Interpretation of levels and current regimen:  Vancomycin level is reflective of -600    Has serum creatinine changed greater than 50% in last 72 hours: No    Renal Function: Worsening      Plan:  1. Continue Current Dose  2. Vancomycin monitoring method: AUC  3. Vancomycin therapeutic monitoring goal: 400-600 mg*h/L  4. Pharmacy will check vancomycin levels as appropriate.    5. Serum creatinine levels will be ordered daily.    Yoel Cain RP

## 2022-09-23 NOTE — PLAN OF CARE
Problem: Plan of Care - These are the overarching goals to be used throughout the patient stay.    Goal: Optimal Comfort and Wellbeing  Outcome: Ongoing, Progressing  Intervention: Monitor Pain and Promote Comfort  Recent Flowsheet Documentation  Taken 9/22/2022 1715 by Olga Bhakta RN  Pain Management Interventions:   medication (see MAR)   back rub  Taken 9/22/2022 1615 by Olga Bhakta RN  Pain Management Interventions:   medication (see MAR)   back rub  Taken 9/22/2022 1600 by Olga Bhakta RN  Pain Management Interventions:   medication (see MAR)   back rub  Taken 9/22/2022 1530 by Olga Bhakta RN  Pain Management Interventions:   medication (see MAR)   back rub  Taken 9/22/2022 1400 by Olga Bhakta RN  Pain Management Interventions:   medication (see MAR)   back rub     Problem: Plan of Care - These are the overarching goals to be used throughout the patient stay.    Goal: Optimal Comfort and Wellbeing  Intervention: Monitor Pain and Promote Comfort  Recent Flowsheet Documentation  Taken 9/22/2022 1715 by Olga Bhakta RN  Pain Management Interventions:   medication (see MAR)   back rub  Taken 9/22/2022 1615 by Olga Bhakta RN  Pain Management Interventions:   medication (see MAR)   back rub  Taken 9/22/2022 1600 by Olga Bhakta RN  Pain Management Interventions:   medication (see MAR)   back rub  Taken 9/22/2022 1530 by Olga Bhakta RN  Pain Management Interventions:   medication (see MAR)   back rub  Taken 9/22/2022 1400 by Olga Bhakta RN  Pain Management Interventions:   medication (see MAR)   back rub   Goal Outcome Evaluation:           Pt's pain was undercontrol this am. Pt was having a good morning. Palliative came by to see pt and switched pt to haldol scheduled and d/c'd her ativan and Zyprexa prn. Gave Haldol as ordered. Midafternoon pt was having terrible pain and was crying. The bugs were worse after the haldol per pt.  Gave pt all her creams and patches for pain.  Waited a  "little While. Pt's pain \"10\" gave  prn oxycodone since to soon for scheduled. Tried to call Palliative care. Left message. No return call. Called Dr. Colin to have haldol d/c'd  and have ativan and zyprexa reordered since pt was having terrible pain, anxiety, seeing bugs, spiders, bats and comets now. Pt very stressed and crying begging that she just wants something that helps makes her sleep.  Orders changed, See Mar for new orders.  Gave Ativan and scheduled oxycodone.  By 1830 pt was resting peacefully but will wake up at times to talk with family now (per family).           "

## 2022-09-23 NOTE — PHARMACY-AMINOGLYCOSIDE DOSING SERVICE
Pharmacy Aminoglycoside Follow-Up Note  Date of Service 2022  Patient's  1950   72 year old, female    Weight (Actual): 68.9 kg    Indication: Endocarditis synergy dosing  Current Gentamicin regimen:  80 mg IV q24h  Day of therapy: 10    Target goals based on synergy dosing  Goal Peak level: 3-5 mg/L  Goal Trough level: <1 mg/L    Current estimated CrCl: Estimated Creatinine Clearance: 32.7 mL/min (A) (based on SCr of 1.34 mg/dL (H)).    Creatinine for last 3 days  2022:  5:07 AM Creatinine 1.34 mg/dL    Nephrotoxins and other renal medications (From now, onward)    Start     Dose/Rate Route Frequency Ordered Stop    22  vancomycin 750 mg in 0.9% NaCl 250 mL intermittent infusion 750 mg         750 mg  over 60 Minutes Intravenous EVERY 24 HOURS 22 0848      22 0852  gentamicin (GARAMYCIN) place alvarado - receiving intermittent dosing         1 each Intravenous SEE ADMIN INSTRUCTIONS 22 0852      22 1300  [Held by provider]  gentamicin (GARAMYCIN) 80 mg in sodium chloride 0.9 % 50 mL intermittent infusion        (Held by provider since Thu 2022 at 0840 by Sarkis Carballo MD.Hold Reason: Acute Kidney Injury.)    80 mg  over 60 Minutes Intravenous EVERY 24 HOURS 22 1122            Contrast Orders - past 72 hours (72h ago, onward)    None          Aminoglycoside Levels - past 2 days  2022:  6:27 PM Gentamicin 1.1 ug/mL    Aminoglycosides IV Administrations (past 72 hours)                   gentamicin (GARAMYCIN) 80 mg in sodium chloride 0.9 % 50 mL intermittent infusion (mg) 80 mg New Bag 22 1318     80 mg New Bag 22 1308                  Interpretation of levels and current regimen:  Trough gentamicin level is higher than desired.  Goal trough < 1 ug/mL.    Has serum creatinine changed greater than 50% in the last 72 hours: Yes    Renal function: Worsening    Plan  1. Decrease dose to 50 mg IV q24h.    2.  Method of evaluation:  Trough level    3. Pharmacy will continue to follow and check levels  as appropriate.    Yoel Cain RPH

## 2022-09-23 NOTE — PROGRESS NOTES
Attempted to visit patient. Dtr present at bedside. Dtr was grateful for attempt. Katherine is sleeping.    DINESH Drake.

## 2022-09-23 NOTE — PROGRESS NOTES
Care Management Follow Up    Length of Stay (days): 15    Expected Discharge Date: 09/23/2022     Concerns to be Addressed:  LTC or Hospice Home Placement      Patient plan of care discussed at interdisciplinary rounds: Yes    Anticipated Discharge Disposition: Hospice, Long Term Care     Anticipated Discharge Services:  Hospice Long Term Care  Anticipated Discharge DME:  TBD    Education Provided on the Discharge Plan:  Per Care Team   Patient/Family in Agreement with the Plan: Yes     Referrals Placed by CM/SW:    Private pay costs discussed: Not applicable    Additional Information:  Chart reviewed.    RNCM called and left voicemail's for LTC facilities. [facility] declined due to no LTC beds available.    [facility]CM also called [facility], they are going to get back to writer this AM, they were reviewing yesterday, and writer never received a call back.    [facility], no bed available until possilby middle of next week.      [facility] following the patient.     3:15pm- RNCM spoke with brother Tera. He would prefer if the patient wasn't sent to [facility], [facility] would not have openings until mid week possible next week anyways.    He is going to speak with sister, and come up with more LTC options,and let writer know. Awaiting response.     CM will continue to follow plan of care, review recommendations,and assist with any discharge needs anticipated.       Camelia Alonso RN

## 2022-09-23 NOTE — PROGRESS NOTES
C/o new lower posterior calf pain down to top of foot. +1 pulses. Theo in color. +3 edema LLE vs +2 edema in RLE. Discussed with palliative at bedside. Text page sent to Dr. Colin. Waiting for reply. PRN PO Morphine given, pending results.     1315 - Discussed with Dr. Colin. Goal of care is comfort. Patient already anticoagulated with Eliquis. Symptom management.

## 2022-09-23 NOTE — PROGRESS NOTES
[facility]    Medicine Progress Note - Hospitalist Service    Date of Admission:  9/8/2022    Assessment & Plan          72 year old female with PMH of aortic stenosis, s/p TAVR 09/21, HTN, s/p AAA repair, tobacco use disorder, diastolic CHF, HTN, known right-sided pleural effusion, alcohol use disorder, admitted on 9/8/2022 with severe sepsis. Now transitioning to hospice cares after discharge.    #Severe sepsis due to high grade MRSA bacteremia and prosthetic aortic valve/mitral valve endocarditis  -- BC x2 on 9/8-9/13 remain positive for MRSA bacteremia.  TTE no obvious vegetation.  PET CT (9/12) showed moderate uptake about TAVR and moderate uptake about dense mitral annulus calcification, suspicious for underlying endocarditis. EMILY (9/14/22)showed 0.7x0.3 mobile vegetation associated with the aortic valve prosthesis best visualized in the left ventricular outflow tract. There is  suspected abscess associated with the aortic valve in the 2-3 o'clock in short  Axis, 1.3x0.8 mobile vegetation associated with the posterior mitral  valve leaflet (atrial aspect of valve). There is a superimposed 1.3 cm mobile  linear strand associated with the vegetation. In addition there is a 1.1 x 0.5  cm vegetation associated with the anterior mitral valve leaflet.  CV surgery consulted and very high operative mortality to repair and in agreement with focus on more palliative approach.     -- MRI brain to r/o septic emboli ordered and showed signs of septic emboli.  -- Patient is DNR/DNI.  -- Prognosis remains poor; transitioning to comfort cares but will keep on IV vancomycin and gentamycin while inpatient   -- Can consider indefinite bactrim after discharge, see ID note    #Anorexia  -- Will add Marinol 2.5 mg PO BID for appetitive stimulation  -- Patient and family are fully amenable  -- Will likely also address pain, nausea, vomiting     #Acute toxic/metabolic encephalopathy (improved)  -- Due to  benzodiazepine/opiate induced respiratory depression and acute on chronic hypercarbic respiratory acidosis.  NH3 normal.   -- Palliative care assisting greatly     #Cirrhosis  -- Suspect EtOH mediated. Per CT abdomen/pelvis imaging.  -- LFTs normal. Not decompensated. No significant ascites.       Diet: Regular Diet Adult  Snacks/Supplements Adult: Magic Cup; With Meals    DVT Prophylaxis: DOAC  Reynoso Catheter: Not present  Central Lines: None  Cardiac Monitoring: None  Code Status: No CPR- Do NOT Intubate      Disposition Plan     Expected Discharge Date: 09/23/2022    Discharge Delays: Placement - LTC    Discharge Comments: comfort cares  LTC w/ hospice  needs LTC placement, referrals pending        The patient's care was discussed with the Bedside Nurse, Patient and Palliative Care Consultant.    Mando Colin DO  Hospitalist Service  [facility]  Securely message with the Vocera Web Console (learn more here)  Text page via [employer] Paging/Directory         Clinically Significant Risk Factors Present on Admission                      ______________________________________________________________________    Interval History   Patient was sleeping comfortably upon entering room.  Per nursing staff, some hallucinations overPM; Zyprexa effective.  Discussed case with case management.  Working on TCU placement for hospice.    Data reviewed today: I reviewed all medications, new labs and imaging results over the last 24 hours. I personally reviewed no images or EKG's today.    Physical Exam   Vital Signs: Temp: 98.6  F (37  C) Temp src: Oral BP: 113/61 Pulse: 67   Resp: 20 SpO2: 93 % O2 Device: None (Room air) Oxygen Delivery: 2 LPM  Weight: 157 lbs 0 oz     GENERAL: Patient sleeping comfortably; no acute distress; appears underweight.  HEENT: Normocephalic; atraumatic; scleral icterus.  CV: RRR; normal S1, S2; (+) murmur.  RESP: Lung fields clear to aucultation B/L; no wheezing or crepitations.  GI:  Abdomen is soft, nontender, nondistended; no organomegaly; normal bowel sounds.  : Deferred genital examination.   MSK: No clubbing, cyanosis, or edema.  DERM: Skin is intact; no rash, lesions, or skin breakdown; jaundice.    Data   Recent Labs   Lab 09/23/22  0451 09/22/22  0507 09/19/22  0436 09/18/22  0550   WBC  --  14.1* 9.7 12.5*   HGB  --  8.8* 9.2* 9.4*   MCV  --  99 95 94   PLT  --  163 130* 114*   * 133* 133* 130*   POTASSIUM 4.2 4.1 3.9 3.9   CHLORIDE 97* 99 99 97*   CO2 25 24 27 25   BUN 17.7 16.5 14 15   CR 1.33* 1.34* 0.75 0.74   ANIONGAP 10 10 7 8   YENI 8.0* 8.0* 7.8* 7.9*   GLC 87 98 110 108   ALBUMIN  --  2.2* 1.9* 2.0*   PROTTOTAL  --  6.3* 5.6* 6.0   BILITOTAL  --  1.0 2.8* 3.6*   ALKPHOS  --  140* 129* 131*   ALT  --  12 <9 11   AST  --  39* 21 24     No results found for this or any previous visit (from the past 24 hour(s)).  Medications       acetaminophen  975 mg Oral Q8H     amiodarone  200 mg Oral Daily     apixaban ANTICOAGULANT  5 mg Oral BID     diclofenac  2 g Topical 4x Daily     dronabinol  2.5 mg Oral BID     famotidine  20 mg Oral BID     gentamicin  50 mg Intravenous Q24H     levothyroxine  88 mcg Oral QAM AC     lidocaine  2 patch Transdermal Q24H     lidocaine   Transdermal Q8H ISIDRO     oxyCODONE  5 mg Oral Q6H     polyethylene glycol  17 g Oral Daily     sennosides  1 tablet Oral BID     sodium chloride (PF)  3 mL Intracatheter Q8H     vancomycin  750 mg Intravenous Q24H     venlafaxine  300 mg Oral Daily

## 2022-09-24 PROBLEM — N17.9 ACUTE KIDNEY FAILURE, UNSPECIFIED (H): Status: ACTIVE | Noted: 2022-01-01

## 2022-09-24 NOTE — PLAN OF CARE
Goal Outcome Evaluation:    Plan of Care Reviewed With: patient        Problem: Plan of Care - These are the overarching goals to be used throughout the patient stay.    Goal: Plan of Care Review/Shift Note  Description: The Plan of Care Review/Shift note should be completed every shift.  The Outcome Evaluation is a brief statement about your assessment that the patient is improving, declining, or no change.  This information will be displayed automatically on your shift note.  Outcome: Ongoing, Progressing  Flowsheets (Taken 9/24/2022 0449)  Plan of Care Reviewed With: patient     Problem: Risk for Delirium  Goal: Optimal Coping  Outcome: Ongoing, Progressing     Problem: Adjustment to Illness (Sepsis/Septic Shock)  Goal: Optimal Coping  Outcome: Ongoing, Progressing     Problem: Pain Acute  Goal: Acceptable Pain Control and Functional Ability  Outcome: Ongoing, Progressing  Intervention: Prevent or Manage Pain  Recent Flowsheet Documentation  Taken 9/24/2022 0407 by Saskia Mcclain, RN  Medication Review/Management: medications reviewed  Taken 9/24/2022 0045 by Saskia Mcclain, RN  Medication Review/Management: medications reviewed  Taken 9/23/2022 2045 by Saskia Mcclain, RN  Medication Review/Management: medications reviewed        Pt alert and oriented, forgetful at times, pain controlled on current regimen, no new complaints, fall precautions implemented, encouraged to use call light, monitoring ongoing

## 2022-09-24 NOTE — PROGRESS NOTES
[facility]    Medicine Progress Note - Hospitalist Service    Date of Admission:  9/8/2022    Assessment & Plan          72 year old female with PMH of aortic stenosis, s/p TAVR 09/21, HTN, s/p AAA repair, tobacco use disorder, diastolic CHF, HTN, known right-sided pleural effusion, alcohol use disorder, admitted on 9/8/2022 with severe sepsis. Now transitioning to hospice cares after discharge.    #Severe sepsis due to high grade MRSA bacteremia and prosthetic aortic valve/mitral valve endocarditis  -- BC x2 on 9/8-9/13 remain positive for MRSA bacteremia.  TTE no obvious vegetation.  PET CT (9/12) showed moderate uptake about TAVR and moderate uptake about dense mitral annulus calcification, suspicious for underlying endocarditis. EMILY (9/14/22)showed 0.7x0.3 mobile vegetation associated with the aortic valve prosthesis best visualized in the left ventricular outflow tract. There is suspected abscess associated with the aortic valve in the 2-3 o'clock in short Axis, 1.3x0.8 mobile vegetation associated with the posterior mitral valve leaflet (atrial aspect of valve). There is a superimposed 1.3 cm mobile linear strand associated with the vegetation. In addition there is a 1.1 x 0.5 cm vegetation associated with the anterior mitral valve leaflet. CV surgery consulted and very high operative mortality to repair and in agreement with focus on more palliative approach  -- MRI brain to r/o septic emboli ordered and showed signs of septic emboli  -- Patient is DNR/DNI  -- Prognosis remains poor; transitioning to comfort cares but will keep on IV vancomycin and gentamycin while inpatient  -- Can consider indefinite bactrim after discharge, see ID note    #Anorexia  -- Will increase Marinol 5 mg PO BID for appetitive stimulation  -- Patient and family are fully amenable  -- Will likely also address pain, nausea, vomiting     #Acute toxic/metabolic encephalopathy (improved)  -- Due to  benzodiazepine/opiate induced respiratory depression and acute on chronic hypercarbic respiratory acidosis.  NH3 normal.   -- Will management palliative treatment plan    #Cirrhosis  -- Suspect EtOH mediated. Per CT abdomen/pelvis imaging.  -- LFTs normal. Not decompensated. No significant ascites.       Diet: Regular Diet Adult  Snacks/Supplements Adult: Magic Cup; With Meals  Room Service    DVT Prophylaxis: DOAC  Reynoso Catheter: Not present  Central Lines: None  Cardiac Monitoring: None  Code Status: No CPR- Do NOT Intubate      Disposition Plan      Expected Discharge Date: 09/25/2022    Discharge Delays: Placement - LTC    Discharge Comments: comfort cares  LTC w/ hospice  needs LTC placement, referrals pending -most are declining        The patient's care was discussed with the Bedside Nurse, Patient and Patient's Family.    Mando Colin DO  Hospitalist Service  [facility]  Securely message with the Vocera Web Console (learn more here)  Text page via [employer] Paging/Directory         Clinically Significant Risk Factors Present on Admission                      ______________________________________________________________________    Interval History   Patient reports feeling OK.  Patient's youngest sister is present at bedside.  Patient tearful at times given her prognosis.  Patient reports some low back discomfort.  Patient amenable to increasing SL morphine dose.    Data reviewed today: I reviewed all medications, new labs and imaging results over the last 24 hours. I personally reviewed no images or EKG's today.    Physical Exam   Vital Signs: Temp: 98.1  F (36.7  C) Temp src: Oral BP: 98/64 Pulse: 70   Resp: 20 SpO2: 97 % O2 Device: None (Room air)    Weight: 157 lbs 0 oz     GENERAL: Alert and oriented x 3; no acute distress; well-nourished.  HEENT: Normocephalic; atraumatic; scleral icterus.  CV: RRR; normal S1, S2; no rubs, murmurs, or gallops.  RESP: Lung fields clear to  aucultation B/L; no wheezing or crepitations.  GI: Abdomen is soft, nontender, nondistended; no organomegaly; normal bowel sounds.  : Deferred genital examination.   MSK: No clubbing, cyanosis, or edema.  DERM: Skin is intact; no rash, lesions, or skin breakdown; jaundice.  NEURO: No focal deficits appreciated; strength & sensorium are grossly intact.  PSYCH: No active hallucinations; affect, insight appear within normal limits.    Data   Recent Labs   Lab 09/24/22  0446 09/23/22  0451 09/22/22  0507 09/19/22  0436 09/18/22  0550   WBC  --   --  14.1* 9.7 12.5*   HGB  --   --  8.8* 9.2* 9.4*   MCV  --   --  99 95 94   PLT  --   --  163 130* 114*   * 132* 133* 133* 130*   POTASSIUM 4.2 4.2 4.1 3.9 3.9   CHLORIDE 98 97* 99 99 97*   CO2 26 25 24 27 25   BUN 22.5 17.7 16.5 14 15   CR 1.78* 1.33* 1.34* 0.75 0.74   ANIONGAP 6* 10 10 7 8   YENI 7.9* 8.0* 8.0* 7.8* 7.9*   GLC 97 87 98 110 108   ALBUMIN  --   --  2.2* 1.9* 2.0*   PROTTOTAL  --   --  6.3* 5.6* 6.0   BILITOTAL  --   --  1.0 2.8* 3.6*   ALKPHOS  --   --  140* 129* 131*   ALT  --   --  12 <9 11   AST  --   --  39* 21 24     No results found for this or any previous visit (from the past 24 hour(s)).  Medications       acetaminophen  975 mg Oral Q8H     amiodarone  200 mg Oral Daily     apixaban ANTICOAGULANT  5 mg Oral BID     diclofenac  2 g Topical 4x Daily     dronabinol  5 mg Oral BID     famotidine  20 mg Oral Daily     [Held by provider] gentamicin  50 mg Intravenous Q24H     levothyroxine  88 mcg Oral QAM AC     lidocaine  2 patch Transdermal Q24H     lidocaine   Transdermal Q8H ISIDRO     oxyCODONE  5 mg Oral Q6H     polyethylene glycol  17 g Oral Daily     sennosides  1 tablet Oral BID     sodium chloride (PF)  3 mL Intracatheter Q8H     [Held by provider] vancomycin  750 mg Intravenous Q24H     venlafaxine  300 mg Oral Daily

## 2022-09-24 NOTE — PROGRESS NOTES
Care Management Follow Up    Length of Stay (days): 16    Expected Discharge Date: 09/24/2022     Concerns to be Addressed: LTC placement with [facility], or Hospice Home       Patient plan of care discussed at interdisciplinary rounds: Yes    Anticipated Discharge Disposition: Hospice, Long Term Care     Anticipated Discharge Services:  Hospice, long term care , or hospice home  Anticipated Discharge DME:  TBD    Education Provided on the Discharge Plan: Per Care Team     Patient/Family in Agreement with the Plan:  Yes    Referrals Placed by CM/SW:    Private pay costs discussed: Not applicable    Additional Information:  Chart reviewed.    RNCM spoke with son Tera. He gave writer a list of more LTC facilities to check out. He was ok with some more estates locations. Also looked into [facility].      LTC referrals sent ( pending).    Tera expressed again that [facility] would be last resort, would rather not have placed there, willing to pay private pay costs.     [facility] continues to follow the patient.    1:15pm- [facility], can take the patient tomorrow (9/25) morning. [facility] can be over there at 12pm, to do initial visit.    Family updated, and agreeable.    Stretcher Transport set up for 10 Am tomorrow (9/25) through [facility].    PCS complete.    CM will continue to follow plan of care, review recommendations,and assist with any discharge needs anticipated.        Camelia Alonso RN

## 2022-09-24 NOTE — PHARMACY
Given change in creatinine today, will place today's doses of vanco & gent on hold & check levels with AM labs on 9/25.    Miryam Epperson RPH  9:06 AM

## 2022-09-24 NOTE — PLAN OF CARE
Rising Cr: 1.78. IV Vanco and Gentamycin on hold; both trough's scheduled for tomorrow am.     Problem: Plan of Care - These are the overarching goals to be used throughout the patient stay.    Goal: Optimal Comfort and Wellbeing  Outcome: Ongoing, Progressing  Intervention: Monitor Pain and Promote Comfort  Recent Flowsheet Documentation  Taken 9/24/2022 0850 by Dinora Cavazos, RN  Pain Management Interventions:   medication (see MAR)   rest   repositioned  Youngest sister at bedside this am and supportive. Patient sad and tearful. Emotional  support provided.     Problem: Pain Acute  Goal: Acceptable Pain Control and Functional Ability  Outcome: Ongoing, Progressing  Intervention: Develop Pain Management Plan  Recent Flowsheet Documentation  Taken 9/24/2022 0850 by Dinora Cavazos RN  Pain Management Interventions:   medication (see MAR)   rest   repositioned  Intervention: Prevent or Manage Pain  Recent Flowsheet Documentation  Taken 9/24/2022 0850 by Dinora Cavazos, RN  Medication Review/Management: medications reviewed    Goal Outcome Evaluation: Stretcher  scheduled for 1000 on 9/25 to [facility]. Hospice will follow there and meet with patient and family at noon on 9/25.

## 2022-09-25 NOTE — PLAN OF CARE
"  Problem: Pain Acute  Goal: Acceptable Pain Control and Functional Ability  Outcome: Ongoing, Progressing  Intervention: Develop Pain Management Plan  Recent Flowsheet Documentation  Taken 9/24/2022 2353 by Chantal Webb RN  Pain Management Interventions: medication (see MAR)  Intervention: Prevent or Manage Pain  Recent Flowsheet Documentation  Taken 9/25/2022 0000 by Chantal Webb RN  Medication Review/Management: medications reviewed     Problem: Respiratory Compromise (Pneumothorax)  Goal: Optimal Oxygenation and Ventilation  Outcome: Ongoing, Progressing       Goal Outcome Evaluation: Patient is alert and oriented. Patient is having hallucinations. She seeing \"black bugs.\" Has been calm and redirectable. VSS. Patient c/o back pain. Pain controlled with scheduled oxycodone and tylenol. Plan to discharge in 10 AM today to [facility] with stretcher.    "

## 2022-09-25 NOTE — PROGRESS NOTES
Discharged to [facility] on hospice via stretcher. Handoff to [facility] transport personnel. Discharge instructions given to them. Family at bedside. All personal belongings sent with family.

## 2022-09-25 NOTE — PLAN OF CARE
"  Problem: Plan of Care - These are the overarching goals to be used throughout the patient stay.    Goal: Absence of Hospital-Acquired Illness or Injury  Intervention: Prevent Skin Injury  Recent Flowsheet Documentation  Taken 9/24/2022 1745 by Leah Childers RN  Body Position:    turned    right     Problem: Plan of Care - These are the overarching goals to be used throughout the patient stay.    Goal: Optimal Comfort and Wellbeing  Intervention: Monitor Pain and Promote Comfort  Recent Flowsheet Documentation  Taken 9/24/2022 1745 by Leah Childers, RN  Pain Management Interventions: medication (see MAR)     Problem: Nutrition Impaired (Sepsis/Septic Shock)  Goal: Optimal Nutrition Intake  Outcome: Ongoing, Progressing     Problem: Respiratory Compromise (Pneumothorax)  Goal: Optimal Oxygenation and Ventilation  Outcome: Ongoing, Progressing   Goal Outcome Evaluation:    Providing comfort cares. Pt reported pain in her back 8.5/10 as well as anxiety; oxycodone and ativan administered. Scheduled tylenol also administered with relief. VSS.  Lungs clear.Pt is A & O x 4 but hallucinates. Pt reported seeing \"black bugs everywhere\" and feeling that someone is in the room with her. Pt was redirected and reoriented. Pt refused scheduled Eliquis and stated \"I've already had too much in me.\" Pt reported her last BM was 5 days ago; stool softener administered as scheduled this evening and no BM reported this evening.       "

## 2022-09-25 NOTE — PHARMACY-CONSULT NOTE
Reviewed vanco & gent levels this AM.  Noted plans to discharge at 1000 today.    Discussed with Dr Colin and plan will be to convert to Bactrim with no further vancomycin or gentamicin prior to discharge.    Miryam Epperson, KRISHNA  8:08 AM

## 2022-09-25 NOTE — DISCHARGE SUMMARY
[facility]  Hospitalist Discharge Summary      Date of Admission:  9/8/2022  Date of Discharge:  9/25/2022  Discharging Provider: Mando Colin DO  Discharge Service: Hospitalist Service    Discharge Diagnoses   Severe sepsis due to high grade MRSA bacteremia and prosthetic aortic valve/mitral valve endocarditis  Anorexia  Acute toxic/metabolic encephalopathy (improved)  Cirrhosis  End of life care  Hospice care    Follow-ups Needed After Discharge   Follow-up Appointments     Follow Up and recommended labs and tests      Please follow up with hospice provider.             Unresulted Labs Ordered in the Past 30 Days of this Admission     No orders found from 8/9/2022 to 9/9/2022.        Discharge Disposition   Discharged to hospice facility   Condition at discharge: Terminal    Hospital Course            72 year old female with PMH of aortic stenosis, s/p TAVR 09/21, HTN, s/p AAA repair, tobacco use disorder, diastolic CHF, HTN, known right-sided pleural effusion, alcohol use disorder, admitted on 9/8/2022 with severe sepsis. Now transitioning to hospice cares after discharge.    #Severe sepsis due to high grade MRSA bacteremia and prosthetic aortic valve/mitral valve endocarditis  -- BC x2 on 9/8-9/13 remain positive for MRSA bacteremia.  TTE no obvious vegetation.  PET CT (9/12) showed moderate uptake about TAVR and moderate uptake about dense mitral annulus calcification, suspicious for underlying endocarditis. EMILY (9/14/22)showed 0.7x0.3 mobile vegetation associated with the aortic valve prosthesis best visualized in the left ventricular outflow tract. There is suspected abscess associated with the aortic valve in the 2-3 o'clock in short Axis, 1.3x0.8 mobile vegetation associated with the posterior mitral valve leaflet (atrial aspect of valve). There is a superimposed 1.3 cm mobile linear strand associated with the vegetation. In addition there is a 1.1 x 0.5 cm vegetation associated with  the anterior mitral valve leaflet. CV surgery consulted and very high operative mortality to repair and in agreement with focus on more palliative approach  -- MRI brain to r/o septic emboli ordered and showed signs of septic emboli  -- Patient is DNR/DNI  -- Prognosis remains poor; transitioning to comfort cares but will keep on IV vancomycin and gentamycin while inpatient  -- Can consider indefinite bactrim after discharge, see ID note    #Anorexia  -- Will increase Marinol 5 mg PO BID for appetitive stimulation  -- Patient and family are fully amenable  -- Will likely also address pain, nausea, vomiting     #Acute toxic/metabolic encephalopathy (improved)  -- Due to benzodiazepine/opiate induced respiratory depression and acute on chronic hypercarbic respiratory acidosis.  NH3 normal.   -- Will management palliative treatment plan    #Cirrhosis  -- Suspect EtOH mediated. Per CT abdomen/pelvis imaging.  -- LFTs normal. Not decompensated. No significant ascites.      Consultations This Hospital Stay   PHARMACY TO DOSE VANCO  PHARMACY TO DOSE VANCO  INFECTIOUS DISEASES IP CONSULT  PULMONARY IP CONSULT  CARE MANAGEMENT / SOCIAL WORK IP CONSULT  PHYSICAL THERAPY ADULT IP CONSULT  OCCUPATIONAL THERAPY ADULT IP CONSULT  HEMATOLOGY & ONCOLOGY IP CONSULT  ORTHOPEDIC SURGERY IP CONSULT  SPEECH LANGUAGE PATH ADULT IP CONSULT  PHARMACY TO DOSE GENTAMICIN  CARDIOLOGY IP CONSULT  PALLIATIVE CARE ADULT IP CONSULT  CARE MANAGEMENT / SOCIAL WORK IP CONSULT  INPATIENT HOSPICE ADULT CONSULT  CARE MANAGEMENT / SOCIAL WORK IP CONSULT    Code Status   No CPR- Do NOT Intubate    Time Spent on this Encounter   I, Mando Colin DO, personally saw the patient today and spent greater than 30 minutes discharging this patient.       Mando Colin DO  [facility]  [address] [place] [id number]  Phone: [phone]  Fax:  [phone]  ______________________________________________________________________       Primary Care Physician   Nora Mccarthy    Discharge Orders      General info for SNF    Length of Stay Estimate: Short Term Care: Estimated # of Days <30  Condition at Discharge: Terminal  Level of care:skilled   Rehabilitation Potential: Poor  Admission H&P remains valid and up-to-date: No (If no, please update H&P)  Recent Chemotherapy: N/A  Use Nursing Home Standing Orders: Yes     Follow Up and recommended labs and tests    Please follow up with hospice provider.     Reason for your hospital stay    Patient was admitted for prosthetic valve endocarditis. Patient was transitioned to hospice care.     Activity - Up with nursing assistance     No CPR- Do NOT Intubate    Hospice     Contact Isolation     Diet    Follow this diet upon discharge: Whatever patient wants (Hospice)       Significant Results and Procedures   Results for orders placed or performed during the hospital encounter of 09/08/22   CT Head w/o Contrast    Addendum: 9/8/2022    Addendum/  impression:    INDICATION: Dizziness      Narrative    EXAM: CT HEAD W/O CONTRAST  LOCATION: [facility]  DATE/TIME: 9/8/2022 7:34 PM    INDICATION: fall  COMPARISON: None.  TECHNIQUE: Routine CT Head without IV contrast. Multiplanar reformats. Dose reduction techniques were used.    FINDINGS:  INTRACRANIAL CONTENTS: No evidence of acute intracranial hemorrhage or mass effect. Scattered foci of decreased attenuation within the cerebral hemispheric white matter which are nonspecific, though most commonly ascribed to chronic small vessel ischemic   disease. The ventricles and sulci are prominent consistent with mild brain parenchymal volume loss. Gray-white matter differentiation is maintained. The basilar cisterns are patent.    VISUALIZED ORBITS/SINUSES/MASTOIDS: Bilateral cataract surgery. The partially imaged globes are otherwise unremarkable. The  partially imaged paranasal sinuses, mastoid air cells and middle ear cavities are unremarkable.     BONES/SOFT TISSUES: The visualized skull base and calvarium are unremarkable.      Impression    IMPRESSION:    1.  No evidence of acute intracranial hemorrhage or mass effect.  2.  Mild nonspecific white matter changes.  3.  Mild brain parenchymal volume loss.   CT Cervical Spine w/o Contrast    Narrative    EXAM: CT CERVICAL SPINE W/O CONTRAST  LOCATION: [facility]  DATE/TIME: 9/8/2022 7:38 PM    INDICATION: fall  COMPARISON: None.  TECHNIQUE: Routine CT Cervical Spine without IV contrast. Multiplanar reformats. Dose reduction techniques were used.    FINDINGS:  No evidence of acute fracture or subluxation of the cervical spine by CT imaging. Straightening of the normal cervical lordosis. The vertebral bodies of the cervical spine otherwise have normal stature and alignment. Anterolisthesis of C3 on C4 measuring   2 mm. Anterior marginal osteophytes at C4/C5-C7/T1. Multilevel degenerative disc disease of the cervical spine with disc height loss, most pronounced at C4/C5-C7/T1. The partially imaged intracranial contents are unremarkable. No prevertebral soft   tissue swelling. The partially imaged lung apices are unremarkable.     Craniovertebral junction and C1-C2: The odontoid process is well approximated with the anterior body of C1 and well aligned between the lateral masses of C1.    C2-C3: No posterior disc bulge or spinal canal narrowing. No neural foraminal narrowing.     C3-C4: No posterior disc bulge or spinal canal narrowing. No neural foraminal narrowing.     C4-C5: Broad bar disc osteophyte complex with moderate spinal canal narrowing. Uncovertebral joint disease and facet arthropathy with severe bilateral neural foraminal narrowing.     C5-C6: No posterior disc bulge or spinal canal narrowing. Uncovertebral joint disease and facet arthropathy with severe right and moderate left  neural foraminal narrowing.     C6-C7: No posterior disc bulge or spinal canal narrowing. Uncovertebral joint disease and facet arthropathy with severe bilateral neural foraminal narrowing.     C7-T1: No posterior disc bulge or spinal canal narrowing. Uncovertebral joint disease and facet arthropathy with moderate bilateral neural foraminal narrowing.       Impression    IMPRESSION:  1.  No evidence of acute fracture or subluxation of the cervical spine by CT imaging.  2.  Degenerative cervical spondylosis as described above.   CT Lumbar Spine w/o Contrast    Narrative    EXAM: CT LUMBAR SPINE W/O CONTRAST  LOCATION: [facility]  DATE/TIME: 9/8/2022 7:39 PM    INDICATION: Back pain  COMPARISON: None.  TECHNIQUE: Routine CT Lumbar Spine without IV contrast. Multiplanar reformats. Dose reduction techniques were used.     FINDINGS:  No evidence of acute fracture or subluxation of the lumbar spine by CT imaging. Postsurgical changes posterior fusion at L3-L5 and interbody fusion at L3/L4 and L4/L5. No hardware complication. No evidence of acute fracture or subluxation of the lumbar   spine by CT imaging. Retrolisthesis of T12 on L1 measuring 2 mm. Anterior marginal osteophytes at T12/L1 and L1/L2 and L2/L3. Multilevel degenerative disc disease of the lumbar spine with disc height loss, most pronounced at T12/L1 and L2/L3.   Aortobiiliac endograft. The partially imaged intra-abdominal contents are otherwise unremarkable.    T12/L1:  Symmetric disc bulge without spinal canal narrowing. Mild bilateral neural foraminal narrowing.    L1/L2:  No posterior disc bulge or spinal canal narrowing. Mild bilateral neural foraminal narrowing.    L2/L3:  Broad bar disc osteophyte complex with mild spinal canal narrowing. Severe bilateral neural foraminal narrowing.    L3/L4:  Spondylotic ridging without spinal canal narrowing. Moderate bilateral facet arthropathy. No neural foraminal narrowing.      L4/L5:  No  posterior disc bulge or spinal canal narrowing. Mild bilateral facet arthropathy. No neural foraminal narrowing.     L5/S1: Symmetric disc bulge and moderate facet arthropathy without spinal canal narrowing. Severe bilateral neural foraminal narrowing.       Impression    IMPRESSION:    1.  No evidence of acute fracture or subluxation of the lumbar spine by CT imaging.  2.  Postsurgical changes posterior fusion at L3-L5 and interbody fusion at L3/L4 and L4/L5. No hardware complication.  3.  Degenerative lumbar spondylosis as described above.   CT Abdomen Pelvis w Contrast    Narrative    EXAM: CT ABDOMEN PELVIS W CONTRAST  LOCATION: [facility]  DATE/TIME: 9/8/2022 7:39 PM    INDICATION: Right upper abdominal pain and fever.  COMPARISON: CTA 8/21/2021  TECHNIQUE: CT scan of the abdomen and pelvis was performed following injection of IV contrast. Multiplanar reformats were obtained. Dose reduction techniques were used.  CONTRAST: 75ml isovue 370    FINDINGS:   LOWER CHEST: Small right hydropneumothorax post right thoracentesis earlier today. Small residual right pleural effusion. Heavy mitral annular calcifications. Small sliding-type hiatal hernia.    HEPATOBILIARY: Cirrhotic appearing liver redemonstrated without focal suspicious mass. Normal gallbladder and biliary system.    PANCREAS: Normal.    SPLEEN: Normal.    ADRENAL GLANDS: Normal.    KIDNEYS/BLADDER: Normal kidneys and ureters. Nondistended bladder with focal asymmetric enhancing bladder wall thickening measuring up to 8 mm in the right bladder base image 173 of series 3..    BOWEL: Diverticulosis of the colon. No acute inflammatory change. No obstruction. Large amount of stool in the rectum. Mild ascites.    LYMPH NODES: No lymphadenopathy.    VASCULATURE: Previous endovascular aortic aneurysm repair with bifurcated endograft. Aneurysm sac measures 5.0 x 5.6 cm, previously 4.7 x 5.4 cm. Type II endoleak is noted.    PELVIC ORGANS:  Hysterectomy. No adnexal mass.    MUSCULOSKELETAL: Previous lower lumbar fusion. Degenerative changes are present within the spine and hips. Generalized anasarca.      Impression    IMPRESSION:   1.  Small to moderate right hydropneumothorax, status post large volume right thoracentesis earlier today. Patient may have a trapped right lung. Consider two-view chest radiograph.  2.  Cirrhotic appearing liver with mild ascites. No splenomegaly.  3.  Previous endovascular repair of infrarenal abdominal aortic aneurysm. Type II endoleak with interval enlargement of the aneurysm sac, compatible with endotension. Previous repair at [facility]. Consider nonemergent Interventional Radiology   consult.  4.  Generalized anasarca.    Report called to Dr. Roberson at 2000 hours   XR Chest Port 1 View    Narrative    EXAM: XR CHEST PORT 1 VIEW  LOCATION: [facility]  DATE/TIME: 9/8/2022 4:30 PM    INDICATION: fever, cough  COMPARISON: 12/3/2021      Impression    IMPRESSION: Small to moderate right effusion, slightly increased. Right lower lobe infiltrate or atelectasis.  No pneumothorax.  The left lung is clear.  The heart is normal in size.   US Thoracentesis    Narrative    EXAM:   1. RIGHT  THORACENTESIS  2. ULTRASOUND GUIDANCE  LOCATION: [facility]  DATE/TIME: 9/8/2022 6:46 PM    INDICATION: Pleural effusion.    PROCEDURE: Informed consent obtained. Time out performed. The chest was prepped and draped in sterile fashion. 5  mL of 1 % lidocaine was infused into the local soft tissues. Under direct ultrasound guidance, a 5 Turkmen catheter system was placed into   the pleural effusion.     1.35  liters of clear yellow and red  fluid were removed and sent to lab, if requested.    Patient tolerated procedure well.    Ultrasound imaging was obtained and placed in the patient's permanent medical record.      Impression    IMPRESSION:  Status post right  ultrasound-guided  thoracentesis.    Reference CPT Code: 25275   XR Chest Port 1 View    Narrative    EXAM: XR CHEST PORT 1 VIEW  LOCATION: [facility]  DATE/TIME: 9/8/2022 8:18 PM    INDICATION: s p thoracentesis, ? ptx  COMPARISON: Same date abdominal CT and chest radiograph.      Impression    IMPRESSION: Stable cardiomediastinal silhouette with prior aortic valve replacement. Small right hydropneumothorax, similar volume to same day CT given differences in modalities, without evidence of tension. Possible reexpansion edema in the right lower   lobe. Left lung is clear. No acute bony abnormality.   XR Chest Port 1 View    Narrative    EXAM: XR CHEST PORT 1 VIEW  LOCATION: [facility]  DATE/TIME: 9/8/2022 9:44 PM    INDICATION: ptx  COMPARISON: 09/08/2022      Impression    IMPRESSION: Persistent right pneumothorax measuring 8 mm at the apex (stable) and 1.7 cm at the lateral base (previously 1.4 cm). Small right pleural effusion. Increased airspace infiltrate in the right lower lobe. Surgical clips in the left axilla.   Calcified aortic arch. Prior aortic valve repair.   XR Chest Port 1 View    Narrative    EXAM: XR CHEST PORT 1 VIEW  LOCATION: [facility]  DATE/TIME: 9/9/2022 12:00 AM    INDICATION: s p chest tube follow-up pneumothorax  COMPARISON: 09/08/2022      Impression    IMPRESSION: New right chest tube terminates at the apex medially. Right pneumothorax has resolved. Persistent opacity at the right base may represent an combination of atelectasis and edema. Normal heart size. Prior endovascular aortic valve replacement.   Left lung clear. Surgical clips cluster over the left shoulder.   XR Chest 2 Views    Narrative    EXAM: XR CHEST 2 VIEWS  LOCATION: [facility]  DATE/TIME: 9/9/2022 11:17 AM    INDICATION: f u chest tube, PTX  COMPARISON: 09/08/2022.      Impression    IMPRESSION: Right chest tube over the right lung apex in  stable position. No recurrent pneumothorax. Small right pleural effusion with some infiltrates or atelectasis and some mild atelectasis left lung base all appear stable. Aortic valve prosthesis.   XR Chest Port 1 View    Narrative    EXAM: XR CHEST PORT 1 VIEW  LOCATION: [facility]  DATE/TIME: 9/10/2022 2:21 PM    INDICATION: follow up PTX for continued resolution  COMPARISON: 09/09/2022      Impression    IMPRESSION: The heart is normal in size. Improved right pleural effusion. Right lower lobe opacities, most likely representing infiltrate. No pneumothorax. Right-sided chest tube.   XR Chest 1 View    Narrative    EXAM: XR CHEST 1 VIEW  LOCATION: [facility]  DATE/TIME: 9/10/2022 5:25 PM    INDICATION: Increase air leak  COMPARISON: 9/10/2022 1400 hours      Impression    IMPRESSION: The right pleural catheter has been retracted from the apex and now resides with tip overlying the anterior second rib. There is a trace pneumothorax measuring less than 1 mm. Increased subcutaneous air in the right chest wall. Basilar   predominant interstitial and airspace opacities right greater than left are similar. Small right pleural effusion slightly increased. Heart size is normal. TAVR. Atherosclerotic change of the aorta. Surgical clips in the left axilla. Degenerative change   in the right shoulder.   XR Chest 1 View    Narrative    EXAM: XR CHEST 1 VIEW  LOCATION: [facility]  DATE/TIME: 9/10/2022 8:13 PM    INDICATION: S p removal of chest tube, follow up residual pneumothorax  COMPARISON: 9/10/2022 1658 hours      Impression    IMPRESSION: Interval removal of right pleural catheter. There has been development of a right pneumothorax measuring 1.2 cm laterally and 1.5 cm at the right apex. Small right pleural effusion. Increasing bibasilar atelectasis. Heart size normal. TAVR.   Atherosclerotic change of the aorta. Surgical clips in the left axilla.  Degenerative change in the spine and both shoulders.    Report was discussed with the patient's nurse Narda 9/10/2022 2130 hours.   XR Chest Port 1 View    Narrative    EXAM: XR CHEST PORT 1 VIEW  LOCATION: [facility]  DATE/TIME: 9/11/2022 12:07 AM    INDICATION: right pneumothorax, eval for interval change  COMPARISON: Prior study dated 9/10/2022 at 1954 hours      Impression    IMPRESSION: The cardiac mediastinal silhouette is unchanged in size and contour. The aortic valve prosthesis is again noted. There is mild central pulmonary venous congestion patchy airspace opacity seen in the right lung base, similar to prior exam.   There is a small right-sided pneumothorax, slightly decreased in size from prior study. Subcutaneous emphysema seen overlying the right chest wall, unchanged from prior exam.   XR Chest 1 View    Narrative    EXAM: XR CHEST 1 VIEW  LOCATION: [facility]  DATE/TIME: 9/11/2022 5:42 AM    INDICATION: removal of chest tube, follow up residual pneumothorax  COMPARISON: X-ray from earlier today      Impression    IMPRESSION: Stable right pneumothorax remains. Remainder of the exam is unchanged.   PET Oncology Whole Body    Narrative    EXAM: PET ONCOLOGY WHOLE BODY, CT SOFT TISSUE NECK W CONTRAST, CT CHEST/ABDOMEN/PELVIS W CONTRAST  LOCATION: [facility]  DATE/TIME: 9/12/2022 11:30 AM    INDICATION: Other nonspecific abnormal finding of lung field. Abnormal findings on diagnostic imaging of other abdominal regions, including retroperitoneum. MRSA bacteremia. Fever of unknown origin. History of TAVR, AAA , bilateral TKAs. Assess for nidus   of infection. Initial treatment strategy.  COMPARISON: CT abdomen pelvis 09/08/2022, numerous recent chest radiographs most recent 09/12/2022 reviewed.  CONTRAST: 74 mL Isovue-370  TECHNIQUE: Serum glucose level 99 mg/dL. One hour post intravenous administration of 10.1 mCi F-18 FDG, PET  imaging was performed from the skull vertex to below knees, utilizing attenuation correction with concurrent axial CT and PET/CT image fusion.   Separate diagnostic CT of the neck, chest, abdomen, and pelvis was performed. Dose reduction techniques were used.    PET/CT FINDINGS: Moderate severity patchy irregular ground glass and consolidative opacities in the right lung, mostly in the right lower lobe although also involving the right upper lobe, associated with varying degrees of heterogeneous mild to moderate   uptake, likely infectious/inflammatory. Mild left basilar atelectasis with low-level inflammatory uptake. Moderate uptake about TAVR and moderate uptake about dense mitral annulus calcification, suspicious for underlying endocarditis. Focus of moderate   uptake involves the lumbosacral junction, likely inflammatory, without other findings to suggest osteomyelitis discitis. Heterogeneous uptake about both knees, marked on the right and moderate on the left. Small right knee joint effusion. Abdominal   aortic aneurysm status post aortobiiliac stent graft placement. No increased uptake about the stent graft to suggest associated infection. No FDG avid adenopathy. No focal organized fluid collection or abscess.    CT FINDINGS: Moderate senescent intracranial changes. Large right pleural effusion. Small right pneumothorax. Moderate size left pleural effusion. Enlarged central pulmonary arteries suggesting pulmonary arterial hypertension. Mild soft tissue emphysema   right chest wall. Diffuse heterogeneity of the liver with some surface nodularity suggesting underlying fibrosis/cirrhosis. Small volume ascites. Tiny nonobstructing bilateral intrarenal stones. Hysterectomy. Mild colonic diverticulosis. Moderate   anasarca.      Impression    IMPRESSION:  1. Patchy opacities throughout much of the right lung suspicious for infectious pneumonia, most prominently in the right lower lobe.  2. Intracardiac uptake about  a TAVR and dense mitral annulus calcification suspicious for underlying endocarditis.  3. Heterogeneous uptake about both knees, more so on the right, indeterminate for underlying infection. Small right knee effusion present, amenable to aspiration.   XR Chest 1 View    Narrative    EXAM: XR CHEST 1 VIEW  LOCATION: [facility]  DATE/TIME: 9/12/2022 6:07 AM    INDICATION: Follow up pneumothorax  COMPARISON: 11/2022      Impression    IMPRESSION: The cardiac silhouette is unchanged in size and contour. There is a small right-sided pneumothorax, similar to prior exam. Increasing opacification of the right lung base is noted, likely reflecting combination of right pleural effusion and   superimposed atelectasis and/or airspace disease. A small left-sided pleural effusion has developed in the interval.   CT Soft Tissue Neck w Contrast    Narrative    EXAM: PET ONCOLOGY WHOLE BODY, CT SOFT TISSUE NECK W CONTRAST, CT CHEST/ABDOMEN/PELVIS W CONTRAST  LOCATION: [facility]  DATE/TIME: 9/12/2022 11:30 AM    INDICATION: Other nonspecific abnormal finding of lung field. Abnormal findings on diagnostic imaging of other abdominal regions, including retroperitoneum. MRSA bacteremia. Fever of unknown origin. History of TAVR, AAA , bilateral TKAs. Assess for nidus   of infection. Initial treatment strategy.  COMPARISON: CT abdomen pelvis 09/08/2022, numerous recent chest radiographs most recent 09/12/2022 reviewed.  CONTRAST: 74 mL Isovue-370  TECHNIQUE: Serum glucose level 99 mg/dL. One hour post intravenous administration of 10.1 mCi F-18 FDG, PET imaging was performed from the skull vertex to below knees, utilizing attenuation correction with concurrent axial CT and PET/CT image fusion.   Separate diagnostic CT of the neck, chest, abdomen, and pelvis was performed. Dose reduction techniques were used.    PET/CT FINDINGS: Moderate severity patchy irregular ground glass and  consolidative opacities in the right lung, mostly in the right lower lobe although also involving the right upper lobe, associated with varying degrees of heterogeneous mild to moderate   uptake, likely infectious/inflammatory. Mild left basilar atelectasis with low-level inflammatory uptake. Moderate uptake about TAVR and moderate uptake about dense mitral annulus calcification, suspicious for underlying endocarditis. Focus of moderate   uptake involves the lumbosacral junction, likely inflammatory, without other findings to suggest osteomyelitis discitis. Heterogeneous uptake about both knees, marked on the right and moderate on the left. Small right knee joint effusion. Abdominal   aortic aneurysm status post aortobiiliac stent graft placement. No increased uptake about the stent graft to suggest associated infection. No FDG avid adenopathy. No focal organized fluid collection or abscess.    CT FINDINGS: Moderate senescent intracranial changes. Large right pleural effusion. Small right pneumothorax. Moderate size left pleural effusion. Enlarged central pulmonary arteries suggesting pulmonary arterial hypertension. Mild soft tissue emphysema   right chest wall. Diffuse heterogeneity of the liver with some surface nodularity suggesting underlying fibrosis/cirrhosis. Small volume ascites. Tiny nonobstructing bilateral intrarenal stones. Hysterectomy. Mild colonic diverticulosis. Moderate   anasarca.      Impression    IMPRESSION:  1. Patchy opacities throughout much of the right lung suspicious for infectious pneumonia, most prominently in the right lower lobe.  2. Intracardiac uptake about a TAVR and dense mitral annulus calcification suspicious for underlying endocarditis.  3. Heterogeneous uptake about both knees, more so on the right, indeterminate for underlying infection. Small right knee effusion present, amenable to aspiration.   CT Chest/Abdomen/Pelvis w Contrast    Narrative    EXAM: PET ONCOLOGY WHOLE  BODY, CT SOFT TISSUE NECK W CONTRAST, CT CHEST/ABDOMEN/PELVIS W CONTRAST  LOCATION: [facility]  DATE/TIME: 9/12/2022 11:30 AM    INDICATION: Other nonspecific abnormal finding of lung field. Abnormal findings on diagnostic imaging of other abdominal regions, including retroperitoneum. MRSA bacteremia. Fever of unknown origin. History of TAVR, AAA , bilateral TKAs. Assess for nidus   of infection. Initial treatment strategy.  COMPARISON: CT abdomen pelvis 09/08/2022, numerous recent chest radiographs most recent 09/12/2022 reviewed.  CONTRAST: 74 mL [id number]  TECHNIQUE: Serum glucose level 99 mg/dL. One hour post intravenous administration of 10.1 mCi F-18 FDG, PET imaging was performed from the skull vertex to below knees, utilizing attenuation correction with concurrent axial CT and PET/CT image fusion.   Separate diagnostic CT of the neck, chest, abdomen, and pelvis was performed. Dose reduction techniques were used.    PET/CT FINDINGS: Moderate severity patchy irregular ground glass and consolidative opacities in the right lung, mostly in the right lower lobe although also involving the right upper lobe, associated with varying degrees of heterogeneous mild to moderate   uptake, likely infectious/inflammatory. Mild left basilar atelectasis with low-level inflammatory uptake. Moderate uptake about TAVR and moderate uptake about dense mitral annulus calcification, suspicious for underlying endocarditis. Focus of moderate   uptake involves the lumbosacral junction, likely inflammatory, without other findings to suggest osteomyelitis discitis. Heterogeneous uptake about both knees, marked on the right and moderate on the left. Small right knee joint effusion. Abdominal   aortic aneurysm status post aortobiiliac stent graft placement. No increased uptake about the stent graft to suggest associated infection. No FDG avid adenopathy. No focal organized fluid collection or abscess.    CT  FINDINGS: Moderate senescent intracranial changes. Large right pleural effusion. Small right pneumothorax. Moderate size left pleural effusion. Enlarged central pulmonary arteries suggesting pulmonary arterial hypertension. Mild soft tissue emphysema   right chest wall. Diffuse heterogeneity of the liver with some surface nodularity suggesting underlying fibrosis/cirrhosis. Small volume ascites. Tiny nonobstructing bilateral intrarenal stones. Hysterectomy. Mild colonic diverticulosis. Moderate   anasarca.      Impression    IMPRESSION:  1. Patchy opacities throughout much of the right lung suspicious for infectious pneumonia, most prominently in the right lower lobe.  2. Intracardiac uptake about a TAVR and dense mitral annulus calcification suspicious for underlying endocarditis.  3. Heterogeneous uptake about both knees, more so on the right, indeterminate for underlying infection. Small right knee effusion present, amenable to aspiration.   XR Chest Port 1 View    Narrative    EXAM: XR CHEST PORT 1 VIEW  LOCATION: [facility]  DATE/TIME: 9/13/2022 3:39 PM    INDICATION: f u right PTX  COMPARISON: 09/12/2022 and prior      Impression    IMPRESSION: Right pneumothorax no longer definitely visualized. Decreased subcutaneous gas along the right lateral chest wall. Similar opacification of the right lung base, likely right pleural effusion and superimposed atelectasis and/or airspace   disease. A small left-sided pleural effusion is slightly decreased.     MR Brain w/o & w Contrast    Narrative    EXAM: MR BRAIN W/O and W CONTRAST  LOCATION: [facility]  DATE/TIME: 9/16/2022 1:00 PM    INDICATION: High grade bacteremia, TAVR, endocarditis, concern for intracranial septic emboli.  COMPARISON: None.  CONTRAST: 7 mL Gadavist.  TECHNIQUE: Routine multiplanar multisequence head MRI without and with intravenous contrast.    FINDINGS:  INTRACRANIAL CONTENTS: Punctate focus of  cortically-based diffusion restriction in the superior right frontoparietal region with an additional tiny focus of diffusion restriction in the right frontoparietal white matter. Tiny focus of diffusion   restriction in the posterior superior left temporal lobe. These demonstrate no associated abnormal enhancement and no accompanying FLAIR signal abnormality. No intracranial hemorrhage or extra-axial collection. Mild to moderate burden scattered chronic   small vessel ischemic change. Moderate to severe diffuse parenchymal volume loss. Ventricular size and configuration is in keeping with this volume loss. Major intracranial vascular flow voids are preserved. 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 Cerebellar tonsils are normally positioned.    SELLA: No abnormality accounting for technique.    OSSEOUS STRUCTURES/SOFT TISSUES: Normal marrow signal.    ORBITS: Prior bilateral cataract surgery. Visualized portions of the orbits are otherwise unremarkable.     SINUSES/MASTOIDS: Mild mucosal thickening scattered about the paranasal sinuses. Small amount of fluid in the right mastoid air cells.       Impression    IMPRESSION:  1.  Punctate foci of acute infarct in the posterior superior right frontal lobe, the right frontal parietal white matter, and the posterior superior left temporal lobe. As these are scattered about multiple vascular distributions, punctate embolic   infarcts are favored. This may simply reflect bland emboli. While there is no accompanying abnormal enhancement or adjacent FLAIR hyperintensity and no findings to suggest meningitis or encephalitis, the possibility of septic emboli is not completely   excluded given the background clinical context.    2.  Age-related changes include moderate to severe diffuse parenchymal volume loss with a mild to moderate burden scattered chronic small vessel ischemic change.   Echocardiogram Complete     Value    LVEF  60-65%    Narrative     [id number]  [id number]  [id number]  [id number]^CAMILLE^PAT^[place] [address]     Name: LEELEE MARIN  MRN: [id number]  : 1950  Study Date: 2022 02:42 PM  Age: 72 yrs  Gender: Female  Patient Location: [facility]  Reason For Study: Abnormal Heart Sound  Ordering Physician: PAT OBRIEN  Performed By: SANDI     BSA: 1.7 m2  Height: 61 in  Weight: 148 lb  HR: 81  BP: 120/66 mmHg  ______________________________________________________________________________  Procedure  Complete Portable Echo Adult. Compared to the prior study dated 10/26/2021,  there have been no changes.  ______________________________________________________________________________  Interpretation Summary     1.Left ventricular size, wall motion and function are normal. The ejection  fraction is 60-65%.  2.The right ventricle is normal size. Mildly decreased right ventricular  systolic function  3.The left atrium is moderate to severely dilated.  4.Thickened mitral leaflets without stenosis. There is mild to moderate (1-2+)  mitral regurgitation.  5.There is a 23 ISABELLA 3 bioprosthetic valve present in aortic valve position.  At heart rate of 99 bpm peak velocity is 2.3 m/s, mean gradient is 9 mmHg,  dimensionless index 0.48.  6.IVC diameter >2.1 cm collapsing <50% with sniff suggests a high RA pressure  estimated at 15 mmHg or greater.  Compared to the prior study dated 10/26/2021, the Tricuspid regurgitation is  really not sampled well, left atrium is further enlarged, no essential change  for the valve with prior peak velocity of 2.8 m/s and mean gradient of 16  mmHg.  ______________________________________________________________________________  I      WMSI = 1.00     % Normal = 100     X - Cannot   0 -                      (2) - Mildly 2 -          Segments  Size  Interpret    Hyperkinetic 1 - Normal  Hypokinetic  Hypokinetic  1-2     small                                                      7 -          3-5      moderate  3 - Akinetic 4 -          5 -         6 - Akinetic Dyskinetic   6-14    large               Dyskinetic   Aneurysmal  w/scar       w/scar       15-16   diffuse     Left Ventricle  Left ventricular size, wall motion and function are normal. The ejection  fraction is 60-65%. There is normal left ventricular wall thickness. Left  ventricular diastolic function is normal. No regional wall motion  abnormalities noted.     Right Ventricle  The right ventricle is normal size. Mildly decreased right ventricular  systolic function. TAPSE is normal, which is consistent with normal right  ventricular systolic function.     Atria  The left atrium is moderate to severely dilated. Right atrial size is normal.  There is no color Doppler evidence of an atrial shunt.     Mitral Valve  Thickened mitral valve anterior leaflet. Thickened mitral valve posterior  leaflet. There is mild to moderate (1-2+) mitral regurgitation. There is no  mitral valve stenosis.     Tricuspid Valve  The tricuspid valve is not well visualized, but is grossly normal. Right  ventricle systolic pressure estimate normal. There is trace to mild tricuspid  regurgitation. There is no tricuspid stenosis.     Aortic Valve  There is a ISABELLA 3 bioprosthetic valve present in aortic valve position. 23  At heart rate of 99 bpm peak velocity is 2.3 m/s, mean gradient is 9 mmHg,  dimensionless index 0.48.     Pulmonic Valve  The pulmonic valve is not well seen, but is grossly normal. This degree of  valvular regurgitation is within normal limits. There is no pulmonic valvular  stenosis.     Vessels  The aorta root is normal. Normal size ascending aorta. IVC diameter >2.1 cm  collapsing <50% with sniff suggests a high RA pressure estimated at 15 mmHg or  greater.     Pericardium  There is no pericardial effusion.     Rhythm  Sinus rhythm was noted.      ______________________________________________________________________________  MMode/2D Measurements & Calculations  IVSd: 0.74 cm  LVIDd: 4.7 cm  LVIDs: 3.3 cm  LVPWd: 0.75 cm  FS: 28.2 %     LV mass(C)d: 109.3 grams  LV mass(C)dI: 65.7 grams/m2  Ao root diam: 2.5 cm  LA dimension: 5.8 cm  LA/Ao: 2.3  LVOT diam: 1.6 cm  LVOT area: 2.0 cm2  LA Volume Indexed (AL/bp): 86.6 ml/m2  RWT: 0.32     Time Measurements  Aortic HR: 106.0 BPM  MM HR: 91.0 BPM     Doppler Measurements & Calculations  MV E max thomas: 102.3 cm/sec  MV A max thomas: 108.5 cm/sec  MV E/A: 0.94  MV dec slope: 322.0 cm/sec2  MV dec time: 0.32 sec  Ao V2 max: 204.0 cm/sec  Ao max P.0 mmHg  Ao V2 mean: 165.0 cm/sec  Ao mean P.0 mmHg  Ao V2 VTI: 44.8 cm  KP(I,D): 0.80 cm2  KP(V,D): 0.96 cm2  LV V1 max PG: 3.8 mmHg  LV V1 max: 97.6 cm/sec  LV V1 VTI: 17.8 cm  CO(LVOT): 3.8 l/min  CI(LVOT): 2.3 l/min/m2  SV(LVOT): 35.7 ml  SI(LVOT): 21.5 ml/m2  TR max thomas: 244.0 cm/sec  TR max P.8 mmHg  AV Thomas Ratio (DI): 0.48  KP Index (cm2/m2): 0.48  E/E': 16.5  E/E' av.0  Lateral E/e': 20.0  Medial E/e': 13.9  Peak E' Thomas: 6.2 cm/sec     ______________________________________________________________________________  Report approved by: Yonatan Arroyo 2022 04:29 PM         Echocardiogram EMILY    Narrative    [id number]  [id number]  [id number]^ELLIS^LES^[place] [address]     Name: LEELEE MARIN  MRN: [id number]  : 1950  Study Date: 2022 11:31 AM  Age: 72 yrs  Gender: Female  Patient Location: [facility]  Reason For Study: Atrial Fibrillation  Ordering Physician: JEY CORRAL  Performed By: FR-BP     BSA: 1.7 m2  Height: 61 in  Weight: 150 lb  HR: 130  ______________________________________________________________________________  Procedure  Complete EMILY Adult. 3D image acquisition, reconstruction, and real-time  interpretation was  performed.  ______________________________________________________________________________  Interpretation Summary     TRANSESOPHAGEAL ECHOCARDIOGRAM     1. Normal left ventricular size and systolic performance with a visually  estimated ejection fraction of 60-65%.  2. There is a bio-prosthetic aortic valve (documented 23 mm Bates Ryan 3  tissue valve).  Â  There is reasonable cusp separation on two-dimensional imaging.  Â  No aortic insufficiency is detected.  3. There is a 0.7x0.3 mobile vegetation associated with the aortic valve  prosthesis best visualized in the left ventricular outflow tract. There is  suspected abscess associated with the aortic valve in the 2-3 o'clock in short  axis.  4. There is moderate mitral insufficiency.  5. There is a 1.3x0.8 mobile vegetation associated with the posterior mitral  valve leaflet (atrial aspect of valve). There is a superimposed 1.3 cm mobile  linear strand associated with the vegetation. In addition there is a 1.1 x 0.5  cm vegetation associated with the anterior mitral valve leaflet.  6. There is moderate to severe left atrial enlargement. There is mild right  atrial enlargement.  7. No thrombus is detected within the left atrial appendage.  8. Echo contrast examination was performed using agitated NS as contrast  agent. The right heart opacity was good and the left heart was well  visualized. There was no evidence of right to left shunting during spontaneous  respiration or following release of Valsalva.  ______________________________________________________________________________  Left ventricle:  Normal left ventricular size and systolic performance with a visually  estimated ejection fraction of 60-65%. There is normal regional wall motion.  Left ventricular wall thickness is normal.     Right ventricle:  Normal right ventricular size and systolic performance.     Left atrium:  There is moderate to severe left atrial enlargement. The atrial septum  is  intact.     Right atrium:  There is mild right atrial enlargement.     Aortic valve:  There is a bio-prosthetic aortic valve (documented 23 mm Bates Ryan 3  tissue valve).  There is reasonable cusp separation on two-dimensional imaging. No aortic  insufficiency is detected. There is a 0.7x0.3 mobile vegetation associated  with the aortic valve prosthesis best visualized in the left ventricular  outflow tract. There is suspected abscess associated with the aortic valve in  the 2-3 o'clock in short axis.     Mitral valve:  There is a 1.3x0.8 mobile vegetation associated with the posterior mitral  valve leaflet (atrial aspect of valve). There is a superimposed 1.3 cm mobile  linear strand associated with the vegetation. In addition there is a 1.1 x 0.5  cm vegetation associated with the anterior mitral valve leaflet. There is  moderate mitral insufficiency.     Tricuspid valve:  The tricuspid valve is morphologically normal. There is mild tricuspid  insufficiency.     Pulmonic valve:  The pulmonic valve is grossly morphologically normal. There is trace pulmonic  insufficiency.     Thoracic aorta:  The aortic root and proximal ascending aorta are of normal dimension.     Pericardium:  There is no significant pericardial effusion.  ______________________________________________________________________________  The study was performed using a multiplane adult transducer. 2-D, colorflow  Doppler, and spectral Doppler analysis was performed. Informed consent was  obtained prior to the procedure. The patient was assessed upon my arrival to  the procedure room. The patient was felt to be an appropriate candidate for  the procedure and planned sedation. Sedation: fentanyl 50 mcg & versed 0.5 mg  iv. Topical anesthesia was performed with viscous lidocaine and benzocaine  spray. The transducer was introduced without difficulty. Heart rate,  respiratory rate, oxygen saturations, blood pressure, and response to care  were  monitored throughout the procedure with nursing assistance. There were no  complications of the procedure. Total sedation time: 34 minutes of continuous  bedside 1:1 monitoring.  ______________________________________________________________________________  EMILY  Patient was sedated using Versed 0.5 mg. The heart rate, respiratory rate,  oxygen saturations, blood pressure, and response to care were monitored  throughout the procedure with the assistance of the nurse. Patient was sedated  using Fentanyl 50 mcg. 15 ml viscous Lidocaine, 11 sprays of Benzocaine. I  determined this patient to be an appropriate candidate for the planned  sedation and procedure and have reassessed the patient immediately prior to  sedation and procedure. Total sedation time: 34 minutes of continuous bedside  1:1 monitoring.  ______________________________________________________________________________  Doppler Measurements & Calculations  MR ERO: 0.15 cm2  MR volume: 17.2 ml  TR max beena: 243.8 cm/sec  TR max P.8 mmHg     ______________________________________________________________________________  Report approved by: Yonatan Parrish 2022 01:45 PM               Discharge Medications   Current Discharge Medication List      START taking these medications    Details   !! acetaminophen (TYLENOL) 325 MG tablet Take 3 tablets (975 mg) by mouth every 8 hours    Associated Diagnoses: Hospice care patient      !! acetaminophen (TYLENOL) 325 MG tablet Take 3 tablets (975 mg) by mouth daily as needed for mild pain    Associated Diagnoses: Hospice care patient      amiodarone (PACERONE) 200 MG tablet Take 1 tablet (200 mg) by mouth daily    Associated Diagnoses: Hospice care patient      diclofenac (VOLTAREN) 1 % topical gel Apply 2 g topically 4 times daily    Associated Diagnoses: Hospice care patient      dronabinol (MARINOL) 5 MG capsule Take 1 capsule (5 mg) by mouth 2 times daily    Associated Diagnoses: Hospice care  patient      famotidine (PEPCID) 20 MG tablet Take 1 tablet (20 mg) by mouth daily    Associated Diagnoses: Hospice care patient      Lidocaine (LIDOCARE) 4 % Patch Place 2 patches onto the skin every 24 hours To prevent lidocaine toxicity, patient should be patch free for 12 hrs daily.    Associated Diagnoses: Hospice care patient      lidocaine (LMX4) 4 % external cream Apply topically every hour as needed for pain (with VAD insertion)    Associated Diagnoses: Hospice care patient      LORazepam (ATIVAN) 0.5 MG tablet Take 0.5 tablets (0.25 mg) by mouth every 2 hours as needed for anxiety    Associated Diagnoses: Hospice care patient      !! melatonin 5 MG tablet Take 1 tablet (5 mg) by mouth every evening as needed for sleep    Associated Diagnoses: Hospice care patient      morphine 10 MG/5ML solution Take 7.5 mLs (15 mg) by mouth every 2 hours as needed for moderate to severe pain  Refills: 0    Associated Diagnoses: Hospice care patient      OLANZapine (ZYPREXA) 1 mg/ml suspension Take 5 mLs (5 mg) by mouth every 6 hours as needed for agitation    Associated Diagnoses: Hospice care patient      OLANZapine zydis (ZYPREXA) 5 MG ODT Take 1 tablet (5 mg) by mouth every 6 hours as needed for agitation or other (halluciations)    Associated Diagnoses: Hospice care patient      ondansetron (ZOFRAN ODT) 4 MG ODT tab Take 1 tablet (4 mg) by mouth every 6 hours as needed for nausea or vomiting    Associated Diagnoses: Hospice care patient      oxyCODONE (ROXICODONE) 5 MG tablet Take 1 tablet (5 mg) by mouth every 6 hours  Refills: 0    Associated Diagnoses: Hospice care patient      polyethylene glycol (MIRALAX) 17 GM/Dose powder Take 17 g by mouth daily  Qty: 510 g    Associated Diagnoses: Hospice care patient      prochlorperazine (COMPAZINE) 25 MG suppository Place 0.5 suppositories (12.5 mg) rectally every 12 hours as needed for nausea or vomiting    Associated Diagnoses: Hospice care patient      prochlorperazine  (COMPAZINE) 5 MG tablet Take 1 tablet (5 mg) by mouth every 6 hours as needed for vomiting    Associated Diagnoses: Hospice care patient      !! senna-docusate (SENOKOT-S/PERICOLACE) 8.6-50 MG tablet Take 1 tablet by mouth 2 times daily as needed for constipation    Associated Diagnoses: Hospice care patient      !! senna-docusate (SENOKOT-S/PERICOLACE) 8.6-50 MG tablet Take 2 tablets by mouth 2 times daily as needed for constipation    Associated Diagnoses: Hospice care patient      sennosides (SENOKOT) 8.6 MG tablet Take 1 tablet by mouth 2 times daily    Associated Diagnoses: Hospice care patient      sulfamethoxazole-trimethoprim (BACTRIM DS) 800-160 MG tablet Take 1 tablet by mouth 2 times daily    Associated Diagnoses: Hospice care patient       !! - Potential duplicate medications found. Please discuss with provider.      CONTINUE these medications which have NOT CHANGED    Details   diphenhydrAMINE (BENADRYL) 25 MG tablet Take 25 mg by mouth as needed      EPINEPHrine (EPIPEN/ADRENACLICK/AUVI-Q) 0.3 mg/0.3 mL injection 0.3 mg as needed     Comments: Please dispense product with lowest cost to patient      fish oil-omega-3 fatty acids 1000 MG capsule Take 1 g by mouth daily      ginkgo biloba 60 MG CAPS capsule Take 120 mg by mouth daily      lidocaine (ASPERCREME LIDOCAINE) 4 % external cream Apply 1 g topically once as needed for mild pain      !! Melatonin 10 MG TABS tablet Take 30 mg by mouth At Bedtime      omeprazole 20 MG tablet Take 20 mg by mouth daily      torsemide (DEMADEX) 20 MG tablet Take 1 tablet (20 mg) by mouth daily  Qty: 90 tablet, Refills: 3    Associated Diagnoses: S/P TAVR (transcatheter aortic valve replacement)      traZODone (DESYREL) 150 MG tablet Take 150 mg by mouth At Bedtime       venlafaxine (EFFEXOR-XR) 150 MG 24 hr capsule [VENLAFAXINE (EFFEXOR-XR) 150 MG 24 HR CAPSULE] Take 300 mg by mouth daily.       !! - Potential duplicate medications found. Please discuss with provider.       STOP taking these medications       aspirin (ASA) 81 MG EC tablet Comments:   Reason for Stopping:         atorvastatin (LIPITOR) 80 MG tablet Comments:   Reason for Stopping:         levothyroxine (SYNTHROID, LEVOTHROID) 88 MCG tablet Comments:   Reason for Stopping:         losartan (COZAAR) 25 MG tablet Comments:   Reason for Stopping:         potassium chloride ER (KLOR-CON M) 20 MEQ CR tablet Comments:   Reason for Stopping:             Allergies   Allergies   Allergen Reactions     Bee Venom Anaphylaxis     Other Drug Allergy (See Comments) Hives     Ice Packs   Any kind of cold      Dilaudid [Hydromorphone] Other (See Comments)     Altered mental status, confusion, itching. Pt tolerates oxycodone.     Latex      hives

## 2022-10-06 NOTE — Clinical Note
Temporary pacemaker Rate= 40bpmPaced mA= 10Pacer wire was captured appropriately, Pacer is set, Demand on Standby and Pacing catheter was removed per order per cousin no h/o depression or suicide attempts

## 2023-12-14 NOTE — PLAN OF CARE
Problem: Plan of Care - These are the overarching goals to be used throughout the patient stay.    Goal: Plan of Care Review/Shift Note  Description: The Plan of Care Review/Shift note should be completed every shift.  The Outcome Evaluation is a brief statement about your assessment that the patient is improving, declining, or no change.  This information will be displayed automatically on your shift note.  Outcome: Ongoing, Progressing   Goal Outcome Evaluation:    Patient has been cooperative with cares.  Patient is lethargic, but easily aroused by voice.  Patient having appropriate conversation all evening.  Patient has generalized weakness, but otherwise no neuro deficits noted.  Patient's vital signs have remained stable, and telemetry NSR.  Patient did have a low grade temp 100.1, and was given her scheduled tylenol for comfort.  Patient was given IV morphine earlier this evening for her back pain, and was repositioned.  Patient has not wanted anything further for pain this evening, and has been resting comfortably.  Patient remains on 2 liters nasal cannula, and o2 saturations mid 90s.  Patient is on continuous pulse oximetry.  Patient has eaten very little this evening, as she states she's not hungry.                         [Age Appropriate] : age appropriate developmental milestones not met [FreeTextEntry1] : 6 lbs 9 oz [FreeTextEntry3] : left club foot casting and bracing. Walked at about 1 year

## (undated) DEVICE — SU MONOCRYL 4-0 PS-2 27" UND Y426H

## (undated) DEVICE — INTRO TERUMO 6FRX25CM W/MARKER RSB603

## (undated) DEVICE — TUBING SUCTION 10'X3/16" N510

## (undated) DEVICE — SET INTRODUCER SHEATH 16FR 916ES

## (undated) DEVICE — CLIP HORIZON SM RED WIDE SLOT 001201

## (undated) DEVICE — CATH SYSTEM SENTINEL CEREBRAL PROTECTION 6FR CMS15-10C-US

## (undated) DEVICE — Device

## (undated) DEVICE — SU SILK 0 TIE 6X30" A306H

## (undated) DEVICE — DRSG PRIMAPORE 03 1/8X6" 66000318

## (undated) DEVICE — CLIP HORIZON MED BLUE 002200

## (undated) DEVICE — ELECTRODE ADULT PACING MULTI P-211-M1

## (undated) DEVICE — WIRE GUIDE 0.035"X260CM AMPTLAZ XSTIFF CVD THSCF-35-260-3-A

## (undated) DEVICE — GUIDEWIRE ASAHI GRAND SLAM 300CM J-TIP AG141302J

## (undated) DEVICE — SPONGE LAP 18X18" X8435

## (undated) DEVICE — STARTER GUIDEWIRE STRAIGHT FIXED CORE

## (undated) DEVICE — TOURNIQUET VASCULAR KIT 7 1/2" 79012

## (undated) DEVICE — CATH ANGIO SUPERTORQUE AL1 6FRX100CM 532-645

## (undated) DEVICE — SYR ANGIOGRAPHY MULTIUSE KIT ACIST 014612

## (undated) DEVICE — VALVE HEMOSTASIS .096" COPILOT MECH 1003331

## (undated) DEVICE — INFLATION DEVICE ATRION QL2530

## (undated) DEVICE — SYR BULB IRRIG DOVER 60 ML LATEX FREE 67000

## (undated) DEVICE — CONNECTOR STOPCOCK 3-WAY HIGH PRESSURE (NAMIC) H965700550091

## (undated) DEVICE — INTRO MICRO MINI STICK 4FR STIFF NITINOL 45-753

## (undated) DEVICE — GUIDEWIRE VASC SAFARI2 0.035X275CM H74939406XS1

## (undated) DEVICE — SLEEVE TR BAND RADIAL COMPRESSION DEVICE 24CM TRB24-REG

## (undated) DEVICE — CUSTOM PACK CORONARY SAN5BCRHEA

## (undated) DEVICE — SU VICRYL 3-0 FS-1 27" J442H

## (undated) DEVICE — SHEATH GLIDE RADIAL 5FR 10CM .021

## (undated) DEVICE — SU PROLENE 5-0 RB-2DA 30" 8710H

## (undated) DEVICE — MANIFOLD KIT ANGIO AUTOMATED 014613

## (undated) DEVICE — SUTURE BOOTS 051003PBX

## (undated) DEVICE — SET INTRODUCER SHEATH 14FR 914ES

## (undated) DEVICE — ESU ELEC BLADE 2.75" COATED/INSULATED E1455

## (undated) DEVICE — SURGICEL HEMOSTAT 4X8" 1952

## (undated) DEVICE — INTRO MICRO MINI STICK 4FR STD NITINOL

## (undated) DEVICE — KIT HAND CONTROL ACIST 014644 AR-P54

## (undated) DEVICE — DRAPE CV INCISE CVARTS 89466

## (undated) DEVICE — KNIFE HANDLE W/10 BLADE 371610

## (undated) DEVICE — GUIDEWIRE JTIP 3MM .035 180CM IQ35F180J3

## (undated) DEVICE — EXCHANGE WIRE .035 260 STAR/JFC/035/260/ M001491681

## (undated) DEVICE — CATH GUIDING 5FR X 100CM LA5PAPA

## (undated) DEVICE — ADH SKIN CLOSURE PREMIERPRO EXOFIN 1.0ML 3470

## (undated) DEVICE — SU PROLENE 4-0 SHDA 36" 8521H

## (undated) DEVICE — SU PROLENE 6-0 C-1DA 30" 8706H

## (undated) DEVICE — VESSEL LOOPS YELLOW MAXI 31145694

## (undated) DEVICE — RAD KNIFE HANDLE W/11 BLADE DISPOSABLE 371611

## (undated) RX ORDER — ASPIRIN 325 MG
TABLET ORAL
Status: DISPENSED
Start: 2021-08-30

## (undated) RX ORDER — HEPARIN SODIUM 1000 [USP'U]/ML
INJECTION, SOLUTION INTRAVENOUS; SUBCUTANEOUS
Status: DISPENSED
Start: 2021-09-28

## (undated) RX ORDER — DIAZEPAM 5 MG
TABLET ORAL
Status: DISPENSED
Start: 2021-08-30

## (undated) RX ORDER — CEFAZOLIN SODIUM 2 G/100ML
INJECTION, SOLUTION INTRAVENOUS
Status: DISPENSED
Start: 2021-09-28

## (undated) RX ORDER — FENTANYL CITRATE 50 UG/ML
INJECTION, SOLUTION INTRAMUSCULAR; INTRAVENOUS
Status: DISPENSED
Start: 2021-09-28

## (undated) RX ORDER — ASPIRIN 325 MG
TABLET ORAL
Status: DISPENSED
Start: 2021-09-28

## (undated) RX ORDER — FENTANYL CITRATE 50 UG/ML
INJECTION, SOLUTION INTRAMUSCULAR; INTRAVENOUS
Status: DISPENSED
Start: 2021-08-30

## (undated) RX ORDER — SODIUM CHLORIDE 9 MG/ML
INJECTION, SOLUTION INTRAVENOUS
Status: DISPENSED
Start: 2021-09-28